# Patient Record
Sex: FEMALE | Race: WHITE | NOT HISPANIC OR LATINO | URBAN - METROPOLITAN AREA
[De-identification: names, ages, dates, MRNs, and addresses within clinical notes are randomized per-mention and may not be internally consistent; named-entity substitution may affect disease eponyms.]

---

## 2017-03-29 ENCOUNTER — INPATIENT (INPATIENT)
Facility: HOSPITAL | Age: 47
LOS: 3 days | Discharge: HOME | DRG: 101 | End: 2017-04-02
Attending: INTERNAL MEDICINE | Admitting: HOSPITALIST
Payer: COMMERCIAL

## 2017-03-29 VITALS
DIASTOLIC BLOOD PRESSURE: 110 MMHG | HEART RATE: 121 BPM | OXYGEN SATURATION: 98 % | RESPIRATION RATE: 22 BRPM | SYSTOLIC BLOOD PRESSURE: 157 MMHG

## 2017-03-29 DIAGNOSIS — R56.9 UNSPECIFIED CONVULSIONS: ICD-10-CM

## 2017-03-29 DIAGNOSIS — E78.5 HYPERLIPIDEMIA, UNSPECIFIED: ICD-10-CM

## 2017-03-29 DIAGNOSIS — E11.9 TYPE 2 DIABETES MELLITUS WITHOUT COMPLICATIONS: ICD-10-CM

## 2017-03-29 DIAGNOSIS — E87.2 ACIDOSIS: ICD-10-CM

## 2017-03-29 DIAGNOSIS — E87.1 HYPO-OSMOLALITY AND HYPONATREMIA: ICD-10-CM

## 2017-03-29 DIAGNOSIS — R00.0 TACHYCARDIA, UNSPECIFIED: ICD-10-CM

## 2017-03-29 DIAGNOSIS — E03.9 HYPOTHYROIDISM, UNSPECIFIED: ICD-10-CM

## 2017-03-29 DIAGNOSIS — Z41.8 ENCOUNTER FOR OTHER PROCEDURES FOR PURPOSES OTHER THAN REMEDYING HEALTH STATE: ICD-10-CM

## 2017-03-29 DIAGNOSIS — F25.9 SCHIZOAFFECTIVE DISORDER, UNSPECIFIED: ICD-10-CM

## 2017-03-29 LAB
ALBUMIN SERPL ELPH-MCNC: 4.9 G/DL — SIGNIFICANT CHANGE UP (ref 3.3–5)
ALP SERPL-CCNC: 88 U/L — SIGNIFICANT CHANGE UP (ref 40–120)
ALT FLD-CCNC: 13 U/L RC — SIGNIFICANT CHANGE UP (ref 10–45)
ANION GAP SERPL CALC-SCNC: 15 MMOL/L — SIGNIFICANT CHANGE UP (ref 5–17)
ANION GAP SERPL CALC-SCNC: 16 MMOL/L — SIGNIFICANT CHANGE UP (ref 5–17)
ANION GAP SERPL CALC-SCNC: 26 MMOL/L — HIGH (ref 5–17)
APTT BLD: 31 SEC — SIGNIFICANT CHANGE UP (ref 27.5–37.4)
AST SERPL-CCNC: 11 U/L — SIGNIFICANT CHANGE UP (ref 10–40)
BASE EXCESS BLDV CALC-SCNC: -5.6 MMOL/L — LOW (ref -2–2)
BASOPHILS # BLD AUTO: 0 K/UL — SIGNIFICANT CHANGE UP (ref 0–0.2)
BASOPHILS NFR BLD AUTO: 0.4 % — SIGNIFICANT CHANGE UP (ref 0–2)
BILIRUB SERPL-MCNC: 0.9 MG/DL — SIGNIFICANT CHANGE UP (ref 0.2–1.2)
BUN SERPL-MCNC: 4 MG/DL — LOW (ref 7–23)
BUN SERPL-MCNC: 6 MG/DL — LOW (ref 7–23)
BUN SERPL-MCNC: 7 MG/DL — SIGNIFICANT CHANGE UP (ref 7–23)
CA-I SERPL-SCNC: 1.23 MMOL/L — SIGNIFICANT CHANGE UP (ref 1.12–1.3)
CALCIUM SERPL-MCNC: 9.2 MG/DL — SIGNIFICANT CHANGE UP (ref 8.4–10.5)
CALCIUM SERPL-MCNC: 9.2 MG/DL — SIGNIFICANT CHANGE UP (ref 8.4–10.5)
CALCIUM SERPL-MCNC: 9.8 MG/DL — SIGNIFICANT CHANGE UP (ref 8.4–10.5)
CHLORIDE BLDV-SCNC: 106 MMOL/L — SIGNIFICANT CHANGE UP (ref 96–108)
CHLORIDE SERPL-SCNC: 101 MMOL/L — SIGNIFICANT CHANGE UP (ref 96–108)
CHLORIDE SERPL-SCNC: 101 MMOL/L — SIGNIFICANT CHANGE UP (ref 96–108)
CHLORIDE SERPL-SCNC: 86 MMOL/L — LOW (ref 96–108)
CK SERPL-CCNC: 82 U/L — SIGNIFICANT CHANGE UP (ref 25–170)
CO2 BLDV-SCNC: 22 MMOL/L — SIGNIFICANT CHANGE UP (ref 22–30)
CO2 SERPL-SCNC: 15 MMOL/L — LOW (ref 22–31)
CO2 SERPL-SCNC: 19 MMOL/L — LOW (ref 22–31)
CO2 SERPL-SCNC: 20 MMOL/L — LOW (ref 22–31)
CREAT SERPL-MCNC: 0.62 MG/DL — SIGNIFICANT CHANGE UP (ref 0.5–1.3)
CREAT SERPL-MCNC: 0.81 MG/DL — SIGNIFICANT CHANGE UP (ref 0.5–1.3)
CREAT SERPL-MCNC: 0.84 MG/DL — SIGNIFICANT CHANGE UP (ref 0.5–1.3)
EOSINOPHIL # BLD AUTO: 0 K/UL — SIGNIFICANT CHANGE UP (ref 0–0.5)
EOSINOPHIL NFR BLD AUTO: 0.5 % — SIGNIFICANT CHANGE UP (ref 0–6)
ETHANOL SERPL-MCNC: SIGNIFICANT CHANGE UP MG/DL (ref 0–10)
GAS PNL BLDV: 134 MMOL/L — LOW (ref 136–145)
GAS PNL BLDV: SIGNIFICANT CHANGE UP
GAS PNL BLDV: SIGNIFICANT CHANGE UP
GLUCOSE BLDV-MCNC: 79 MG/DL — SIGNIFICANT CHANGE UP (ref 70–99)
GLUCOSE SERPL-MCNC: 141 MG/DL — HIGH (ref 70–99)
GLUCOSE SERPL-MCNC: 156 MG/DL — HIGH (ref 70–99)
GLUCOSE SERPL-MCNC: 85 MG/DL — SIGNIFICANT CHANGE UP (ref 70–99)
HCO3 BLDV-SCNC: 20 MMOL/L — LOW (ref 21–29)
HCT VFR BLD CALC: 50 % — HIGH (ref 34.5–45)
HCT VFR BLDA CALC: 48 % — SIGNIFICANT CHANGE UP (ref 39–50)
HGB BLD CALC-MCNC: 15.6 G/DL — HIGH (ref 11.5–15.5)
HGB BLD-MCNC: 16.9 G/DL — HIGH (ref 11.5–15.5)
INR BLD: 1.16 RATIO — SIGNIFICANT CHANGE UP (ref 0.88–1.16)
LACTATE BLDV-MCNC: 1.4 MMOL/L — SIGNIFICANT CHANGE UP (ref 0.7–2)
LYMPHOCYTES # BLD AUTO: 1.5 K/UL — SIGNIFICANT CHANGE UP (ref 1–3.3)
LYMPHOCYTES # BLD AUTO: 16.2 % — SIGNIFICANT CHANGE UP (ref 13–44)
MAGNESIUM SERPL-MCNC: 2.2 MG/DL — SIGNIFICANT CHANGE UP (ref 1.6–2.6)
MCHC RBC-ENTMCNC: 28.6 PG — SIGNIFICANT CHANGE UP (ref 27–34)
MCHC RBC-ENTMCNC: 33.7 GM/DL — SIGNIFICANT CHANGE UP (ref 32–36)
MCV RBC AUTO: 84.8 FL — SIGNIFICANT CHANGE UP (ref 80–100)
MONOCYTES # BLD AUTO: 0.7 K/UL — SIGNIFICANT CHANGE UP (ref 0–0.9)
MONOCYTES NFR BLD AUTO: 7.4 % — SIGNIFICANT CHANGE UP (ref 2–14)
NEUTROPHILS # BLD AUTO: 6.9 K/UL — SIGNIFICANT CHANGE UP (ref 1.8–7.4)
NEUTROPHILS NFR BLD AUTO: 75.5 % — SIGNIFICANT CHANGE UP (ref 43–77)
PCO2 BLDV: 44 MMHG — SIGNIFICANT CHANGE UP (ref 35–50)
PH BLDV: 7.29 — LOW (ref 7.35–7.45)
PHOSPHATE SERPL-MCNC: 3.2 MG/DL — SIGNIFICANT CHANGE UP (ref 2.5–4.5)
PLATELET # BLD AUTO: 278 K/UL — SIGNIFICANT CHANGE UP (ref 150–400)
PO2 BLDV: 38 MMHG — SIGNIFICANT CHANGE UP (ref 25–45)
POTASSIUM BLDV-SCNC: 3.8 MMOL/L — SIGNIFICANT CHANGE UP (ref 3.5–5)
POTASSIUM SERPL-MCNC: 3.6 MMOL/L — SIGNIFICANT CHANGE UP (ref 3.5–5.3)
POTASSIUM SERPL-MCNC: 3.7 MMOL/L — SIGNIFICANT CHANGE UP (ref 3.5–5.3)
POTASSIUM SERPL-MCNC: 4.2 MMOL/L — SIGNIFICANT CHANGE UP (ref 3.5–5.3)
POTASSIUM SERPL-SCNC: 3.6 MMOL/L — SIGNIFICANT CHANGE UP (ref 3.5–5.3)
POTASSIUM SERPL-SCNC: 3.7 MMOL/L — SIGNIFICANT CHANGE UP (ref 3.5–5.3)
POTASSIUM SERPL-SCNC: 4.2 MMOL/L — SIGNIFICANT CHANGE UP (ref 3.5–5.3)
PROT SERPL-MCNC: 7.4 G/DL — SIGNIFICANT CHANGE UP (ref 6–8.3)
PROTHROM AB SERPL-ACNC: 12.7 SEC — SIGNIFICANT CHANGE UP (ref 9.8–12.7)
RBC # BLD: 5.9 M/UL — HIGH (ref 3.8–5.2)
RBC # FLD: 13.8 % — SIGNIFICANT CHANGE UP (ref 10.3–14.5)
SAO2 % BLDV: 67 % — SIGNIFICANT CHANGE UP (ref 67–88)
SODIUM SERPL-SCNC: 127 MMOL/L — LOW (ref 135–145)
SODIUM SERPL-SCNC: 135 MMOL/L — SIGNIFICANT CHANGE UP (ref 135–145)
SODIUM SERPL-SCNC: 137 MMOL/L — SIGNIFICANT CHANGE UP (ref 135–145)
WBC # BLD: 9.1 K/UL — SIGNIFICANT CHANGE UP (ref 3.8–10.5)
WBC # FLD AUTO: 9.1 K/UL — SIGNIFICANT CHANGE UP (ref 3.8–10.5)

## 2017-03-29 PROCEDURE — 70450 CT HEAD/BRAIN W/O DYE: CPT | Mod: 26

## 2017-03-29 PROCEDURE — 99285 EMERGENCY DEPT VISIT HI MDM: CPT | Mod: 25

## 2017-03-29 PROCEDURE — 71010: CPT | Mod: 26

## 2017-03-29 PROCEDURE — 99223 1ST HOSP IP/OBS HIGH 75: CPT | Mod: AI,GC

## 2017-03-29 PROCEDURE — 93010 ELECTROCARDIOGRAM REPORT: CPT

## 2017-03-29 RX ORDER — DEXTROSE 50 % IN WATER 50 %
25 SYRINGE (ML) INTRAVENOUS ONCE
Qty: 0 | Refills: 0 | Status: DISCONTINUED | OUTPATIENT
Start: 2017-03-29 | End: 2017-03-29

## 2017-03-29 RX ORDER — CLONAZEPAM 1 MG
1 TABLET ORAL
Qty: 0 | Refills: 0 | Status: DISCONTINUED | OUTPATIENT
Start: 2017-03-29 | End: 2017-04-02

## 2017-03-29 RX ORDER — LEVOTHYROXINE SODIUM 125 MCG
50 TABLET ORAL
Qty: 0 | Refills: 0 | Status: DISCONTINUED | OUTPATIENT
Start: 2017-03-29 | End: 2017-04-02

## 2017-03-29 RX ORDER — SODIUM CHLORIDE 9 MG/ML
1000 INJECTION, SOLUTION INTRAVENOUS
Qty: 0 | Refills: 0 | Status: DISCONTINUED | OUTPATIENT
Start: 2017-03-29 | End: 2017-03-29

## 2017-03-29 RX ORDER — DEXTROSE 50 % IN WATER 50 %
1 SYRINGE (ML) INTRAVENOUS ONCE
Qty: 0 | Refills: 0 | Status: DISCONTINUED | OUTPATIENT
Start: 2017-03-29 | End: 2017-03-29

## 2017-03-29 RX ORDER — LEVOTHYROXINE SODIUM 125 MCG
25 TABLET ORAL
Qty: 0 | Refills: 0 | Status: DISCONTINUED | OUTPATIENT
Start: 2017-03-29 | End: 2017-04-02

## 2017-03-29 RX ORDER — CHOLECALCIFEROL (VITAMIN D3) 125 MCG
2000 CAPSULE ORAL DAILY
Qty: 0 | Refills: 0 | Status: DISCONTINUED | OUTPATIENT
Start: 2017-03-29 | End: 2017-04-02

## 2017-03-29 RX ORDER — OXCARBAZEPINE 300 MG/1
300 TABLET, FILM COATED ORAL
Qty: 0 | Refills: 0 | Status: DISCONTINUED | OUTPATIENT
Start: 2017-03-29 | End: 2017-03-29

## 2017-03-29 RX ORDER — INSULIN LISPRO 100/ML
VIAL (ML) SUBCUTANEOUS AT BEDTIME
Qty: 0 | Refills: 0 | Status: DISCONTINUED | OUTPATIENT
Start: 2017-03-29 | End: 2017-04-02

## 2017-03-29 RX ORDER — RISPERIDONE 4 MG/1
4 TABLET ORAL
Qty: 0 | Refills: 0 | Status: DISCONTINUED | OUTPATIENT
Start: 2017-03-29 | End: 2017-04-02

## 2017-03-29 RX ORDER — DEXTROSE 50 % IN WATER 50 %
25 SYRINGE (ML) INTRAVENOUS ONCE
Qty: 0 | Refills: 0 | Status: DISCONTINUED | OUTPATIENT
Start: 2017-03-29 | End: 2017-04-02

## 2017-03-29 RX ORDER — OXCARBAZEPINE 300 MG/1
600 TABLET, FILM COATED ORAL
Qty: 0 | Refills: 0 | Status: DISCONTINUED | OUTPATIENT
Start: 2017-03-29 | End: 2017-03-29

## 2017-03-29 RX ORDER — SODIUM CHLORIDE 9 MG/ML
1000 INJECTION INTRAMUSCULAR; INTRAVENOUS; SUBCUTANEOUS ONCE
Qty: 0 | Refills: 0 | Status: COMPLETED | OUTPATIENT
Start: 2017-03-29 | End: 2017-03-29

## 2017-03-29 RX ORDER — SODIUM CHLORIDE 9 MG/ML
1000 INJECTION, SOLUTION INTRAVENOUS
Qty: 0 | Refills: 0 | Status: DISCONTINUED | OUTPATIENT
Start: 2017-03-29 | End: 2017-04-02

## 2017-03-29 RX ORDER — BENZTROPINE MESYLATE 1 MG
0.5 TABLET ORAL
Qty: 0 | Refills: 0 | Status: DISCONTINUED | OUTPATIENT
Start: 2017-03-29 | End: 2017-04-02

## 2017-03-29 RX ORDER — ACETAMINOPHEN 500 MG
650 TABLET ORAL EVERY 6 HOURS
Qty: 0 | Refills: 0 | Status: DISCONTINUED | OUTPATIENT
Start: 2017-03-29 | End: 2017-04-02

## 2017-03-29 RX ORDER — NICOTINE POLACRILEX 2 MG
1 GUM BUCCAL DAILY
Qty: 0 | Refills: 0 | Status: DISCONTINUED | OUTPATIENT
Start: 2017-03-29 | End: 2017-04-02

## 2017-03-29 RX ORDER — GLUCAGON INJECTION, SOLUTION 0.5 MG/.1ML
1 INJECTION, SOLUTION SUBCUTANEOUS ONCE
Qty: 0 | Refills: 0 | Status: DISCONTINUED | OUTPATIENT
Start: 2017-03-29 | End: 2017-03-29

## 2017-03-29 RX ORDER — DEXTROSE 50 % IN WATER 50 %
12.5 SYRINGE (ML) INTRAVENOUS ONCE
Qty: 0 | Refills: 0 | Status: DISCONTINUED | OUTPATIENT
Start: 2017-03-29 | End: 2017-03-29

## 2017-03-29 RX ORDER — ATORVASTATIN CALCIUM 80 MG/1
10 TABLET, FILM COATED ORAL AT BEDTIME
Qty: 0 | Refills: 0 | Status: DISCONTINUED | OUTPATIENT
Start: 2017-03-29 | End: 2017-04-02

## 2017-03-29 RX ORDER — PREGABALIN 225 MG/1
500 CAPSULE ORAL DAILY
Qty: 0 | Refills: 0 | Status: DISCONTINUED | OUTPATIENT
Start: 2017-03-29 | End: 2017-04-02

## 2017-03-29 RX ORDER — OXCARBAZEPINE 300 MG/1
600 TABLET, FILM COATED ORAL
Qty: 0 | Refills: 0 | Status: DISCONTINUED | OUTPATIENT
Start: 2017-03-29 | End: 2017-04-02

## 2017-03-29 RX ORDER — INSULIN LISPRO 100/ML
VIAL (ML) SUBCUTANEOUS
Qty: 0 | Refills: 0 | Status: DISCONTINUED | OUTPATIENT
Start: 2017-03-29 | End: 2017-04-02

## 2017-03-29 RX ORDER — ENOXAPARIN SODIUM 100 MG/ML
40 INJECTION SUBCUTANEOUS EVERY 24 HOURS
Qty: 0 | Refills: 0 | Status: DISCONTINUED | OUTPATIENT
Start: 2017-03-29 | End: 2017-04-02

## 2017-03-29 RX ORDER — INFLUENZA VIRUS VACCINE 15; 15; 15; 15 UG/.5ML; UG/.5ML; UG/.5ML; UG/.5ML
0.5 SUSPENSION INTRAMUSCULAR ONCE
Qty: 0 | Refills: 0 | Status: COMPLETED | OUTPATIENT
Start: 2017-03-29 | End: 2017-03-29

## 2017-03-29 RX ADMIN — SODIUM CHLORIDE 1000 MILLILITER(S): 9 INJECTION INTRAMUSCULAR; INTRAVENOUS; SUBCUTANEOUS at 04:42

## 2017-03-29 RX ADMIN — SODIUM CHLORIDE 1000 MILLILITER(S): 9 INJECTION INTRAMUSCULAR; INTRAVENOUS; SUBCUTANEOUS at 06:09

## 2017-03-29 RX ADMIN — Medication 1 MILLIGRAM(S): at 21:25

## 2017-03-29 RX ADMIN — Medication 1 MILLIGRAM(S): at 10:46

## 2017-03-29 RX ADMIN — ATORVASTATIN CALCIUM 10 MILLIGRAM(S): 80 TABLET, FILM COATED ORAL at 21:25

## 2017-03-29 RX ADMIN — Medication 2000 UNIT(S): at 17:43

## 2017-03-29 RX ADMIN — PREGABALIN 500 MICROGRAM(S): 225 CAPSULE ORAL at 17:43

## 2017-03-29 RX ADMIN — RISPERIDONE 4 MILLIGRAM(S): 4 TABLET ORAL at 17:44

## 2017-03-29 RX ADMIN — OXCARBAZEPINE 300 MILLIGRAM(S): 300 TABLET, FILM COATED ORAL at 17:43

## 2017-03-29 RX ADMIN — ENOXAPARIN SODIUM 40 MILLIGRAM(S): 100 INJECTION SUBCUTANEOUS at 17:42

## 2017-03-29 RX ADMIN — Medication 0.5 MILLIGRAM(S): at 17:44

## 2017-03-29 NOTE — H&P ADULT. - HISTORY OF PRESENT ILLNESS
45yo F with PMH of T2DM, HLD, schizoaffective disorder presents by ems s/p fall. pt found by  on bathroom floor s/p hearing "shriek" and "thud." pt was unconscious initially, awoke after brief period.  did witness "seizure like activity" but cannot describe in more detail. states she was "not making sense" when she awoke. she denies any complaints at this time, including headache, dizziness, cp, sob, numbness, weakness, change in vision, nausea. pt denies vomiting but appears to have vomited on self 45yo F with PMH of T2DM, HLD, and schizoaffective disorder presenting s/p fall. Per , around 3AM he heard a shriek and found the patient lying on her back in front of the bathroom.  states the patient was conscious when he found her. She had her eyes open and had "seizure-like activity." This activity was described as rhythmic movement of her arms, body, and head. However, per , patient was able to grab and hold on to both of his hands on command. When patient attempted to speak initially,  states that her speech was garbled and unclear. When EMS arrived, patient required multiple attempts to stand as she felt unsteady on her feet.    Patient was sleeping during history and physical. She was arousable but uncooperative with questioning, stating "just let me stay here."  states that there have not been any significant changes to patient's medications, mood, or health recently. Patient has not complained of headache, CP, SOB, change in vision, numbness, weakness, Abd Pain, N/V.    In the ED, patient was afebrile to 98.1, hypertensive to 152/110, tachycardic to 121, tachypneic to 22, and satting 98% on RA. She had positive orthostatics with 45yo F with PMH of T2DM, HLD, and schizoaffective disorder presenting s/p fall. Per , around 3AM he heard a shriek and found the patient lying on her back in front of the bathroom.  states the patient was conscious when he found her. She had her eyes open and had "seizure-like activity." This activity was described as rhythmic movement of her arms, body, and head. However, per , patient was able to grab and hold on to both of his hands on command. When patient attempted to speak initially,  states that her speech was garbled and unclear. When EMS arrived, patient required multiple attempts to stand as she felt unsteady on her feet.    Patient was sleeping during history and physical. She was arousable but uncooperative with questioning, stating "just let me stay here."  states that there have not been any significant changes to patient's medications, mood, or health recently. Patient has not complained of headache, CP, SOB, change in vision, numbness, weakness, Abd Pain, N/V.    In the ED, patient was afebrile to 98.1, hypertensive to 152/110, tachycardic to 121, tachypneic to 22, and satting 98% on RA. She had positive orthostatics with HR increase of 23 and diastolic decrease of 15 from sitting to standing. Patient was bolused 2L NS and given home dose of clonazepam 1mg.

## 2017-03-29 NOTE — H&P ADULT. - ATTENDING COMMENTS
47Y/O/F with PMH of T2DM, HLD, and schizoaffective disorder reported by her  that around 3 AM she found her lying on her back and not responding, initially staring at him then he noted that she is having rhythmic body movements and seizure like activity. She has no history of seizure disorder. Now, she is awake alert oriented without focal neurological deficit. There is also minor tongue bite in the lateral aspect of her tongue.   She was found to have hyponatremia with Sr. Sodium of 127, AG metabolic acidosis and hyperlactetemia. CT head was non diagnostic. I looked at her old lab which revealed mild chronic hyponatremia. She received NS bolus in the ED secondary to orthostatic hypotension. Repeat BMP revealed that her AG is closed and Serum sodium is increased to 137.   Seizure like activity- Neurology consulted EEG and MRI of brain. Seizure  precautions.   Hyponatremia – it appears like patient has history of hyponatremia and now its corrected,- will renal opinion and start hypotonic saline. Check serum osmolality and TSH and UA.  H/O schizoaffective disorder- will try to speak to her psychiatrist.   Plan of care discussed with the patient and her . They agree. ( patient's  also reported that she is not eating her meal on a regular basis)

## 2017-03-29 NOTE — ED PROVIDER NOTE - PHYSICAL EXAMINATION
CN II-XII intact. Visual fields intact. EOMI, PERRLA. Facial sensation equal bilat. Smile and eye closure equal bilat. Hearing intact bilat. Palate elevation equal, tongue protrusion midline. Lateral head rotation equal bilat. 5/5 strength UE and LE bilat. No pronator drift.

## 2017-03-29 NOTE — ED PROVIDER NOTE - OBJECTIVE STATEMENT
46F with pmh of DM II, HTN, HLD, schizoaffective disorder presents by ems s/p fall. pt found by  on bathroom floor s/p hearing "shriek" and "thud." pt was unconscious initially, awoke after brief period.  did witness "seizure like activity" but cannot describe in more detail. states she was "not making sense" when she awoke. she denies any complaints at this time, including headache, dizziness, cp, sob, numbness, weakness, change in vision, nausea. 46F with pmh of DM II, HTN, HLD, schizoaffective disorder presents by ems s/p fall. pt found by  on bathroom floor s/p hearing "shriek" and "thud." pt was unconscious initially, awoke after brief period.  did witness "seizure like activity" but cannot describe in more detail. states she was "not making sense" when she awoke. she denies any complaints at this time, including headache, dizziness, cp, sob, numbness, weakness, change in vision, nausea. pt denies vomiting but appears to have vomited on self.

## 2017-03-29 NOTE — H&P ADULT. - LAB RESULTS AND INTERPRETATION
hyponatremic. hemoconcentrated. AG acidosis with elevated lactate. negative cardiac enzymes. negative EtOH, Salicylate, acetaminophen levels.

## 2017-03-29 NOTE — ED PROVIDER NOTE - MEDICAL DECISION MAKING DETAILS
46F with schizoaffective disorder, htn, hld, presents s/p fall, possible seizure. pt initially uncooperative, agitated, but eventually agreed to evaluation. will obtain cth, cbc, cmp, vbg, ua, ekg. orthostatic bp. re-assess.

## 2017-03-29 NOTE — H&P ADULT. - PROBLEM SELECTOR PLAN 2
-positive orth -resolved, likely due to dehydration  -positive orthostatics in the ED  -tachycardia resolved after 2L bolus

## 2017-03-29 NOTE — ED PROVIDER NOTE - ATTENDING CONTRIBUTION TO CARE
47 yo F with schizoaffective disorder, htn, hld on multiple meds p/w s/p fall, possible seizure as  heard a loud thump and scream followed by him witnessing seizure activity followed by LOC for a few seconds. Pt has no recollection of the events prior to or following the incident. Dry liquid is visualized on the pt's right cheek from the edge of her mouth to her hair which is likely vomitus although pt denies. Pt says she has been compliant with her psych meds and denies overdose. Denies SI, HI, visual and auditory hallucinations. Pt initially uncooperative, agitated, but eventually agreed to evaluation. will obtain cth, cbc, cmp, vbg, ua, ekg, neuro eval for new onset seizure. orthostatic bp. re-assess.  PE: no signs of trauma, neuro intact  I performed a history and physical exam of the patient and discussed their management with the resident. I reviewed the resident's note and agree with the documented findings and plan of care. My medical decision making and observations are found above. 45 yo F with schizoaffective disorder, htn, hld on multiple meds p/w s/p fall, possible seizure as  heard a loud thump and scream followed by him witnessing seizure activity followed by LOC for a few seconds. Pt has no recollection of the events prior to or following the incident. Dry liquid is visualized on the pt's right cheek from the edge of her mouth to her hair which is likely vomitus although pt denies. Pt says she has been compliant with her psych meds and denies overdose. Denies SI, HI, visual and auditory hallucinations. Pt initially uncooperative, agitated, but eventually agreed to evaluation. will obtain cth, cbc, cmp, vbg, ua, ekg, neuro eval for new onset seizure vs. syncope vs electrolyte imbalance vs. withdrawal.   PE: no signs of trauma, neuro intact  I performed a history and physical exam of the patient and discussed their management with the resident. I reviewed the resident's note and agree with the documented findings and plan of care. My medical decision making and observations are found above.

## 2017-03-29 NOTE — ED ADULT NURSE REASSESSMENT NOTE - NS ED NURSE REASSESS COMMENT FT1
Pt received AA and Ox3.  Pt in no distress.  Pt denies cp, ha, dizziness or sob at this time.  Lungs cta b/l.  No seizure activity noted at this time.  On tele=SR.  Abdomen soft, nt/nd, (+)bs.  Pt denies abdominal pain, n/v or diarrhea at this time.  Left AC with 20G HL patent and intact.  VSS.  Pt admitted.  Pending bed availability.

## 2017-03-29 NOTE — H&P ADULT. - PROBLEM SELECTOR PLAN 1
-back to baseline mental status  -seen and evaluated by Neuro  -check routine EEG  -check MRI Brain w/o Contrast  -no role for AED at this time -unclear if true seizure: could have been secondary to hyponatremia ro benzo withdrawal  -seen and evaluated by Neuro  -check routine EEG  -check MRI Brain w/o Contrast  -no role for AED at this time -unclear if seizure vs psych event vs orthostasis  -seen and evaluated by Neuro  -check routine EEG  -check MRI Brain w/o Contrast  -no role for AED at this time

## 2017-03-29 NOTE — H&P ADULT. - PROBLEM SELECTOR PLAN 3
-may be secondary to dehydration +/- med-induced  -repeat BMP now, s/p hydration  -if not improving, check Serum Osm

## 2017-03-29 NOTE — ED ADULT NURSE REASSESSMENT NOTE - NS ED NURSE REASSESS COMMENT FT1
Pt. noted to have urinated in bed, pt. cleaned, new linen provided. Pt. refusing to be changed into new gown. No vomiting noted at this time. Moves all extremities. IVF being administered as ordered. Speech clear. Pending dispo.

## 2017-03-29 NOTE — H&P ADULT. - ASSESSMENT
45yo F with PMH of T2DM, HLD, and schizoaffective disorder presenting s/p fall with reported rhythmic body movements  found to be hyponatremic with positive orthostatics with concern for new onset seizures.

## 2017-03-29 NOTE — H&P ADULT. - RS GEN PE MLT RESP DETAILS PC
respirations non-labored/diminished breath sounds, R/airway patent/breath sounds equal/diminished breath sounds, L

## 2017-03-29 NOTE — ED ADULT NURSE NOTE - OBJECTIVE STATEMENT
45 yo F presents to ED via EMS s/p fall.  at bedside reports "seizure like activity" at home.  states he heard a "thump" and a "shriek" and found patient unresponsive on the floor, states patient was alert shortly after. Pt arrives with dried emesis on shirt and in hair. Pt. noted to have red area and small skin tear under left breast. No obvious injuries noted. Pt. moves all extremities. Skin warm pink and dry. Breathing unlabored on RA.  states patient is compliant with home medications. Pt. denies any pain/discomfort. Pt. denies SI/auditory/visual hallucinations. Comfort and safety maintained,  at bedside.

## 2017-03-29 NOTE — ED ADULT NURSE REASSESSMENT NOTE - NS ED NURSE REASSESS COMMENT FT1
Pt. urinated in bed for a second time. New sheets, linen and gown provided. Pt. denies urinary incontinence.

## 2017-03-29 NOTE — ED PROVIDER NOTE - CARE PLAN
Principal Discharge DX:	New onset seizure Principal Discharge DX:	New onset seizure  Secondary Diagnosis:	Tachycardia

## 2017-03-29 NOTE — H&P ADULT. - PROBLEM SELECTOR PLAN 4
-anion gap of 26 with elevated lactate  -may be secondary to dehydration vs new seizure  -recheck BMP, monitor gap

## 2017-03-30 LAB
ANION GAP SERPL CALC-SCNC: 13 MMOL/L — SIGNIFICANT CHANGE UP (ref 5–17)
ANION GAP SERPL CALC-SCNC: 13 MMOL/L — SIGNIFICANT CHANGE UP (ref 5–17)
APPEARANCE UR: CLEAR — SIGNIFICANT CHANGE UP
BACTERIA # UR AUTO: ABNORMAL /HPF
BASOPHILS # BLD AUTO: 0 K/UL — SIGNIFICANT CHANGE UP (ref 0–0.2)
BASOPHILS NFR BLD AUTO: 0.8 % — SIGNIFICANT CHANGE UP (ref 0–2)
BILIRUB UR-MCNC: NEGATIVE — SIGNIFICANT CHANGE UP
BUN SERPL-MCNC: 6 MG/DL — LOW (ref 7–23)
BUN SERPL-MCNC: 7 MG/DL — SIGNIFICANT CHANGE UP (ref 7–23)
CALCIUM SERPL-MCNC: 9.1 MG/DL — SIGNIFICANT CHANGE UP (ref 8.4–10.5)
CALCIUM SERPL-MCNC: 9.4 MG/DL — SIGNIFICANT CHANGE UP (ref 8.4–10.5)
CHLORIDE SERPL-SCNC: 102 MMOL/L — SIGNIFICANT CHANGE UP (ref 96–108)
CHLORIDE SERPL-SCNC: 102 MMOL/L — SIGNIFICANT CHANGE UP (ref 96–108)
CO2 SERPL-SCNC: 21 MMOL/L — LOW (ref 22–31)
CO2 SERPL-SCNC: 22 MMOL/L — SIGNIFICANT CHANGE UP (ref 22–31)
COLOR SPEC: COLORLESS — SIGNIFICANT CHANGE UP
CREAT SERPL-MCNC: 0.69 MG/DL — SIGNIFICANT CHANGE UP (ref 0.5–1.3)
CREAT SERPL-MCNC: 0.74 MG/DL — SIGNIFICANT CHANGE UP (ref 0.5–1.3)
DIFF PNL FLD: NEGATIVE — SIGNIFICANT CHANGE UP
EOSINOPHIL # BLD AUTO: 0 K/UL — SIGNIFICANT CHANGE UP (ref 0–0.5)
EOSINOPHIL NFR BLD AUTO: 0.8 % — SIGNIFICANT CHANGE UP (ref 0–6)
GLUCOSE SERPL-MCNC: 104 MG/DL — HIGH (ref 70–99)
GLUCOSE SERPL-MCNC: 107 MG/DL — HIGH (ref 70–99)
GLUCOSE UR QL: NEGATIVE — SIGNIFICANT CHANGE UP
HCT VFR BLD CALC: 43 % — SIGNIFICANT CHANGE UP (ref 34.5–45)
HGB BLD-MCNC: 14.6 G/DL — SIGNIFICANT CHANGE UP (ref 11.5–15.5)
KETONES UR-MCNC: NEGATIVE — SIGNIFICANT CHANGE UP
LEUKOCYTE ESTERASE UR-ACNC: NEGATIVE — SIGNIFICANT CHANGE UP
LYMPHOCYTES # BLD AUTO: 1.7 K/UL — SIGNIFICANT CHANGE UP (ref 1–3.3)
LYMPHOCYTES # BLD AUTO: 31.1 % — SIGNIFICANT CHANGE UP (ref 13–44)
MAGNESIUM SERPL-MCNC: 1.9 MG/DL — SIGNIFICANT CHANGE UP (ref 1.6–2.6)
MCHC RBC-ENTMCNC: 29.1 PG — SIGNIFICANT CHANGE UP (ref 27–34)
MCHC RBC-ENTMCNC: 34.1 GM/DL — SIGNIFICANT CHANGE UP (ref 32–36)
MCV RBC AUTO: 85.5 FL — SIGNIFICANT CHANGE UP (ref 80–100)
MONOCYTES # BLD AUTO: 0.4 K/UL — SIGNIFICANT CHANGE UP (ref 0–0.9)
MONOCYTES NFR BLD AUTO: 8.1 % — SIGNIFICANT CHANGE UP (ref 2–14)
NEUTROPHILS # BLD AUTO: 3.3 K/UL — SIGNIFICANT CHANGE UP (ref 1.8–7.4)
NEUTROPHILS NFR BLD AUTO: 59.1 % — SIGNIFICANT CHANGE UP (ref 43–77)
NITRITE UR-MCNC: NEGATIVE — SIGNIFICANT CHANGE UP
OSMOLALITY SERPL: 284 MOS/KG — SIGNIFICANT CHANGE UP (ref 275–300)
OSMOLALITY UR: 47 MOS/KG — LOW (ref 300–900)
PCP SPEC-MCNC: SIGNIFICANT CHANGE UP
PH UR: 5.5 — SIGNIFICANT CHANGE UP (ref 4.8–8)
PHOSPHATE SERPL-MCNC: 3.7 MG/DL — SIGNIFICANT CHANGE UP (ref 2.5–4.5)
PLATELET # BLD AUTO: 238 K/UL — SIGNIFICANT CHANGE UP (ref 150–400)
POTASSIUM SERPL-MCNC: 3.4 MMOL/L — LOW (ref 3.5–5.3)
POTASSIUM SERPL-MCNC: 3.8 MMOL/L — SIGNIFICANT CHANGE UP (ref 3.5–5.3)
POTASSIUM SERPL-SCNC: 3.4 MMOL/L — LOW (ref 3.5–5.3)
POTASSIUM SERPL-SCNC: 3.8 MMOL/L — SIGNIFICANT CHANGE UP (ref 3.5–5.3)
PROT UR-MCNC: NEGATIVE — SIGNIFICANT CHANGE UP
RBC # BLD: 5.03 M/UL — SIGNIFICANT CHANGE UP (ref 3.8–5.2)
RBC # FLD: 13.5 % — SIGNIFICANT CHANGE UP (ref 10.3–14.5)
RBC CASTS # UR COMP ASSIST: SIGNIFICANT CHANGE UP /HPF (ref 0–2)
SODIUM SERPL-SCNC: 136 MMOL/L — SIGNIFICANT CHANGE UP (ref 135–145)
SODIUM SERPL-SCNC: 137 MMOL/L — SIGNIFICANT CHANGE UP (ref 135–145)
SODIUM UR-SCNC: <20 MMOL/L — SIGNIFICANT CHANGE UP
SP GR SPEC: <1.005 — LOW (ref 1.01–1.02)
TSH SERPL-MCNC: 3.14 UIU/ML — SIGNIFICANT CHANGE UP (ref 0.27–4.2)
UROBILINOGEN FLD QL: NEGATIVE — SIGNIFICANT CHANGE UP
WBC # BLD: 5.5 K/UL — SIGNIFICANT CHANGE UP (ref 3.8–10.5)
WBC # FLD AUTO: 5.5 K/UL — SIGNIFICANT CHANGE UP (ref 3.8–10.5)

## 2017-03-30 PROCEDURE — 99233 SBSQ HOSP IP/OBS HIGH 50: CPT | Mod: GC

## 2017-03-30 RX ORDER — POTASSIUM CHLORIDE 20 MEQ
20 PACKET (EA) ORAL ONCE
Qty: 0 | Refills: 0 | Status: COMPLETED | OUTPATIENT
Start: 2017-03-30 | End: 2017-03-30

## 2017-03-30 RX ADMIN — ENOXAPARIN SODIUM 40 MILLIGRAM(S): 100 INJECTION SUBCUTANEOUS at 17:01

## 2017-03-30 RX ADMIN — Medication 0.5 MILLIGRAM(S): at 17:01

## 2017-03-30 RX ADMIN — ATORVASTATIN CALCIUM 10 MILLIGRAM(S): 80 TABLET, FILM COATED ORAL at 21:50

## 2017-03-30 RX ADMIN — Medication 0.5 MILLIGRAM(S): at 05:57

## 2017-03-30 RX ADMIN — PREGABALIN 500 MICROGRAM(S): 225 CAPSULE ORAL at 09:04

## 2017-03-30 RX ADMIN — Medication 1 MILLIGRAM(S): at 17:00

## 2017-03-30 RX ADMIN — Medication 20 MILLIEQUIVALENT(S): at 17:00

## 2017-03-30 RX ADMIN — OXCARBAZEPINE 600 MILLIGRAM(S): 300 TABLET, FILM COATED ORAL at 17:00

## 2017-03-30 RX ADMIN — RISPERIDONE 4 MILLIGRAM(S): 4 TABLET ORAL at 17:01

## 2017-03-30 RX ADMIN — RISPERIDONE 4 MILLIGRAM(S): 4 TABLET ORAL at 05:57

## 2017-03-30 RX ADMIN — Medication 25 MICROGRAM(S): at 09:03

## 2017-03-30 RX ADMIN — Medication 1 MILLIGRAM(S): at 05:57

## 2017-03-30 RX ADMIN — Medication 2000 UNIT(S): at 09:04

## 2017-03-30 NOTE — PROVIDER CONTACT NOTE (OTHER) - ASSESSMENT
Pt A+Ox4 with hx of schizoaffective disorder. Pt agreeable to IVL at one point; attempted by IV nurse twice. Pt angry/agitated removing tourniquet stating that "this is abuse..I'm going to justin you all." Pt received dose of klonopin as ordered. Unable to place IV. Pt ordered for dextrose5% at 50ml/hr. Bedtime FS81. Pt requests cola and agrees to drink to increase glucose level.

## 2017-03-30 NOTE — PROVIDER CONTACT NOTE (OTHER) - ASSESSMENT
Pt A+Ox3. Pt going to bathroom about 3times. Signal lost on monitor, refusing to be checked. States that she wants to talk to "a man doctor" and not the nurse who is a woman. Screaming and demanding for diet cola and to be left alone. [id number] when OOB to bathroom. No IVL.

## 2017-03-30 NOTE — PROVIDER CONTACT NOTE (OTHER) - REASON
Pt refusing tele to be fixed; screaming agitated stating that she wants to speak with "man doctor," HR up to 130s

## 2017-03-30 NOTE — PROVIDER CONTACT NOTE (OTHER) - ASSESSMENT
A&OX3, denies any pain or discomfort. states, "I feel fine and I don't need you or to even be here so I don't need a monitor". Spouse at bedside, not participating in conversation.

## 2017-03-30 NOTE — PROVIDER CONTACT NOTE (OTHER) - ACTION/TREATMENT ORDERED:
Continue to monitor patient. Will come to assess patient.
MD aware. Okay to hold off on IV placement at this time due to agitation. Encourage PO intake for blood glucose level. Continue to monitor.
MD aware. Will assess pt. Pt on monitor 80s.
No new orders at this time. continue to monitor pt.

## 2017-03-31 LAB
ANION GAP SERPL CALC-SCNC: 15 MMOL/L — SIGNIFICANT CHANGE UP (ref 5–17)
BUN SERPL-MCNC: 7 MG/DL — SIGNIFICANT CHANGE UP (ref 7–23)
CALCIUM SERPL-MCNC: 9.3 MG/DL — SIGNIFICANT CHANGE UP (ref 8.4–10.5)
CHLORIDE SERPL-SCNC: 100 MMOL/L — SIGNIFICANT CHANGE UP (ref 96–108)
CO2 SERPL-SCNC: 21 MMOL/L — LOW (ref 22–31)
CREAT SERPL-MCNC: 0.68 MG/DL — SIGNIFICANT CHANGE UP (ref 0.5–1.3)
GLUCOSE SERPL-MCNC: 139 MG/DL — HIGH (ref 70–99)
POTASSIUM SERPL-MCNC: 3.6 MMOL/L — SIGNIFICANT CHANGE UP (ref 3.5–5.3)
POTASSIUM SERPL-SCNC: 3.6 MMOL/L — SIGNIFICANT CHANGE UP (ref 3.5–5.3)
SODIUM SERPL-SCNC: 136 MMOL/L — SIGNIFICANT CHANGE UP (ref 135–145)

## 2017-03-31 PROCEDURE — 99233 SBSQ HOSP IP/OBS HIGH 50: CPT | Mod: GC

## 2017-03-31 RX ADMIN — Medication 25 MICROGRAM(S): at 09:00

## 2017-03-31 RX ADMIN — Medication 2000 UNIT(S): at 09:00

## 2017-03-31 RX ADMIN — Medication 0.5 MILLIGRAM(S): at 17:43

## 2017-03-31 RX ADMIN — Medication 1 MILLIGRAM(S): at 05:20

## 2017-03-31 RX ADMIN — Medication 0.5 MILLIGRAM(S): at 05:20

## 2017-03-31 RX ADMIN — SODIUM CHLORIDE 50 MILLILITER(S): 9 INJECTION, SOLUTION INTRAVENOUS at 05:20

## 2017-03-31 RX ADMIN — OXCARBAZEPINE 600 MILLIGRAM(S): 300 TABLET, FILM COATED ORAL at 17:42

## 2017-03-31 RX ADMIN — RISPERIDONE 4 MILLIGRAM(S): 4 TABLET ORAL at 17:42

## 2017-03-31 RX ADMIN — Medication 1 MILLIGRAM(S): at 17:42

## 2017-03-31 RX ADMIN — ATORVASTATIN CALCIUM 10 MILLIGRAM(S): 80 TABLET, FILM COATED ORAL at 22:27

## 2017-03-31 RX ADMIN — PREGABALIN 500 MICROGRAM(S): 225 CAPSULE ORAL at 09:00

## 2017-03-31 RX ADMIN — RISPERIDONE 4 MILLIGRAM(S): 4 TABLET ORAL at 05:20

## 2017-04-01 LAB
ANION GAP SERPL CALC-SCNC: 15 MMOL/L — SIGNIFICANT CHANGE UP (ref 5–17)
BASOPHILS # BLD AUTO: 0.1 K/UL — SIGNIFICANT CHANGE UP (ref 0–0.2)
BASOPHILS NFR BLD AUTO: 0.9 % — SIGNIFICANT CHANGE UP (ref 0–2)
BUN SERPL-MCNC: 11 MG/DL — SIGNIFICANT CHANGE UP (ref 7–23)
CALCIUM SERPL-MCNC: 9 MG/DL — SIGNIFICANT CHANGE UP (ref 8.4–10.5)
CHLORIDE SERPL-SCNC: 100 MMOL/L — SIGNIFICANT CHANGE UP (ref 96–108)
CO2 SERPL-SCNC: 20 MMOL/L — LOW (ref 22–31)
CREAT SERPL-MCNC: 0.7 MG/DL — SIGNIFICANT CHANGE UP (ref 0.5–1.3)
EOSINOPHIL # BLD AUTO: 0.1 K/UL — SIGNIFICANT CHANGE UP (ref 0–0.5)
EOSINOPHIL NFR BLD AUTO: 2.4 % — SIGNIFICANT CHANGE UP (ref 0–6)
GLUCOSE SERPL-MCNC: 106 MG/DL — HIGH (ref 70–99)
HCT VFR BLD CALC: 45.2 % — HIGH (ref 34.5–45)
HGB BLD-MCNC: 15.2 G/DL — SIGNIFICANT CHANGE UP (ref 11.5–15.5)
LYMPHOCYTES # BLD AUTO: 1.7 K/UL — SIGNIFICANT CHANGE UP (ref 1–3.3)
LYMPHOCYTES # BLD AUTO: 27.5 % — SIGNIFICANT CHANGE UP (ref 13–44)
MAGNESIUM SERPL-MCNC: 1.9 MG/DL — SIGNIFICANT CHANGE UP (ref 1.6–2.6)
MCHC RBC-ENTMCNC: 29 PG — SIGNIFICANT CHANGE UP (ref 27–34)
MCHC RBC-ENTMCNC: 33.5 GM/DL — SIGNIFICANT CHANGE UP (ref 32–36)
MCV RBC AUTO: 86.4 FL — SIGNIFICANT CHANGE UP (ref 80–100)
MONOCYTES # BLD AUTO: 0.4 K/UL — SIGNIFICANT CHANGE UP (ref 0–0.9)
MONOCYTES NFR BLD AUTO: 7.1 % — SIGNIFICANT CHANGE UP (ref 2–14)
NEUTROPHILS # BLD AUTO: 3.8 K/UL — SIGNIFICANT CHANGE UP (ref 1.8–7.4)
NEUTROPHILS NFR BLD AUTO: 62.1 % — SIGNIFICANT CHANGE UP (ref 43–77)
PHOSPHATE SERPL-MCNC: 4.6 MG/DL — HIGH (ref 2.5–4.5)
PLATELET # BLD AUTO: 238 K/UL — SIGNIFICANT CHANGE UP (ref 150–400)
POTASSIUM SERPL-MCNC: 4.5 MMOL/L — SIGNIFICANT CHANGE UP (ref 3.5–5.3)
POTASSIUM SERPL-SCNC: 4.5 MMOL/L — SIGNIFICANT CHANGE UP (ref 3.5–5.3)
RBC # BLD: 5.24 M/UL — HIGH (ref 3.8–5.2)
RBC # FLD: 13.5 % — SIGNIFICANT CHANGE UP (ref 10.3–14.5)
SODIUM SERPL-SCNC: 135 MMOL/L — SIGNIFICANT CHANGE UP (ref 135–145)
WBC # BLD: 6.1 K/UL — SIGNIFICANT CHANGE UP (ref 3.8–10.5)
WBC # FLD AUTO: 6.1 K/UL — SIGNIFICANT CHANGE UP (ref 3.8–10.5)

## 2017-04-01 PROCEDURE — 99232 SBSQ HOSP IP/OBS MODERATE 35: CPT | Mod: GC

## 2017-04-01 RX ADMIN — SODIUM CHLORIDE 50 MILLILITER(S): 9 INJECTION, SOLUTION INTRAVENOUS at 05:54

## 2017-04-01 RX ADMIN — PREGABALIN 500 MICROGRAM(S): 225 CAPSULE ORAL at 12:47

## 2017-04-01 RX ADMIN — ATORVASTATIN CALCIUM 10 MILLIGRAM(S): 80 TABLET, FILM COATED ORAL at 22:59

## 2017-04-01 RX ADMIN — Medication 50 MICROGRAM(S): at 08:32

## 2017-04-01 RX ADMIN — Medication 0.5 MILLIGRAM(S): at 05:54

## 2017-04-01 RX ADMIN — RISPERIDONE 4 MILLIGRAM(S): 4 TABLET ORAL at 05:54

## 2017-04-01 RX ADMIN — OXCARBAZEPINE 600 MILLIGRAM(S): 300 TABLET, FILM COATED ORAL at 17:30

## 2017-04-01 RX ADMIN — SODIUM CHLORIDE 50 MILLILITER(S): 9 INJECTION, SOLUTION INTRAVENOUS at 04:19

## 2017-04-01 RX ADMIN — Medication 0.5 MILLIGRAM(S): at 17:31

## 2017-04-01 RX ADMIN — SODIUM CHLORIDE 50 MILLILITER(S): 9 INJECTION, SOLUTION INTRAVENOUS at 12:48

## 2017-04-01 RX ADMIN — Medication 1 MILLIGRAM(S): at 17:30

## 2017-04-01 RX ADMIN — Medication 2000 UNIT(S): at 12:47

## 2017-04-01 RX ADMIN — Medication 1 MILLIGRAM(S): at 05:54

## 2017-04-01 RX ADMIN — RISPERIDONE 4 MILLIGRAM(S): 4 TABLET ORAL at 17:30

## 2017-04-02 ENCOUNTER — TRANSCRIPTION ENCOUNTER (OUTPATIENT)
Age: 47
End: 2017-04-02

## 2017-04-02 VITALS
RESPIRATION RATE: 18 BRPM | OXYGEN SATURATION: 983 % | HEART RATE: 65 BPM | SYSTOLIC BLOOD PRESSURE: 150 MMHG | TEMPERATURE: 98 F | DIASTOLIC BLOOD PRESSURE: 91 MMHG

## 2017-04-02 PROCEDURE — 71045 X-RAY EXAM CHEST 1 VIEW: CPT

## 2017-04-02 PROCEDURE — 82947 ASSAY GLUCOSE BLOOD QUANT: CPT

## 2017-04-02 PROCEDURE — 82553 CREATINE MB FRACTION: CPT

## 2017-04-02 PROCEDURE — 82550 ASSAY OF CK (CPK): CPT

## 2017-04-02 PROCEDURE — 80307 DRUG TEST PRSMV CHEM ANLYZR: CPT

## 2017-04-02 PROCEDURE — 81001 URINALYSIS AUTO W/SCOPE: CPT

## 2017-04-02 PROCEDURE — 83935 ASSAY OF URINE OSMOLALITY: CPT

## 2017-04-02 PROCEDURE — 83930 ASSAY OF BLOOD OSMOLALITY: CPT

## 2017-04-02 PROCEDURE — 99239 HOSP IP/OBS DSCHRG MGMT >30: CPT

## 2017-04-02 PROCEDURE — 99285 EMERGENCY DEPT VISIT HI MDM: CPT | Mod: 25

## 2017-04-02 PROCEDURE — 80053 COMPREHEN METABOLIC PANEL: CPT

## 2017-04-02 PROCEDURE — 84295 ASSAY OF SERUM SODIUM: CPT

## 2017-04-02 PROCEDURE — 82803 BLOOD GASES ANY COMBINATION: CPT

## 2017-04-02 PROCEDURE — 84132 ASSAY OF SERUM POTASSIUM: CPT

## 2017-04-02 PROCEDURE — 82435 ASSAY OF BLOOD CHLORIDE: CPT

## 2017-04-02 PROCEDURE — 80048 BASIC METABOLIC PNL TOTAL CA: CPT

## 2017-04-02 PROCEDURE — 84300 ASSAY OF URINE SODIUM: CPT

## 2017-04-02 PROCEDURE — 70450 CT HEAD/BRAIN W/O DYE: CPT

## 2017-04-02 PROCEDURE — 85027 COMPLETE CBC AUTOMATED: CPT

## 2017-04-02 PROCEDURE — 82962 GLUCOSE BLOOD TEST: CPT

## 2017-04-02 PROCEDURE — 93005 ELECTROCARDIOGRAM TRACING: CPT

## 2017-04-02 PROCEDURE — 84484 ASSAY OF TROPONIN QUANT: CPT

## 2017-04-02 PROCEDURE — 83735 ASSAY OF MAGNESIUM: CPT

## 2017-04-02 PROCEDURE — 84100 ASSAY OF PHOSPHORUS: CPT

## 2017-04-02 PROCEDURE — 84443 ASSAY THYROID STIM HORMONE: CPT

## 2017-04-02 PROCEDURE — 85730 THROMBOPLASTIN TIME PARTIAL: CPT

## 2017-04-02 PROCEDURE — 85014 HEMATOCRIT: CPT

## 2017-04-02 PROCEDURE — 82330 ASSAY OF CALCIUM: CPT

## 2017-04-02 PROCEDURE — 83605 ASSAY OF LACTIC ACID: CPT

## 2017-04-02 PROCEDURE — 85610 PROTHROMBIN TIME: CPT

## 2017-04-02 RX ADMIN — Medication 1 MILLIGRAM(S): at 06:26

## 2017-04-02 RX ADMIN — SODIUM CHLORIDE 50 MILLILITER(S): 9 INJECTION, SOLUTION INTRAVENOUS at 00:52

## 2017-04-02 RX ADMIN — Medication 0.5 MILLIGRAM(S): at 06:27

## 2017-04-02 RX ADMIN — RISPERIDONE 4 MILLIGRAM(S): 4 TABLET ORAL at 06:27

## 2017-04-02 RX ADMIN — Medication 50 MICROGRAM(S): at 10:36

## 2017-04-02 NOTE — DISCHARGE NOTE ADULT - MEDICATION SUMMARY - MEDICATIONS TO TAKE
I will START or STAY ON the medications listed below when I get home from the hospital:    clonazePAM 1 mg oral tablet  -- 1 tab(s) by mouth 2 times a day  -- Indication: For Schizoaffective disorder    OXcarbazepine 600 mg oral tablet  -- 1 tab(s) by mouth once a day  -- Indication: For Schizoaffective disorder    metFORMIN 850 mg oral tablet  -- 1 tab(s) by mouth once a day (in the morning)  -- Indication: For Diabetes mellitus type 2, controlled    atorvastatin 10 mg oral tablet  -- 1 tab(s) by mouth once a day  -- @ 5pm  -- Indication: For HLD (hyperlipidemia)    benztropine 0.5 mg oral tablet  -- 1 tab(s) by mouth 2 times a day  -- @ 8am and 5pm  -- Indication: For Schizoaffective disorder    risperiDONE 4 mg oral tablet  -- 1 tab(s) by mouth 2 times a day  -- Indication: For Schizoaffective disorder    levothyroxine 25 mcg (0.025 mg) oral tablet  -- 1 tab(s) by mouth once a day Monday to Friday and 2 tab(s) on Saturday and Sunday  -- Indication: For Hypothyroidism    cholecalciferol 2000 intl units oral tablet  -- 1 tab(s) by mouth once a day  -- Indication: For Health Maintenance    Vitamin B-12 500 mcg oral tablet  -- 1 tab(s) by mouth once a day  -- Indication: For Health Maintenance

## 2017-04-02 NOTE — DISCHARGE NOTE ADULT - CARE PLAN
Principal Discharge DX:	New onset seizure  Instructions for follow-up, activity and diet:	There is concern that you had a seizure at home because you fell, had body shaking, and bit your tongue. You were not able to tolerate the inpatient workup for seizures. You did not have any more episodes. Please follow-up with the neurologists as an outpatient for further work-up and management.  Secondary Diagnosis:	Schizoaffective disorder  Instructions for follow-up, activity and diet:	You were continued on your home psychiatric medications. You were seen by the psychiatrists who recommended continuing your home meds and follow-up with Dr. Velázquez. Please continue to take your meds as prescribed.  Secondary Diagnosis:	Hyponatremia  Instructions for follow-up, activity and diet:	You were found to have low sodium levels. This is likely due to dehydration or your medications. Your level returned to normal. Please follow-up with your PCP.  Secondary Diagnosis:	HLD (hyperlipidemia)  Instructions for follow-up, activity and diet:	You were continued on your home medications. Please follow-up with your PCP.  Secondary Diagnosis:	Diabetes mellitus type 2, controlled  Instructions for follow-up, activity and diet:	You received insulin while in the hospital, your glucose was well-controlled. Please follow-up with your PCP. Principal Discharge DX:	New onset seizure  Goal:	follow-up with the Neurologists  Instructions for follow-up, activity and diet:	There is concern that you had a seizure at home because you fell, had body shaking, and bit your tongue. You were not able to tolerate the inpatient workup for seizures. You did not have any more episodes. Please follow-up with the neurologists as an outpatient for further work-up and management.  Secondary Diagnosis:	Schizoaffective disorder  Instructions for follow-up, activity and diet:	You were continued on your home psychiatric medications. You were seen by the psychiatrists who recommended continuing your home meds and follow-up with Dr. Velázquez. Please continue to take your meds as prescribed.  Secondary Diagnosis:	Hyponatremia  Instructions for follow-up, activity and diet:	You were found to have low sodium levels. This is likely due to dehydration or your medications. Your level returned to normal. Please follow-up with your PCP.  Secondary Diagnosis:	HLD (hyperlipidemia)  Instructions for follow-up, activity and diet:	You were continued on your home medications. Please follow-up with your PCP.  Secondary Diagnosis:	Diabetes mellitus type 2, controlled  Instructions for follow-up, activity and diet:	You received insulin while in the hospital, your glucose was well-controlled. Please follow-up with your PCP.

## 2017-04-02 NOTE — DISCHARGE NOTE ADULT - ADDITIONAL INSTRUCTIONS
Please follow-up with the neurologists as an outpatient for further work-up of possible seizure.  Please follow-up with Dr. Velázquez within 1 week and continue to take your meds as prescribed.  Please follow-up with your PCP within 1-2 weeks.

## 2017-04-02 NOTE — DISCHARGE NOTE ADULT - PROVIDER TOKENS
TOKEN:'[id number]' MORELIA:'[id number]' TOKEN:'[id number]',FREE:[LAST:[[facility]],PHONE:[phone]:[phone]    -],ADDRESS:[[address] [place] [address]]]

## 2017-04-02 NOTE — DISCHARGE NOTE ADULT - HOSPITAL COURSE
47yo F with PMH of T2DM, HLD, and schizoaffective disorder presenting s/p fall. Per , around 3AM he heard a shriek and found the patient lying on her back in front of the bathroom.  states the patient was conscious when he found her. She had her eyes open and had "seizure-like activity." This activity was described as rhythmic movement of her arms, body, and head. However, per , patient was able to grab and hold on to both of his hands on command. When patient attempted to speak initially,  states that her speech was garbled and unclear. When EMS arrived, patient required multiple attempts to stand as she felt unsteady on her feet.    Patient was sleeping during history and physical. She was arousable but uncooperative with questioning, stating "just let me stay here."  states that there have not been any significant changes to patient's medications, mood, or health recently. Patient has not complained of headache, CP, SOB, change in vision, numbness, weakness, Abd Pain, N/V.    In the ED, patient was afebrile to 98.1, hypertensive to 152/110, tachycardic to 121, tachypneic to 22, and satting 98% on RA. She had positive orthostatics with HR increase of 23 and diastolic decrease of 15 from sitting to standing. Patient was bolused 2L NS and given home dose of clonazepam 1mg. 45yo F with PMH of T2DM, HLD, and schizoaffective disorder presenting s/p fall. Per , around 3AM he heard a shriek and found the patient lying on her back in front of the bathroom.  states the patient was conscious when he found her. She had her eyes open and had "seizure-like activity." This activity was described as rhythmic movement of her arms, body, and head. However, per , patient was able to grab and hold on to both of his hands on command. When patient attempted to speak initially,  states that her speech was garbled and unclear. When EMS arrived, patient required multiple attempts to stand as she felt unsteady on her feet. No significant changes to patient's medications, mood, or health recently. Patient has not complained of headache, CP, SOB, change in vision, numbness, weakness, Abd Pain, N/V. In the ED, patient was afebrile to 98.1, hypertensive to 152/110, tachycardic to 121, tachypneic to 22, and satting 98% on RA. She had positive orthostatics with HR increase of 23 and diastolic decrease of 15 from sitting to standing. Patient was bolused 2L NS and given home dose of clonazepam 1mg.    Patient admitted to Medicine for further work-up to rule-out seizure. Patient was seen by Neuro who cleared the patient for outpatient work-up. Pt could not tolerate MRI due to her hair and positioning. Pt repeatedly refused vEEG on multiple occasions. While admitted, the pt did not have any further episodes and remained at baseline mental status. Per Neuro, no role for AEDs at this time. Patient will follow-up as outpatient for further management,    Course was complicated by rapid overcorrection of chronic hyponatremia. Per outpatient providers, Na chronically 127-129 likely due to Trileptal, significant efforts as outpatient trying to titrate off medication have been unsuccessful. In the ED, patient was bolused 2L NS and had rapid overcorrection of Na. Patient was started on hypotonic IVF to stop any further correction. Renal followed the patient and stated there was no role/need for DDAVP. Sodium stabilized in 135-137 and patient had no evidence of neuro damage. UTox and CT Head were negative.    Patient was continued on her home psychiatric medications after speaking with her outpatient provider (Dr. Eber). Inpatient psychiatry evaluated the patient and determined that she had capacity and did not require inpatient psych admission. Initially, patient had very elevated mood and was oppositional to medical care. On day of discharge, patient was at her baseline mental status/behavior.    Patient was continued on her home meds for hypothyroidism. Her blood glucose was controlled with ISS in place of metformin. 47yo F with PMH of T2DM, HLD, and schizoaffective disorder presenting s/p fall. Per , around 3AM he heard a shriek and found the patient lying on her back in front of the bathroom.  states the patient was conscious when he found her. She had her eyes open and had "seizure-like activity." This activity was described as rhythmic movement of her arms, body, and head. However, per , patient was able to grab and hold on to both of his hands on command. When patient attempted to speak initially,  states that her speech was garbled and unclear. When EMS arrived, patient required multiple attempts to stand as she felt unsteady on her feet. No significant changes to patient's medications, mood, or health recently. Patient has not complained of headache, CP, SOB, change in vision, numbness, weakness, Abd Pain, N/V. In the ED, patient was afebrile to 98.1, hypertensive to 152/110, tachycardic to 121, tachypneic to 22, and satting 98% on RA. She had positive orthostatics with HR increase of 23 and diastolic decrease of 15 from sitting to standing. Patient was bolused 2L NS and given home dose of clonazepam 1mg.    Patient admitted to Medicine for further work-up to rule-out seizure. Patient was seen by Neuro who cleared the patient for outpatient work-up. Pt could not tolerate MRI due to her hair and positioning. Pt repeatedly refused vEEG on multiple occasions. While admitted, the pt did not have any further episodes and remained at baseline mental status. Per Neuro, no role for AEDs at this time. Patient will follow-up as outpatient for further management,    Course was complicated by rapid overcorrection of chronic hyponatremia. Per outpatient providers, Na chronically 127-129 likely due to Trileptal, significant efforts as outpatient trying to titrate off medication have been unsuccessful. In the ED, patient was bolused 2L NS and had rapid overcorrection of Na. Patient was started on hypotonic IVF to stop any further correction. Renal followed the patient and stated there was no role/need for DDAVP. Sodium stabilized at 135-137 and patient had no evidence of neuro damage. UTox and CT Head were negative.    Patient was continued on her home psychiatric medications after speaking with her outpatient provider (Dr. Eber). Inpatient psychiatry evaluated the patient and determined that she had capacity and did not require inpatient psych admission. Initially, patient had very elevated mood and was oppositional to medical care. On day of discharge, patient was at her baseline mental status/behavior.    Patient was continued on her home meds for hypothyroidism. Her blood glucose was controlled with ISS in place of metformin.

## 2017-04-02 NOTE — DISCHARGE NOTE ADULT - PLAN OF CARE
There is concern that you had a seizure at home because you fell, had body shaking, and bit your tongue. You were not able to tolerate the inpatient workup for seizures. You did not have any more episodes. Please follow-up with the neurologists as an outpatient for further work-up and management. You were continued on your home psychiatric medications. You were seen by the psychiatrists who recommended continuing your home meds and follow-up with Dr. Velázquez. Please continue to take your meds as prescribed. You were found to have low sodium levels. This is likely due to dehydration or your medications. Your level returned to normal. Please follow-up with your PCP. You were continued on your home medications. Please follow-up with your PCP. You received insulin while in the hospital, your glucose was well-controlled. Please follow-up with your PCP. follow-up with the Neurologists

## 2017-04-02 NOTE — DISCHARGE NOTE ADULT - CARE PROVIDER_API CALL
Montrell Velázquez (MD), Psychiatry  [address] [place] [address]  Phone: [phone]  Fax: [phone] Montrell Velázquez), Psychiatry  [address] [facility] [address]  [place] [address]  Phone: [phone]  Fax: [phone]    Wesley Villatoro), EndocrinologyMetabDiabetes; Internal Medicine  [address] [place] [address] [address]  [place], [place] [id number]  Phone: [phone]  Fax: [phone] Montrell Velázquez), Psychiatry  [address] [facility] [address]  [place] [address]  Phone: [phone]  Fax: [phone]    Wesley Villatoro (MD), EndocrinologyMetabDiabetes; Internal Medicine  [address] [place] [address] [address]  [place], [place] [id number]  Phone: [phone]  Fax: [phone]    [facility],   [address] [place] [address]  Phone: [phone]  Fax: (   )    -

## 2017-04-02 NOTE — DISCHARGE NOTE ADULT - PATIENT PORTAL LINK FT
“You can access the FollowHealth Patient Portal, offered by [facility], by registering with the following website: [email or web address]”

## 2017-04-08 ENCOUNTER — INPATIENT (INPATIENT)
Facility: HOSPITAL | Age: 47
LOS: 3 days | Discharge: HOME | DRG: 101 | End: 2017-04-12
Attending: HOSPITALIST | Admitting: HOSPITALIST
Payer: COMMERCIAL

## 2017-04-08 VITALS
HEART RATE: 97 BPM | RESPIRATION RATE: 20 BRPM | OXYGEN SATURATION: 97 % | SYSTOLIC BLOOD PRESSURE: 134 MMHG | DIASTOLIC BLOOD PRESSURE: 78 MMHG | TEMPERATURE: 98 F

## 2017-04-08 DIAGNOSIS — E03.9 HYPOTHYROIDISM, UNSPECIFIED: ICD-10-CM

## 2017-04-08 DIAGNOSIS — E87.1 HYPO-OSMOLALITY AND HYPONATREMIA: ICD-10-CM

## 2017-04-08 DIAGNOSIS — R56.9 UNSPECIFIED CONVULSIONS: ICD-10-CM

## 2017-04-08 DIAGNOSIS — Z41.8 ENCOUNTER FOR OTHER PROCEDURES FOR PURPOSES OTHER THAN REMEDYING HEALTH STATE: ICD-10-CM

## 2017-04-08 DIAGNOSIS — E11.9 TYPE 2 DIABETES MELLITUS WITHOUT COMPLICATIONS: ICD-10-CM

## 2017-04-08 DIAGNOSIS — R63.8 OTHER SYMPTOMS AND SIGNS CONCERNING FOOD AND FLUID INTAKE: ICD-10-CM

## 2017-04-08 LAB
ALBUMIN SERPL ELPH-MCNC: 4.2 G/DL — SIGNIFICANT CHANGE UP (ref 3.3–5)
ALP SERPL-CCNC: 87 U/L — SIGNIFICANT CHANGE UP (ref 40–120)
ALT FLD-CCNC: 11 U/L RC — SIGNIFICANT CHANGE UP (ref 10–45)
ANION GAP SERPL CALC-SCNC: 20 MMOL/L — HIGH (ref 5–17)
APPEARANCE UR: ABNORMAL
AST SERPL-CCNC: 12 U/L — SIGNIFICANT CHANGE UP (ref 10–40)
BACTERIA # UR AUTO: ABNORMAL /HPF
BASOPHILS # BLD AUTO: 0 K/UL — SIGNIFICANT CHANGE UP (ref 0–0.2)
BASOPHILS NFR BLD AUTO: 0.2 % — SIGNIFICANT CHANGE UP (ref 0–2)
BILIRUB SERPL-MCNC: 0.8 MG/DL — SIGNIFICANT CHANGE UP (ref 0.2–1.2)
BILIRUB UR-MCNC: NEGATIVE — SIGNIFICANT CHANGE UP
BUN SERPL-MCNC: 8 MG/DL — SIGNIFICANT CHANGE UP (ref 7–23)
CALCIUM SERPL-MCNC: 9.8 MG/DL — SIGNIFICANT CHANGE UP (ref 8.4–10.5)
CHLORIDE SERPL-SCNC: 92 MMOL/L — LOW (ref 96–108)
CO2 SERPL-SCNC: 18 MMOL/L — LOW (ref 22–31)
COLOR SPEC: SIGNIFICANT CHANGE UP
CREAT SERPL-MCNC: 0.73 MG/DL — SIGNIFICANT CHANGE UP (ref 0.5–1.3)
DIFF PNL FLD: NEGATIVE — SIGNIFICANT CHANGE UP
EOSINOPHIL # BLD AUTO: 0.1 K/UL — SIGNIFICANT CHANGE UP (ref 0–0.5)
EOSINOPHIL NFR BLD AUTO: 0.6 % — SIGNIFICANT CHANGE UP (ref 0–6)
EPI CELLS # UR: SIGNIFICANT CHANGE UP /HPF
GAS PNL BLDV: SIGNIFICANT CHANGE UP
GLUCOSE SERPL-MCNC: 110 MG/DL — HIGH (ref 70–99)
GLUCOSE UR QL: NEGATIVE — SIGNIFICANT CHANGE UP
HCT VFR BLD CALC: 46.4 % — HIGH (ref 34.5–45)
HGB BLD-MCNC: 16.3 G/DL — HIGH (ref 11.5–15.5)
KETONES UR-MCNC: ABNORMAL
LEUKOCYTE ESTERASE UR-ACNC: ABNORMAL
LYMPHOCYTES # BLD AUTO: 1.4 K/UL — SIGNIFICANT CHANGE UP (ref 1–3.3)
LYMPHOCYTES # BLD AUTO: 13.8 % — SIGNIFICANT CHANGE UP (ref 13–44)
MAGNESIUM SERPL-MCNC: 2 MG/DL — SIGNIFICANT CHANGE UP (ref 1.6–2.6)
MCHC RBC-ENTMCNC: 29.4 PG — SIGNIFICANT CHANGE UP (ref 27–34)
MCHC RBC-ENTMCNC: 35 GM/DL — SIGNIFICANT CHANGE UP (ref 32–36)
MCV RBC AUTO: 84 FL — SIGNIFICANT CHANGE UP (ref 80–100)
MONOCYTES # BLD AUTO: 0.8 K/UL — SIGNIFICANT CHANGE UP (ref 0–0.9)
MONOCYTES NFR BLD AUTO: 7.7 % — SIGNIFICANT CHANGE UP (ref 2–14)
NEUTROPHILS # BLD AUTO: 7.6 K/UL — HIGH (ref 1.8–7.4)
NEUTROPHILS NFR BLD AUTO: 77.8 % — HIGH (ref 43–77)
NITRITE UR-MCNC: NEGATIVE — SIGNIFICANT CHANGE UP
PH UR: 6 — SIGNIFICANT CHANGE UP (ref 4.8–8)
PHOSPHATE SERPL-MCNC: 3.7 MG/DL — SIGNIFICANT CHANGE UP (ref 2.5–4.5)
PLATELET # BLD AUTO: 269 K/UL — SIGNIFICANT CHANGE UP (ref 150–400)
POTASSIUM SERPL-MCNC: 3.8 MMOL/L — SIGNIFICANT CHANGE UP (ref 3.5–5.3)
POTASSIUM SERPL-SCNC: 3.8 MMOL/L — SIGNIFICANT CHANGE UP (ref 3.5–5.3)
PROT SERPL-MCNC: 6.6 G/DL — SIGNIFICANT CHANGE UP (ref 6–8.3)
PROT UR-MCNC: NEGATIVE — SIGNIFICANT CHANGE UP
RBC # BLD: 5.52 M/UL — HIGH (ref 3.8–5.2)
RBC # FLD: 13.8 % — SIGNIFICANT CHANGE UP (ref 10.3–14.5)
RBC CASTS # UR COMP ASSIST: SIGNIFICANT CHANGE UP /HPF (ref 0–2)
SODIUM SERPL-SCNC: 130 MMOL/L — LOW (ref 135–145)
SP GR SPEC: <1.005 — LOW (ref 1.01–1.02)
UROBILINOGEN FLD QL: NEGATIVE — SIGNIFICANT CHANGE UP
WBC # BLD: 9.8 K/UL — SIGNIFICANT CHANGE UP (ref 3.8–10.5)
WBC # FLD AUTO: 9.8 K/UL — SIGNIFICANT CHANGE UP (ref 3.8–10.5)
WBC UR QL: SIGNIFICANT CHANGE UP /HPF (ref 0–5)

## 2017-04-08 PROCEDURE — 99223 1ST HOSP IP/OBS HIGH 75: CPT

## 2017-04-08 PROCEDURE — 99285 EMERGENCY DEPT VISIT HI MDM: CPT | Mod: 25

## 2017-04-08 PROCEDURE — 99223 1ST HOSP IP/OBS HIGH 75: CPT | Mod: GC

## 2017-04-08 RX ORDER — BENZTROPINE MESYLATE 1 MG
0.5 TABLET ORAL
Qty: 0 | Refills: 0 | Status: DISCONTINUED | OUTPATIENT
Start: 2017-04-08 | End: 2017-04-12

## 2017-04-08 RX ORDER — CLONAZEPAM 1 MG
1 TABLET ORAL
Qty: 0 | Refills: 0 | Status: DISCONTINUED | OUTPATIENT
Start: 2017-04-08 | End: 2017-04-12

## 2017-04-08 RX ORDER — GLUCAGON INJECTION, SOLUTION 0.5 MG/.1ML
1 INJECTION, SOLUTION SUBCUTANEOUS ONCE
Qty: 0 | Refills: 0 | Status: DISCONTINUED | OUTPATIENT
Start: 2017-04-08 | End: 2017-04-12

## 2017-04-08 RX ORDER — ACETAMINOPHEN 500 MG
650 TABLET ORAL EVERY 6 HOURS
Qty: 0 | Refills: 0 | Status: DISCONTINUED | OUTPATIENT
Start: 2017-04-08 | End: 2017-04-12

## 2017-04-08 RX ORDER — ENOXAPARIN SODIUM 100 MG/ML
40 INJECTION SUBCUTANEOUS DAILY
Qty: 0 | Refills: 0 | Status: DISCONTINUED | OUTPATIENT
Start: 2017-04-08 | End: 2017-04-12

## 2017-04-08 RX ORDER — DEXTROSE 50 % IN WATER 50 %
12.5 SYRINGE (ML) INTRAVENOUS ONCE
Qty: 0 | Refills: 0 | Status: DISCONTINUED | OUTPATIENT
Start: 2017-04-08 | End: 2017-04-12

## 2017-04-08 RX ORDER — PREGABALIN 225 MG/1
500 CAPSULE ORAL DAILY
Qty: 0 | Refills: 0 | Status: DISCONTINUED | OUTPATIENT
Start: 2017-04-08 | End: 2017-04-12

## 2017-04-08 RX ORDER — LEVETIRACETAM 250 MG/1
500 TABLET, FILM COATED ORAL ONCE
Qty: 0 | Refills: 0 | Status: COMPLETED | OUTPATIENT
Start: 2017-04-08 | End: 2017-04-08

## 2017-04-08 RX ORDER — DEXTROSE 50 % IN WATER 50 %
25 SYRINGE (ML) INTRAVENOUS ONCE
Qty: 0 | Refills: 0 | Status: DISCONTINUED | OUTPATIENT
Start: 2017-04-08 | End: 2017-04-12

## 2017-04-08 RX ORDER — INSULIN LISPRO 100/ML
VIAL (ML) SUBCUTANEOUS AT BEDTIME
Qty: 0 | Refills: 0 | Status: DISCONTINUED | OUTPATIENT
Start: 2017-04-08 | End: 2017-04-12

## 2017-04-08 RX ORDER — SODIUM CHLORIDE 9 MG/ML
1000 INJECTION, SOLUTION INTRAVENOUS
Qty: 0 | Refills: 0 | Status: DISCONTINUED | OUTPATIENT
Start: 2017-04-08 | End: 2017-04-12

## 2017-04-08 RX ORDER — INSULIN LISPRO 100/ML
VIAL (ML) SUBCUTANEOUS
Qty: 0 | Refills: 0 | Status: DISCONTINUED | OUTPATIENT
Start: 2017-04-08 | End: 2017-04-12

## 2017-04-08 RX ORDER — CHOLECALCIFEROL (VITAMIN D3) 125 MCG
2000 CAPSULE ORAL DAILY
Qty: 0 | Refills: 0 | Status: DISCONTINUED | OUTPATIENT
Start: 2017-04-08 | End: 2017-04-12

## 2017-04-08 RX ORDER — RISPERIDONE 4 MG/1
4 TABLET ORAL
Qty: 0 | Refills: 0 | Status: DISCONTINUED | OUTPATIENT
Start: 2017-04-08 | End: 2017-04-12

## 2017-04-08 RX ORDER — INFLUENZA VIRUS VACCINE 15; 15; 15; 15 UG/.5ML; UG/.5ML; UG/.5ML; UG/.5ML
0.5 SUSPENSION INTRAMUSCULAR ONCE
Qty: 0 | Refills: 0 | Status: DISCONTINUED | OUTPATIENT
Start: 2017-04-08 | End: 2017-04-12

## 2017-04-08 RX ORDER — LEVOTHYROXINE SODIUM 125 MCG
25 TABLET ORAL
Qty: 0 | Refills: 0 | Status: DISCONTINUED | OUTPATIENT
Start: 2017-04-08 | End: 2017-04-12

## 2017-04-08 RX ORDER — OXCARBAZEPINE 300 MG/1
600 TABLET, FILM COATED ORAL DAILY
Qty: 0 | Refills: 0 | Status: DISCONTINUED | OUTPATIENT
Start: 2017-04-08 | End: 2017-04-10

## 2017-04-08 RX ORDER — ATORVASTATIN CALCIUM 80 MG/1
10 TABLET, FILM COATED ORAL AT BEDTIME
Qty: 0 | Refills: 0 | Status: DISCONTINUED | OUTPATIENT
Start: 2017-04-08 | End: 2017-04-12

## 2017-04-08 RX ORDER — LEVOTHYROXINE SODIUM 125 MCG
50 TABLET ORAL
Qty: 0 | Refills: 0 | Status: DISCONTINUED | OUTPATIENT
Start: 2017-04-08 | End: 2017-04-12

## 2017-04-08 RX ORDER — DEXTROSE 50 % IN WATER 50 %
1 SYRINGE (ML) INTRAVENOUS ONCE
Qty: 0 | Refills: 0 | Status: DISCONTINUED | OUTPATIENT
Start: 2017-04-08 | End: 2017-04-12

## 2017-04-08 RX ADMIN — OXCARBAZEPINE 600 MILLIGRAM(S): 300 TABLET, FILM COATED ORAL at 17:49

## 2017-04-08 RX ADMIN — Medication 0.5 MILLIGRAM(S): at 17:50

## 2017-04-08 RX ADMIN — Medication 1 MILLIGRAM(S): at 17:48

## 2017-04-08 RX ADMIN — ATORVASTATIN CALCIUM 10 MILLIGRAM(S): 80 TABLET, FILM COATED ORAL at 22:28

## 2017-04-08 RX ADMIN — LEVETIRACETAM 400 MILLIGRAM(S): 250 TABLET, FILM COATED ORAL at 10:15

## 2017-04-08 RX ADMIN — PREGABALIN 500 MICROGRAM(S): 225 CAPSULE ORAL at 17:49

## 2017-04-08 RX ADMIN — Medication 2000 UNIT(S): at 17:48

## 2017-04-08 RX ADMIN — RISPERIDONE 4 MILLIGRAM(S): 4 TABLET ORAL at 17:49

## 2017-04-08 NOTE — ED BEHAVIORAL HEALTH ASSESSMENT NOTE - DESCRIPTION
Stereotypical answers, otherwise cooperative Rash Patient on psychiatric disability, lives with  in [place].  No children.

## 2017-04-08 NOTE — H&P ADULT. - LAB RESULTS AND INTERPRETATION
No leukocytosis, Mildly elevated hemoglobin Personally reviewed - No leukocytosis, Mildly elevated hemoglobin, Na 130

## 2017-04-08 NOTE — ED BEHAVIORAL HEALTH ASSESSMENT NOTE - RISK ASSESSMENT
Risk factors include SAD, anxiety.  No history of self- injurious behavior, prior sucidality, previous suicide attempts, positive family hx, substance abuse.  She has consistent outpatient follow up for over 10 years and has a good therapeutic rapport with Dr. Velázquez.  Although she demonstrates poor insight into illness and poor reactivity to stressors, it appears this is her baseline.   At this time pt does not represent an acute threat to self/others necessitating inpatient hospitalization.

## 2017-04-08 NOTE — ED BEHAVIORAL HEALTH ASSESSMENT NOTE - SUMMARY
Patient is a 45 year old  female, domiciled with  in [place], non-caregiver, unemployed on SSI, PPH of Schizoaffective DO, multiple prior psychiatric hospitalizations, most recent in 2013 [facility] (unknown reason), current outpatient tx with Dr. Montrell Velázquez, no known suicide attempts, no known history of violence or arrests, no active substance abuse or known history of complicated withdrawal, PMH significant of hypothyroidism, HLD, DM2, brought in by EMS for seizures. ED team called consult for capacity on refusing treatment and diagnostic procedures. Pt at the moment appears off baseline in terms of providing only stereotypical answers and being disoriented in time, which suggests post-ictal state. Therefore, pt is not able to express a persistent will as to how to proceed for these medical procedures, and is unable to explain her rational as of why she would refuse them, nor the consequences that she would expect this decision to have on her health. Her  agrees with treatment recommendations from primary team however, and in the room he convinced the patient to accept those. Otherwise, the patient does not present with any deviation from baseline in terms of her psychiatric sx and she does not meet criteria for 9.27 hospitalization, so she can c/w her outpt ttmt w Dr Velázquez. Patient is a 45 year old  female, domiciled with  in [place], non-caregiver, unemployed on SSI, PPH of Schizoaffective DO, multiple prior psychiatric hospitalizations, most recent in 2013 [facility] (unknown reason), current outpatient tx with Dr. Montrell Velázquez, no known suicide attempts, no known history of violence or arrests, no active substance abuse or known history of complicated withdrawal, PMH significant of hypothyroidism, HLD, DM2, brought in by EMS for seizures. ED team called consult for capacity on refusing treatment and diagnostic procedures. Pt at the moment appears off baseline in terms of providing only stereotypical answers and being disoriented in time, which suggests post-ictal state. Therefore, pt is not able to express a persistent will as to how to proceed for these medical procedures, and is unable to explain her rational as of why she would refuse them, nor the consequences that she would expect this decision to have on her health. Her  agrees with treatment recommendations from primary team however, and in the room he convinced the patient to accept those. Otherwise, the patient does not present with any deviation from baseline in terms of her psychiatric sx and she does not meet criteria for 9.27 hospitalization. 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 Case will be signed out to CL for follow up during medical hospitalization.

## 2017-04-08 NOTE — H&P ADULT. - PROBLEM SELECTOR PLAN 3
Pt w/ hyponatremia @ 130. per documentation, pt chronically hyponatremia (likely secondary to Trileptal)  - would monitor BMP for now  - Encourage po intake

## 2017-04-08 NOTE — ED BEHAVIORAL HEALTH ASSESSMENT NOTE - OTHER PAST PSYCHIATRIC HISTORY (INCLUDE DETAILS REGARDING ONSET, COURSE OF ILLNESS, INPATIENT/OUTPATIENT TREATMENT)
SAD  on Risperdal, Cogentin, Klonopin, Trileptal  + 5 hospitalizations, no SI/SA, last hospitalized in 2013  Outpt treatment w. Dr. Montrell Velázquez (10 years)

## 2017-04-08 NOTE — ED PROVIDER NOTE - NOTES
Does not meet criteria for inpatient admission, feels patient's disorientation is due to postictal state, states patient does not have capacity and medical decisions should be made by  for now, and also would like to be contacted with neuro recs

## 2017-04-08 NOTE — ED PROVIDER NOTE - ATTENDING CONTRIBUTION TO CARE
****ATTENDING**** 45yo f hx listed BIB  for witnessed seizure in bed with generalized shaking. Patient was recently admitted to NS for seizure and left declining work up. At this time patient is awake and oriented to 1 saying that she does not want a work at this time. As per  patient did not have any trauma. No recent illness or fever, chills. On exam, Patient is awake and alert. Tongue with abrasion. Patient's chest is clear to ausculation, +s1s2. Abdomen is soft nd/nt +BS. Extremity with no swelling. Moving all 4 extremities.   Check labs, consult psych and neurology. closely monitor patient.

## 2017-04-08 NOTE — ED ADULT NURSE NOTE - OBJECTIVE STATEMENT
pt BIBA s/p seizure. Pt awake A/O and denies any complaints and stating to DR VILMA Falk that she wants to go home, I don't want anything done.  states "I woke up and saw she was having a seizure, no sure how long it was"

## 2017-04-08 NOTE — ED BEHAVIORAL HEALTH ASSESSMENT NOTE - HPI (INCLUDE ILLNESS QUALITY, SEVERITY, DURATION, TIMING, CONTEXT, MODIFYING FACTORS, ASSOCIATED SIGNS AND SYMPTOMS)
Patient is a 46 year old  female, domiciled with  in [place], non-caregiver, unemployed on SSI, PPH of Schizoaffective DO, multiple prior psychiatric hospitalizations, most recent in 2013 [facility] (unknown reason), [facility] outpatient tx with Dr. Montrell Velázquez, no known suicide attempts, no known history of violence or arrests, no active substance abuse or known history of complicated withdrawal, PMH significant of hypothyroidism, HLD, DM2, brought in by EMS, called by  after the patient had a second episode of seizure-like activity while she was in bed.     Interview limited due to stereotypical answers of patient, limited to "yes", "correct", "no, thank you", "I don't know" and "I am tired of talking". Pt denies having any recall of the event that brought her to the hospital, and denies having any clear memory of the time before the event. She is unable to explain the reason why she is at the hospital at the moment, although she recognizes that she is at [facility], is able to tell where she lives and is oriented to year and month, but not day of the month or the week. Pt gives ambivalent answers to whether she would agree to have bloodwork as a w/u for her seizures, or whether she would take Keppra, which is being recommended by the primary team. Otherwise, patient denies change from baseline in terms of feeling more suspicious than at baseline, denies changes in her mood at the time, denies SI/HI, and reports being compliant w her medications, which she keeps close track of by checking in a paper each time that she takes her meds.    Per , both the patient's disorientation and her stereotypical answers are not her baseline, however he denies any other changes from baseline on her presentation, and denies that any other changes happened prior to the this last seizure episode. He denies that the patient has been presenting unusual behaviors for her, or that has endorsed SI/HI, denies changes in mood.  agrees that she should have a diagnostic workup and treatment for seizures, since despite she does not drive, it is still not safe for her to not be on treatment for the seizures if these are the doctor's recommendation.    Note on her case by CL from last week consulted. In summary, on an assessment on 3/31/17 pt was psychiatrically at her baseline, and no additional recommendations to her usual management were provided. Patient is a 46 year old  female, domiciled with  in [place], non-caregiver, unemployed on SSI, PPH of Schizoaffective DO, multiple prior psychiatric hospitalizations, most recent in 2013 [facility] (unknown reason), [facility] outpatient tx with Dr. Montrell Velázquez, no known suicide attempts, no known history of violence or arrests, no active substance abuse or known history of complicated withdrawal, PMH significant of hypothyroidism, HLD, DM2, brought in by EMS, called by  after the patient had a second episode of seizure-like activity while she was in bed.     Interview limited due to stereotypical answers of patient, limited to "yes", "correct", "no, thank you", "I don't know" and "I am tired of talking". Pt denies having any recall of the event that brought her to the hospital, and denies having any clear memory of the time before the event. She is unable to explain the reason why she is at the hospital at the moment, although she recognizes that she is at [facility], is able to tell where she lives and is oriented to year and month, but not day of the month or the week. Pt gives ambivalent answers to whether she would agree to have bloodwork as a w/u for her seizures, or whether she would take Keppra, which is being recommended by the primary team. Otherwise, patient denies change from baseline in terms of feeling more suspicious than at baseline, denies changes in her mood at the time, denies SI/HI, and reports being compliant w her medications, which she keeps close track of by checking in a paper each time that she takes her meds.    Per , both the patient's disorientation and her stereotypical answers are not her baseline, however he denies any other changes from baseline on her presentation, and denies that any other changes happened prior to the this last seizure episode. He denies that the patient has been presenting unusual behaviors for her, or that has endorsed SI/HI, denies changes in mood.  agrees that she should have a diagnostic workup and treatment for seizures, since despite she does not drive, it is still not safe for her to not be on treatment for the seizures if these are the doctor's recommendation.    Note on her case by CL from last week consulted. In summary, on an assessment on 3/31/17 pt was psychiatrically at her baseline, and no additional recommendations to her usual management were provided.    On reassessment in the am, ED team recommended admission, finding out Na of 130.

## 2017-04-08 NOTE — ED PROVIDER NOTE - MEDICAL DECISION MAKING DETAILS
46 year old female with psychiatric disorders p/w 2nd seizure in 10 days, recently discharged, will consult neuro to determine medication recommendations and psych for capacity evaluation, reassess

## 2017-04-08 NOTE — H&P ADULT. - PROBLEM SELECTOR PLAN 2
New onset seizure from prior hospitilization  - c/w trileptal Pt w/ hyponatremia @ 130. per documentation, pt chronically hyponatremia (likely secondary to Trileptal)  - would monitor BMP for now  - Encourage po intake - pt w/ hx of schizoaffective d/o with weird affect  - Would resume home Trileptal and benztropine  - C/w klonopin  - Would contact Dr. Velázquez (pt's psychiatrist) about increasing dose of Trileptal

## 2017-04-08 NOTE — ED BEHAVIORAL HEALTH ASSESSMENT NOTE - SUICIDE PROTECTIVE FACTORS
Identifies reasons for living/Future oriented/Supportive social network or family/Positive therapeutic relationships/Fear of death or dying due to pain/suffering/Responsibility to family and others

## 2017-04-08 NOTE — ED PROVIDER NOTE - PROGRESS NOTE DETAILS
pt well appearing, no seizure like activity while in ED, pt ambulating with steady gait.  to care for pt and administer meds. discussed case jimi mendez neuro resident - review prior neuro resident recomnadiaons of increasign trileptil for sezire. made neuro residnet aware that tp is being weined off medication 2/2 SIAHD. Ed labs show Na of 130 in setting of seairure. Per neuro recs olinda start keppa, admit to medicine for hyponatremia. - HS

## 2017-04-08 NOTE — ED PROVIDER NOTE - OBJECTIVE STATEMENT
46 year old female with PMHx of hypothyroidism, HLD, DM2, schizoaffective disorder, multiple prior hospitalizations for medication noncompliance, recent admission (AZ'ed 6 days ago) for first time seizure in the setting of orthostatic hypotension. During hospital stay, pt refused EEG and MRI. Psych deemed her to have capacity. Neuro did not recommend AEDs and she did not have any further episodes during her stay thus was discharged with outpatient follow up. This morning she is BIBEMS for further seizure episode, it happened in bed, witnessed by , generalized, denies tongue biting or incontinence. Per EMS, she was "altered" afterwards.  states she is currently at her baseline mental status. Patient states "I want to go home as soon as possible" and gives other stereotyped answers.   Psychiatrist Eber

## 2017-04-08 NOTE — ED BEHAVIORAL HEALTH ASSESSMENT NOTE - PATIENT SEEN BY
Trainee with Telephonic supervision from Attending Psychiatrist Attending Psychiatrist supervising NP/Trainee and meeting pt

## 2017-04-08 NOTE — H&P ADULT. - ASSESSMENT
46 year old female with PMHx of hypothyroidism, HLD, DM2, schizoaffective disorder, multiple prior hospitalizations for medication noncompliance presents with seizure-like activity. Pt was brought into the ED by EMS for further seizure episode, it happened in bed, witnessed by , generalized, denies tongue biting or incontinence.

## 2017-04-08 NOTE — ED BEHAVIORAL HEALTH ASSESSMENT NOTE - PSYCHIATRIC ISSUES AND PLAN (INCLUDE STANDING AND PRN MEDICATION)
C/w standing risperidone 4 mg po qhs, klonopin 0.5 mg po qhs. Discontinue oxcarbamazepine C/w standing ·benztropine 0.5 mg oral tablet: 1 tab(s) orally 2 times a day; ·clonazePAM 1 mg oral tablet: 1 tab(s) orally 2 times a day;. Discontinue oxcarbamazepine

## 2017-04-08 NOTE — ED BEHAVIORAL HEALTH ASSESSMENT NOTE - OTHER
Pt does not have capacity to refuse diagnostic and treatment recs by primary team, because she is not able to produce a decision that is stable in time, is unable to justify her rationale, nor the consequences of this decision on her health.  however agrees with treatment recs by primary team, including bloodwork and starting Keppra, after discussion of alternatives, risks and benefits.

## 2017-04-08 NOTE — ED BEHAVIORAL HEALTH ASSESSMENT NOTE - DETAILS
Depakote Patient,  aware of plan. Patient, , and psychiatrist aware of plan. Dr Anderson in the am Patient, , aware of plan.

## 2017-04-08 NOTE — ED BEHAVIORAL HEALTH ASSESSMENT NOTE - CURRENT MEDICATION
on Risperdal 4mg bid,  Klonopin 1mg bid, [id number] bid, atorvastatin, vit b12, vit #, metformin atorvastatin 10 mg oral tablet: 1 tab(s) orally once a day; ·benztropine 0.5 mg oral tablet: 1 tab(s) orally 2 times a day; ·clonazePAM 1 mg oral tablet: 1 tab(s) orally 2 times a day; ·levothyroxine 25 mcg (0.025 mg) oral tablet: 1 tab(s) orally once a day Monday to Friday and 2 tab(s) on Saturday and Sunday; ·metFORMIN 850 mg oral tablet: 1 tab(s) orally once a day (in the morning);·OXcarbazepine 600 mg oral tablet: 1 tab(s) orally once a day; ·risperiDONE 4 mg oral tablet: 1 tab(s) orally 2 times a day  ·cholecalciferol 2000 intl units oral tablet: 1 tab(s) orally once a day; ·Vitamin B-12 500 mcg oral tablet: 1 tab(s) orally once a day

## 2017-04-08 NOTE — H&P ADULT. - HISTORY OF PRESENT ILLNESS
46 year old female with PMHx of hypothyroidism, HLD, DM2, schizoaffective disorder, multiple prior hospitalizations for medication noncompliance. Pt was brought into the ED by EMS for further seizure episode, it happened in bed, witnessed by , generalized, denies tongue biting or incontinence. Per EMS, she was "altered" afterwards.  states she is currently at her baseline mental status. Pt 46 year old female with PMHx of hypothyroidism, HLD, DM2, schizoaffective disorder, multiple prior hospitalizations for medication noncompliance presents with seizure-like activity. Pt was brought into the ED by EMS for further seizure episode, it happened in bed, witnessed by , generalized, denies tongue biting or incontinence. Per EMS, she was "altered" afterwards.  states she is currently at her baseline mental status. Pt is poor historian, and kept asking for water and ice. Per pt, she doesn't remember anything prior to coming to the hospital.    Of note, she was recently admitted for a fall. At the time of admission, neurology cleared patient for outpatient MRI and patient refused EEG. She also was hyponatremic, which was overcorrected quickly requiring hypotonic IVF. During admission, patient was seizure-free.

## 2017-04-08 NOTE — ED ADULT NURSE NOTE - CHPI ED SYMPTOMS NEG
no nausea/no blurred vision/no confusion/no numbness/no dizziness/no change in level of consciousness/no weakness/no vomiting/no loss of consciousness/no fever

## 2017-04-08 NOTE — H&P ADULT. - ATTENDING COMMENTS
Pt seen and examined,  is at bedside.  reports convulsions over night around 4:30Am, which woke him up. The patient was unresponsive at the time, +tongue bitting, lasted 10-15 min, slurred speech after, and then resolved. She did not get MRI on last admission, because "she needs to have her hair washed, conditioned, and put down". And they want to do that out-patient.  The patient is sleeping and not cooperative with my exam, withdraws arms and legs when I tried to examine her, resists pupillary exam, but opened her eyes to her husbands voice. She said to leave her alone.   Plan:  - check EEG  - MRI out-patient  - seen by neurology, can increase trileptal if OK per psych  - schizoaffective d/o - cont home meds  - hypothyroidism - cont home meds  - DM - ISS  - dispo: if MS back at baseline can d/c home after EEG

## 2017-04-08 NOTE — ED BEHAVIORAL HEALTH ASSESSMENT NOTE - MODIFICATIONS
Patient known to writer from previous admissions.  Patient with a long history of schizoaffective disorder but has been at her baseline recently, with no overt delusions, hallucinations, si/hi.  presented today for witnessed seizure. On ecam she is oriented to place, month, year, but not why she is here or what the plan of care, despite being told multiple times by the clinicians.  She is ambivalent about treatment.

## 2017-04-08 NOTE — H&P ADULT. - PROBLEM SELECTOR PLAN 1
Pt w/ hyponatremia @ 130. per documentation, pt chronically hyponatremia (likely secondary to Trileptal)  - would monitor BMP for now  - Encourage po intake Seen by neurology, unclear whether seizure vs PNES.  Pt not cooperative with PE.   - c/w trileptal, neuro recs uptitrating if Ok with psychiatry  - check EEG  - out-pt MRI per neuro

## 2017-04-08 NOTE — ED BEHAVIORAL HEALTH ASSESSMENT NOTE - CASE SUMMARY
46 year old female with history of sad presents for witnessed seizure.  Of note she was admitted 1 weeka go for similar reasons, she refused workup and treatment and was discharged.  on last admission was hyponatremic.  Today in ed na is 130.  She icurerently on risperdal 4mg bid, tripletal 600mg bis klonopin 1mg bid, and cogentin.  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  Given seizure activity would recommend neurology consult to switch trileptal to another aed.   from a psychiatric standpoint, depakote, lamictal would be preferable as it would help for mood stabilization as well as seizure disorder.  Will defer to neuro about cross titrating between the two because of risk of future seizures.  recommend to cont risperdal 4mg bid, cogentin, and klonopin.  Can give ativan 2mg iv/im q2hrs for seizures or for agitation/anxiety.  cont on 1:1 as patient is post-ictal still confused at this point as she is elopement risk.  patient does not have capacity to leave ama or refuse medical workup or treatment at this time as she does not know why she is here or what treatments have been offered despite being told many times.  this is likely secondary to post ictal state and is not her baseline mental status.  As this state resolves, likely she will regain her decisional capacity soon.

## 2017-04-09 ENCOUNTER — TRANSCRIPTION ENCOUNTER (OUTPATIENT)
Age: 47
End: 2017-04-09

## 2017-04-09 DIAGNOSIS — F25.9 SCHIZOAFFECTIVE DISORDER, UNSPECIFIED: ICD-10-CM

## 2017-04-09 LAB
ANION GAP SERPL CALC-SCNC: 15 MMOL/L — SIGNIFICANT CHANGE UP (ref 5–17)
BUN SERPL-MCNC: 9 MG/DL — SIGNIFICANT CHANGE UP (ref 7–23)
CALCIUM SERPL-MCNC: 9.6 MG/DL — SIGNIFICANT CHANGE UP (ref 8.4–10.5)
CHLORIDE SERPL-SCNC: 99 MMOL/L — SIGNIFICANT CHANGE UP (ref 96–108)
CO2 SERPL-SCNC: 19 MMOL/L — LOW (ref 22–31)
CREAT SERPL-MCNC: 0.81 MG/DL — SIGNIFICANT CHANGE UP (ref 0.5–1.3)
CULTURE RESULTS: SIGNIFICANT CHANGE UP
GLUCOSE SERPL-MCNC: 123 MG/DL — HIGH (ref 70–99)
HCT VFR BLD CALC: 49.4 % — HIGH (ref 34.5–45)
HGB BLD-MCNC: 15.9 G/DL — HIGH (ref 11.5–15.5)
MAGNESIUM SERPL-MCNC: 2 MG/DL — SIGNIFICANT CHANGE UP (ref 1.6–2.6)
MCHC RBC-ENTMCNC: 27.4 PG — SIGNIFICANT CHANGE UP (ref 27–34)
MCHC RBC-ENTMCNC: 32.2 GM/DL — SIGNIFICANT CHANGE UP (ref 32–36)
MCV RBC AUTO: 85.4 FL — SIGNIFICANT CHANGE UP (ref 80–100)
PHOSPHATE SERPL-MCNC: 2.9 MG/DL — SIGNIFICANT CHANGE UP (ref 2.5–4.5)
PLATELET # BLD AUTO: 254 K/UL — SIGNIFICANT CHANGE UP (ref 150–400)
POTASSIUM SERPL-MCNC: 3.6 MMOL/L — SIGNIFICANT CHANGE UP (ref 3.5–5.3)
POTASSIUM SERPL-SCNC: 3.6 MMOL/L — SIGNIFICANT CHANGE UP (ref 3.5–5.3)
RBC # BLD: 5.79 M/UL — HIGH (ref 3.8–5.2)
RBC # FLD: 13.9 % — SIGNIFICANT CHANGE UP (ref 10.3–14.5)
SODIUM SERPL-SCNC: 133 MMOL/L — LOW (ref 135–145)
SPECIMEN SOURCE: SIGNIFICANT CHANGE UP
WBC # BLD: 8.2 K/UL — SIGNIFICANT CHANGE UP (ref 3.8–10.5)
WBC # FLD AUTO: 8.2 K/UL — SIGNIFICANT CHANGE UP (ref 3.8–10.5)

## 2017-04-09 RX ADMIN — Medication 0.5 MILLIGRAM(S): at 17:44

## 2017-04-09 RX ADMIN — OXCARBAZEPINE 600 MILLIGRAM(S): 300 TABLET, FILM COATED ORAL at 17:44

## 2017-04-09 RX ADMIN — Medication 2000 UNIT(S): at 13:40

## 2017-04-09 RX ADMIN — Medication 1 MILLIGRAM(S): at 08:18

## 2017-04-09 RX ADMIN — RISPERIDONE 4 MILLIGRAM(S): 4 TABLET ORAL at 17:44

## 2017-04-09 RX ADMIN — Medication 50 MICROGRAM(S): at 08:18

## 2017-04-09 RX ADMIN — RISPERIDONE 4 MILLIGRAM(S): 4 TABLET ORAL at 09:00

## 2017-04-09 RX ADMIN — Medication 1 MILLIGRAM(S): at 17:44

## 2017-04-09 RX ADMIN — PREGABALIN 500 MICROGRAM(S): 225 CAPSULE ORAL at 13:39

## 2017-04-09 RX ADMIN — ATORVASTATIN CALCIUM 10 MILLIGRAM(S): 80 TABLET, FILM COATED ORAL at 22:28

## 2017-04-09 RX ADMIN — Medication 0.5 MILLIGRAM(S): at 08:19

## 2017-04-09 NOTE — DISCHARGE NOTE ADULT - PATIENT PORTAL LINK FT
“You can access the [facility] Patient Portal, offered by [facility], by registering with the following website: [email or web address]”

## 2017-04-09 NOTE — DISCHARGE NOTE ADULT - MEDICATION SUMMARY - MEDICATIONS TO CHANGE
I will SWITCH the dose or number of times a day I take the medications listed below when I get home from the hospital:  None I will SWITCH the dose or number of times a day I take the medications listed below when I get home from the hospital:    OXcarbazepine 600 mg oral tablet  -- 1 tab(s) by mouth once a day

## 2017-04-09 NOTE — DISCHARGE NOTE ADULT - HOSPITAL COURSE
Admission:  46 year old female with PMHx of hypothyroidism, HLD, T2DM, schizoaffective disorder, multiple prior hospitalizations for medication noncompliance presents with seizure-like activity. Pt was brought into the ED by EMS for further seizure episode, it happened in bed, witnessed by , generalized, denies tongue biting or incontinence. Per EMS, she was "altered" afterwards.  states she is currently at her baseline mental status. Pt is poor historian, and kept asking for water and ice. Per pt, she doesn't remember anything prior to coming to the hospital.  Of note, she was recently admitted for a fall c/b ?seizure like activity. At the time of admission, neurology cleared patient for outpatient MRI and patient refused EEG. She also was hyponatremic, which was overcorrected quickly requiring hypotonic IVF. During admission, patient was seizure-free.       Hospitalization:  Pt was seen by neuro who agreed patient can follow up with out pt EEG and MRI, as episode does not sound like typical-seizure. Pt was stable overnight, no further activity/issues. Pt was not cooperative with daily physical neuro exams by our team, inappropriate affect/giggling and responses.    No changes to medication.    Pt okay to be discharged home on 4/9/17 with outpt follow up as previously recommended. Admission:  46 year old female with PMHx of hypothyroidism, HLD, T2DM, schizoaffective disorder, multiple prior hospitalizations for medication noncompliance presents with seizure-like activity. Pt was brought into the ED by EMS for further seizure episode, it happened in bed, witnessed by , generalized, denies tongue biting or incontinence. Per EMS, she was "altered" afterwards.  states she is currently at her baseline mental status. Pt is poor historian, and kept asking for water and ice. Per pt, she doesn't remember anything prior to coming to the hospital.  Of note, she was recently admitted for a fall c/b ?seizure like activity. At the time of admission, neurology cleared patient for outpatient MRI and patient refused EEG. She also was hyponatremic, which was overcorrected quickly requiring hypotonic IVF. During admission, patient was seizure-free.       Hospitalization:  Pt was seen by neuro who agreed patient can follow up with out pt EEG and MRI, as episode does not sound like typical-seizure. Pt was stable overnight, no further activity/issues. Pt was not cooperative with daily physical neuro exams by our team, inappropriate affect/giggling and responses.    Neuro recommended to think abuout increasing trileptal to 900 qd however since Dr. Alcantar is prescribing the medication the covering psych team was uncomfortable with making that change. The patient will need to follow up as an outpt for any medication titration.    Pt okay to be discharged home on 4/9/17 with outpt follow up as previously recommended. Admission:  46 year old female with PMHx of hypothyroidism, HLD, T2DM, schizoaffective disorder, multiple prior hospitalizations for medication noncompliance presents with seizure-like activity. Pt was brought into the ED by EMS for further seizure episode, it happened in bed, witnessed by , generalized, denies tongue biting or incontinence. Per EMS, she was "altered" afterwards.  states she is currently at her baseline mental status. Pt is poor historian, and kept asking for water and ice. Per pt, she doesn't remember anything prior to coming to the hospital.  Of note, she was recently admitted for a fall c/b ?seizure like activity. At the time of admission, neurology cleared patient for outpatient MRI and patient refused EEG. She also was hyponatremic, which was overcorrected quickly requiring hypotonic IVF. During admission, patient was seizure-free.       Hospitalization:  Pt was seen by neuro who agreed patient can follow up with out pt EEG and MRI, as episode does not sound like typical-seizure. Pt was stable overnight, no further activity/issues. Pt was not cooperative with daily physical neuro exams by our team, inappropriate affect/giggling and responses.    Pt once again refuse MRI/EEG on this admission, not giving a reason. She was aggressive towards staff when a physical exam was attempted. [facility] pt follow up. Pt's trileptal was increased to 900 mg qd on recommendation of neuro with agreement from psych.    Pt okay to be discharged home on 4/10/17 with outpt follow up as previously recommended. Admission:  46 year old female with PMHx of hypothyroidism, HLD, T2DM, schizoaffective disorder, multiple prior hospitalizations for medication noncompliance presents with seizure-like activity. Pt was brought into the ED by EMS for further seizure episode, it happened in bed, witnessed by , generalized, denies tongue biting or incontinence. Per EMS, she was "altered" afterwards.  states she is currently at her baseline mental status. Pt is poor historian, and kept asking for water and ice. Per pt, she doesn't remember anything prior to coming to the hospital.  Of note, she was recently admitted for a fall c/b ?seizure like activity. At the time of admission, neurology cleared patient for outpatient MRI and patient refused EEG. She also was hyponatremic, which was overcorrected quickly requiring hypotonic IVF. During admission, patient was seizure-free.       Hospitalization:  Differential is seizure vs psychogenic nonepileptic seizure.  Pt was seen by neuro who agreed patient can follow up with out-pt EEG and MRI. Pt was stable during hospitalization, no further activity/issues. Pt was not cooperative with daily physical neuro exams by our team, inappropriate affect/giggling and responses.    Pt once again refuse MRI/EEG on this admission, not giving a reason. She was aggressive towards staff when a physical exam was attempted. Neuro and [facility] cleared pt for discharge with out pt follow up. Pt's trileptal was increased to 900 mg qd on recommendation of neuro with agreement from psych.    Pt okay to be discharged home on 4/12/17 with outpt follow up as previously recommended. Admission:  46 year old female with PMHx of hypothyroidism, HLD, T2DM, schizoaffective disorder, multiple prior hospitalizations for medication noncompliance presents with seizure-like activity. Pt was brought into the ED by EMS for further seizure episode, it happened in bed, witnessed by , generalized, denies tongue biting or incontinence. Per EMS, she was "altered" afterwards.  states she is currently at her baseline mental status. Pt is poor historian, and kept asking for water and ice. Per pt, she doesn't remember anything prior to coming to the hospital.  Of note, she was recently admitted for a fall c/b ?seizure like activity. At the time of admission, neurology cleared patient for outpatient MRI and patient refused EEG. She also was hyponatremic, which was overcorrected quickly requiring hypotonic IVF. During admission, patient was seizure-free.       Hospitalization:  Differential is seizure vs psychogenic nonepileptic seizure   Pt was seen by neuro who agreed patient can follow up with out-pt EEG and MRI. Pt was stable during hospitalization, no further activity/issues. Pt was not cooperative with daily physical neuro exams by our team, inappropriate affect/giggling and responses. Pt was attemtpted to be discahrge the day after admission but was refused to be taken home by  who demanded an inpt work up.    However, pt refused MRI/EEG ( x3 attempts, psych said pt did not have capacity to refuse w/u,  was requesting intubation and sedation for work up which was denied) including the last time with sedation w/ ativan 2 mg given prior to exam and she could not give a coherent reason as to why she refused the work up. She was aggressive towards staff when a physical exam was attempted. Neuro and psych cleared pt for discharge with out pt follow up,  given the unlikelihood of this being a true seizure.. Pt's trileptal was increased to 900 mg qd on recommendation of neuro with agreement from psych.    Pt okay to be discharged home on 4/12/17 with outpt follow up as previously recommended.

## 2017-04-09 NOTE — DISCHARGE NOTE ADULT - PROVIDER TOKENS
TOKEN:'[id number]',FREE:[LAST:[[facility]],PHONE:[(   )    -],FAX:[(   )    -],ADDRESS:[[address] [place] [address]  Phone: [phone]]]

## 2017-04-09 NOTE — DISCHARGE NOTE ADULT - PLAN OF CARE
You were admitted because of "seizure like" occurance at home. However, the description did not fit a true seizure. Please follow up with your neurologist and primary care doctor/psychiatrist in order to complete the out patient MRI and EEG as recommended by neurology. This will allow us to better understand the etiology and type of seizures you are having, should you have a seizure disorder. out patient neurology follow up please follow up with your Psychiatrist as an out patient. Also, continue to take all meds from home as previously prescribed, nothing has changed on this admission. out patient psych follow up You were admitted because of "seizure like" occurrence at home. However, the description did not fit a true seizure. Please follow up with your neurologist and primary care doctor/psychiatrist in order to complete the out patient MRI and EEG as recommended by neurology. This will allow us to better understand the etiology and type of seizures you are having, should you have a seizure disorder.    You will also need to follow up with your psychiatrist to assess whether you trileptal would need to be increased in dose amount. Control blood sugar Take your medications as prescribed by your doctor. Follow up with PMD on discharge. Euthyroid Please take your medications as prescribed by your PMD. Follow up him for continued management of your thyroid problems. please follow up with your Psychiatrist as an out patient. Also, continue to take all meds as prescribed. Your trileptal dose was increased to 900mg. You were admitted because of "seizure like" occurrence at home. It was either a seizure or pseudoseizure. Please follow up with your neurologist and primary care doctor/psychiatrist in order to complete the out patient MRI and EEG as recommended by neurology. This will allow us to better understand the etiology and type of seizures you are having, should you have a seizure disorder.    You will also need to follow up with your psychiatrist to assess whether you trileptal would need to be increased in dose amount.  Take the higher dose of trileptal as recommended by neurologist here. You were admitted because of "seizure like" occurrence at home. It was either a seizure or pseudoseizure. You had 3 attempts to have an MRI completed here but each time they were refused and/or unable to be performed because of compliance. Please follow up with your neurologist and primary care doctor/psychiatrist in order to complete the out patient MRI and EEG as recommended by neurology, since an inpatient work up is not needed at this time. This will allow us to better understand the etiology and type of seizures you are having, should you have a seizure disorder.  Take the higher dose of trileptal as recommended by neurologist here and follow up with your neurologist./psychiatrist as an outpt.

## 2017-04-09 NOTE — DISCHARGE NOTE ADULT - MEDICATION SUMMARY - MEDICATIONS TO TAKE
I will START or STAY ON the medications listed below when I get home from the hospital:    clonazePAM 1 mg oral tablet  -- 1 tab(s) by mouth 2 times a day  -- Indication: For Schizoaffective disorder, unspecified type    OXcarbazepine 600 mg oral tablet  -- 1 tab(s) by mouth once a day  -- Indication: For Seizures    metFORMIN 850 mg oral tablet  -- 1 tab(s) by mouth once a day (in the morning)  -- Indication: For T2DM    atorvastatin 10 mg oral tablet  -- 1 tab(s) by mouth once a day  -- @ 5pm  -- Indication: For HLD    benztropine 0.5 mg oral tablet  -- 1 tab(s) by mouth 2 times a day  -- @ 8am and 5pm  -- Indication: For Schizoaffective disorder, unspecified type    risperiDONE 4 mg oral tablet  -- 1 tab(s) by mouth 2 times a day  -- Indication: For Schizoaffective disorder, unspecified type    levothyroxine 25 mcg (0.025 mg) oral tablet  -- 1 tab(s) by mouth once a day Monday to Friday and 2 tab(s) on Saturday and Sunday  -- Indication: For Hypothyroid    cholecalciferol 2000 intl units oral tablet  -- 1 tab(s) by mouth once a day  -- Indication: For prophylactic    Vitamin B-12 500 mcg oral tablet  -- 1 tab(s) by mouth once a day  -- Indication: For prophylactic I will START or STAY ON the medications listed below when I get home from the hospital:    clonazePAM 1 mg oral tablet  -- 1 tab(s) by mouth 2 times a day  -- Indication: For Schizoaffective disorder, unspecified type    OXcarbazepine 300 mg oral tablet  -- 3 tab(s) by mouth once a day MDD:3 tabs  -- Indication: For Seizures    metFORMIN 850 mg oral tablet  -- 1 tab(s) by mouth once a day (in the morning)  -- Indication: For T2DM    atorvastatin 10 mg oral tablet  -- 1 tab(s) by mouth once a day  -- @ 5pm  -- Indication: For HLD    benztropine 0.5 mg oral tablet  -- 1 tab(s) by mouth 2 times a day  -- @ 8am and 5pm  -- Indication: For Schizoaffective disorder, unspecified type    risperiDONE 4 mg oral tablet  -- 1 tab(s) by mouth 2 times a day  -- Indication: For Schizoaffective disorder, unspecified type    levothyroxine 25 mcg (0.025 mg) oral tablet  -- 1 tab(s) by mouth once a day Monday to Friday and 2 tab(s) on Saturday and Sunday  -- Indication: For Hypothyroid    cholecalciferol 2000 intl units oral tablet  -- 1 tab(s) by mouth once a day  -- Indication: For prophylactic    Vitamin B-12 500 mcg oral tablet  -- 1 tab(s) by mouth once a day  -- Indication: For prophylactic

## 2017-04-09 NOTE — DISCHARGE NOTE ADULT - CARE PROVIDER_API CALL
Montrell Velázquez), Psychiatry  [address] [facility] [address]  [place] [address]  Phone: [phone]  Fax: [phone]    [facility],   [address] [place] [id number]  Phone: [phone]  Phone: (   )    -  Fax: (   )    -

## 2017-04-09 NOTE — DISCHARGE NOTE ADULT - CARE PLAN
Principal Discharge DX:	Seizures  Goal:	out patient neurology follow up  Instructions for follow-up, activity and diet:	You were admitted because of "seizure like" occurance at home. However, the description did not fit a true seizure. Please follow up with your neurologist and primary care doctor/psychiatrist in order to complete the out patient MRI and EEG as recommended by neurology. This will allow us to better understand the etiology and type of seizures you are having, should you have a seizure disorder.  Secondary Diagnosis:	Schizoaffective disorder, unspecified type  Goal:	out patient psych follow up  Instructions for follow-up, activity and diet:	please follow up with your Psychiatrist as an out patient. Also, continue to take all meds from home as previously prescribed, nothing has changed on this admission.  Secondary Diagnosis:	Diabetes  Secondary Diagnosis:	Hypothyroid Principal Discharge DX:	Seizures  Goal:	out patient neurology follow up  Instructions for follow-up, activity and diet:	You were admitted because of "seizure like" occurrence at home. However, the description did not fit a true seizure. Please follow up with your neurologist and primary care doctor/psychiatrist in order to complete the out patient MRI and EEG as recommended by neurology. This will allow us to better understand the etiology and type of seizures you are having, should you have a seizure disorder.    You will also need to follow up with your psychiatrist to assess whether you trileptal would need to be increased in dose amount.  Secondary Diagnosis:	Schizoaffective disorder, unspecified type  Goal:	out patient psych follow up  Instructions for follow-up, activity and diet:	please follow up with your Psychiatrist as an out patient. Also, continue to take all meds from home as previously prescribed, nothing has changed on this admission.  Secondary Diagnosis:	Diabetes  Secondary Diagnosis:	Hypothyroid Principal Discharge DX:	Seizures  Goal:	out patient neurology follow up  Instructions for follow-up, activity and diet:	You were admitted because of "seizure like" occurrence at home. However, the description did not fit a true seizure. Please follow up with your neurologist and primary care doctor/psychiatrist in order to complete the out patient MRI and EEG as recommended by neurology. This will allow us to better understand the etiology and type of seizures you are having, should you have a seizure disorder.    You will also need to follow up with your psychiatrist to assess whether you trileptal would need to be increased in dose amount.  Secondary Diagnosis:	Schizoaffective disorder, unspecified type  Goal:	out patient psych follow up  Instructions for follow-up, activity and diet:	please follow up with your Psychiatrist as an out patient. Also, continue to take all meds from home as previously prescribed, nothing has changed on this admission.  Secondary Diagnosis:	Diabetes  Goal:	Control blood sugar  Instructions for follow-up, activity and diet:	Take your medications as prescribed by your doctor. Follow up with PMD on discharge.  Secondary Diagnosis:	Hypothyroid  Goal:	Euthyroid  Instructions for follow-up, activity and diet:	Please take your medications as prescribed by your PMD. Follow up him for continued management of your thyroid problems. Principal Discharge DX:	Seizures  Goal:	out patient neurology follow up  Instructions for follow-up, activity and diet:	You were admitted because of "seizure like" occurrence at home. It was either a seizure or pseudoseizure. Please follow up with your neurologist and primary care doctor/psychiatrist in order to complete the out patient MRI and EEG as recommended by neurology. This will allow us to better understand the etiology and type of seizures you are having, should you have a seizure disorder.    You will also need to follow up with your psychiatrist to assess whether you trileptal would need to be increased in dose amount.  Take the higher dose of trileptal as recommended by neurologist here.  Secondary Diagnosis:	Schizoaffective disorder, unspecified type  Goal:	out patient psych follow up  Instructions for follow-up, activity and diet:	please follow up with your Psychiatrist as an out patient. Also, continue to take all meds as prescribed. Your trileptal dose was increased to 900mg.  Secondary Diagnosis:	Diabetes  Goal:	Control blood sugar  Instructions for follow-up, activity and diet:	Take your medications as prescribed by your doctor. Follow up with PMD on discharge.  Secondary Diagnosis:	Hypothyroid  Goal:	Euthyroid  Instructions for follow-up, activity and diet:	Please take your medications as prescribed by your PMD. Follow up him for continued management of your thyroid problems. Principal Discharge DX:	Seizures  Goal:	out patient neurology follow up  Instructions for follow-up, activity and diet:	You were admitted because of "seizure like" occurrence at home. It was either a seizure or pseudoseizure. You had 3 attempts to have an MRI completed here but each time they were refused and/or unable to be performed because of compliance. Please follow up with your neurologist and primary care doctor/psychiatrist in order to complete the out patient MRI and EEG as recommended by neurology, since an inpatient work up is not needed at this time. This will allow us to better understand the etiology and type of seizures you are having, should you have a seizure disorder.  Take the higher dose of trileptal as recommended by neurologist here and follow up with your neurologist./psychiatrist as an outpt.  Secondary Diagnosis:	Schizoaffective disorder, unspecified type  Goal:	out patient psych follow up  Instructions for follow-up, activity and diet:	please follow up with your Psychiatrist as an out patient. Also, continue to take all meds as prescribed. Your trileptal dose was increased to 900mg.  Secondary Diagnosis:	Diabetes  Goal:	Control blood sugar  Instructions for follow-up, activity and diet:	Take your medications as prescribed by your doctor. Follow up with PMD on discharge.  Secondary Diagnosis:	Hypothyroid  Goal:	Euthyroid  Instructions for follow-up, activity and diet:	Please take your medications as prescribed by your PMD. Follow up him for continued management of your thyroid problems.

## 2017-04-10 PROCEDURE — 99232 SBSQ HOSP IP/OBS MODERATE 35: CPT

## 2017-04-10 PROCEDURE — 99233 SBSQ HOSP IP/OBS HIGH 50: CPT | Mod: GC

## 2017-04-10 RX ORDER — OXCARBAZEPINE 300 MG/1
1 TABLET, FILM COATED ORAL
Qty: 0 | Refills: 0 | COMMUNITY

## 2017-04-10 RX ORDER — OXCARBAZEPINE 300 MG/1
3 TABLET, FILM COATED ORAL
Qty: 42 | Refills: 0 | OUTPATIENT
Start: 2017-04-10 | End: 2017-04-24

## 2017-04-10 RX ORDER — OXCARBAZEPINE 300 MG/1
900 TABLET, FILM COATED ORAL
Qty: 0 | Refills: 0 | Status: DISCONTINUED | OUTPATIENT
Start: 2017-04-10 | End: 2017-04-10

## 2017-04-10 RX ORDER — OXCARBAZEPINE 300 MG/1
900 TABLET, FILM COATED ORAL DAILY
Qty: 0 | Refills: 0 | Status: DISCONTINUED | OUTPATIENT
Start: 2017-04-10 | End: 2017-04-12

## 2017-04-10 RX ADMIN — ATORVASTATIN CALCIUM 10 MILLIGRAM(S): 80 TABLET, FILM COATED ORAL at 22:53

## 2017-04-10 RX ADMIN — PREGABALIN 500 MICROGRAM(S): 225 CAPSULE ORAL at 08:14

## 2017-04-10 RX ADMIN — OXCARBAZEPINE 900 MILLIGRAM(S): 300 TABLET, FILM COATED ORAL at 17:56

## 2017-04-10 RX ADMIN — Medication 1 MILLIGRAM(S): at 08:16

## 2017-04-10 RX ADMIN — RISPERIDONE 4 MILLIGRAM(S): 4 TABLET ORAL at 08:14

## 2017-04-10 RX ADMIN — Medication 1 MILLIGRAM(S): at 17:56

## 2017-04-10 RX ADMIN — Medication 0.5 MILLIGRAM(S): at 17:56

## 2017-04-10 RX ADMIN — Medication 0.5 MILLIGRAM(S): at 08:14

## 2017-04-10 RX ADMIN — RISPERIDONE 4 MILLIGRAM(S): 4 TABLET ORAL at 17:56

## 2017-04-10 RX ADMIN — Medication 2000 UNIT(S): at 08:14

## 2017-04-10 RX ADMIN — Medication 25 MICROGRAM(S): at 08:14

## 2017-04-11 PROCEDURE — 99232 SBSQ HOSP IP/OBS MODERATE 35: CPT

## 2017-04-11 PROCEDURE — 99233 SBSQ HOSP IP/OBS HIGH 50: CPT | Mod: GC

## 2017-04-11 RX ADMIN — Medication 1 MILLIGRAM(S): at 17:05

## 2017-04-11 RX ADMIN — ATORVASTATIN CALCIUM 10 MILLIGRAM(S): 80 TABLET, FILM COATED ORAL at 21:21

## 2017-04-11 RX ADMIN — Medication 0.5 MILLIGRAM(S): at 08:39

## 2017-04-11 RX ADMIN — OXCARBAZEPINE 900 MILLIGRAM(S): 300 TABLET, FILM COATED ORAL at 11:23

## 2017-04-11 RX ADMIN — Medication 2000 UNIT(S): at 11:25

## 2017-04-11 RX ADMIN — PREGABALIN 500 MICROGRAM(S): 225 CAPSULE ORAL at 11:24

## 2017-04-11 RX ADMIN — RISPERIDONE 4 MILLIGRAM(S): 4 TABLET ORAL at 17:05

## 2017-04-11 RX ADMIN — Medication 25 MICROGRAM(S): at 08:39

## 2017-04-11 RX ADMIN — RISPERIDONE 4 MILLIGRAM(S): 4 TABLET ORAL at 11:23

## 2017-04-11 RX ADMIN — Medication 1 MILLIGRAM(S): at 08:38

## 2017-04-11 RX ADMIN — Medication 0.5 MILLIGRAM(S): at 17:05

## 2017-04-11 RX ADMIN — Medication 2 MILLIGRAM(S): at 17:05

## 2017-04-12 VITALS
TEMPERATURE: 98 F | OXYGEN SATURATION: 98 % | SYSTOLIC BLOOD PRESSURE: 112 MMHG | HEART RATE: 64 BPM | RESPIRATION RATE: 18 BRPM | DIASTOLIC BLOOD PRESSURE: 76 MMHG

## 2017-04-12 PROCEDURE — 84132 ASSAY OF SERUM POTASSIUM: CPT

## 2017-04-12 PROCEDURE — 85027 COMPLETE CBC AUTOMATED: CPT

## 2017-04-12 PROCEDURE — 84295 ASSAY OF SERUM SODIUM: CPT

## 2017-04-12 PROCEDURE — 82803 BLOOD GASES ANY COMBINATION: CPT

## 2017-04-12 PROCEDURE — 84100 ASSAY OF PHOSPHORUS: CPT

## 2017-04-12 PROCEDURE — 81001 URINALYSIS AUTO W/SCOPE: CPT

## 2017-04-12 PROCEDURE — 83735 ASSAY OF MAGNESIUM: CPT

## 2017-04-12 PROCEDURE — 85014 HEMATOCRIT: CPT

## 2017-04-12 PROCEDURE — 80053 COMPREHEN METABOLIC PANEL: CPT

## 2017-04-12 PROCEDURE — 99239 HOSP IP/OBS DSCHRG MGMT >30: CPT

## 2017-04-12 PROCEDURE — 87086 URINE CULTURE/COLONY COUNT: CPT

## 2017-04-12 PROCEDURE — 82330 ASSAY OF CALCIUM: CPT

## 2017-04-12 PROCEDURE — 82435 ASSAY OF BLOOD CHLORIDE: CPT

## 2017-04-12 PROCEDURE — 99285 EMERGENCY DEPT VISIT HI MDM: CPT

## 2017-04-12 PROCEDURE — 82947 ASSAY GLUCOSE BLOOD QUANT: CPT

## 2017-04-12 PROCEDURE — 83605 ASSAY OF LACTIC ACID: CPT

## 2017-04-12 PROCEDURE — 80048 BASIC METABOLIC PNL TOTAL CA: CPT

## 2017-04-12 RX ORDER — OXCARBAZEPINE 300 MG/1
3 TABLET, FILM COATED ORAL
Qty: 42 | Refills: 0 | OUTPATIENT
Start: 2017-04-12 | End: 2017-04-26

## 2017-04-12 RX ADMIN — OXCARBAZEPINE 900 MILLIGRAM(S): 300 TABLET, FILM COATED ORAL at 13:54

## 2017-04-12 RX ADMIN — RISPERIDONE 4 MILLIGRAM(S): 4 TABLET ORAL at 08:39

## 2017-04-12 RX ADMIN — Medication 1 MILLIGRAM(S): at 08:39

## 2017-04-12 RX ADMIN — Medication 25 MICROGRAM(S): at 08:39

## 2017-04-12 RX ADMIN — Medication 0.5 MILLIGRAM(S): at 17:03

## 2017-04-12 RX ADMIN — PREGABALIN 500 MICROGRAM(S): 225 CAPSULE ORAL at 13:54

## 2017-04-12 RX ADMIN — Medication 2000 UNIT(S): at 13:54

## 2017-04-12 RX ADMIN — ENOXAPARIN SODIUM 40 MILLIGRAM(S): 100 INJECTION SUBCUTANEOUS at 13:54

## 2017-04-12 RX ADMIN — Medication 1 MILLIGRAM(S): at 17:03

## 2017-04-12 RX ADMIN — Medication 0.5 MILLIGRAM(S): at 08:39

## 2017-04-12 RX ADMIN — RISPERIDONE 4 MILLIGRAM(S): 4 TABLET ORAL at 17:03

## 2017-04-27 ENCOUNTER — EMERGENCY (EMERGENCY)
Facility: HOSPITAL | Age: 47
LOS: 1 days | Discharge: PSYCHIATRIC OR SUBSTANCE USE FACILITY | End: 2017-04-27
Attending: EMERGENCY MEDICINE | Admitting: EMERGENCY MEDICINE
Payer: COMMERCIAL

## 2017-04-27 VITALS
HEART RATE: 84 BPM | SYSTOLIC BLOOD PRESSURE: 155 MMHG | DIASTOLIC BLOOD PRESSURE: 98 MMHG | TEMPERATURE: 98 F | RESPIRATION RATE: 20 BRPM | OXYGEN SATURATION: 97 %

## 2017-04-27 DIAGNOSIS — E78.5 HYPERLIPIDEMIA, UNSPECIFIED: ICD-10-CM

## 2017-04-27 DIAGNOSIS — R41.82 ALTERED MENTAL STATUS, UNSPECIFIED: ICD-10-CM

## 2017-04-27 DIAGNOSIS — F25.9 SCHIZOAFFECTIVE DISORDER, UNSPECIFIED: ICD-10-CM

## 2017-04-27 DIAGNOSIS — E03.9 HYPOTHYROIDISM, UNSPECIFIED: ICD-10-CM

## 2017-04-27 DIAGNOSIS — Z79.899 OTHER LONG TERM (CURRENT) DRUG THERAPY: ICD-10-CM

## 2017-04-27 DIAGNOSIS — E11.9 TYPE 2 DIABETES MELLITUS WITHOUT COMPLICATIONS: ICD-10-CM

## 2017-04-27 DIAGNOSIS — Z79.84 LONG TERM (CURRENT) USE OF ORAL HYPOGLYCEMIC DRUGS: ICD-10-CM

## 2017-04-27 DIAGNOSIS — Z88.8 ALLERGY STATUS TO OTHER DRUGS, MEDICAMENTS AND BIOLOGICAL SUBSTANCES STATUS: ICD-10-CM

## 2017-04-27 DIAGNOSIS — Z88.0 ALLERGY STATUS TO PENICILLIN: ICD-10-CM

## 2017-04-27 LAB
ALBUMIN SERPL ELPH-MCNC: 4.5 G/DL — SIGNIFICANT CHANGE UP (ref 3.3–5)
ALP SERPL-CCNC: 83 U/L — SIGNIFICANT CHANGE UP (ref 40–120)
ALT FLD-CCNC: 12 U/L RC — SIGNIFICANT CHANGE UP (ref 10–45)
ANION GAP SERPL CALC-SCNC: 20 MMOL/L — HIGH (ref 5–17)
APAP SERPL-MCNC: <15 UG/ML — SIGNIFICANT CHANGE UP (ref 10–30)
APPEARANCE UR: CLEAR — SIGNIFICANT CHANGE UP
AST SERPL-CCNC: 16 U/L — SIGNIFICANT CHANGE UP (ref 10–40)
BASOPHILS # BLD AUTO: 0.1 K/UL — SIGNIFICANT CHANGE UP (ref 0–0.2)
BASOPHILS NFR BLD AUTO: 0.7 % — SIGNIFICANT CHANGE UP (ref 0–2)
BILIRUB SERPL-MCNC: 0.9 MG/DL — SIGNIFICANT CHANGE UP (ref 0.2–1.2)
BILIRUB UR-MCNC: NEGATIVE — SIGNIFICANT CHANGE UP
BUN SERPL-MCNC: 8 MG/DL — SIGNIFICANT CHANGE UP (ref 7–23)
CALCIUM SERPL-MCNC: 9.9 MG/DL — SIGNIFICANT CHANGE UP (ref 8.4–10.5)
CHLORIDE SERPL-SCNC: 94 MMOL/L — LOW (ref 96–108)
CO2 SERPL-SCNC: 19 MMOL/L — LOW (ref 22–31)
COLOR SPEC: SIGNIFICANT CHANGE UP
CREAT SERPL-MCNC: 1 MG/DL — SIGNIFICANT CHANGE UP (ref 0.5–1.3)
DIFF PNL FLD: NEGATIVE — SIGNIFICANT CHANGE UP
EOSINOPHIL # BLD AUTO: 0 K/UL — SIGNIFICANT CHANGE UP (ref 0–0.5)
EOSINOPHIL NFR BLD AUTO: 0.4 % — SIGNIFICANT CHANGE UP (ref 0–6)
EPI CELLS # UR: SIGNIFICANT CHANGE UP /HPF
GAS PNL BLDV: SIGNIFICANT CHANGE UP
GLUCOSE SERPL-MCNC: 151 MG/DL — HIGH (ref 70–99)
GLUCOSE UR QL: NEGATIVE — SIGNIFICANT CHANGE UP
HCT VFR BLD CALC: 45.9 % — HIGH (ref 34.5–45)
HGB BLD-MCNC: 16.1 G/DL — HIGH (ref 11.5–15.5)
KETONES UR-MCNC: NEGATIVE — SIGNIFICANT CHANGE UP
LEUKOCYTE ESTERASE UR-ACNC: NEGATIVE — SIGNIFICANT CHANGE UP
LYMPHOCYTES # BLD AUTO: 1.8 K/UL — SIGNIFICANT CHANGE UP (ref 1–3.3)
LYMPHOCYTES # BLD AUTO: 19.1 % — SIGNIFICANT CHANGE UP (ref 13–44)
MCHC RBC-ENTMCNC: 29.5 PG — SIGNIFICANT CHANGE UP (ref 27–34)
MCHC RBC-ENTMCNC: 35 GM/DL — SIGNIFICANT CHANGE UP (ref 32–36)
MCV RBC AUTO: 84.2 FL — SIGNIFICANT CHANGE UP (ref 80–100)
MONOCYTES # BLD AUTO: 0.7 K/UL — SIGNIFICANT CHANGE UP (ref 0–0.9)
MONOCYTES NFR BLD AUTO: 7.3 % — SIGNIFICANT CHANGE UP (ref 2–14)
NEUTROPHILS # BLD AUTO: 6.7 K/UL — SIGNIFICANT CHANGE UP (ref 1.8–7.4)
NEUTROPHILS NFR BLD AUTO: 72.5 % — SIGNIFICANT CHANGE UP (ref 43–77)
NITRITE UR-MCNC: NEGATIVE — SIGNIFICANT CHANGE UP
PCP SPEC-MCNC: SIGNIFICANT CHANGE UP
PH UR: 6 — SIGNIFICANT CHANGE UP (ref 5–8)
PLATELET # BLD AUTO: 246 K/UL — SIGNIFICANT CHANGE UP (ref 150–400)
POTASSIUM SERPL-MCNC: 3.9 MMOL/L — SIGNIFICANT CHANGE UP (ref 3.5–5.3)
POTASSIUM SERPL-SCNC: 3.9 MMOL/L — SIGNIFICANT CHANGE UP (ref 3.5–5.3)
PROT SERPL-MCNC: 7.3 G/DL — SIGNIFICANT CHANGE UP (ref 6–8.3)
PROT UR-MCNC: NEGATIVE — SIGNIFICANT CHANGE UP
RBC # BLD: 5.45 M/UL — HIGH (ref 3.8–5.2)
RBC # FLD: 13.9 % — SIGNIFICANT CHANGE UP (ref 10.3–14.5)
RBC CASTS # UR COMP ASSIST: >50 /HPF (ref 0–2)
SALICYLATES SERPL-MCNC: 2.6 MG/DL — LOW (ref 15–30)
SODIUM SERPL-SCNC: 133 MMOL/L — LOW (ref 135–145)
SP GR SPEC: <1.005 — LOW (ref 1.01–1.02)
UROBILINOGEN FLD QL: NEGATIVE — SIGNIFICANT CHANGE UP
WBC # BLD: 9.2 K/UL — SIGNIFICANT CHANGE UP (ref 3.8–10.5)
WBC # FLD AUTO: 9.2 K/UL — SIGNIFICANT CHANGE UP (ref 3.8–10.5)
WBC UR QL: SIGNIFICANT CHANGE UP /HPF (ref 0–5)

## 2017-04-27 PROCEDURE — 70450 CT HEAD/BRAIN W/O DYE: CPT | Mod: 26

## 2017-04-27 PROCEDURE — 99285 EMERGENCY DEPT VISIT HI MDM: CPT | Mod: 25

## 2017-04-27 PROCEDURE — 71010: CPT | Mod: 26

## 2017-04-27 PROCEDURE — 93010 ELECTROCARDIOGRAM REPORT: CPT

## 2017-04-27 NOTE — ED PROVIDER NOTE - ATTENDING CONTRIBUTION TO CARE
****ATTENDING**** 45yo f hx listed BIB EMS for AMS. As per EMS and  patient became confused, concerned for CVA. Patient is a limited historian. On exam patient appears confused, repeating word. PERRL, Patient's chest is clear to ausculation, +s1s2. Abdomen is soft nd/nt +BS. Moving all 4 ext. Check Labs, Neuro consult/Psych consult.

## 2017-04-27 NOTE — ED BEHAVIORAL HEALTH ASSESSMENT NOTE - CURRENT MEDICATION
atorvastatin 10 mg oral tablet: 1 tab(s) orally once a day; ·benztropine 0.5 mg oral tablet: 1 tab(s) orally 2 times a day; ·clonazePAM 1 mg oral tablet: 1 tab(s) orally 2 times a day; ·levothyroxine 25 mcg (0.025 mg) oral tablet: 1 tab(s) orally once a day Monday to Friday and 2 tab(s) on Saturday and Sunday; ·metFORMIN 850 mg oral tablet: 1 tab(s) orally once a day (in the morning);·OXcarbazepine 600 mg oral tablet: 1 tab(s) orally once a day; ·risperiDONE 4 mg oral tablet: 1 tab(s) orally 2 times a day  ·cholecalciferol 2000 intl units oral tablet: 1 tab(s) orally once a day; ·Vitamin B-12 500 mcg oral tablet: 1 tab(s) orally once a day Oxcarbazepine 900mg daily, Metformin 850mg daily, Atorvastatin 10mg daily, Cholecalciferol 2000 IU daily, Vit-B12 500 mcg daily. Patient has been prescribed Oxcarbazepine 900mg daily, Metformin 850mg daily, Atorvastatin 10mg daily, Cholecalciferol 2000 IU daily, Vit-B12 500 mcg daily. Patient has been prescribed Oxcarbazepine 900mg daily, Metformin 850mg daily, Atorvastatin 10mg daily, Cholecalciferol 2000 IU daily, Vit-B12 500 mcg daily, Levothyroxine 25mcg daily

## 2017-04-27 NOTE — ED BEHAVIORAL HEALTH ASSESSMENT NOTE - HPI (INCLUDE ILLNESS QUALITY, SEVERITY, DURATION, TIMING, CONTEXT, MODIFYING FACTORS, ASSOCIATED SIGNS AND SYMPTOMS)
Patient is a 46 year old  female, domiciled with  in [place], non-caregiver, unemployed on SSI, PPH of Schizoaffective DO, multiple prior psychiatric hospitalizations, most recent in 2013 [facility] (disorganized behavior marked by repeatedly calling police), [facility] outpatient tx with Dr. Montrell Velázquez, no known suicide attempts, no known history of violence or arrests, no active substance abuse or known history of complicated withdrawal, PMH significant of hypothyroidism, HLD, DM2, brought in by EMS, called by . Patient's  states that the patient repeatedly stated to him that he is not her . He states that she also "didn't recognize the cat." He was concerned that the patient was having a stroke so called EMS.    On interview, patient is difficult to engage. She lays down and does not make eye contact with writer. She then abruptly gets up and states, "evelio says no." When asked about psychotic symptoms, she denies delusions, perceptual disturbances or IOR/TB. However, she repeteadly answers with "evelio says no" She then goes on to state "im 66 years old and I have no ." When asked questions about ADLs such as eating , she states, "I don't eat, I don't know what you're asking" She continues to answer other questions non sensical answers.     Spoke to the patient's , who states that patient speech has been progressively becoming incoherent over the past week and worsened today. He states that the patient is compliant with her outpatient medications and saw her outpatient psychiatrist last week. He denies depressive or manic symptoms in the patient. He also denies delusions, paranoia or notable perceptual disturbances in the patient.    Spoke to the ED team. They state that the patient was incoherent and tangential in her speech on arrival. They state that she refused to be taken to her room "unless the FBI was present" When patient was confronted about this statement she states, "my  calls people to hurt me so I need the FBI" Patient's  states that she is referring to EMS as "people hurting her."    Left message for Dr. Velázquez on his emergency line [phone]. Patient is a 46 year old  female, domiciled with  in [place], non-caregiver, unemployed on SSI, PPH of Schizoaffective DO, multiple prior psychiatric hospitalizations, most recent in 2013 [facility] (disorganized behavior marked by repeatedly calling police), [facility] outpatient tx with Dr. Montrell Velázquez, no known suicide attempts, no known history of violence or arrests, no active substance abuse or known history of complicated withdrawal, PMH significant of hypothyroidism, HLD, DM2, brought in by EMS, called by . Patient's  states that the patient repeatedly stated to him that he is not her . He states that she also "didn't recognize the cat." He was concerned that the patient was having a stroke so called EMS.    On interview, patient is difficult to engage. She lays down and does not make eye contact with writer. She then abruptly gets up and states, "evelio says no." When asked about psychotic symptoms, she denies delusions, perceptual disturbances or IOR/TB. However, she repeteadly answers with "evelio says no" She then goes on to state "im 66 years old and I have no ." When asked questions about ADLs such as eating , she states, "I don't eat, I don't know what you're asking" She continues to answer other questions non sensical answers.     Spoke to the patient's , who states that patient speech has been progressively becoming incoherent over the past week and worsened today. He states that he is unsure if patient is compliant with outpatient medications. He denies depressive or manic symptoms in the patient. He also denies delusions, paranoia or notable perceptual disturbances in the patient.    Spoke to the ED team. They state that the patient was incoherent and tangential in her speech on arrival. They state that she refused to be taken to her room "unless the FBI was present" When patient was confronted about this statement she states, "my  calls people to hurt me so I need the FBI" Patient's  states that she is referring to EMS as "people hurting her."    Left message for Dr. Velázquez on his emergency line [phone]. Patient is a 46 year old  female, domiciled with  in [place], non-caregiver, unemployed on SSI, PPH of Schizoaffective DO, multiple prior psychiatric hospitalizations, most recent in 2013 [facility] (disorganized behavior marked by repeatedly calling police), [facility] outpatient tx with Dr. Montrell Velázquez, no known suicide attempts, no known history of violence or arrests, no active substance abuse or known history of complicated withdrawal, PMH significant of hypothyroidism, HLD, DM2, brought in by EMS, called by . Patient's  states that the patient repeatedly stated to him that he is not her . He states that she also "didn't recognize the cat." He was concerned that the patient was having a stroke so called EMS.    On interview, patient is difficult to engage. She lays down and does not make eye contact with writer. She then abruptly gets up and states, "evelio says no." When asked about psychotic symptoms, she denies delusions, perceptual disturbances or IOR/TB. However, she repeteadly answers with "evelio says no" She then goes on to state "im 66 years old and I have no ." When asked questions about ADLs such as eating , she states, "I don't eat, I don't know what you're asking" She continues to answer other questions non sensical answers.     Spoke to the patient's , who states that patient speech has been progressively becoming incoherent over the past week and worsened further today. He states that he is unsure if patient is compliant with outpatient medications. He denies depressive or manic symptoms in the patient. He also denies delusions, paranoia or notable perceptual disturbances in the patient.    Spoke to the ED team. They state that the patient was incoherent and tangential in her speech on arrival. They state that she refused to be taken to her room "unless the FBI was present" When patient was confronted about this statement she states, "my  calls people to hurt me so I need the FBI" Patient's  states that she is referring to EMS as "people hurting her."    Left message for Dr. Velázquez on his emergency line [phone]. Patient is a 46 year old  female, domiciled with  in [place], non-caregiver, unemployed on SSI, PPH of Schizoaffective DO, multiple prior psychiatric hospitalizations, most recent in 2013 [facility] (disorganized behavior marked by repeatedly calling police), [facility] outpatient tx with Dr. Mnotrell Velázquez, no known suicide attempts, no known history of violence or arrests, no active substance abuse or known history of complicated withdrawal, medica brought in by EMS, called by . Patient's  states that the patient repeatedly stated to him that he is not her . He states that she also "didn't recognize the cat." He was concerned that the patient was having a stroke so called EMS.    On interview, patient is difficult to engage. She lays down and does not make eye contact with writer. She then abruptly gets up and states, "evelio says no." When asked about psychotic symptoms, she denies delusions, perceptual disturbances or IOR/TB. However, she repeteadly answers with "evelio says no" She then goes on to state "im 66 years old and I have no ." When asked questions about ADLs such as eating , she states, "I don't eat, I don't know what you're asking" She continues to answer other questions non sensical answers.     Spoke to the patient's , who states that patient speech has been progressively becoming incoherent over the past week and worsened further today. He states that he is unsure if patient is compliant with outpatient medications. He denies depressive or manic symptoms in the patient. He also denies delusions, paranoia or notable perceptual disturbances in the patient.    Spoke to the ED team. They state that the patient was incoherent and tangential in her speech on arrival. They state that she refused to be taken to her room "unless the FBI was present" When patient was confronted about this statement she states, "my  calls people to hurt me so I need the FBI" Patient's  states that she is referring to EMS as "people hurting her."    Left message for Dr. Velázquez on his emergency line [phone]. Patient is a 46 year old  female, domiciled with  in [place], non-caregiver, unemployed on SSI, PPH of Schizoaffective DO, multiple prior psychiatric hospitalizations, most recent in 2013 [facility] (disorganized behavior marked by repeatedly calling police), [facility] outpatient tx with Dr. Montrell Velázquez, no known suicide attempts, no known history of violence or arrests, no active substance abuse or known history of complicated withdrawal, medica brought in by EMS, called by . Patient's  states that the patient repeatedly stated to him that he is not her . He states that she also "didn't recognize the cat." He was concerned that the patient was having a stroke so called EMS.    On interview, patient is difficult to engage. She lays down and does not make eye contact with writer. She then abruptly gets up and states, "evelio says no." When asked about psychotic symptoms, she denies delusions, perceptual disturbances or IOR/TB. However, she repeteadly answers with "evelio says no" She then goes on to state "im 66 years old and I have no ." When asked questions about ADLs such as eating , she states, "I don't eat, I don't know what you're asking" She continues to answer other questions non sensical answers. When asked if she is compliant with medications, she states, "what medications?"    Spoke to the patient's , who states that patient speech has been progressively becoming incoherent over the past week and worsened further today. He states that he is unsure if patient is compliant with outpatient medications.  He denies depressive or manic symptoms in the patient. He also denies delusions, paranoia or notable perceptual disturbances in the patient. In regards to her functioning he states that the patient hasn't bathed for the past week and has had difficulty leaving her house and visiting her physicians.    Spoke to the ED team. They state that the patient was incoherent and tangential in her speech on arrival. They state that she refused to be taken to her room "unless the FBI was present" When patient was confronted about this statement she states, "my  calls people to hurt me so I need the FBI" Patient's  states that she is referring to EMS as "people hurting her."    Left message for Dr. Velázquez on his emergency line [phone]. 46 year old  female, domiciled with  in [place], non-caregiver, unemployed on SSI, PPH of Schizoaffective DO, multiple prior psychiatric hospitalizations, most recent in 2013 [facility] (disorganized behavior marked by repeatedly calling police), [facility] outpatient tx with Dr. Monterll Velázquez, no known suicide attempts, no known history of violence or arrests, no active substance abuse or known history of complicated withdrawal,  brought in by EMS, called by . Patient's  states that the patient repeatedly stated to him that he is not her . He states that she also "didn't recognize the cat." He was concerned that the patient was having a stroke so called EMS.    On interview, patient is difficult to engage. She lays down and does not make eye contact with writer. She then abruptly gets up and states, "evelio says no." When asked about psychotic symptoms, she denies delusions, perceptual disturbances or IOR/TB. However, she repeatedly answers with "evelio says no" She then goes on to state "im 66 years old and I have no ." When asked questions about ADLs such as eating , she states, "I don't eat, I don't know what you're asking" She continues to answer other questions with non-sensical answers. When asked if she is compliant with medications, she states, "what medications?"    Spoke to the patient's , who states that patient speech has been progressively becoming incoherent over the past week and worsened further today. He states that he is unsure if patient is compliant with outpatient medications.  He denies depressive or manic symptoms in the patient. He also denies delusions, paranoia or notable perceptual disturbances in the patient. In regards to her functioning he states that the patient hasn't bathed for the past week and has had difficulty leaving her house and visiting her physicians.    Spoke to the ED team. They state that the patient was incoherent and tangential in her speech on arrival. They state that she refused to be taken to her room "unless the FBI was present" When patient was confronted about this statement she states, "my  calls people to hurt me so I need the FBI" Patient's  states that she is referring to EMS as "people hurting her."    Left message for Dr. Eber on his emergency line [phone].

## 2017-04-27 NOTE — ED PROVIDER NOTE - PROGRESS NOTE DETAILS
pt is medically cleared  seth sherman Skip Burkett MD: Patient with improving labs, will continue one more liter of fluid, will transfer to [facility].

## 2017-04-27 NOTE — ED BEHAVIORAL HEALTH ASSESSMENT NOTE - RISK ASSESSMENT
Risk factors include SAD, anxiety.  No history of self- injurious behavior, prior sucidality, previous suicide attempts, positive family hx, substance abuse.  She has consistent outpatient follow up for over 10 years and has a good therapeutic rapport with Dr. Velázquez.  Although she demonstrates poor insight into illness and poor reactivity to stressors, it appears this is her baseline.   At this time pt does not represent an acute threat to self/others necessitating inpatient hospitalization. Patient is at moderate risk for self injurious behavior due to risk factors of acute psychosis with a history of schizoaffective disorder. Although she is not currently suicidal or homicidal, her psychosis is impairing her ability to function as is apparent by her inability to communicate and poor hygiene.

## 2017-04-27 NOTE — ED PROVIDER NOTE - OBJECTIVE STATEMENT
46F hx hypothyroid hld dm schizoaffective disorder presents with AMS.  called EMS because patient seemed altered more than usual. On arrival EMS wasn't sure if she was at baseline or having a "stroke" as patient was not making sense. On arrival patient alert oriented x 0 pressured speech going off on tangents unable to hold a normal conversation.

## 2017-04-27 NOTE — ED BEHAVIORAL HEALTH ASSESSMENT NOTE - DETAILS
Depakote Dr Anderson in the am Patient, , aware of plan. Depakote-rash Given to JACOB spoke to Dr. Lindsey

## 2017-04-27 NOTE — ED BEHAVIORAL HEALTH ASSESSMENT NOTE - MEDICAL ISSUES AND PLAN (INCLUDE STANDING AND PRN MEDICATION)
Continue Oxcarbazepine 900mg daily, Metformin 850mg daily, Atorvastatin 10mg daily, Cholecalciferol 2000 IU daily, Vit-B12 500 mcg daily, Levothyroxine 25mcg

## 2017-04-27 NOTE — ED BEHAVIORAL HEALTH ASSESSMENT NOTE - DESCRIPTION
Stereotypical answers, otherwise cooperative Rash Patient on psychiatric disability, lives with  in [place].  No children. cooperative. PMH significant of hypothyroidism, HLD, DM2, non epilitiform seizures vs seizures

## 2017-04-27 NOTE — ED BEHAVIORAL HEALTH ASSESSMENT NOTE - PSYCHIATRIC ISSUES AND PLAN (INCLUDE STANDING AND PRN MEDICATION)
C/w standing ·benztropine 0.5 mg oral tablet: 1 tab(s) orally 2 times a day; ·clonazePAM 1 mg oral tablet: 1 tab(s) orally 2 times a day;. Discontinue oxcarbamazepine As compliance cannot be confirmed at this time will restart Risperidone at 1mg BID and Klonopin at 0.5 mg BID. Will do CIWA monitoring for benzo withdrawal symptioms in case patient has been compliant with klonopin. Will continue benztropine 0.5mg BID. As compliance cannot be confirmed at this time will restart Risperidone at 1mg BID and Klonopin at 0.5 mg BID. Will recommend CIWA monitoring for benzo withdrawal symptioms in case patient has been compliant with klonopin. Will continue benztropine 0.5mg BID.

## 2017-04-27 NOTE — ED PROVIDER NOTE - MEDICAL DECISION MAKING DETAILS
46F hx hypothyroid hld dm schizoaffective d/o presents with ams. Pt currently not speaking coherently, disheveled unkempt.  will do medical work up and psych eval.

## 2017-04-27 NOTE — ED PROVIDER NOTE - PHYSICAL EXAMINATION
Constitutional: disheveled unkempt pressured speech   Eyes: PERRLA EOMI  Head: Normocephalic atraumatic  Cardiac: regular rate   Resp: Lungs CTAB  GI: Abd s/nt/nd  Neuro: CN2-12 intact  Skin: No rashes  Psych: No si/hi - pressured speech goes off on tangents cannot hold normal conversation/answer basic questions.

## 2017-04-27 NOTE — ED BEHAVIORAL HEALTH ASSESSMENT NOTE - SUMMARY
Patient is a 45 year old  female, domiciled with  in [place], non-caregiver, unemployed on SSI, PPH of Schizoaffective DO, multiple prior psychiatric hospitalizations, most recent in 2013 [facility] (unknown reason), current outpatient tx with Dr. Montrell Velázquez, no known suicide attempts, no known history of violence or arrests, no active substance abuse or known history of complicated withdrawal, PMH significant of hypothyroidism, HLD, DM2, brought in by EMS for seizures. ED team called consult for capacity on refusing treatment and diagnostic procedures. Pt at the moment appears off baseline in terms of providing only stereotypical answers and being disoriented in time, which suggests post-ictal state. Therefore, pt is not able to express a persistent will as to how to proceed for these medical procedures, and is unable to explain her rational as of why she would refuse them, nor the consequences that she would expect this decision to have on her health. Her  agrees with treatment recommendations from primary team however, and in the room he convinced the patient to accept those. Otherwise, the patient does not present with any deviation from baseline in terms of her psychiatric sx and she does not meet criteria for 9.27 hospitalization. 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 Case will be signed out to [facility] for follow up during medical hospitalization. Patient is a 46 year old  female, domiciled with  in [place], non-caregiver, unemployed on SSI, PPH of Schizoaffective DO, multiple prior psychiatric hospitalizations, most recent in 2013 [facility] (disorganized behavior marked by repeatedly calling police), [facility] outpatient tx with Dr. Montrell Velázquez, no known suicide attempts, no known history of violence or arrests, no active substance abuse or known history of complicated withdrawal, medica brought in by EMS, called by . Patient's  states that the patient repeatedly stated to him that he is not her . He states that she also "didn't recognize the cat." He was concerned that the patient was having a stroke so called EMS. Patient is a 46 year old  female, domiciled with  in [place], non-caregiver, unemployed on SSI, PPH of Schizoaffective DO, multiple prior psychiatric hospitalizations, most recent in 2013 [facility] (disorganized behavior marked by repeatedly calling police), current outpatient tx with Dr. Montrell Velázquez, no known suicide attempts, no known history of violence or arrests, no active substance abuse or known history of complicated withdrawal, medica brought in by EMS, called by . Patient's  states that the patient repeatedly stated to him that he is not her . He states that she also "didn't recognize the cat." He was concerned that the patient was having a stroke so called EMS.    Patient presents with acute psychosis marked by disorganized speech and disorganized behavior ongoing for at least 1 week in the context of possible non-compliance with medication. She will require involuntary hospitalization for stabilization as her psychosis is impairing her ability to function/care for self as is apparent by her inability to communicate and extremely poor hygiene. She also does not have the capacity at this time to accept a voluntary hospitalization.

## 2017-04-27 NOTE — ED BEHAVIORAL HEALTH ASSESSMENT NOTE - OTHER PAST PSYCHIATRIC HISTORY (INCLUDE DETAILS REGARDING ONSET, COURSE OF ILLNESS, INPATIENT/OUTPATIENT TREATMENT)
SAD  on Risperdal 4mg BID, Klonopin 1mg BID, Klonopin, Trileptal  + 5 hospitalizations, no SI/SA, last hospitalized in 2013  Outpt treatment w. Dr. Montrell Velázquez (10 years) SAD  on Risperdal 4mg BID, Klonopin 1mg BID, Cogen , Trileptal  + 5 hospitalizations, no SI/SA, last hospitalized in 2013  Outpt treatment w. Dr. Montrell Velázquez (10 years) Schizoaffective disorder    Risperdal 4mg BID, Klonopin 1mg BID, Cogentin 0.5mg BID , Trileptal  + 5 hospitalizations, no SI/SA, last hospitalized in 2013  Outpt treatment w. Dr. Montrell Velázquez (10 years) Schizoaffective disorder + 5 hospitalizations, no SI/SA, last hospitalized in 2013    Risperdal 4mg BID , Klonopin 1mg BID, Cogentin 0.5mg BID (cannot confirm compliance)    Outpt treatment wLyric Velázquez (10 years)

## 2017-04-27 NOTE — ED ADULT NURSE NOTE - OBJECTIVE STATEMENT
45 y/o female biba for altered mental status. Pt has significant hx of schizophrenia. Pt presents altered, babbling, confused, diaphoretic, lungs cta, abd soft nondistended, maex4, abd soft nondistended, akin warm dry and intact. Denies suicidal or homicidal ideation. Security called to wand and collect belongings.  Awaits further evaluation from MD.

## 2017-04-28 ENCOUNTER — INPATIENT (INPATIENT)
Facility: HOSPITAL | Age: 47
LOS: 47 days | Discharge: HOME | End: 2017-06-15
Attending: PSYCHIATRY & NEUROLOGY | Admitting: STUDENT IN AN ORGANIZED HEALTH CARE EDUCATION/TRAINING PROGRAM
Payer: COMMERCIAL

## 2017-04-28 VITALS
DIASTOLIC BLOOD PRESSURE: 88 MMHG | TEMPERATURE: 98 F | HEART RATE: 82 BPM | OXYGEN SATURATION: 98 % | SYSTOLIC BLOOD PRESSURE: 129 MMHG | RESPIRATION RATE: 20 BRPM

## 2017-04-28 VITALS
WEIGHT: 175.05 LBS | DIASTOLIC BLOOD PRESSURE: 90 MMHG | SYSTOLIC BLOOD PRESSURE: 160 MMHG | TEMPERATURE: 98 F | HEART RATE: 101 BPM | HEIGHT: 62 IN

## 2017-04-28 DIAGNOSIS — F25.9 SCHIZOAFFECTIVE DISORDER, UNSPECIFIED: ICD-10-CM

## 2017-04-28 LAB
ANION GAP SERPL CALC-SCNC: 19 MMOL/L — HIGH (ref 5–17)
BUN SERPL-MCNC: 9 MG/DL — SIGNIFICANT CHANGE UP (ref 7–23)
CALCIUM SERPL-MCNC: 9.7 MG/DL — SIGNIFICANT CHANGE UP (ref 8.4–10.5)
CHLORIDE SERPL-SCNC: 98 MMOL/L — SIGNIFICANT CHANGE UP (ref 96–108)
CO2 SERPL-SCNC: 18 MMOL/L — LOW (ref 22–31)
CREAT SERPL-MCNC: 0.81 MG/DL — SIGNIFICANT CHANGE UP (ref 0.5–1.3)
GLUCOSE SERPL-MCNC: 77 MG/DL — SIGNIFICANT CHANGE UP (ref 70–99)
POTASSIUM SERPL-MCNC: 3.9 MMOL/L — SIGNIFICANT CHANGE UP (ref 3.5–5.3)
POTASSIUM SERPL-SCNC: 3.9 MMOL/L — SIGNIFICANT CHANGE UP (ref 3.5–5.3)
SODIUM SERPL-SCNC: 135 MMOL/L — SIGNIFICANT CHANGE UP (ref 135–145)
TSH SERPL-MCNC: 5.11 UIU/ML — HIGH (ref 0.27–4.2)

## 2017-04-28 PROCEDURE — 99232 SBSQ HOSP IP/OBS MODERATE 35: CPT

## 2017-04-28 PROCEDURE — 81001 URINALYSIS AUTO W/SCOPE: CPT

## 2017-04-28 PROCEDURE — 80053 COMPREHEN METABOLIC PANEL: CPT

## 2017-04-28 PROCEDURE — 85027 COMPLETE CBC AUTOMATED: CPT

## 2017-04-28 PROCEDURE — 82435 ASSAY OF BLOOD CHLORIDE: CPT

## 2017-04-28 PROCEDURE — 82330 ASSAY OF CALCIUM: CPT

## 2017-04-28 PROCEDURE — 71045 X-RAY EXAM CHEST 1 VIEW: CPT

## 2017-04-28 PROCEDURE — 84295 ASSAY OF SERUM SODIUM: CPT

## 2017-04-28 PROCEDURE — 84132 ASSAY OF SERUM POTASSIUM: CPT

## 2017-04-28 PROCEDURE — 85014 HEMATOCRIT: CPT

## 2017-04-28 PROCEDURE — 80048 BASIC METABOLIC PNL TOTAL CA: CPT

## 2017-04-28 PROCEDURE — 82947 ASSAY GLUCOSE BLOOD QUANT: CPT

## 2017-04-28 PROCEDURE — 84443 ASSAY THYROID STIM HORMONE: CPT

## 2017-04-28 PROCEDURE — 82803 BLOOD GASES ANY COMBINATION: CPT

## 2017-04-28 PROCEDURE — 80307 DRUG TEST PRSMV CHEM ANLYZR: CPT

## 2017-04-28 PROCEDURE — 99285 EMERGENCY DEPT VISIT HI MDM: CPT | Mod: 25

## 2017-04-28 PROCEDURE — 93005 ELECTROCARDIOGRAM TRACING: CPT

## 2017-04-28 PROCEDURE — 82550 ASSAY OF CK (CPK): CPT

## 2017-04-28 PROCEDURE — 70450 CT HEAD/BRAIN W/O DYE: CPT

## 2017-04-28 PROCEDURE — 83605 ASSAY OF LACTIC ACID: CPT

## 2017-04-28 RX ORDER — PREGABALIN 225 MG/1
500 CAPSULE ORAL DAILY
Qty: 0 | Refills: 0 | Status: DISCONTINUED | OUTPATIENT
Start: 2017-04-28 | End: 2017-06-15

## 2017-04-28 RX ORDER — HALOPERIDOL DECANOATE 100 MG/ML
5 INJECTION INTRAMUSCULAR EVERY 6 HOURS
Qty: 0 | Refills: 0 | Status: DISCONTINUED | OUTPATIENT
Start: 2017-04-28 | End: 2017-06-15

## 2017-04-28 RX ORDER — CHOLECALCIFEROL (VITAMIN D3) 125 MCG
2000 CAPSULE ORAL DAILY
Qty: 0 | Refills: 0 | Status: DISCONTINUED | OUTPATIENT
Start: 2017-04-28 | End: 2017-06-15

## 2017-04-28 RX ORDER — CLONAZEPAM 1 MG
1 TABLET ORAL
Qty: 0 | Refills: 0 | Status: DISCONTINUED | OUTPATIENT
Start: 2017-04-28 | End: 2017-05-05

## 2017-04-28 RX ORDER — OXCARBAZEPINE 300 MG/1
300 TABLET, FILM COATED ORAL THREE TIMES A DAY
Qty: 0 | Refills: 0 | Status: DISCONTINUED | OUTPATIENT
Start: 2017-04-28 | End: 2017-06-15

## 2017-04-28 RX ORDER — HALOPERIDOL DECANOATE 100 MG/ML
5 INJECTION INTRAMUSCULAR ONCE
Qty: 0 | Refills: 0 | Status: DISCONTINUED | OUTPATIENT
Start: 2017-04-28 | End: 2017-06-15

## 2017-04-28 RX ORDER — DIPHENHYDRAMINE HCL 50 MG
50 CAPSULE ORAL EVERY 6 HOURS
Qty: 0 | Refills: 0 | Status: DISCONTINUED | OUTPATIENT
Start: 2017-04-28 | End: 2017-06-15

## 2017-04-28 RX ORDER — NICOTINE POLACRILEX 2 MG
1 GUM BUCCAL DAILY
Qty: 0 | Refills: 0 | Status: DISCONTINUED | OUTPATIENT
Start: 2017-04-28 | End: 2017-06-15

## 2017-04-28 RX ORDER — SODIUM CHLORIDE 9 MG/ML
1000 INJECTION INTRAMUSCULAR; INTRAVENOUS; SUBCUTANEOUS ONCE
Qty: 0 | Refills: 0 | Status: COMPLETED | OUTPATIENT
Start: 2017-04-28 | End: 2017-04-28

## 2017-04-28 RX ORDER — LEVOTHYROXINE SODIUM 125 MCG
25 TABLET ORAL DAILY
Qty: 0 | Refills: 0 | Status: DISCONTINUED | OUTPATIENT
Start: 2017-04-28 | End: 2017-06-15

## 2017-04-28 RX ORDER — NICOTINE POLACRILEX 2 MG
2 GUM BUCCAL
Qty: 0 | Refills: 0 | Status: DISCONTINUED | OUTPATIENT
Start: 2017-04-28 | End: 2017-06-15

## 2017-04-28 RX ORDER — RISPERIDONE 4 MG/1
1 TABLET ORAL
Qty: 0 | Refills: 0 | Status: DISCONTINUED | OUTPATIENT
Start: 2017-04-28 | End: 2017-04-29

## 2017-04-28 RX ORDER — SODIUM CHLORIDE 9 MG/ML
250 INJECTION INTRAMUSCULAR; INTRAVENOUS; SUBCUTANEOUS ONCE
Qty: 0 | Refills: 0 | Status: COMPLETED | OUTPATIENT
Start: 2017-04-28 | End: 2017-04-28

## 2017-04-28 RX ORDER — CLONAZEPAM 1 MG
0.5 TABLET ORAL
Qty: 0 | Refills: 0 | Status: DISCONTINUED | OUTPATIENT
Start: 2017-04-28 | End: 2017-04-28

## 2017-04-28 RX ORDER — METFORMIN HYDROCHLORIDE 850 MG/1
850 TABLET ORAL DAILY
Qty: 0 | Refills: 0 | Status: DISCONTINUED | OUTPATIENT
Start: 2017-04-28 | End: 2017-06-15

## 2017-04-28 RX ORDER — BENZTROPINE MESYLATE 1 MG
0.5 TABLET ORAL
Qty: 0 | Refills: 0 | Status: DISCONTINUED | OUTPATIENT
Start: 2017-04-28 | End: 2017-05-12

## 2017-04-28 RX ORDER — ATORVASTATIN CALCIUM 80 MG/1
10 TABLET, FILM COATED ORAL AT BEDTIME
Qty: 0 | Refills: 0 | Status: DISCONTINUED | OUTPATIENT
Start: 2017-04-28 | End: 2017-06-15

## 2017-04-28 RX ADMIN — SODIUM CHLORIDE 2000 MILLILITER(S): 9 INJECTION INTRAMUSCULAR; INTRAVENOUS; SUBCUTANEOUS at 01:39

## 2017-04-28 RX ADMIN — Medication 1 MILLIGRAM(S): at 21:06

## 2017-04-28 RX ADMIN — Medication 0.5 MILLIGRAM(S): at 10:01

## 2017-04-28 RX ADMIN — OXCARBAZEPINE 300 MILLIGRAM(S): 300 TABLET, FILM COATED ORAL at 13:53

## 2017-04-28 RX ADMIN — OXCARBAZEPINE 300 MILLIGRAM(S): 300 TABLET, FILM COATED ORAL at 21:07

## 2017-04-28 RX ADMIN — Medication 25 MICROGRAM(S): at 10:01

## 2017-04-28 RX ADMIN — Medication 0.5 MILLIGRAM(S): at 21:06

## 2017-04-28 RX ADMIN — RISPERIDONE 1 MILLIGRAM(S): 4 TABLET ORAL at 21:07

## 2017-04-28 RX ADMIN — Medication 2000 UNIT(S): at 10:01

## 2017-04-28 RX ADMIN — METFORMIN HYDROCHLORIDE 850 MILLIGRAM(S): 850 TABLET ORAL at 10:01

## 2017-04-28 RX ADMIN — OXCARBAZEPINE 300 MILLIGRAM(S): 300 TABLET, FILM COATED ORAL at 10:01

## 2017-04-28 RX ADMIN — ATORVASTATIN CALCIUM 10 MILLIGRAM(S): 80 TABLET, FILM COATED ORAL at 21:06

## 2017-04-28 RX ADMIN — RISPERIDONE 1 MILLIGRAM(S): 4 TABLET ORAL at 10:01

## 2017-04-28 RX ADMIN — SODIUM CHLORIDE 500 MILLILITER(S): 9 INJECTION INTRAMUSCULAR; INTRAVENOUS; SUBCUTANEOUS at 00:46

## 2017-04-28 RX ADMIN — PREGABALIN 500 MICROGRAM(S): 225 CAPSULE ORAL at 10:01

## 2017-04-28 NOTE — ED ADULT NURSE REASSESSMENT NOTE - NS ED NURSE REASSESS COMMENT FT1
Pt. was transferred to [address] this time via [facility]. pt.remains disorganized and psychotic, but calm and cooperative w/ transfer.

## 2017-04-28 NOTE — ED ADULT NURSE REASSESSMENT NOTE - NS ED NURSE REASSESS COMMENT FT1
Received report from Berenice Good, ED, RN regarding pt. profile and plan of care. pt. in [facility],calm, but is disorganized and psychotic.pt.maintained   on 1:1 CO for safety.

## 2017-04-29 LAB
CHOLEST SERPL-MCNC: 190 MG/DL — SIGNIFICANT CHANGE UP (ref 120–199)
HBA1C BLD-MCNC: 5.6 % — SIGNIFICANT CHANGE UP (ref 4–5.6)
HDLC SERPL-MCNC: 63 MG/DL — SIGNIFICANT CHANGE UP (ref 45–65)
LIPID PNL WITH DIRECT LDL SERPL: 111 MG/DL — SIGNIFICANT CHANGE UP
T4 AB SER-ACNC: 7.41 UG/DL — SIGNIFICANT CHANGE UP (ref 5.1–13)
TRIGL SERPL-MCNC: 68 MG/DL — SIGNIFICANT CHANGE UP (ref 10–149)

## 2017-04-29 PROCEDURE — 99232 SBSQ HOSP IP/OBS MODERATE 35: CPT

## 2017-04-29 RX ORDER — RISPERIDONE 4 MG/1
2 TABLET ORAL AT BEDTIME
Qty: 0 | Refills: 0 | Status: DISCONTINUED | OUTPATIENT
Start: 2017-04-29 | End: 2017-04-30

## 2017-04-29 RX ORDER — RISPERIDONE 4 MG/1
1 TABLET ORAL DAILY
Qty: 0 | Refills: 0 | Status: DISCONTINUED | OUTPATIENT
Start: 2017-04-29 | End: 2017-05-02

## 2017-04-29 RX ADMIN — OXCARBAZEPINE 300 MILLIGRAM(S): 300 TABLET, FILM COATED ORAL at 20:54

## 2017-04-29 RX ADMIN — Medication 0.5 MILLIGRAM(S): at 09:20

## 2017-04-29 RX ADMIN — Medication 25 MICROGRAM(S): at 09:21

## 2017-04-29 RX ADMIN — RISPERIDONE 1 MILLIGRAM(S): 4 TABLET ORAL at 09:21

## 2017-04-29 RX ADMIN — Medication 1 MILLIGRAM(S): at 09:21

## 2017-04-29 RX ADMIN — METFORMIN HYDROCHLORIDE 850 MILLIGRAM(S): 850 TABLET ORAL at 09:21

## 2017-04-29 RX ADMIN — PREGABALIN 500 MICROGRAM(S): 225 CAPSULE ORAL at 09:21

## 2017-04-29 RX ADMIN — ATORVASTATIN CALCIUM 10 MILLIGRAM(S): 80 TABLET, FILM COATED ORAL at 20:54

## 2017-04-29 RX ADMIN — RISPERIDONE 2 MILLIGRAM(S): 4 TABLET ORAL at 20:54

## 2017-04-29 RX ADMIN — OXCARBAZEPINE 300 MILLIGRAM(S): 300 TABLET, FILM COATED ORAL at 09:21

## 2017-04-29 RX ADMIN — Medication 2000 UNIT(S): at 09:21

## 2017-04-29 RX ADMIN — Medication 0.5 MILLIGRAM(S): at 20:54

## 2017-04-29 RX ADMIN — OXCARBAZEPINE 300 MILLIGRAM(S): 300 TABLET, FILM COATED ORAL at 13:25

## 2017-04-29 RX ADMIN — Medication 1 MILLIGRAM(S): at 20:54

## 2017-04-30 LAB — T3FREE SERPL-MCNC: 2.43 PG/ML — SIGNIFICANT CHANGE UP (ref 1.8–4.6)

## 2017-04-30 PROCEDURE — 99232 SBSQ HOSP IP/OBS MODERATE 35: CPT

## 2017-04-30 RX ORDER — RISPERIDONE 4 MG/1
3 TABLET ORAL AT BEDTIME
Qty: 0 | Refills: 0 | Status: DISCONTINUED | OUTPATIENT
Start: 2017-04-30 | End: 2017-05-01

## 2017-04-30 RX ADMIN — RISPERIDONE 3 MILLIGRAM(S): 4 TABLET ORAL at 21:02

## 2017-04-30 RX ADMIN — OXCARBAZEPINE 300 MILLIGRAM(S): 300 TABLET, FILM COATED ORAL at 20:57

## 2017-04-30 RX ADMIN — METFORMIN HYDROCHLORIDE 850 MILLIGRAM(S): 850 TABLET ORAL at 09:03

## 2017-04-30 RX ADMIN — Medication 1 MILLIGRAM(S): at 20:57

## 2017-04-30 RX ADMIN — Medication 1 MILLIGRAM(S): at 09:03

## 2017-04-30 RX ADMIN — Medication 0.5 MILLIGRAM(S): at 20:57

## 2017-04-30 RX ADMIN — OXCARBAZEPINE 300 MILLIGRAM(S): 300 TABLET, FILM COATED ORAL at 09:03

## 2017-04-30 RX ADMIN — Medication 0.5 MILLIGRAM(S): at 09:03

## 2017-04-30 RX ADMIN — Medication 2000 UNIT(S): at 09:03

## 2017-04-30 RX ADMIN — RISPERIDONE 1 MILLIGRAM(S): 4 TABLET ORAL at 09:03

## 2017-04-30 RX ADMIN — Medication 25 MICROGRAM(S): at 09:03

## 2017-04-30 RX ADMIN — ATORVASTATIN CALCIUM 10 MILLIGRAM(S): 80 TABLET, FILM COATED ORAL at 20:57

## 2017-04-30 RX ADMIN — OXCARBAZEPINE 300 MILLIGRAM(S): 300 TABLET, FILM COATED ORAL at 12:54

## 2017-04-30 RX ADMIN — PREGABALIN 500 MICROGRAM(S): 225 CAPSULE ORAL at 09:03

## 2017-05-01 PROCEDURE — 99232 SBSQ HOSP IP/OBS MODERATE 35: CPT

## 2017-05-01 RX ORDER — RISPERIDONE 4 MG/1
4 TABLET ORAL AT BEDTIME
Qty: 0 | Refills: 0 | Status: DISCONTINUED | OUTPATIENT
Start: 2017-05-01 | End: 2017-05-04

## 2017-05-01 RX ADMIN — OXCARBAZEPINE 300 MILLIGRAM(S): 300 TABLET, FILM COATED ORAL at 09:16

## 2017-05-01 RX ADMIN — OXCARBAZEPINE 300 MILLIGRAM(S): 300 TABLET, FILM COATED ORAL at 22:28

## 2017-05-01 RX ADMIN — Medication 0.5 MILLIGRAM(S): at 09:16

## 2017-05-01 RX ADMIN — PREGABALIN 500 MICROGRAM(S): 225 CAPSULE ORAL at 09:16

## 2017-05-01 RX ADMIN — Medication 1 MILLIGRAM(S): at 22:28

## 2017-05-01 RX ADMIN — Medication 2000 UNIT(S): at 09:16

## 2017-05-01 RX ADMIN — Medication 1 MILLIGRAM(S): at 09:16

## 2017-05-01 RX ADMIN — Medication 25 MICROGRAM(S): at 09:16

## 2017-05-01 RX ADMIN — Medication 0.5 MILLIGRAM(S): at 22:28

## 2017-05-01 RX ADMIN — RISPERIDONE 1 MILLIGRAM(S): 4 TABLET ORAL at 09:16

## 2017-05-01 RX ADMIN — ATORVASTATIN CALCIUM 10 MILLIGRAM(S): 80 TABLET, FILM COATED ORAL at 22:28

## 2017-05-01 RX ADMIN — OXCARBAZEPINE 300 MILLIGRAM(S): 300 TABLET, FILM COATED ORAL at 13:15

## 2017-05-01 RX ADMIN — METFORMIN HYDROCHLORIDE 850 MILLIGRAM(S): 850 TABLET ORAL at 09:16

## 2017-05-01 RX ADMIN — RISPERIDONE 4 MILLIGRAM(S): 4 TABLET ORAL at 22:28

## 2017-05-02 ENCOUNTER — APPOINTMENT (OUTPATIENT)
Dept: INTERNAL MEDICINE | Facility: CLINIC | Age: 47
End: 2017-05-02

## 2017-05-02 PROCEDURE — 99232 SBSQ HOSP IP/OBS MODERATE 35: CPT

## 2017-05-02 RX ORDER — RISPERIDONE 4 MG/1
2 TABLET ORAL DAILY
Qty: 0 | Refills: 0 | Status: DISCONTINUED | OUTPATIENT
Start: 2017-05-03 | End: 2017-05-03

## 2017-05-02 RX ADMIN — OXCARBAZEPINE 300 MILLIGRAM(S): 300 TABLET, FILM COATED ORAL at 09:32

## 2017-05-02 RX ADMIN — Medication 1 MILLIGRAM(S): at 09:32

## 2017-05-02 RX ADMIN — Medication 0.5 MILLIGRAM(S): at 20:43

## 2017-05-02 RX ADMIN — Medication 2000 UNIT(S): at 09:32

## 2017-05-02 RX ADMIN — PREGABALIN 500 MICROGRAM(S): 225 CAPSULE ORAL at 09:32

## 2017-05-02 RX ADMIN — ATORVASTATIN CALCIUM 10 MILLIGRAM(S): 80 TABLET, FILM COATED ORAL at 20:43

## 2017-05-02 RX ADMIN — METFORMIN HYDROCHLORIDE 850 MILLIGRAM(S): 850 TABLET ORAL at 09:32

## 2017-05-02 RX ADMIN — RISPERIDONE 1 MILLIGRAM(S): 4 TABLET ORAL at 09:33

## 2017-05-02 RX ADMIN — OXCARBAZEPINE 300 MILLIGRAM(S): 300 TABLET, FILM COATED ORAL at 21:00

## 2017-05-02 RX ADMIN — RISPERIDONE 4 MILLIGRAM(S): 4 TABLET ORAL at 21:00

## 2017-05-02 RX ADMIN — Medication 1 MILLIGRAM(S): at 20:43

## 2017-05-02 RX ADMIN — OXCARBAZEPINE 300 MILLIGRAM(S): 300 TABLET, FILM COATED ORAL at 13:09

## 2017-05-02 RX ADMIN — Medication 25 MICROGRAM(S): at 09:32

## 2017-05-02 RX ADMIN — Medication 0.5 MILLIGRAM(S): at 09:32

## 2017-05-03 PROCEDURE — 99232 SBSQ HOSP IP/OBS MODERATE 35: CPT

## 2017-05-03 RX ORDER — CLONAZEPAM 1 MG
1 TABLET ORAL
Qty: 0 | Refills: 0 | Status: DISCONTINUED | OUTPATIENT
Start: 2017-05-06 | End: 2017-05-13

## 2017-05-03 RX ORDER — RISPERIDONE 4 MG/1
3 TABLET ORAL DAILY
Qty: 0 | Refills: 0 | Status: DISCONTINUED | OUTPATIENT
Start: 2017-05-04 | End: 2017-05-04

## 2017-05-03 RX ADMIN — Medication 25 MICROGRAM(S): at 09:03

## 2017-05-03 RX ADMIN — METFORMIN HYDROCHLORIDE 850 MILLIGRAM(S): 850 TABLET ORAL at 09:03

## 2017-05-03 RX ADMIN — Medication 0.5 MILLIGRAM(S): at 21:22

## 2017-05-03 RX ADMIN — Medication 0.5 MILLIGRAM(S): at 09:03

## 2017-05-03 RX ADMIN — Medication 1 MILLIGRAM(S): at 21:22

## 2017-05-03 RX ADMIN — Medication 2000 UNIT(S): at 09:03

## 2017-05-03 RX ADMIN — RISPERIDONE 2 MILLIGRAM(S): 4 TABLET ORAL at 09:03

## 2017-05-03 RX ADMIN — OXCARBAZEPINE 300 MILLIGRAM(S): 300 TABLET, FILM COATED ORAL at 09:03

## 2017-05-03 RX ADMIN — ATORVASTATIN CALCIUM 10 MILLIGRAM(S): 80 TABLET, FILM COATED ORAL at 21:22

## 2017-05-03 RX ADMIN — Medication 1 MILLIGRAM(S): at 09:03

## 2017-05-03 RX ADMIN — OXCARBAZEPINE 300 MILLIGRAM(S): 300 TABLET, FILM COATED ORAL at 13:44

## 2017-05-03 RX ADMIN — PREGABALIN 500 MICROGRAM(S): 225 CAPSULE ORAL at 09:03

## 2017-05-03 RX ADMIN — OXCARBAZEPINE 300 MILLIGRAM(S): 300 TABLET, FILM COATED ORAL at 21:22

## 2017-05-03 RX ADMIN — RISPERIDONE 4 MILLIGRAM(S): 4 TABLET ORAL at 21:22

## 2017-05-04 PROCEDURE — 99232 SBSQ HOSP IP/OBS MODERATE 35: CPT

## 2017-05-04 RX ORDER — RISPERIDONE 4 MG/1
4 TABLET ORAL
Qty: 0 | Refills: 0 | Status: DISCONTINUED | OUTPATIENT
Start: 2017-05-04 | End: 2017-05-08

## 2017-05-04 RX ADMIN — METFORMIN HYDROCHLORIDE 850 MILLIGRAM(S): 850 TABLET ORAL at 08:45

## 2017-05-04 RX ADMIN — Medication 0.5 MILLIGRAM(S): at 08:45

## 2017-05-04 RX ADMIN — Medication 1 MILLIGRAM(S): at 08:45

## 2017-05-04 RX ADMIN — Medication 25 MICROGRAM(S): at 08:45

## 2017-05-04 RX ADMIN — OXCARBAZEPINE 300 MILLIGRAM(S): 300 TABLET, FILM COATED ORAL at 12:53

## 2017-05-04 RX ADMIN — OXCARBAZEPINE 300 MILLIGRAM(S): 300 TABLET, FILM COATED ORAL at 08:45

## 2017-05-04 RX ADMIN — Medication 2000 UNIT(S): at 08:45

## 2017-05-04 RX ADMIN — RISPERIDONE 3 MILLIGRAM(S): 4 TABLET ORAL at 08:45

## 2017-05-04 RX ADMIN — PREGABALIN 500 MICROGRAM(S): 225 CAPSULE ORAL at 08:45

## 2017-05-04 RX ADMIN — RISPERIDONE 4 MILLIGRAM(S): 4 TABLET ORAL at 20:34

## 2017-05-05 PROCEDURE — 99232 SBSQ HOSP IP/OBS MODERATE 35: CPT

## 2017-05-05 RX ADMIN — Medication 1 MILLIGRAM(S): at 10:47

## 2017-05-05 RX ADMIN — RISPERIDONE 4 MILLIGRAM(S): 4 TABLET ORAL at 10:47

## 2017-05-05 RX ADMIN — OXCARBAZEPINE 300 MILLIGRAM(S): 300 TABLET, FILM COATED ORAL at 13:26

## 2017-05-05 RX ADMIN — Medication 25 MICROGRAM(S): at 10:47

## 2017-05-05 RX ADMIN — RISPERIDONE 4 MILLIGRAM(S): 4 TABLET ORAL at 20:45

## 2017-05-05 RX ADMIN — Medication 2000 UNIT(S): at 10:47

## 2017-05-05 RX ADMIN — Medication 0.5 MILLIGRAM(S): at 20:45

## 2017-05-05 RX ADMIN — PREGABALIN 500 MICROGRAM(S): 225 CAPSULE ORAL at 10:47

## 2017-05-05 RX ADMIN — OXCARBAZEPINE 300 MILLIGRAM(S): 300 TABLET, FILM COATED ORAL at 10:47

## 2017-05-05 RX ADMIN — ATORVASTATIN CALCIUM 10 MILLIGRAM(S): 80 TABLET, FILM COATED ORAL at 20:45

## 2017-05-05 RX ADMIN — METFORMIN HYDROCHLORIDE 850 MILLIGRAM(S): 850 TABLET ORAL at 10:47

## 2017-05-05 RX ADMIN — Medication 1 MILLIGRAM(S): at 20:45

## 2017-05-05 RX ADMIN — OXCARBAZEPINE 300 MILLIGRAM(S): 300 TABLET, FILM COATED ORAL at 20:45

## 2017-05-05 RX ADMIN — Medication 0.5 MILLIGRAM(S): at 10:47

## 2017-05-06 RX ADMIN — OXCARBAZEPINE 300 MILLIGRAM(S): 300 TABLET, FILM COATED ORAL at 10:05

## 2017-05-06 RX ADMIN — ATORVASTATIN CALCIUM 10 MILLIGRAM(S): 80 TABLET, FILM COATED ORAL at 20:34

## 2017-05-06 RX ADMIN — OXCARBAZEPINE 300 MILLIGRAM(S): 300 TABLET, FILM COATED ORAL at 13:49

## 2017-05-06 RX ADMIN — Medication 1 MILLIGRAM(S): at 10:04

## 2017-05-06 RX ADMIN — Medication 0.5 MILLIGRAM(S): at 20:33

## 2017-05-06 RX ADMIN — Medication 1 MILLIGRAM(S): at 20:34

## 2017-05-06 RX ADMIN — Medication 1 MILLIGRAM(S): at 20:33

## 2017-05-06 RX ADMIN — OXCARBAZEPINE 300 MILLIGRAM(S): 300 TABLET, FILM COATED ORAL at 20:34

## 2017-05-06 RX ADMIN — ATORVASTATIN CALCIUM 10 MILLIGRAM(S): 80 TABLET, FILM COATED ORAL at 20:33

## 2017-05-06 RX ADMIN — PREGABALIN 500 MICROGRAM(S): 225 CAPSULE ORAL at 10:04

## 2017-05-06 RX ADMIN — Medication 0.5 MILLIGRAM(S): at 20:34

## 2017-05-06 RX ADMIN — RISPERIDONE 4 MILLIGRAM(S): 4 TABLET ORAL at 20:34

## 2017-05-06 RX ADMIN — Medication 2000 UNIT(S): at 10:05

## 2017-05-06 RX ADMIN — METFORMIN HYDROCHLORIDE 850 MILLIGRAM(S): 850 TABLET ORAL at 10:04

## 2017-05-06 RX ADMIN — Medication 25 MICROGRAM(S): at 10:04

## 2017-05-06 RX ADMIN — OXCARBAZEPINE 300 MILLIGRAM(S): 300 TABLET, FILM COATED ORAL at 20:33

## 2017-05-06 RX ADMIN — Medication 0.5 MILLIGRAM(S): at 10:05

## 2017-05-06 RX ADMIN — RISPERIDONE 4 MILLIGRAM(S): 4 TABLET ORAL at 10:05

## 2017-05-07 RX ADMIN — Medication 1 MILLIGRAM(S): at 08:42

## 2017-05-07 RX ADMIN — ATORVASTATIN CALCIUM 10 MILLIGRAM(S): 80 TABLET, FILM COATED ORAL at 20:55

## 2017-05-07 RX ADMIN — OXCARBAZEPINE 300 MILLIGRAM(S): 300 TABLET, FILM COATED ORAL at 08:42

## 2017-05-07 RX ADMIN — Medication 1 MILLIGRAM(S): at 20:55

## 2017-05-07 RX ADMIN — RISPERIDONE 4 MILLIGRAM(S): 4 TABLET ORAL at 20:56

## 2017-05-07 RX ADMIN — Medication 0.5 MILLIGRAM(S): at 08:42

## 2017-05-07 RX ADMIN — Medication 2000 UNIT(S): at 08:42

## 2017-05-07 RX ADMIN — RISPERIDONE 4 MILLIGRAM(S): 4 TABLET ORAL at 08:42

## 2017-05-07 RX ADMIN — Medication 0.5 MILLIGRAM(S): at 20:55

## 2017-05-07 RX ADMIN — OXCARBAZEPINE 300 MILLIGRAM(S): 300 TABLET, FILM COATED ORAL at 20:56

## 2017-05-07 RX ADMIN — PREGABALIN 500 MICROGRAM(S): 225 CAPSULE ORAL at 08:42

## 2017-05-07 RX ADMIN — Medication 25 MICROGRAM(S): at 08:42

## 2017-05-07 RX ADMIN — OXCARBAZEPINE 300 MILLIGRAM(S): 300 TABLET, FILM COATED ORAL at 12:46

## 2017-05-07 RX ADMIN — METFORMIN HYDROCHLORIDE 850 MILLIGRAM(S): 850 TABLET ORAL at 08:42

## 2017-05-08 PROCEDURE — 99232 SBSQ HOSP IP/OBS MODERATE 35: CPT

## 2017-05-08 RX ORDER — RISPERIDONE 4 MG/1
3 TABLET ORAL
Qty: 0 | Refills: 0 | Status: DISCONTINUED | OUTPATIENT
Start: 2017-05-08 | End: 2017-05-09

## 2017-05-08 RX ORDER — OLANZAPINE 15 MG/1
10 TABLET, FILM COATED ORAL AT BEDTIME
Qty: 0 | Refills: 0 | Status: DISCONTINUED | OUTPATIENT
Start: 2017-05-08 | End: 2017-05-09

## 2017-05-08 RX ADMIN — METFORMIN HYDROCHLORIDE 850 MILLIGRAM(S): 850 TABLET ORAL at 09:56

## 2017-05-08 RX ADMIN — OXCARBAZEPINE 300 MILLIGRAM(S): 300 TABLET, FILM COATED ORAL at 21:02

## 2017-05-08 RX ADMIN — Medication 2000 UNIT(S): at 09:56

## 2017-05-08 RX ADMIN — Medication 0.5 MILLIGRAM(S): at 09:56

## 2017-05-08 RX ADMIN — Medication 1 MILLIGRAM(S): at 21:02

## 2017-05-08 RX ADMIN — OLANZAPINE 10 MILLIGRAM(S): 15 TABLET, FILM COATED ORAL at 21:02

## 2017-05-08 RX ADMIN — RISPERIDONE 4 MILLIGRAM(S): 4 TABLET ORAL at 09:57

## 2017-05-08 RX ADMIN — OXCARBAZEPINE 300 MILLIGRAM(S): 300 TABLET, FILM COATED ORAL at 09:57

## 2017-05-08 RX ADMIN — OXCARBAZEPINE 300 MILLIGRAM(S): 300 TABLET, FILM COATED ORAL at 12:40

## 2017-05-08 RX ADMIN — Medication 1 MILLIGRAM(S): at 09:56

## 2017-05-08 RX ADMIN — PREGABALIN 500 MICROGRAM(S): 225 CAPSULE ORAL at 09:56

## 2017-05-08 RX ADMIN — Medication 25 MICROGRAM(S): at 09:56

## 2017-05-08 RX ADMIN — Medication 0.5 MILLIGRAM(S): at 21:02

## 2017-05-08 RX ADMIN — RISPERIDONE 3 MILLIGRAM(S): 4 TABLET ORAL at 21:02

## 2017-05-08 RX ADMIN — ATORVASTATIN CALCIUM 10 MILLIGRAM(S): 80 TABLET, FILM COATED ORAL at 21:02

## 2017-05-09 PROCEDURE — 99232 SBSQ HOSP IP/OBS MODERATE 35: CPT

## 2017-05-09 RX ORDER — RISPERIDONE 4 MG/1
2 TABLET ORAL
Qty: 0 | Refills: 0 | Status: DISCONTINUED | OUTPATIENT
Start: 2017-05-09 | End: 2017-05-11

## 2017-05-09 RX ORDER — OLANZAPINE 15 MG/1
20 TABLET, FILM COATED ORAL AT BEDTIME
Qty: 0 | Refills: 0 | Status: DISCONTINUED | OUTPATIENT
Start: 2017-05-09 | End: 2017-05-11

## 2017-05-09 RX ADMIN — Medication 25 MICROGRAM(S): at 09:19

## 2017-05-09 RX ADMIN — Medication 0.5 MILLIGRAM(S): at 21:11

## 2017-05-09 RX ADMIN — PREGABALIN 500 MICROGRAM(S): 225 CAPSULE ORAL at 09:19

## 2017-05-09 RX ADMIN — OXCARBAZEPINE 300 MILLIGRAM(S): 300 TABLET, FILM COATED ORAL at 21:12

## 2017-05-09 RX ADMIN — OXCARBAZEPINE 300 MILLIGRAM(S): 300 TABLET, FILM COATED ORAL at 12:26

## 2017-05-09 RX ADMIN — RISPERIDONE 3 MILLIGRAM(S): 4 TABLET ORAL at 09:19

## 2017-05-09 RX ADMIN — Medication 0.5 MILLIGRAM(S): at 09:19

## 2017-05-09 RX ADMIN — Medication 1 MILLIGRAM(S): at 09:19

## 2017-05-09 RX ADMIN — OXCARBAZEPINE 300 MILLIGRAM(S): 300 TABLET, FILM COATED ORAL at 09:19

## 2017-05-09 RX ADMIN — METFORMIN HYDROCHLORIDE 850 MILLIGRAM(S): 850 TABLET ORAL at 09:19

## 2017-05-09 RX ADMIN — Medication 1 MILLIGRAM(S): at 21:12

## 2017-05-09 RX ADMIN — RISPERIDONE 2 MILLIGRAM(S): 4 TABLET ORAL at 21:12

## 2017-05-09 RX ADMIN — OLANZAPINE 20 MILLIGRAM(S): 15 TABLET, FILM COATED ORAL at 21:12

## 2017-05-09 RX ADMIN — Medication 2000 UNIT(S): at 09:19

## 2017-05-09 RX ADMIN — ATORVASTATIN CALCIUM 10 MILLIGRAM(S): 80 TABLET, FILM COATED ORAL at 21:11

## 2017-05-10 PROCEDURE — 99232 SBSQ HOSP IP/OBS MODERATE 35: CPT

## 2017-05-10 RX ADMIN — Medication 0.5 MILLIGRAM(S): at 21:21

## 2017-05-10 RX ADMIN — Medication 1 MILLIGRAM(S): at 09:56

## 2017-05-10 RX ADMIN — Medication 0.5 MILLIGRAM(S): at 09:56

## 2017-05-10 RX ADMIN — Medication 1 MILLIGRAM(S): at 21:21

## 2017-05-10 RX ADMIN — RISPERIDONE 2 MILLIGRAM(S): 4 TABLET ORAL at 21:21

## 2017-05-10 RX ADMIN — Medication 2000 UNIT(S): at 09:56

## 2017-05-10 RX ADMIN — PREGABALIN 500 MICROGRAM(S): 225 CAPSULE ORAL at 09:56

## 2017-05-10 RX ADMIN — ATORVASTATIN CALCIUM 10 MILLIGRAM(S): 80 TABLET, FILM COATED ORAL at 21:21

## 2017-05-10 RX ADMIN — OXCARBAZEPINE 300 MILLIGRAM(S): 300 TABLET, FILM COATED ORAL at 13:28

## 2017-05-10 RX ADMIN — OXCARBAZEPINE 300 MILLIGRAM(S): 300 TABLET, FILM COATED ORAL at 09:25

## 2017-05-10 RX ADMIN — Medication 25 MICROGRAM(S): at 09:56

## 2017-05-10 RX ADMIN — METFORMIN HYDROCHLORIDE 850 MILLIGRAM(S): 850 TABLET ORAL at 09:25

## 2017-05-10 RX ADMIN — OXCARBAZEPINE 300 MILLIGRAM(S): 300 TABLET, FILM COATED ORAL at 21:21

## 2017-05-10 RX ADMIN — RISPERIDONE 2 MILLIGRAM(S): 4 TABLET ORAL at 09:25

## 2017-05-10 RX ADMIN — OLANZAPINE 20 MILLIGRAM(S): 15 TABLET, FILM COATED ORAL at 21:21

## 2017-05-11 PROCEDURE — 99232 SBSQ HOSP IP/OBS MODERATE 35: CPT

## 2017-05-11 PROCEDURE — 93010 ELECTROCARDIOGRAM REPORT: CPT

## 2017-05-11 RX ORDER — OLANZAPINE 15 MG/1
25 TABLET, FILM COATED ORAL AT BEDTIME
Qty: 0 | Refills: 0 | Status: DISCONTINUED | OUTPATIENT
Start: 2017-05-11 | End: 2017-05-12

## 2017-05-11 RX ORDER — RISPERIDONE 4 MG/1
1 TABLET ORAL
Qty: 0 | Refills: 0 | Status: DISCONTINUED | OUTPATIENT
Start: 2017-05-11 | End: 2017-05-12

## 2017-05-11 RX ORDER — CLONAZEPAM 1 MG
1 TABLET ORAL
Qty: 0 | Refills: 0 | Status: DISCONTINUED | OUTPATIENT
Start: 2017-05-14 | End: 2017-05-14

## 2017-05-11 RX ADMIN — Medication 2000 UNIT(S): at 08:59

## 2017-05-11 RX ADMIN — OXCARBAZEPINE 300 MILLIGRAM(S): 300 TABLET, FILM COATED ORAL at 22:05

## 2017-05-11 RX ADMIN — Medication 1 MILLIGRAM(S): at 08:59

## 2017-05-11 RX ADMIN — RISPERIDONE 2 MILLIGRAM(S): 4 TABLET ORAL at 08:59

## 2017-05-11 RX ADMIN — OXCARBAZEPINE 300 MILLIGRAM(S): 300 TABLET, FILM COATED ORAL at 14:33

## 2017-05-11 RX ADMIN — ATORVASTATIN CALCIUM 10 MILLIGRAM(S): 80 TABLET, FILM COATED ORAL at 22:04

## 2017-05-11 RX ADMIN — Medication 25 MICROGRAM(S): at 08:59

## 2017-05-11 RX ADMIN — PREGABALIN 500 MICROGRAM(S): 225 CAPSULE ORAL at 08:59

## 2017-05-11 RX ADMIN — Medication 1 MILLIGRAM(S): at 22:05

## 2017-05-11 RX ADMIN — Medication 0.5 MILLIGRAM(S): at 08:59

## 2017-05-11 RX ADMIN — METFORMIN HYDROCHLORIDE 850 MILLIGRAM(S): 850 TABLET ORAL at 08:59

## 2017-05-11 RX ADMIN — RISPERIDONE 1 MILLIGRAM(S): 4 TABLET ORAL at 22:05

## 2017-05-11 RX ADMIN — OXCARBAZEPINE 300 MILLIGRAM(S): 300 TABLET, FILM COATED ORAL at 08:59

## 2017-05-11 RX ADMIN — Medication 0.5 MILLIGRAM(S): at 22:04

## 2017-05-11 RX ADMIN — OLANZAPINE 25 MILLIGRAM(S): 15 TABLET, FILM COATED ORAL at 22:05

## 2017-05-12 PROCEDURE — 99232 SBSQ HOSP IP/OBS MODERATE 35: CPT

## 2017-05-12 RX ORDER — OLANZAPINE 15 MG/1
30 TABLET, FILM COATED ORAL AT BEDTIME
Qty: 0 | Refills: 0 | Status: DISCONTINUED | OUTPATIENT
Start: 2017-05-12 | End: 2017-05-15

## 2017-05-12 RX ADMIN — PREGABALIN 500 MICROGRAM(S): 225 CAPSULE ORAL at 09:54

## 2017-05-12 RX ADMIN — ATORVASTATIN CALCIUM 10 MILLIGRAM(S): 80 TABLET, FILM COATED ORAL at 21:55

## 2017-05-12 RX ADMIN — OXCARBAZEPINE 300 MILLIGRAM(S): 300 TABLET, FILM COATED ORAL at 12:51

## 2017-05-12 RX ADMIN — Medication 1 MILLIGRAM(S): at 09:54

## 2017-05-12 RX ADMIN — OXCARBAZEPINE 300 MILLIGRAM(S): 300 TABLET, FILM COATED ORAL at 09:55

## 2017-05-12 RX ADMIN — Medication 25 MICROGRAM(S): at 09:54

## 2017-05-12 RX ADMIN — OXCARBAZEPINE 300 MILLIGRAM(S): 300 TABLET, FILM COATED ORAL at 21:55

## 2017-05-12 RX ADMIN — Medication 2000 UNIT(S): at 09:54

## 2017-05-12 RX ADMIN — Medication 1 MILLIGRAM(S): at 21:55

## 2017-05-12 RX ADMIN — METFORMIN HYDROCHLORIDE 850 MILLIGRAM(S): 850 TABLET ORAL at 09:54

## 2017-05-12 RX ADMIN — OLANZAPINE 30 MILLIGRAM(S): 15 TABLET, FILM COATED ORAL at 21:55

## 2017-05-12 RX ADMIN — RISPERIDONE 1 MILLIGRAM(S): 4 TABLET ORAL at 09:55

## 2017-05-12 RX ADMIN — Medication 0.5 MILLIGRAM(S): at 09:53

## 2017-05-13 RX ADMIN — PREGABALIN 500 MICROGRAM(S): 225 CAPSULE ORAL at 09:10

## 2017-05-13 RX ADMIN — ATORVASTATIN CALCIUM 10 MILLIGRAM(S): 80 TABLET, FILM COATED ORAL at 21:52

## 2017-05-13 RX ADMIN — OXCARBAZEPINE 300 MILLIGRAM(S): 300 TABLET, FILM COATED ORAL at 09:11

## 2017-05-13 RX ADMIN — OXCARBAZEPINE 300 MILLIGRAM(S): 300 TABLET, FILM COATED ORAL at 21:52

## 2017-05-13 RX ADMIN — OLANZAPINE 30 MILLIGRAM(S): 15 TABLET, FILM COATED ORAL at 21:52

## 2017-05-13 RX ADMIN — Medication 25 MICROGRAM(S): at 09:10

## 2017-05-13 RX ADMIN — Medication 1 MILLIGRAM(S): at 21:52

## 2017-05-13 RX ADMIN — Medication 2000 UNIT(S): at 09:10

## 2017-05-13 RX ADMIN — METFORMIN HYDROCHLORIDE 850 MILLIGRAM(S): 850 TABLET ORAL at 09:11

## 2017-05-13 RX ADMIN — OXCARBAZEPINE 300 MILLIGRAM(S): 300 TABLET, FILM COATED ORAL at 13:16

## 2017-05-13 RX ADMIN — Medication 1 MILLIGRAM(S): at 09:10

## 2017-05-14 RX ORDER — CLONAZEPAM 1 MG
0.5 TABLET ORAL
Qty: 0 | Refills: 0 | Status: DISCONTINUED | OUTPATIENT
Start: 2017-05-14 | End: 2017-05-19

## 2017-05-14 RX ADMIN — Medication 25 MICROGRAM(S): at 08:38

## 2017-05-14 RX ADMIN — METFORMIN HYDROCHLORIDE 850 MILLIGRAM(S): 850 TABLET ORAL at 08:38

## 2017-05-14 RX ADMIN — Medication 1 MILLIGRAM(S): at 08:38

## 2017-05-14 RX ADMIN — PREGABALIN 500 MICROGRAM(S): 225 CAPSULE ORAL at 08:38

## 2017-05-14 RX ADMIN — OLANZAPINE 30 MILLIGRAM(S): 15 TABLET, FILM COATED ORAL at 21:27

## 2017-05-14 RX ADMIN — OXCARBAZEPINE 300 MILLIGRAM(S): 300 TABLET, FILM COATED ORAL at 21:27

## 2017-05-14 RX ADMIN — OXCARBAZEPINE 300 MILLIGRAM(S): 300 TABLET, FILM COATED ORAL at 12:51

## 2017-05-14 RX ADMIN — Medication 0.5 MILLIGRAM(S): at 21:26

## 2017-05-14 RX ADMIN — ATORVASTATIN CALCIUM 10 MILLIGRAM(S): 80 TABLET, FILM COATED ORAL at 21:26

## 2017-05-14 RX ADMIN — Medication 2000 UNIT(S): at 08:38

## 2017-05-14 RX ADMIN — OXCARBAZEPINE 300 MILLIGRAM(S): 300 TABLET, FILM COATED ORAL at 08:38

## 2017-05-15 PROCEDURE — 99232 SBSQ HOSP IP/OBS MODERATE 35: CPT

## 2017-05-15 RX ORDER — OLANZAPINE 15 MG/1
35 TABLET, FILM COATED ORAL AT BEDTIME
Qty: 0 | Refills: 0 | Status: DISCONTINUED | OUTPATIENT
Start: 2017-05-15 | End: 2017-05-16

## 2017-05-15 RX ADMIN — Medication 0.5 MILLIGRAM(S): at 20:52

## 2017-05-15 RX ADMIN — Medication 25 MICROGRAM(S): at 09:04

## 2017-05-15 RX ADMIN — OXCARBAZEPINE 300 MILLIGRAM(S): 300 TABLET, FILM COATED ORAL at 09:05

## 2017-05-15 RX ADMIN — OXCARBAZEPINE 300 MILLIGRAM(S): 300 TABLET, FILM COATED ORAL at 12:45

## 2017-05-15 RX ADMIN — OXCARBAZEPINE 300 MILLIGRAM(S): 300 TABLET, FILM COATED ORAL at 20:53

## 2017-05-15 RX ADMIN — METFORMIN HYDROCHLORIDE 850 MILLIGRAM(S): 850 TABLET ORAL at 09:04

## 2017-05-15 RX ADMIN — ATORVASTATIN CALCIUM 10 MILLIGRAM(S): 80 TABLET, FILM COATED ORAL at 20:52

## 2017-05-15 RX ADMIN — PREGABALIN 500 MICROGRAM(S): 225 CAPSULE ORAL at 09:04

## 2017-05-15 RX ADMIN — Medication 0.5 MILLIGRAM(S): at 09:01

## 2017-05-15 RX ADMIN — Medication 2000 UNIT(S): at 09:00

## 2017-05-15 RX ADMIN — OLANZAPINE 35 MILLIGRAM(S): 15 TABLET, FILM COATED ORAL at 20:53

## 2017-05-16 PROCEDURE — 99232 SBSQ HOSP IP/OBS MODERATE 35: CPT

## 2017-05-16 RX ORDER — OLANZAPINE 15 MG/1
40 TABLET, FILM COATED ORAL AT BEDTIME
Qty: 0 | Refills: 0 | Status: DISCONTINUED | OUTPATIENT
Start: 2017-05-16 | End: 2017-05-25

## 2017-05-16 RX ADMIN — ATORVASTATIN CALCIUM 10 MILLIGRAM(S): 80 TABLET, FILM COATED ORAL at 21:31

## 2017-05-16 RX ADMIN — OXCARBAZEPINE 300 MILLIGRAM(S): 300 TABLET, FILM COATED ORAL at 08:54

## 2017-05-16 RX ADMIN — Medication 25 MICROGRAM(S): at 08:53

## 2017-05-16 RX ADMIN — OXCARBAZEPINE 300 MILLIGRAM(S): 300 TABLET, FILM COATED ORAL at 16:30

## 2017-05-16 RX ADMIN — Medication 0.5 MILLIGRAM(S): at 08:53

## 2017-05-16 RX ADMIN — Medication 0.5 MILLIGRAM(S): at 21:31

## 2017-05-16 RX ADMIN — Medication 2000 UNIT(S): at 08:53

## 2017-05-16 RX ADMIN — OXCARBAZEPINE 300 MILLIGRAM(S): 300 TABLET, FILM COATED ORAL at 21:31

## 2017-05-16 RX ADMIN — PREGABALIN 500 MICROGRAM(S): 225 CAPSULE ORAL at 08:53

## 2017-05-16 RX ADMIN — METFORMIN HYDROCHLORIDE 850 MILLIGRAM(S): 850 TABLET ORAL at 08:53

## 2017-05-16 RX ADMIN — OLANZAPINE 40 MILLIGRAM(S): 15 TABLET, FILM COATED ORAL at 21:31

## 2017-05-17 PROCEDURE — 99232 SBSQ HOSP IP/OBS MODERATE 35: CPT

## 2017-05-17 RX ADMIN — PREGABALIN 500 MICROGRAM(S): 225 CAPSULE ORAL at 09:40

## 2017-05-17 RX ADMIN — OLANZAPINE 40 MILLIGRAM(S): 15 TABLET, FILM COATED ORAL at 21:30

## 2017-05-17 RX ADMIN — ATORVASTATIN CALCIUM 10 MILLIGRAM(S): 80 TABLET, FILM COATED ORAL at 21:30

## 2017-05-17 RX ADMIN — OXCARBAZEPINE 300 MILLIGRAM(S): 300 TABLET, FILM COATED ORAL at 09:40

## 2017-05-17 RX ADMIN — Medication 25 MICROGRAM(S): at 09:40

## 2017-05-17 RX ADMIN — OXCARBAZEPINE 300 MILLIGRAM(S): 300 TABLET, FILM COATED ORAL at 12:56

## 2017-05-17 RX ADMIN — Medication 0.5 MILLIGRAM(S): at 09:40

## 2017-05-17 RX ADMIN — Medication 0.5 MILLIGRAM(S): at 21:30

## 2017-05-17 RX ADMIN — Medication 2000 UNIT(S): at 09:40

## 2017-05-17 RX ADMIN — OXCARBAZEPINE 300 MILLIGRAM(S): 300 TABLET, FILM COATED ORAL at 21:30

## 2017-05-17 RX ADMIN — METFORMIN HYDROCHLORIDE 850 MILLIGRAM(S): 850 TABLET ORAL at 09:40

## 2017-05-18 PROCEDURE — 99232 SBSQ HOSP IP/OBS MODERATE 35: CPT

## 2017-05-18 RX ADMIN — METFORMIN HYDROCHLORIDE 850 MILLIGRAM(S): 850 TABLET ORAL at 09:35

## 2017-05-18 RX ADMIN — Medication 0.5 MILLIGRAM(S): at 20:59

## 2017-05-18 RX ADMIN — OLANZAPINE 40 MILLIGRAM(S): 15 TABLET, FILM COATED ORAL at 20:59

## 2017-05-18 RX ADMIN — Medication 25 MICROGRAM(S): at 09:35

## 2017-05-18 RX ADMIN — ATORVASTATIN CALCIUM 10 MILLIGRAM(S): 80 TABLET, FILM COATED ORAL at 20:59

## 2017-05-18 RX ADMIN — OXCARBAZEPINE 300 MILLIGRAM(S): 300 TABLET, FILM COATED ORAL at 09:35

## 2017-05-18 RX ADMIN — Medication 0.5 MILLIGRAM(S): at 09:34

## 2017-05-18 RX ADMIN — OXCARBAZEPINE 300 MILLIGRAM(S): 300 TABLET, FILM COATED ORAL at 21:00

## 2017-05-18 RX ADMIN — PREGABALIN 500 MICROGRAM(S): 225 CAPSULE ORAL at 09:34

## 2017-05-18 RX ADMIN — Medication 2000 UNIT(S): at 09:34

## 2017-05-18 RX ADMIN — OXCARBAZEPINE 300 MILLIGRAM(S): 300 TABLET, FILM COATED ORAL at 12:35

## 2017-05-19 PROCEDURE — 99232 SBSQ HOSP IP/OBS MODERATE 35: CPT

## 2017-05-19 RX ORDER — FLUPHENAZINE HYDROCHLORIDE 1 MG/1
2.5 TABLET, FILM COATED ORAL
Qty: 0 | Refills: 0 | Status: DISCONTINUED | OUTPATIENT
Start: 2017-05-19 | End: 2017-05-23

## 2017-05-19 RX ADMIN — Medication 0.5 MILLIGRAM(S): at 09:53

## 2017-05-19 RX ADMIN — PREGABALIN 500 MICROGRAM(S): 225 CAPSULE ORAL at 09:53

## 2017-05-19 RX ADMIN — OLANZAPINE 40 MILLIGRAM(S): 15 TABLET, FILM COATED ORAL at 22:04

## 2017-05-19 RX ADMIN — Medication 25 MICROGRAM(S): at 09:54

## 2017-05-19 RX ADMIN — FLUPHENAZINE HYDROCHLORIDE 2.5 MILLIGRAM(S): 1 TABLET, FILM COATED ORAL at 22:04

## 2017-05-19 RX ADMIN — Medication 2000 UNIT(S): at 09:53

## 2017-05-19 RX ADMIN — METFORMIN HYDROCHLORIDE 850 MILLIGRAM(S): 850 TABLET ORAL at 09:54

## 2017-05-19 RX ADMIN — OXCARBAZEPINE 300 MILLIGRAM(S): 300 TABLET, FILM COATED ORAL at 09:54

## 2017-05-19 RX ADMIN — OXCARBAZEPINE 300 MILLIGRAM(S): 300 TABLET, FILM COATED ORAL at 22:04

## 2017-05-19 RX ADMIN — OXCARBAZEPINE 300 MILLIGRAM(S): 300 TABLET, FILM COATED ORAL at 12:32

## 2017-05-19 RX ADMIN — ATORVASTATIN CALCIUM 10 MILLIGRAM(S): 80 TABLET, FILM COATED ORAL at 22:04

## 2017-05-20 RX ADMIN — ATORVASTATIN CALCIUM 10 MILLIGRAM(S): 80 TABLET, FILM COATED ORAL at 21:29

## 2017-05-20 RX ADMIN — FLUPHENAZINE HYDROCHLORIDE 2.5 MILLIGRAM(S): 1 TABLET, FILM COATED ORAL at 21:29

## 2017-05-20 RX ADMIN — FLUPHENAZINE HYDROCHLORIDE 2.5 MILLIGRAM(S): 1 TABLET, FILM COATED ORAL at 09:42

## 2017-05-20 RX ADMIN — OLANZAPINE 40 MILLIGRAM(S): 15 TABLET, FILM COATED ORAL at 21:29

## 2017-05-20 RX ADMIN — Medication 25 MICROGRAM(S): at 09:42

## 2017-05-20 RX ADMIN — OXCARBAZEPINE 300 MILLIGRAM(S): 300 TABLET, FILM COATED ORAL at 13:43

## 2017-05-20 RX ADMIN — Medication 2000 UNIT(S): at 09:12

## 2017-05-20 RX ADMIN — OXCARBAZEPINE 300 MILLIGRAM(S): 300 TABLET, FILM COATED ORAL at 21:29

## 2017-05-20 RX ADMIN — OXCARBAZEPINE 300 MILLIGRAM(S): 300 TABLET, FILM COATED ORAL at 09:42

## 2017-05-20 RX ADMIN — METFORMIN HYDROCHLORIDE 850 MILLIGRAM(S): 850 TABLET ORAL at 09:42

## 2017-05-20 RX ADMIN — PREGABALIN 500 MICROGRAM(S): 225 CAPSULE ORAL at 09:42

## 2017-05-21 RX ADMIN — Medication 25 MICROGRAM(S): at 09:31

## 2017-05-21 RX ADMIN — Medication 2000 UNIT(S): at 09:31

## 2017-05-21 RX ADMIN — OXCARBAZEPINE 300 MILLIGRAM(S): 300 TABLET, FILM COATED ORAL at 20:40

## 2017-05-21 RX ADMIN — FLUPHENAZINE HYDROCHLORIDE 2.5 MILLIGRAM(S): 1 TABLET, FILM COATED ORAL at 09:31

## 2017-05-21 RX ADMIN — OXCARBAZEPINE 300 MILLIGRAM(S): 300 TABLET, FILM COATED ORAL at 09:31

## 2017-05-21 RX ADMIN — OLANZAPINE 40 MILLIGRAM(S): 15 TABLET, FILM COATED ORAL at 20:40

## 2017-05-21 RX ADMIN — PREGABALIN 500 MICROGRAM(S): 225 CAPSULE ORAL at 09:31

## 2017-05-21 RX ADMIN — METFORMIN HYDROCHLORIDE 850 MILLIGRAM(S): 850 TABLET ORAL at 09:31

## 2017-05-21 RX ADMIN — OXCARBAZEPINE 300 MILLIGRAM(S): 300 TABLET, FILM COATED ORAL at 13:41

## 2017-05-21 RX ADMIN — FLUPHENAZINE HYDROCHLORIDE 2.5 MILLIGRAM(S): 1 TABLET, FILM COATED ORAL at 20:40

## 2017-05-21 RX ADMIN — ATORVASTATIN CALCIUM 10 MILLIGRAM(S): 80 TABLET, FILM COATED ORAL at 20:40

## 2017-05-22 PROCEDURE — 99232 SBSQ HOSP IP/OBS MODERATE 35: CPT

## 2017-05-22 RX ADMIN — Medication 2000 UNIT(S): at 09:29

## 2017-05-22 RX ADMIN — OXCARBAZEPINE 300 MILLIGRAM(S): 300 TABLET, FILM COATED ORAL at 17:08

## 2017-05-22 RX ADMIN — Medication 50 MILLIGRAM(S): at 21:24

## 2017-05-22 RX ADMIN — OXCARBAZEPINE 300 MILLIGRAM(S): 300 TABLET, FILM COATED ORAL at 09:30

## 2017-05-22 RX ADMIN — Medication 25 MICROGRAM(S): at 09:30

## 2017-05-22 RX ADMIN — FLUPHENAZINE HYDROCHLORIDE 2.5 MILLIGRAM(S): 1 TABLET, FILM COATED ORAL at 09:30

## 2017-05-22 RX ADMIN — Medication 2 MILLIGRAM(S): at 09:33

## 2017-05-22 RX ADMIN — FLUPHENAZINE HYDROCHLORIDE 2.5 MILLIGRAM(S): 1 TABLET, FILM COATED ORAL at 21:24

## 2017-05-22 RX ADMIN — METFORMIN HYDROCHLORIDE 850 MILLIGRAM(S): 850 TABLET ORAL at 09:30

## 2017-05-22 RX ADMIN — HALOPERIDOL DECANOATE 5 MILLIGRAM(S): 100 INJECTION INTRAMUSCULAR at 09:32

## 2017-05-22 RX ADMIN — OLANZAPINE 40 MILLIGRAM(S): 15 TABLET, FILM COATED ORAL at 21:24

## 2017-05-22 RX ADMIN — Medication 50 MILLIGRAM(S): at 09:32

## 2017-05-22 RX ADMIN — ATORVASTATIN CALCIUM 10 MILLIGRAM(S): 80 TABLET, FILM COATED ORAL at 21:24

## 2017-05-22 RX ADMIN — PREGABALIN 500 MICROGRAM(S): 225 CAPSULE ORAL at 09:29

## 2017-05-22 RX ADMIN — OXCARBAZEPINE 300 MILLIGRAM(S): 300 TABLET, FILM COATED ORAL at 21:24

## 2017-05-23 PROCEDURE — 99232 SBSQ HOSP IP/OBS MODERATE 35: CPT

## 2017-05-23 RX ORDER — FLUPHENAZINE HYDROCHLORIDE 1 MG/1
5 TABLET, FILM COATED ORAL
Qty: 0 | Refills: 0 | Status: DISCONTINUED | OUTPATIENT
Start: 2017-05-23 | End: 2017-06-02

## 2017-05-23 RX ADMIN — OLANZAPINE 40 MILLIGRAM(S): 15 TABLET, FILM COATED ORAL at 21:30

## 2017-05-23 RX ADMIN — FLUPHENAZINE HYDROCHLORIDE 5 MILLIGRAM(S): 1 TABLET, FILM COATED ORAL at 21:30

## 2017-05-23 RX ADMIN — OXCARBAZEPINE 300 MILLIGRAM(S): 300 TABLET, FILM COATED ORAL at 21:30

## 2017-05-23 RX ADMIN — OXCARBAZEPINE 300 MILLIGRAM(S): 300 TABLET, FILM COATED ORAL at 13:10

## 2017-05-23 RX ADMIN — Medication 2000 UNIT(S): at 09:43

## 2017-05-23 RX ADMIN — FLUPHENAZINE HYDROCHLORIDE 2.5 MILLIGRAM(S): 1 TABLET, FILM COATED ORAL at 09:43

## 2017-05-23 RX ADMIN — Medication 25 MICROGRAM(S): at 09:43

## 2017-05-23 RX ADMIN — OXCARBAZEPINE 300 MILLIGRAM(S): 300 TABLET, FILM COATED ORAL at 09:43

## 2017-05-23 RX ADMIN — PREGABALIN 500 MICROGRAM(S): 225 CAPSULE ORAL at 09:43

## 2017-05-23 RX ADMIN — ATORVASTATIN CALCIUM 10 MILLIGRAM(S): 80 TABLET, FILM COATED ORAL at 21:30

## 2017-05-23 RX ADMIN — METFORMIN HYDROCHLORIDE 850 MILLIGRAM(S): 850 TABLET ORAL at 09:43

## 2017-05-24 LAB
BASOPHILS # BLD AUTO: 0.01 K/UL — SIGNIFICANT CHANGE UP (ref 0–0.2)
BASOPHILS NFR BLD AUTO: 0.2 % — SIGNIFICANT CHANGE UP (ref 0–2)
EOSINOPHIL # BLD AUTO: 0.07 K/UL — SIGNIFICANT CHANGE UP (ref 0–0.5)
EOSINOPHIL NFR BLD AUTO: 1.5 % — SIGNIFICANT CHANGE UP (ref 0–6)
HCT VFR BLD CALC: 37.6 % — SIGNIFICANT CHANGE UP (ref 34.5–45)
HGB BLD-MCNC: 13.7 G/DL — SIGNIFICANT CHANGE UP (ref 11.5–15.5)
IMM GRANULOCYTES NFR BLD AUTO: 0 % — SIGNIFICANT CHANGE UP (ref 0–1.5)
LYMPHOCYTES # BLD AUTO: 1.02 K/UL — SIGNIFICANT CHANGE UP (ref 1–3.3)
LYMPHOCYTES # BLD AUTO: 22.2 % — SIGNIFICANT CHANGE UP (ref 13–44)
MCHC RBC-ENTMCNC: 29 PG — SIGNIFICANT CHANGE UP (ref 27–34)
MCHC RBC-ENTMCNC: 36.4 % — HIGH (ref 32–36)
MCV RBC AUTO: 79.5 FL — LOW (ref 80–100)
MONOCYTES # BLD AUTO: 0.35 K/UL — SIGNIFICANT CHANGE UP (ref 0–0.9)
MONOCYTES NFR BLD AUTO: 7.6 % — SIGNIFICANT CHANGE UP (ref 2–14)
NEUTROPHILS # BLD AUTO: 3.14 K/UL — SIGNIFICANT CHANGE UP (ref 1.8–7.4)
NEUTROPHILS NFR BLD AUTO: 68.5 % — SIGNIFICANT CHANGE UP (ref 43–77)
PLATELET # BLD AUTO: 201 K/UL — SIGNIFICANT CHANGE UP (ref 150–400)
PMV BLD: 8.8 FL — SIGNIFICANT CHANGE UP (ref 7–13)
RBC # BLD: 4.73 M/UL — SIGNIFICANT CHANGE UP (ref 3.8–5.2)
RBC # FLD: 13.8 % — SIGNIFICANT CHANGE UP (ref 10.3–14.5)
WBC # BLD: 4.59 K/UL — SIGNIFICANT CHANGE UP (ref 3.8–10.5)
WBC # FLD AUTO: 4.59 K/UL — SIGNIFICANT CHANGE UP (ref 3.8–10.5)

## 2017-05-24 PROCEDURE — 99232 SBSQ HOSP IP/OBS MODERATE 35: CPT

## 2017-05-24 RX ORDER — CLOZAPINE 150 MG/1
25 TABLET, ORALLY DISINTEGRATING ORAL AT BEDTIME
Qty: 0 | Refills: 0 | Status: DISCONTINUED | OUTPATIENT
Start: 2017-05-24 | End: 2017-05-25

## 2017-05-24 RX ADMIN — PREGABALIN 500 MICROGRAM(S): 225 CAPSULE ORAL at 09:59

## 2017-05-24 RX ADMIN — OLANZAPINE 40 MILLIGRAM(S): 15 TABLET, FILM COATED ORAL at 21:40

## 2017-05-24 RX ADMIN — OXCARBAZEPINE 300 MILLIGRAM(S): 300 TABLET, FILM COATED ORAL at 12:57

## 2017-05-24 RX ADMIN — OXCARBAZEPINE 300 MILLIGRAM(S): 300 TABLET, FILM COATED ORAL at 09:59

## 2017-05-24 RX ADMIN — FLUPHENAZINE HYDROCHLORIDE 5 MILLIGRAM(S): 1 TABLET, FILM COATED ORAL at 09:59

## 2017-05-24 RX ADMIN — Medication 2000 UNIT(S): at 09:59

## 2017-05-24 RX ADMIN — Medication 25 MICROGRAM(S): at 09:59

## 2017-05-24 RX ADMIN — OXCARBAZEPINE 300 MILLIGRAM(S): 300 TABLET, FILM COATED ORAL at 21:40

## 2017-05-24 RX ADMIN — CLOZAPINE 25 MILLIGRAM(S): 150 TABLET, ORALLY DISINTEGRATING ORAL at 21:40

## 2017-05-24 RX ADMIN — METFORMIN HYDROCHLORIDE 850 MILLIGRAM(S): 850 TABLET ORAL at 09:59

## 2017-05-24 RX ADMIN — FLUPHENAZINE HYDROCHLORIDE 5 MILLIGRAM(S): 1 TABLET, FILM COATED ORAL at 21:40

## 2017-05-24 RX ADMIN — ATORVASTATIN CALCIUM 10 MILLIGRAM(S): 80 TABLET, FILM COATED ORAL at 21:40

## 2017-05-25 PROCEDURE — 99232 SBSQ HOSP IP/OBS MODERATE 35: CPT

## 2017-05-25 RX ORDER — CLOZAPINE 150 MG/1
50 TABLET, ORALLY DISINTEGRATING ORAL AT BEDTIME
Qty: 0 | Refills: 0 | Status: DISCONTINUED | OUTPATIENT
Start: 2017-05-25 | End: 2017-05-25

## 2017-05-25 RX ORDER — OLANZAPINE 15 MG/1
30 TABLET, FILM COATED ORAL AT BEDTIME
Qty: 0 | Refills: 0 | Status: DISCONTINUED | OUTPATIENT
Start: 2017-05-25 | End: 2017-05-26

## 2017-05-25 RX ORDER — CLOZAPINE 150 MG/1
25 TABLET, ORALLY DISINTEGRATING ORAL AT BEDTIME
Qty: 0 | Refills: 0 | Status: DISCONTINUED | OUTPATIENT
Start: 2017-05-25 | End: 2017-05-26

## 2017-05-25 RX ADMIN — Medication 2000 UNIT(S): at 08:59

## 2017-05-25 RX ADMIN — METFORMIN HYDROCHLORIDE 850 MILLIGRAM(S): 850 TABLET ORAL at 08:59

## 2017-05-25 RX ADMIN — Medication 25 MICROGRAM(S): at 08:59

## 2017-05-25 RX ADMIN — ATORVASTATIN CALCIUM 10 MILLIGRAM(S): 80 TABLET, FILM COATED ORAL at 21:49

## 2017-05-25 RX ADMIN — FLUPHENAZINE HYDROCHLORIDE 5 MILLIGRAM(S): 1 TABLET, FILM COATED ORAL at 08:59

## 2017-05-25 RX ADMIN — CLOZAPINE 25 MILLIGRAM(S): 150 TABLET, ORALLY DISINTEGRATING ORAL at 21:49

## 2017-05-25 RX ADMIN — OXCARBAZEPINE 300 MILLIGRAM(S): 300 TABLET, FILM COATED ORAL at 12:38

## 2017-05-25 RX ADMIN — OLANZAPINE 30 MILLIGRAM(S): 15 TABLET, FILM COATED ORAL at 21:49

## 2017-05-25 RX ADMIN — OXCARBAZEPINE 300 MILLIGRAM(S): 300 TABLET, FILM COATED ORAL at 21:49

## 2017-05-25 RX ADMIN — FLUPHENAZINE HYDROCHLORIDE 5 MILLIGRAM(S): 1 TABLET, FILM COATED ORAL at 21:49

## 2017-05-25 RX ADMIN — PREGABALIN 500 MICROGRAM(S): 225 CAPSULE ORAL at 08:59

## 2017-05-25 RX ADMIN — OXCARBAZEPINE 300 MILLIGRAM(S): 300 TABLET, FILM COATED ORAL at 08:59

## 2017-05-26 PROCEDURE — 99232 SBSQ HOSP IP/OBS MODERATE 35: CPT

## 2017-05-26 RX ORDER — CLOZAPINE 150 MG/1
50 TABLET, ORALLY DISINTEGRATING ORAL AT BEDTIME
Qty: 0 | Refills: 0 | Status: COMPLETED | OUTPATIENT
Start: 2017-05-26 | End: 2017-05-26

## 2017-05-26 RX ORDER — CLOZAPINE 150 MG/1
100 TABLET, ORALLY DISINTEGRATING ORAL AT BEDTIME
Qty: 0 | Refills: 0 | Status: COMPLETED | OUTPATIENT
Start: 2017-05-28 | End: 2017-05-28

## 2017-05-26 RX ORDER — OLANZAPINE 15 MG/1
20 TABLET, FILM COATED ORAL AT BEDTIME
Qty: 0 | Refills: 0 | Status: DISCONTINUED | OUTPATIENT
Start: 2017-05-26 | End: 2017-05-29

## 2017-05-26 RX ORDER — CLOZAPINE 150 MG/1
75 TABLET, ORALLY DISINTEGRATING ORAL AT BEDTIME
Qty: 0 | Refills: 0 | Status: COMPLETED | OUTPATIENT
Start: 2017-05-27 | End: 2017-05-27

## 2017-05-26 RX ADMIN — OXCARBAZEPINE 300 MILLIGRAM(S): 300 TABLET, FILM COATED ORAL at 14:21

## 2017-05-26 RX ADMIN — PREGABALIN 500 MICROGRAM(S): 225 CAPSULE ORAL at 08:52

## 2017-05-26 RX ADMIN — OLANZAPINE 20 MILLIGRAM(S): 15 TABLET, FILM COATED ORAL at 21:41

## 2017-05-26 RX ADMIN — OXCARBAZEPINE 300 MILLIGRAM(S): 300 TABLET, FILM COATED ORAL at 09:44

## 2017-05-26 RX ADMIN — FLUPHENAZINE HYDROCHLORIDE 5 MILLIGRAM(S): 1 TABLET, FILM COATED ORAL at 08:52

## 2017-05-26 RX ADMIN — OXCARBAZEPINE 300 MILLIGRAM(S): 300 TABLET, FILM COATED ORAL at 21:41

## 2017-05-26 RX ADMIN — METFORMIN HYDROCHLORIDE 850 MILLIGRAM(S): 850 TABLET ORAL at 08:52

## 2017-05-26 RX ADMIN — FLUPHENAZINE HYDROCHLORIDE 5 MILLIGRAM(S): 1 TABLET, FILM COATED ORAL at 21:40

## 2017-05-26 RX ADMIN — CLOZAPINE 50 MILLIGRAM(S): 150 TABLET, ORALLY DISINTEGRATING ORAL at 21:40

## 2017-05-26 RX ADMIN — ATORVASTATIN CALCIUM 10 MILLIGRAM(S): 80 TABLET, FILM COATED ORAL at 21:40

## 2017-05-26 RX ADMIN — Medication 25 MICROGRAM(S): at 08:52

## 2017-05-26 RX ADMIN — Medication 2000 UNIT(S): at 09:44

## 2017-05-27 RX ADMIN — OLANZAPINE 20 MILLIGRAM(S): 15 TABLET, FILM COATED ORAL at 20:58

## 2017-05-27 RX ADMIN — OXCARBAZEPINE 300 MILLIGRAM(S): 300 TABLET, FILM COATED ORAL at 08:48

## 2017-05-27 RX ADMIN — METFORMIN HYDROCHLORIDE 850 MILLIGRAM(S): 850 TABLET ORAL at 08:48

## 2017-05-27 RX ADMIN — Medication 2000 UNIT(S): at 08:48

## 2017-05-27 RX ADMIN — FLUPHENAZINE HYDROCHLORIDE 5 MILLIGRAM(S): 1 TABLET, FILM COATED ORAL at 20:58

## 2017-05-27 RX ADMIN — PREGABALIN 500 MICROGRAM(S): 225 CAPSULE ORAL at 08:48

## 2017-05-27 RX ADMIN — OXCARBAZEPINE 300 MILLIGRAM(S): 300 TABLET, FILM COATED ORAL at 20:58

## 2017-05-27 RX ADMIN — Medication 25 MICROGRAM(S): at 08:48

## 2017-05-27 RX ADMIN — FLUPHENAZINE HYDROCHLORIDE 5 MILLIGRAM(S): 1 TABLET, FILM COATED ORAL at 08:48

## 2017-05-27 RX ADMIN — OXCARBAZEPINE 300 MILLIGRAM(S): 300 TABLET, FILM COATED ORAL at 13:01

## 2017-05-27 RX ADMIN — ATORVASTATIN CALCIUM 10 MILLIGRAM(S): 80 TABLET, FILM COATED ORAL at 20:58

## 2017-05-27 RX ADMIN — CLOZAPINE 75 MILLIGRAM(S): 150 TABLET, ORALLY DISINTEGRATING ORAL at 20:58

## 2017-05-28 RX ADMIN — OLANZAPINE 20 MILLIGRAM(S): 15 TABLET, FILM COATED ORAL at 21:06

## 2017-05-28 RX ADMIN — OXCARBAZEPINE 300 MILLIGRAM(S): 300 TABLET, FILM COATED ORAL at 12:43

## 2017-05-28 RX ADMIN — CLOZAPINE 100 MILLIGRAM(S): 150 TABLET, ORALLY DISINTEGRATING ORAL at 21:06

## 2017-05-28 RX ADMIN — Medication 2000 UNIT(S): at 08:47

## 2017-05-28 RX ADMIN — OXCARBAZEPINE 300 MILLIGRAM(S): 300 TABLET, FILM COATED ORAL at 21:07

## 2017-05-28 RX ADMIN — FLUPHENAZINE HYDROCHLORIDE 5 MILLIGRAM(S): 1 TABLET, FILM COATED ORAL at 08:47

## 2017-05-28 RX ADMIN — Medication 25 MICROGRAM(S): at 08:47

## 2017-05-28 RX ADMIN — ATORVASTATIN CALCIUM 10 MILLIGRAM(S): 80 TABLET, FILM COATED ORAL at 21:06

## 2017-05-28 RX ADMIN — METFORMIN HYDROCHLORIDE 850 MILLIGRAM(S): 850 TABLET ORAL at 08:47

## 2017-05-28 RX ADMIN — FLUPHENAZINE HYDROCHLORIDE 5 MILLIGRAM(S): 1 TABLET, FILM COATED ORAL at 21:06

## 2017-05-28 RX ADMIN — OXCARBAZEPINE 300 MILLIGRAM(S): 300 TABLET, FILM COATED ORAL at 08:47

## 2017-05-28 RX ADMIN — PREGABALIN 500 MICROGRAM(S): 225 CAPSULE ORAL at 08:47

## 2017-05-29 RX ORDER — OLANZAPINE 15 MG/1
10 TABLET, FILM COATED ORAL AT BEDTIME
Qty: 0 | Refills: 0 | Status: DISCONTINUED | OUTPATIENT
Start: 2017-05-29 | End: 2017-05-30

## 2017-05-29 RX ORDER — CLOZAPINE 150 MG/1
150 TABLET, ORALLY DISINTEGRATING ORAL AT BEDTIME
Qty: 0 | Refills: 0 | Status: DISCONTINUED | OUTPATIENT
Start: 2017-05-29 | End: 2017-05-30

## 2017-05-29 RX ADMIN — Medication 25 MICROGRAM(S): at 09:07

## 2017-05-29 RX ADMIN — OXCARBAZEPINE 300 MILLIGRAM(S): 300 TABLET, FILM COATED ORAL at 09:08

## 2017-05-29 RX ADMIN — Medication 2000 UNIT(S): at 09:07

## 2017-05-29 RX ADMIN — OLANZAPINE 10 MILLIGRAM(S): 15 TABLET, FILM COATED ORAL at 21:39

## 2017-05-29 RX ADMIN — CLOZAPINE 150 MILLIGRAM(S): 150 TABLET, ORALLY DISINTEGRATING ORAL at 21:38

## 2017-05-29 RX ADMIN — OXCARBAZEPINE 300 MILLIGRAM(S): 300 TABLET, FILM COATED ORAL at 21:04

## 2017-05-29 RX ADMIN — FLUPHENAZINE HYDROCHLORIDE 5 MILLIGRAM(S): 1 TABLET, FILM COATED ORAL at 21:04

## 2017-05-29 RX ADMIN — FLUPHENAZINE HYDROCHLORIDE 5 MILLIGRAM(S): 1 TABLET, FILM COATED ORAL at 09:07

## 2017-05-29 RX ADMIN — OXCARBAZEPINE 300 MILLIGRAM(S): 300 TABLET, FILM COATED ORAL at 13:15

## 2017-05-29 RX ADMIN — PREGABALIN 500 MICROGRAM(S): 225 CAPSULE ORAL at 09:07

## 2017-05-29 RX ADMIN — METFORMIN HYDROCHLORIDE 850 MILLIGRAM(S): 850 TABLET ORAL at 09:07

## 2017-05-29 RX ADMIN — ATORVASTATIN CALCIUM 10 MILLIGRAM(S): 80 TABLET, FILM COATED ORAL at 21:04

## 2017-05-30 PROCEDURE — 99232 SBSQ HOSP IP/OBS MODERATE 35: CPT

## 2017-05-30 RX ORDER — CLOZAPINE 150 MG/1
200 TABLET, ORALLY DISINTEGRATING ORAL AT BEDTIME
Qty: 0 | Refills: 0 | Status: DISCONTINUED | OUTPATIENT
Start: 2017-05-30 | End: 2017-05-31

## 2017-05-30 RX ADMIN — FLUPHENAZINE HYDROCHLORIDE 5 MILLIGRAM(S): 1 TABLET, FILM COATED ORAL at 21:14

## 2017-05-30 RX ADMIN — Medication 25 MICROGRAM(S): at 09:34

## 2017-05-30 RX ADMIN — OXCARBAZEPINE 300 MILLIGRAM(S): 300 TABLET, FILM COATED ORAL at 09:34

## 2017-05-30 RX ADMIN — PREGABALIN 500 MICROGRAM(S): 225 CAPSULE ORAL at 09:34

## 2017-05-30 RX ADMIN — ATORVASTATIN CALCIUM 10 MILLIGRAM(S): 80 TABLET, FILM COATED ORAL at 21:13

## 2017-05-30 RX ADMIN — Medication 2000 UNIT(S): at 09:34

## 2017-05-30 RX ADMIN — OXCARBAZEPINE 300 MILLIGRAM(S): 300 TABLET, FILM COATED ORAL at 16:01

## 2017-05-30 RX ADMIN — METFORMIN HYDROCHLORIDE 850 MILLIGRAM(S): 850 TABLET ORAL at 09:34

## 2017-05-30 RX ADMIN — OXCARBAZEPINE 300 MILLIGRAM(S): 300 TABLET, FILM COATED ORAL at 21:14

## 2017-05-30 RX ADMIN — FLUPHENAZINE HYDROCHLORIDE 5 MILLIGRAM(S): 1 TABLET, FILM COATED ORAL at 09:34

## 2017-05-30 RX ADMIN — CLOZAPINE 200 MILLIGRAM(S): 150 TABLET, ORALLY DISINTEGRATING ORAL at 21:13

## 2017-05-31 LAB
BASOPHILS # BLD AUTO: 0.02 K/UL — SIGNIFICANT CHANGE UP (ref 0–0.2)
BASOPHILS NFR BLD AUTO: 0.3 % — SIGNIFICANT CHANGE UP (ref 0–2)
EOSINOPHIL # BLD AUTO: 0.09 K/UL — SIGNIFICANT CHANGE UP (ref 0–0.5)
EOSINOPHIL NFR BLD AUTO: 1.4 % — SIGNIFICANT CHANGE UP (ref 0–6)
HCT VFR BLD CALC: 45 % — SIGNIFICANT CHANGE UP (ref 34.5–45)
HGB BLD-MCNC: 15.4 G/DL — SIGNIFICANT CHANGE UP (ref 11.5–15.5)
IMM GRANULOCYTES NFR BLD AUTO: 0.2 % — SIGNIFICANT CHANGE UP (ref 0–1.5)
LYMPHOCYTES # BLD AUTO: 1.19 K/UL — SIGNIFICANT CHANGE UP (ref 1–3.3)
LYMPHOCYTES # BLD AUTO: 18.3 % — SIGNIFICANT CHANGE UP (ref 13–44)
MCHC RBC-ENTMCNC: 28.9 PG — SIGNIFICANT CHANGE UP (ref 27–34)
MCHC RBC-ENTMCNC: 34.2 % — SIGNIFICANT CHANGE UP (ref 32–36)
MCV RBC AUTO: 84.6 FL — SIGNIFICANT CHANGE UP (ref 80–100)
MONOCYTES # BLD AUTO: 0.49 K/UL — SIGNIFICANT CHANGE UP (ref 0–0.9)
MONOCYTES NFR BLD AUTO: 7.5 % — SIGNIFICANT CHANGE UP (ref 2–14)
NEUTROPHILS # BLD AUTO: 4.7 K/UL — SIGNIFICANT CHANGE UP (ref 1.8–7.4)
NEUTROPHILS NFR BLD AUTO: 72.3 % — SIGNIFICANT CHANGE UP (ref 43–77)
PLATELET # BLD AUTO: 253 K/UL — SIGNIFICANT CHANGE UP (ref 150–400)
PMV BLD: 9 FL — SIGNIFICANT CHANGE UP (ref 7–13)
RBC # BLD: 5.32 M/UL — HIGH (ref 3.8–5.2)
RBC # FLD: 14.7 % — HIGH (ref 10.3–14.5)
WBC # BLD: 6.5 K/UL — SIGNIFICANT CHANGE UP (ref 3.8–10.5)
WBC # FLD AUTO: 6.5 K/UL — SIGNIFICANT CHANGE UP (ref 3.8–10.5)

## 2017-05-31 PROCEDURE — 99232 SBSQ HOSP IP/OBS MODERATE 35: CPT

## 2017-05-31 RX ORDER — CLOZAPINE 150 MG/1
225 TABLET, ORALLY DISINTEGRATING ORAL AT BEDTIME
Qty: 0 | Refills: 0 | Status: DISCONTINUED | OUTPATIENT
Start: 2017-05-31 | End: 2017-06-01

## 2017-05-31 RX ADMIN — OXCARBAZEPINE 300 MILLIGRAM(S): 300 TABLET, FILM COATED ORAL at 21:02

## 2017-05-31 RX ADMIN — METFORMIN HYDROCHLORIDE 850 MILLIGRAM(S): 850 TABLET ORAL at 09:07

## 2017-05-31 RX ADMIN — Medication 2000 UNIT(S): at 09:07

## 2017-05-31 RX ADMIN — OXCARBAZEPINE 300 MILLIGRAM(S): 300 TABLET, FILM COATED ORAL at 12:51

## 2017-05-31 RX ADMIN — Medication 25 MICROGRAM(S): at 09:07

## 2017-05-31 RX ADMIN — FLUPHENAZINE HYDROCHLORIDE 5 MILLIGRAM(S): 1 TABLET, FILM COATED ORAL at 09:07

## 2017-05-31 RX ADMIN — PREGABALIN 500 MICROGRAM(S): 225 CAPSULE ORAL at 09:07

## 2017-05-31 RX ADMIN — ATORVASTATIN CALCIUM 10 MILLIGRAM(S): 80 TABLET, FILM COATED ORAL at 21:02

## 2017-05-31 RX ADMIN — CLOZAPINE 225 MILLIGRAM(S): 150 TABLET, ORALLY DISINTEGRATING ORAL at 21:02

## 2017-05-31 RX ADMIN — OXCARBAZEPINE 300 MILLIGRAM(S): 300 TABLET, FILM COATED ORAL at 09:07

## 2017-05-31 RX ADMIN — FLUPHENAZINE HYDROCHLORIDE 5 MILLIGRAM(S): 1 TABLET, FILM COATED ORAL at 21:02

## 2017-06-01 PROCEDURE — 99232 SBSQ HOSP IP/OBS MODERATE 35: CPT

## 2017-06-01 RX ORDER — CLOZAPINE 150 MG/1
250 TABLET, ORALLY DISINTEGRATING ORAL AT BEDTIME
Qty: 0 | Refills: 0 | Status: DISCONTINUED | OUTPATIENT
Start: 2017-06-01 | End: 2017-06-02

## 2017-06-01 RX ADMIN — ATORVASTATIN CALCIUM 10 MILLIGRAM(S): 80 TABLET, FILM COATED ORAL at 21:34

## 2017-06-01 RX ADMIN — Medication 2000 UNIT(S): at 09:04

## 2017-06-01 RX ADMIN — Medication 25 MICROGRAM(S): at 09:04

## 2017-06-01 RX ADMIN — METFORMIN HYDROCHLORIDE 850 MILLIGRAM(S): 850 TABLET ORAL at 09:03

## 2017-06-01 RX ADMIN — CLOZAPINE 250 MILLIGRAM(S): 150 TABLET, ORALLY DISINTEGRATING ORAL at 21:34

## 2017-06-01 RX ADMIN — FLUPHENAZINE HYDROCHLORIDE 5 MILLIGRAM(S): 1 TABLET, FILM COATED ORAL at 09:04

## 2017-06-01 RX ADMIN — OXCARBAZEPINE 300 MILLIGRAM(S): 300 TABLET, FILM COATED ORAL at 13:45

## 2017-06-01 RX ADMIN — OXCARBAZEPINE 300 MILLIGRAM(S): 300 TABLET, FILM COATED ORAL at 09:03

## 2017-06-01 RX ADMIN — OXCARBAZEPINE 300 MILLIGRAM(S): 300 TABLET, FILM COATED ORAL at 21:34

## 2017-06-01 RX ADMIN — PREGABALIN 500 MICROGRAM(S): 225 CAPSULE ORAL at 09:04

## 2017-06-01 RX ADMIN — FLUPHENAZINE HYDROCHLORIDE 5 MILLIGRAM(S): 1 TABLET, FILM COATED ORAL at 21:34

## 2017-06-02 PROCEDURE — 99232 SBSQ HOSP IP/OBS MODERATE 35: CPT

## 2017-06-02 RX ORDER — FLUPHENAZINE HYDROCHLORIDE 1 MG/1
2.5 TABLET, FILM COATED ORAL
Qty: 0 | Refills: 0 | Status: DISCONTINUED | OUTPATIENT
Start: 2017-06-02 | End: 2017-06-05

## 2017-06-02 RX ORDER — CLOZAPINE 150 MG/1
300 TABLET, ORALLY DISINTEGRATING ORAL AT BEDTIME
Qty: 0 | Refills: 0 | Status: DISCONTINUED | OUTPATIENT
Start: 2017-06-03 | End: 2017-06-15

## 2017-06-02 RX ORDER — CLOZAPINE 150 MG/1
275 TABLET, ORALLY DISINTEGRATING ORAL AT BEDTIME
Qty: 0 | Refills: 0 | Status: COMPLETED | OUTPATIENT
Start: 2017-06-02 | End: 2017-06-02

## 2017-06-02 RX ADMIN — FLUPHENAZINE HYDROCHLORIDE 5 MILLIGRAM(S): 1 TABLET, FILM COATED ORAL at 09:11

## 2017-06-02 RX ADMIN — CLOZAPINE 275 MILLIGRAM(S): 150 TABLET, ORALLY DISINTEGRATING ORAL at 21:14

## 2017-06-02 RX ADMIN — FLUPHENAZINE HYDROCHLORIDE 2.5 MILLIGRAM(S): 1 TABLET, FILM COATED ORAL at 21:14

## 2017-06-02 RX ADMIN — OXCARBAZEPINE 300 MILLIGRAM(S): 300 TABLET, FILM COATED ORAL at 09:11

## 2017-06-02 RX ADMIN — Medication 25 MICROGRAM(S): at 09:11

## 2017-06-02 RX ADMIN — OXCARBAZEPINE 300 MILLIGRAM(S): 300 TABLET, FILM COATED ORAL at 13:11

## 2017-06-02 RX ADMIN — OXCARBAZEPINE 300 MILLIGRAM(S): 300 TABLET, FILM COATED ORAL at 21:14

## 2017-06-02 RX ADMIN — METFORMIN HYDROCHLORIDE 850 MILLIGRAM(S): 850 TABLET ORAL at 09:11

## 2017-06-02 RX ADMIN — ATORVASTATIN CALCIUM 10 MILLIGRAM(S): 80 TABLET, FILM COATED ORAL at 21:14

## 2017-06-02 RX ADMIN — Medication 2000 UNIT(S): at 09:11

## 2017-06-02 RX ADMIN — PREGABALIN 500 MICROGRAM(S): 225 CAPSULE ORAL at 09:11

## 2017-06-03 RX ADMIN — FLUPHENAZINE HYDROCHLORIDE 2.5 MILLIGRAM(S): 1 TABLET, FILM COATED ORAL at 21:21

## 2017-06-03 RX ADMIN — Medication 2000 UNIT(S): at 08:11

## 2017-06-03 RX ADMIN — METFORMIN HYDROCHLORIDE 850 MILLIGRAM(S): 850 TABLET ORAL at 08:11

## 2017-06-03 RX ADMIN — OXCARBAZEPINE 300 MILLIGRAM(S): 300 TABLET, FILM COATED ORAL at 21:21

## 2017-06-03 RX ADMIN — FLUPHENAZINE HYDROCHLORIDE 2.5 MILLIGRAM(S): 1 TABLET, FILM COATED ORAL at 08:11

## 2017-06-03 RX ADMIN — PREGABALIN 500 MICROGRAM(S): 225 CAPSULE ORAL at 08:11

## 2017-06-03 RX ADMIN — ATORVASTATIN CALCIUM 10 MILLIGRAM(S): 80 TABLET, FILM COATED ORAL at 21:21

## 2017-06-03 RX ADMIN — Medication 25 MICROGRAM(S): at 08:11

## 2017-06-03 RX ADMIN — CLOZAPINE 300 MILLIGRAM(S): 150 TABLET, ORALLY DISINTEGRATING ORAL at 21:21

## 2017-06-03 RX ADMIN — OXCARBAZEPINE 300 MILLIGRAM(S): 300 TABLET, FILM COATED ORAL at 08:11

## 2017-06-03 RX ADMIN — OXCARBAZEPINE 300 MILLIGRAM(S): 300 TABLET, FILM COATED ORAL at 12:14

## 2017-06-04 RX ADMIN — CLOZAPINE 300 MILLIGRAM(S): 150 TABLET, ORALLY DISINTEGRATING ORAL at 20:52

## 2017-06-04 RX ADMIN — ATORVASTATIN CALCIUM 10 MILLIGRAM(S): 80 TABLET, FILM COATED ORAL at 20:52

## 2017-06-04 RX ADMIN — Medication 50 MILLIGRAM(S): at 20:53

## 2017-06-04 RX ADMIN — FLUPHENAZINE HYDROCHLORIDE 2.5 MILLIGRAM(S): 1 TABLET, FILM COATED ORAL at 09:37

## 2017-06-04 RX ADMIN — Medication 2000 UNIT(S): at 09:37

## 2017-06-04 RX ADMIN — PREGABALIN 500 MICROGRAM(S): 225 CAPSULE ORAL at 09:37

## 2017-06-04 RX ADMIN — Medication 25 MICROGRAM(S): at 09:37

## 2017-06-04 RX ADMIN — OXCARBAZEPINE 300 MILLIGRAM(S): 300 TABLET, FILM COATED ORAL at 12:46

## 2017-06-04 RX ADMIN — OXCARBAZEPINE 300 MILLIGRAM(S): 300 TABLET, FILM COATED ORAL at 09:37

## 2017-06-04 RX ADMIN — METFORMIN HYDROCHLORIDE 850 MILLIGRAM(S): 850 TABLET ORAL at 09:37

## 2017-06-04 RX ADMIN — OXCARBAZEPINE 300 MILLIGRAM(S): 300 TABLET, FILM COATED ORAL at 20:53

## 2017-06-04 RX ADMIN — FLUPHENAZINE HYDROCHLORIDE 2.5 MILLIGRAM(S): 1 TABLET, FILM COATED ORAL at 20:52

## 2017-06-05 PROCEDURE — 99232 SBSQ HOSP IP/OBS MODERATE 35: CPT

## 2017-06-05 RX ORDER — DESMOPRESSIN ACETATE 0.1 MG/1
0.1 TABLET ORAL AT BEDTIME
Qty: 0 | Refills: 0 | Status: DISCONTINUED | OUTPATIENT
Start: 2017-06-05 | End: 2017-06-15

## 2017-06-05 RX ADMIN — Medication 25 MICROGRAM(S): at 09:08

## 2017-06-05 RX ADMIN — DESMOPRESSIN ACETATE 0.1 MILLIGRAM(S): 0.1 TABLET ORAL at 20:50

## 2017-06-05 RX ADMIN — CLOZAPINE 300 MILLIGRAM(S): 150 TABLET, ORALLY DISINTEGRATING ORAL at 20:50

## 2017-06-05 RX ADMIN — FLUPHENAZINE HYDROCHLORIDE 2.5 MILLIGRAM(S): 1 TABLET, FILM COATED ORAL at 09:08

## 2017-06-05 RX ADMIN — OXCARBAZEPINE 300 MILLIGRAM(S): 300 TABLET, FILM COATED ORAL at 12:53

## 2017-06-05 RX ADMIN — PREGABALIN 500 MICROGRAM(S): 225 CAPSULE ORAL at 09:08

## 2017-06-05 RX ADMIN — METFORMIN HYDROCHLORIDE 850 MILLIGRAM(S): 850 TABLET ORAL at 09:08

## 2017-06-05 RX ADMIN — Medication 2000 UNIT(S): at 09:08

## 2017-06-05 RX ADMIN — ATORVASTATIN CALCIUM 10 MILLIGRAM(S): 80 TABLET, FILM COATED ORAL at 20:50

## 2017-06-05 RX ADMIN — OXCARBAZEPINE 300 MILLIGRAM(S): 300 TABLET, FILM COATED ORAL at 20:50

## 2017-06-05 RX ADMIN — OXCARBAZEPINE 300 MILLIGRAM(S): 300 TABLET, FILM COATED ORAL at 09:08

## 2017-06-06 PROCEDURE — 99232 SBSQ HOSP IP/OBS MODERATE 35: CPT

## 2017-06-06 RX ADMIN — PREGABALIN 500 MICROGRAM(S): 225 CAPSULE ORAL at 09:04

## 2017-06-06 RX ADMIN — METFORMIN HYDROCHLORIDE 850 MILLIGRAM(S): 850 TABLET ORAL at 09:04

## 2017-06-06 RX ADMIN — Medication 2000 UNIT(S): at 09:04

## 2017-06-06 RX ADMIN — OXCARBAZEPINE 300 MILLIGRAM(S): 300 TABLET, FILM COATED ORAL at 09:04

## 2017-06-06 RX ADMIN — DESMOPRESSIN ACETATE 0.1 MILLIGRAM(S): 0.1 TABLET ORAL at 21:16

## 2017-06-06 RX ADMIN — OXCARBAZEPINE 300 MILLIGRAM(S): 300 TABLET, FILM COATED ORAL at 13:10

## 2017-06-06 RX ADMIN — ATORVASTATIN CALCIUM 10 MILLIGRAM(S): 80 TABLET, FILM COATED ORAL at 21:16

## 2017-06-06 RX ADMIN — OXCARBAZEPINE 300 MILLIGRAM(S): 300 TABLET, FILM COATED ORAL at 21:16

## 2017-06-06 RX ADMIN — Medication 25 MICROGRAM(S): at 09:04

## 2017-06-06 RX ADMIN — CLOZAPINE 300 MILLIGRAM(S): 150 TABLET, ORALLY DISINTEGRATING ORAL at 21:16

## 2017-06-07 LAB
BASOPHILS # BLD AUTO: 0 K/UL — SIGNIFICANT CHANGE UP (ref 0–0.2)
BASOPHILS NFR BLD AUTO: 0 % — SIGNIFICANT CHANGE UP (ref 0–2)
EOSINOPHIL # BLD AUTO: 0.06 K/UL — SIGNIFICANT CHANGE UP (ref 0–0.5)
EOSINOPHIL NFR BLD AUTO: 1 % — SIGNIFICANT CHANGE UP (ref 0–6)
HCT VFR BLD CALC: 36.8 % — SIGNIFICANT CHANGE UP (ref 34.5–45)
HGB BLD-MCNC: 13.5 G/DL — SIGNIFICANT CHANGE UP (ref 11.5–15.5)
IMM GRANULOCYTES NFR BLD AUTO: 0.2 % — SIGNIFICANT CHANGE UP (ref 0–1.5)
LYMPHOCYTES # BLD AUTO: 0.56 K/UL — LOW (ref 1–3.3)
LYMPHOCYTES # BLD AUTO: 9 % — LOW (ref 13–44)
MCHC RBC-ENTMCNC: 28.8 PG — SIGNIFICANT CHANGE UP (ref 27–34)
MCHC RBC-ENTMCNC: 36.7 % — HIGH (ref 32–36)
MCV RBC AUTO: 78.6 FL — LOW (ref 80–100)
MONOCYTES # BLD AUTO: 0.47 K/UL — SIGNIFICANT CHANGE UP (ref 0–0.9)
MONOCYTES NFR BLD AUTO: 7.5 % — SIGNIFICANT CHANGE UP (ref 2–14)
NEUTROPHILS # BLD AUTO: 5.13 K/UL — SIGNIFICANT CHANGE UP (ref 1.8–7.4)
NEUTROPHILS NFR BLD AUTO: 82.3 % — HIGH (ref 43–77)
PLATELET # BLD AUTO: 205 K/UL — SIGNIFICANT CHANGE UP (ref 150–400)
PMV BLD: 9 FL — SIGNIFICANT CHANGE UP (ref 7–13)
RBC # BLD: 4.68 M/UL — SIGNIFICANT CHANGE UP (ref 3.8–5.2)
RBC # FLD: 13.1 % — SIGNIFICANT CHANGE UP (ref 10.3–14.5)
WBC # BLD: 6.23 K/UL — SIGNIFICANT CHANGE UP (ref 3.8–10.5)
WBC # FLD AUTO: 6.23 K/UL — SIGNIFICANT CHANGE UP (ref 3.8–10.5)

## 2017-06-07 PROCEDURE — 99232 SBSQ HOSP IP/OBS MODERATE 35: CPT

## 2017-06-07 RX ADMIN — Medication 2000 UNIT(S): at 08:31

## 2017-06-07 RX ADMIN — Medication 25 MICROGRAM(S): at 08:31

## 2017-06-07 RX ADMIN — CLOZAPINE 300 MILLIGRAM(S): 150 TABLET, ORALLY DISINTEGRATING ORAL at 21:27

## 2017-06-07 RX ADMIN — OXCARBAZEPINE 300 MILLIGRAM(S): 300 TABLET, FILM COATED ORAL at 13:44

## 2017-06-07 RX ADMIN — OXCARBAZEPINE 300 MILLIGRAM(S): 300 TABLET, FILM COATED ORAL at 08:31

## 2017-06-07 RX ADMIN — METFORMIN HYDROCHLORIDE 850 MILLIGRAM(S): 850 TABLET ORAL at 08:31

## 2017-06-07 RX ADMIN — PREGABALIN 500 MICROGRAM(S): 225 CAPSULE ORAL at 08:31

## 2017-06-07 RX ADMIN — OXCARBAZEPINE 300 MILLIGRAM(S): 300 TABLET, FILM COATED ORAL at 21:28

## 2017-06-07 RX ADMIN — ATORVASTATIN CALCIUM 10 MILLIGRAM(S): 80 TABLET, FILM COATED ORAL at 21:27

## 2017-06-07 RX ADMIN — DESMOPRESSIN ACETATE 0.1 MILLIGRAM(S): 0.1 TABLET ORAL at 21:28

## 2017-06-08 PROCEDURE — 99232 SBSQ HOSP IP/OBS MODERATE 35: CPT

## 2017-06-08 RX ADMIN — OXCARBAZEPINE 300 MILLIGRAM(S): 300 TABLET, FILM COATED ORAL at 21:06

## 2017-06-08 RX ADMIN — OXCARBAZEPINE 300 MILLIGRAM(S): 300 TABLET, FILM COATED ORAL at 08:52

## 2017-06-08 RX ADMIN — DESMOPRESSIN ACETATE 0.1 MILLIGRAM(S): 0.1 TABLET ORAL at 21:05

## 2017-06-08 RX ADMIN — Medication 2000 UNIT(S): at 08:52

## 2017-06-08 RX ADMIN — OXCARBAZEPINE 300 MILLIGRAM(S): 300 TABLET, FILM COATED ORAL at 12:46

## 2017-06-08 RX ADMIN — Medication 25 MICROGRAM(S): at 08:52

## 2017-06-08 RX ADMIN — ATORVASTATIN CALCIUM 10 MILLIGRAM(S): 80 TABLET, FILM COATED ORAL at 21:05

## 2017-06-08 RX ADMIN — METFORMIN HYDROCHLORIDE 850 MILLIGRAM(S): 850 TABLET ORAL at 08:52

## 2017-06-08 RX ADMIN — CLOZAPINE 300 MILLIGRAM(S): 150 TABLET, ORALLY DISINTEGRATING ORAL at 21:05

## 2017-06-08 RX ADMIN — PREGABALIN 500 MICROGRAM(S): 225 CAPSULE ORAL at 08:52

## 2017-06-09 PROCEDURE — 99232 SBSQ HOSP IP/OBS MODERATE 35: CPT

## 2017-06-09 RX ORDER — ONDANSETRON 8 MG/1
4 TABLET, FILM COATED ORAL EVERY 6 HOURS
Qty: 0 | Refills: 0 | Status: DISCONTINUED | OUTPATIENT
Start: 2017-06-09 | End: 2017-06-15

## 2017-06-09 RX ADMIN — OXCARBAZEPINE 300 MILLIGRAM(S): 300 TABLET, FILM COATED ORAL at 09:32

## 2017-06-09 RX ADMIN — PREGABALIN 500 MICROGRAM(S): 225 CAPSULE ORAL at 09:32

## 2017-06-09 RX ADMIN — METFORMIN HYDROCHLORIDE 850 MILLIGRAM(S): 850 TABLET ORAL at 09:32

## 2017-06-09 RX ADMIN — Medication 25 MICROGRAM(S): at 09:32

## 2017-06-09 RX ADMIN — Medication 2000 UNIT(S): at 09:32

## 2017-06-09 RX ADMIN — CLOZAPINE 300 MILLIGRAM(S): 150 TABLET, ORALLY DISINTEGRATING ORAL at 21:12

## 2017-06-09 RX ADMIN — OXCARBAZEPINE 300 MILLIGRAM(S): 300 TABLET, FILM COATED ORAL at 13:40

## 2017-06-09 RX ADMIN — ATORVASTATIN CALCIUM 10 MILLIGRAM(S): 80 TABLET, FILM COATED ORAL at 21:12

## 2017-06-09 RX ADMIN — DESMOPRESSIN ACETATE 0.1 MILLIGRAM(S): 0.1 TABLET ORAL at 21:11

## 2017-06-09 RX ADMIN — OXCARBAZEPINE 300 MILLIGRAM(S): 300 TABLET, FILM COATED ORAL at 21:11

## 2017-06-10 RX ADMIN — OXCARBAZEPINE 300 MILLIGRAM(S): 300 TABLET, FILM COATED ORAL at 14:27

## 2017-06-10 RX ADMIN — ATORVASTATIN CALCIUM 10 MILLIGRAM(S): 80 TABLET, FILM COATED ORAL at 21:05

## 2017-06-10 RX ADMIN — PREGABALIN 500 MICROGRAM(S): 225 CAPSULE ORAL at 09:31

## 2017-06-10 RX ADMIN — Medication 25 MICROGRAM(S): at 09:31

## 2017-06-10 RX ADMIN — OXCARBAZEPINE 300 MILLIGRAM(S): 300 TABLET, FILM COATED ORAL at 21:05

## 2017-06-10 RX ADMIN — CLOZAPINE 300 MILLIGRAM(S): 150 TABLET, ORALLY DISINTEGRATING ORAL at 21:05

## 2017-06-10 RX ADMIN — METFORMIN HYDROCHLORIDE 850 MILLIGRAM(S): 850 TABLET ORAL at 09:31

## 2017-06-10 RX ADMIN — OXCARBAZEPINE 300 MILLIGRAM(S): 300 TABLET, FILM COATED ORAL at 09:31

## 2017-06-10 RX ADMIN — DESMOPRESSIN ACETATE 0.1 MILLIGRAM(S): 0.1 TABLET ORAL at 21:05

## 2017-06-10 RX ADMIN — Medication 2000 UNIT(S): at 09:31

## 2017-06-11 RX ADMIN — Medication 25 MICROGRAM(S): at 10:34

## 2017-06-11 RX ADMIN — OXCARBAZEPINE 300 MILLIGRAM(S): 300 TABLET, FILM COATED ORAL at 12:51

## 2017-06-11 RX ADMIN — DESMOPRESSIN ACETATE 0.1 MILLIGRAM(S): 0.1 TABLET ORAL at 20:44

## 2017-06-11 RX ADMIN — METFORMIN HYDROCHLORIDE 850 MILLIGRAM(S): 850 TABLET ORAL at 10:34

## 2017-06-11 RX ADMIN — ATORVASTATIN CALCIUM 10 MILLIGRAM(S): 80 TABLET, FILM COATED ORAL at 20:44

## 2017-06-11 RX ADMIN — CLOZAPINE 300 MILLIGRAM(S): 150 TABLET, ORALLY DISINTEGRATING ORAL at 20:44

## 2017-06-11 RX ADMIN — OXCARBAZEPINE 300 MILLIGRAM(S): 300 TABLET, FILM COATED ORAL at 10:34

## 2017-06-11 RX ADMIN — OXCARBAZEPINE 300 MILLIGRAM(S): 300 TABLET, FILM COATED ORAL at 20:44

## 2017-06-11 RX ADMIN — PREGABALIN 500 MICROGRAM(S): 225 CAPSULE ORAL at 10:34

## 2017-06-11 RX ADMIN — Medication 2000 UNIT(S): at 10:34

## 2017-06-12 LAB — CLOZAPINE SERPL-MCNC: SIGNIFICANT CHANGE UP

## 2017-06-12 PROCEDURE — 99232 SBSQ HOSP IP/OBS MODERATE 35: CPT

## 2017-06-12 RX ADMIN — OXCARBAZEPINE 300 MILLIGRAM(S): 300 TABLET, FILM COATED ORAL at 21:08

## 2017-06-12 RX ADMIN — CLOZAPINE 300 MILLIGRAM(S): 150 TABLET, ORALLY DISINTEGRATING ORAL at 21:08

## 2017-06-12 RX ADMIN — Medication 25 MICROGRAM(S): at 09:06

## 2017-06-12 RX ADMIN — METFORMIN HYDROCHLORIDE 850 MILLIGRAM(S): 850 TABLET ORAL at 09:06

## 2017-06-12 RX ADMIN — OXCARBAZEPINE 300 MILLIGRAM(S): 300 TABLET, FILM COATED ORAL at 09:06

## 2017-06-12 RX ADMIN — DESMOPRESSIN ACETATE 0.1 MILLIGRAM(S): 0.1 TABLET ORAL at 21:08

## 2017-06-12 RX ADMIN — PREGABALIN 500 MICROGRAM(S): 225 CAPSULE ORAL at 09:06

## 2017-06-12 RX ADMIN — ATORVASTATIN CALCIUM 10 MILLIGRAM(S): 80 TABLET, FILM COATED ORAL at 21:07

## 2017-06-12 RX ADMIN — Medication 2000 UNIT(S): at 09:06

## 2017-06-12 RX ADMIN — OXCARBAZEPINE 300 MILLIGRAM(S): 300 TABLET, FILM COATED ORAL at 16:04

## 2017-06-13 PROCEDURE — 99232 SBSQ HOSP IP/OBS MODERATE 35: CPT

## 2017-06-13 RX ADMIN — CLOZAPINE 300 MILLIGRAM(S): 150 TABLET, ORALLY DISINTEGRATING ORAL at 21:23

## 2017-06-13 RX ADMIN — OXCARBAZEPINE 300 MILLIGRAM(S): 300 TABLET, FILM COATED ORAL at 12:13

## 2017-06-13 RX ADMIN — PREGABALIN 500 MICROGRAM(S): 225 CAPSULE ORAL at 09:09

## 2017-06-13 RX ADMIN — ATORVASTATIN CALCIUM 10 MILLIGRAM(S): 80 TABLET, FILM COATED ORAL at 21:23

## 2017-06-13 RX ADMIN — OXCARBAZEPINE 300 MILLIGRAM(S): 300 TABLET, FILM COATED ORAL at 09:09

## 2017-06-13 RX ADMIN — Medication 2000 UNIT(S): at 09:09

## 2017-06-13 RX ADMIN — Medication 25 MICROGRAM(S): at 09:09

## 2017-06-13 RX ADMIN — METFORMIN HYDROCHLORIDE 850 MILLIGRAM(S): 850 TABLET ORAL at 09:09

## 2017-06-13 RX ADMIN — DESMOPRESSIN ACETATE 0.1 MILLIGRAM(S): 0.1 TABLET ORAL at 21:23

## 2017-06-13 RX ADMIN — OXCARBAZEPINE 300 MILLIGRAM(S): 300 TABLET, FILM COATED ORAL at 21:24

## 2017-06-14 LAB
BASOPHILS # BLD AUTO: 0.03 K/UL — SIGNIFICANT CHANGE UP (ref 0–0.2)
BASOPHILS NFR BLD AUTO: 0.5 % — SIGNIFICANT CHANGE UP (ref 0–2)
EOSINOPHIL # BLD AUTO: 0.37 K/UL — SIGNIFICANT CHANGE UP (ref 0–0.5)
EOSINOPHIL NFR BLD AUTO: 5.9 % — SIGNIFICANT CHANGE UP (ref 0–6)
HCT VFR BLD CALC: 36.2 % — SIGNIFICANT CHANGE UP (ref 34.5–45)
HGB BLD-MCNC: 13.1 G/DL — SIGNIFICANT CHANGE UP (ref 11.5–15.5)
IMM GRANULOCYTES NFR BLD AUTO: 0.2 % — SIGNIFICANT CHANGE UP (ref 0–1.5)
LYMPHOCYTES # BLD AUTO: 1.19 K/UL — SIGNIFICANT CHANGE UP (ref 1–3.3)
LYMPHOCYTES # BLD AUTO: 19.1 % — SIGNIFICANT CHANGE UP (ref 13–44)
MCHC RBC-ENTMCNC: 28.8 PG — SIGNIFICANT CHANGE UP (ref 27–34)
MCHC RBC-ENTMCNC: 36.2 % — HIGH (ref 32–36)
MCV RBC AUTO: 79.6 FL — LOW (ref 80–100)
MONOCYTES # BLD AUTO: 0.34 K/UL — SIGNIFICANT CHANGE UP (ref 0–0.9)
MONOCYTES NFR BLD AUTO: 5.4 % — SIGNIFICANT CHANGE UP (ref 2–14)
NEUTROPHILS # BLD AUTO: 4.3 K/UL — SIGNIFICANT CHANGE UP (ref 1.8–7.4)
NEUTROPHILS NFR BLD AUTO: 68.9 % — SIGNIFICANT CHANGE UP (ref 43–77)
PLATELET # BLD AUTO: 284 K/UL — SIGNIFICANT CHANGE UP (ref 150–400)
PMV BLD: 8.5 FL — SIGNIFICANT CHANGE UP (ref 7–13)
RBC # BLD: 4.55 M/UL — SIGNIFICANT CHANGE UP (ref 3.8–5.2)
RBC # FLD: 13.6 % — SIGNIFICANT CHANGE UP (ref 10.3–14.5)
WBC # BLD: 6.24 K/UL — SIGNIFICANT CHANGE UP (ref 3.8–10.5)
WBC # FLD AUTO: 6.24 K/UL — SIGNIFICANT CHANGE UP (ref 3.8–10.5)

## 2017-06-14 PROCEDURE — 99232 SBSQ HOSP IP/OBS MODERATE 35: CPT

## 2017-06-14 RX ORDER — BENZTROPINE MESYLATE 1 MG
1 TABLET ORAL
Qty: 0 | Refills: 0 | COMMUNITY

## 2017-06-14 RX ORDER — DESMOPRESSIN ACETATE 0.1 MG/1
1 TABLET ORAL
Qty: 15 | Refills: 0 | OUTPATIENT
Start: 2017-06-14 | End: 2017-06-29

## 2017-06-14 RX ORDER — CLONAZEPAM 1 MG
1 TABLET ORAL
Qty: 0 | Refills: 0 | COMMUNITY

## 2017-06-14 RX ORDER — PREGABALIN 225 MG/1
1 CAPSULE ORAL
Qty: 15 | Refills: 0 | OUTPATIENT
Start: 2017-06-14 | End: 2017-06-29

## 2017-06-14 RX ORDER — LEVOTHYROXINE SODIUM 125 MCG
1 TABLET ORAL
Qty: 0 | Refills: 0 | COMMUNITY

## 2017-06-14 RX ORDER — PREGABALIN 225 MG/1
1 CAPSULE ORAL
Qty: 0 | Refills: 0 | COMMUNITY

## 2017-06-14 RX ORDER — METFORMIN HYDROCHLORIDE 850 MG/1
1 TABLET ORAL
Qty: 0 | Refills: 0 | COMMUNITY

## 2017-06-14 RX ORDER — LEVOTHYROXINE SODIUM 125 MCG
1 TABLET ORAL
Qty: 15 | Refills: 0 | OUTPATIENT
Start: 2017-06-14 | End: 2017-06-29

## 2017-06-14 RX ORDER — METFORMIN HYDROCHLORIDE 850 MG/1
1 TABLET ORAL
Qty: 15 | Refills: 0 | OUTPATIENT
Start: 2017-06-14 | End: 2017-06-29

## 2017-06-14 RX ORDER — OXCARBAZEPINE 300 MG/1
1 TABLET, FILM COATED ORAL
Qty: 45 | Refills: 0 | OUTPATIENT
Start: 2017-06-14 | End: 2017-06-29

## 2017-06-14 RX ORDER — RISPERIDONE 4 MG/1
1 TABLET ORAL
Qty: 0 | Refills: 0 | COMMUNITY

## 2017-06-14 RX ORDER — CLOZAPINE 150 MG/1
3 TABLET, ORALLY DISINTEGRATING ORAL
Qty: 21 | Refills: 0 | OUTPATIENT
Start: 2017-06-14 | End: 2017-06-21

## 2017-06-14 RX ORDER — ATORVASTATIN CALCIUM 80 MG/1
1 TABLET, FILM COATED ORAL
Qty: 15 | Refills: 0 | OUTPATIENT
Start: 2017-06-14 | End: 2017-06-29

## 2017-06-14 RX ORDER — CHOLECALCIFEROL (VITAMIN D3) 125 MCG
2000 CAPSULE ORAL
Qty: 30000 | Refills: 0 | OUTPATIENT
Start: 2017-06-14 | End: 2017-06-29

## 2017-06-14 RX ORDER — CHOLECALCIFEROL (VITAMIN D3) 125 MCG
1 CAPSULE ORAL
Qty: 0 | Refills: 0 | COMMUNITY

## 2017-06-14 RX ORDER — ATORVASTATIN CALCIUM 80 MG/1
1 TABLET, FILM COATED ORAL
Qty: 0 | Refills: 0 | COMMUNITY

## 2017-06-14 RX ADMIN — Medication 25 MICROGRAM(S): at 09:52

## 2017-06-14 RX ADMIN — OXCARBAZEPINE 300 MILLIGRAM(S): 300 TABLET, FILM COATED ORAL at 09:52

## 2017-06-14 RX ADMIN — PREGABALIN 500 MICROGRAM(S): 225 CAPSULE ORAL at 09:52

## 2017-06-14 RX ADMIN — METFORMIN HYDROCHLORIDE 850 MILLIGRAM(S): 850 TABLET ORAL at 09:52

## 2017-06-14 RX ADMIN — ATORVASTATIN CALCIUM 10 MILLIGRAM(S): 80 TABLET, FILM COATED ORAL at 21:06

## 2017-06-14 RX ADMIN — CLOZAPINE 300 MILLIGRAM(S): 150 TABLET, ORALLY DISINTEGRATING ORAL at 21:06

## 2017-06-14 RX ADMIN — OXCARBAZEPINE 300 MILLIGRAM(S): 300 TABLET, FILM COATED ORAL at 12:55

## 2017-06-14 RX ADMIN — DESMOPRESSIN ACETATE 0.1 MILLIGRAM(S): 0.1 TABLET ORAL at 21:06

## 2017-06-14 RX ADMIN — Medication 2000 UNIT(S): at 09:52

## 2017-06-14 RX ADMIN — OXCARBAZEPINE 300 MILLIGRAM(S): 300 TABLET, FILM COATED ORAL at 21:06

## 2017-06-15 VITALS — TEMPERATURE: 97 F

## 2017-06-15 PROCEDURE — 99238 HOSP IP/OBS DSCHRG MGMT 30/<: CPT

## 2017-06-15 RX ADMIN — PREGABALIN 500 MICROGRAM(S): 225 CAPSULE ORAL at 08:56

## 2017-06-15 RX ADMIN — OXCARBAZEPINE 300 MILLIGRAM(S): 300 TABLET, FILM COATED ORAL at 12:52

## 2017-06-15 RX ADMIN — Medication 25 MICROGRAM(S): at 08:56

## 2017-06-15 RX ADMIN — Medication 2000 UNIT(S): at 08:56

## 2017-06-15 RX ADMIN — METFORMIN HYDROCHLORIDE 850 MILLIGRAM(S): 850 TABLET ORAL at 08:57

## 2017-06-15 RX ADMIN — OXCARBAZEPINE 300 MILLIGRAM(S): 300 TABLET, FILM COATED ORAL at 08:57

## 2017-06-21 ENCOUNTER — OUTPATIENT (OUTPATIENT)
Dept: OUTPATIENT SERVICES | Facility: HOSPITAL | Age: 47
LOS: 1 days | Discharge: HOME | End: 2017-06-21
Payer: COMMERCIAL

## 2017-06-21 LAB
BASOPHILS # BLD AUTO: 0.03 K/UL — SIGNIFICANT CHANGE UP (ref 0–0.2)
BASOPHILS NFR BLD AUTO: 0.3 % — SIGNIFICANT CHANGE UP (ref 0–2)
EOSINOPHIL # BLD AUTO: 0.85 K/UL — HIGH (ref 0–0.5)
EOSINOPHIL NFR BLD AUTO: 9.2 % — HIGH (ref 0–6)
HCT VFR BLD CALC: 35.6 % — SIGNIFICANT CHANGE UP (ref 34.5–45)
HGB BLD-MCNC: 12.7 G/DL — SIGNIFICANT CHANGE UP (ref 11.5–15.5)
IMM GRANULOCYTES NFR BLD AUTO: 0.1 % — SIGNIFICANT CHANGE UP (ref 0–1.5)
LYMPHOCYTES # BLD AUTO: 1.16 K/UL — SIGNIFICANT CHANGE UP (ref 1–3.3)
LYMPHOCYTES # BLD AUTO: 12.6 % — LOW (ref 13–44)
MCHC RBC-ENTMCNC: 28.8 PG — SIGNIFICANT CHANGE UP (ref 27–34)
MCHC RBC-ENTMCNC: 35.7 % — SIGNIFICANT CHANGE UP (ref 32–36)
MCV RBC AUTO: 80.7 FL — SIGNIFICANT CHANGE UP (ref 80–100)
MONOCYTES # BLD AUTO: 0.43 K/UL — SIGNIFICANT CHANGE UP (ref 0–0.9)
MONOCYTES NFR BLD AUTO: 4.7 % — SIGNIFICANT CHANGE UP (ref 2–14)
NEUTROPHILS # BLD AUTO: 6.75 K/UL — SIGNIFICANT CHANGE UP (ref 1.8–7.4)
NEUTROPHILS NFR BLD AUTO: 73.1 % — SIGNIFICANT CHANGE UP (ref 43–77)
PLATELET # BLD AUTO: 263 K/UL — SIGNIFICANT CHANGE UP (ref 150–400)
PMV BLD: 7.9 FL — SIGNIFICANT CHANGE UP (ref 7–13)
RBC # BLD: 4.41 M/UL — SIGNIFICANT CHANGE UP (ref 3.8–5.2)
RBC # FLD: 13.9 % — SIGNIFICANT CHANGE UP (ref 10.3–14.5)
WBC # BLD: 9.23 K/UL — SIGNIFICANT CHANGE UP (ref 3.8–10.5)
WBC # FLD AUTO: 9.23 K/UL — SIGNIFICANT CHANGE UP (ref 3.8–10.5)

## 2017-06-25 ENCOUNTER — INPATIENT (INPATIENT)
Facility: HOSPITAL | Age: 47
LOS: 2 days | Discharge: HOME | DRG: 641 | End: 2017-06-28
Attending: HOSPITALIST | Admitting: HOSPITALIST
Payer: COMMERCIAL

## 2017-06-25 VITALS
HEART RATE: 88 BPM | DIASTOLIC BLOOD PRESSURE: 92 MMHG | TEMPERATURE: 98 F | SYSTOLIC BLOOD PRESSURE: 140 MMHG | OXYGEN SATURATION: 100 % | RESPIRATION RATE: 16 BRPM

## 2017-06-25 DIAGNOSIS — E78.00 PURE HYPERCHOLESTEROLEMIA, UNSPECIFIED: ICD-10-CM

## 2017-06-25 DIAGNOSIS — E87.1 HYPO-OSMOLALITY AND HYPONATREMIA: ICD-10-CM

## 2017-06-25 DIAGNOSIS — R56.9 UNSPECIFIED CONVULSIONS: ICD-10-CM

## 2017-06-25 DIAGNOSIS — Z29.9 ENCOUNTER FOR PROPHYLACTIC MEASURES, UNSPECIFIED: ICD-10-CM
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
## 2017-06-25 DIAGNOSIS — F25.9 SCHIZOAFFECTIVE DISORDER, UNSPECIFIED: ICD-10-CM

## 2017-06-25 DIAGNOSIS — E03.9 HYPOTHYROIDISM, UNSPECIFIED: ICD-10-CM

## 2017-06-25 DIAGNOSIS — E11.9 TYPE 2 DIABETES MELLITUS WITHOUT COMPLICATIONS: ICD-10-CM

## 2017-06-25 LAB
ANION GAP SERPL CALC-SCNC: 12 MMOL/L — SIGNIFICANT CHANGE UP (ref 5–17)
ANION GAP SERPL CALC-SCNC: 13 MMOL/L — SIGNIFICANT CHANGE UP (ref 5–17)
APAP SERPL-MCNC: <15 UG/ML — SIGNIFICANT CHANGE UP (ref 10–30)
APPEARANCE UR: CLEAR — SIGNIFICANT CHANGE UP
BASOPHILS # BLD AUTO: 0 K/UL — SIGNIFICANT CHANGE UP (ref 0–0.2)
BASOPHILS NFR BLD AUTO: 0.6 % — SIGNIFICANT CHANGE UP (ref 0–2)
BILIRUB UR-MCNC: NEGATIVE — SIGNIFICANT CHANGE UP
BUN SERPL-MCNC: 7 MG/DL — SIGNIFICANT CHANGE UP (ref 7–23)
BUN SERPL-MCNC: 7 MG/DL — SIGNIFICANT CHANGE UP (ref 7–23)
CALCIUM SERPL-MCNC: 9.2 MG/DL — SIGNIFICANT CHANGE UP (ref 8.4–10.5)
CALCIUM SERPL-MCNC: 9.6 MG/DL — SIGNIFICANT CHANGE UP (ref 8.4–10.5)
CARBAMAZEPINE SERPL-MCNC: 0.7 UG/ML — LOW (ref 4–12)
CHLORIDE SERPL-SCNC: 81 MMOL/L — LOW (ref 96–108)
CHLORIDE SERPL-SCNC: 84 MMOL/L — LOW (ref 96–108)
CK SERPL-CCNC: 32 U/L — SIGNIFICANT CHANGE UP (ref 25–170)
CO2 SERPL-SCNC: 23 MMOL/L — SIGNIFICANT CHANGE UP (ref 22–31)
CO2 SERPL-SCNC: 24 MMOL/L — SIGNIFICANT CHANGE UP (ref 22–31)
COLOR SPEC: COLORLESS — SIGNIFICANT CHANGE UP
CREAT ?TM UR-MCNC: 5 MG/DL — SIGNIFICANT CHANGE UP
CREAT SERPL-MCNC: 0.49 MG/DL — LOW (ref 0.5–1.3)
CREAT SERPL-MCNC: 0.5 MG/DL — SIGNIFICANT CHANGE UP (ref 0.5–1.3)
DIFF PNL FLD: NEGATIVE — SIGNIFICANT CHANGE UP
EOSINOPHIL # BLD AUTO: 0.2 K/UL — SIGNIFICANT CHANGE UP (ref 0–0.5)
EOSINOPHIL NFR BLD AUTO: 2.8 % — SIGNIFICANT CHANGE UP (ref 0–6)
ETHANOL SERPL-MCNC: SIGNIFICANT CHANGE UP MG/DL (ref 0–10)
GAS PNL BLDV: SIGNIFICANT CHANGE UP
GLUCOSE SERPL-MCNC: 101 MG/DL — HIGH (ref 70–99)
GLUCOSE SERPL-MCNC: 118 MG/DL — HIGH (ref 70–99)
GLUCOSE UR QL: NEGATIVE — SIGNIFICANT CHANGE UP
HCT VFR BLD CALC: 33.8 % — LOW (ref 34.5–45)
HGB BLD-MCNC: 12.3 G/DL — SIGNIFICANT CHANGE UP (ref 11.5–15.5)
KETONES UR-MCNC: NEGATIVE — SIGNIFICANT CHANGE UP
LEUKOCYTE ESTERASE UR-ACNC: NEGATIVE — SIGNIFICANT CHANGE UP
LYMPHOCYTES # BLD AUTO: 0.8 K/UL — LOW (ref 1–3.3)
LYMPHOCYTES # BLD AUTO: 9.4 % — LOW (ref 13–44)
MCHC RBC-ENTMCNC: 30.8 PG — SIGNIFICANT CHANGE UP (ref 27–34)
MCHC RBC-ENTMCNC: 36.3 GM/DL — HIGH (ref 32–36)
MCV RBC AUTO: 84.7 FL — SIGNIFICANT CHANGE UP (ref 80–100)
MONOCYTES # BLD AUTO: 0.3 K/UL — SIGNIFICANT CHANGE UP (ref 0–0.9)
MONOCYTES NFR BLD AUTO: 4.3 % — SIGNIFICANT CHANGE UP (ref 2–14)
NEUTROPHILS # BLD AUTO: 6.6 K/UL — SIGNIFICANT CHANGE UP (ref 1.8–7.4)
NEUTROPHILS NFR BLD AUTO: 83 % — HIGH (ref 43–77)
NITRITE UR-MCNC: NEGATIVE — SIGNIFICANT CHANGE UP
OSMOLALITY SERPL: 251 MOS/KG — LOW (ref 275–300)
OSMOLALITY UR: 53 MOS/KG — LOW (ref 300–900)
PCP SPEC-MCNC: SIGNIFICANT CHANGE UP
PH UR: 6.5 — SIGNIFICANT CHANGE UP (ref 5–8)
PLATELET # BLD AUTO: 228 K/UL — SIGNIFICANT CHANGE UP (ref 150–400)
POTASSIUM SERPL-MCNC: 3.4 MMOL/L — LOW (ref 3.5–5.3)
POTASSIUM SERPL-MCNC: 3.6 MMOL/L — SIGNIFICANT CHANGE UP (ref 3.5–5.3)
POTASSIUM SERPL-SCNC: 3.4 MMOL/L — LOW (ref 3.5–5.3)
POTASSIUM SERPL-SCNC: 3.6 MMOL/L — SIGNIFICANT CHANGE UP (ref 3.5–5.3)
PROT UR-MCNC: NEGATIVE — SIGNIFICANT CHANGE UP
RBC # BLD: 4 M/UL — SIGNIFICANT CHANGE UP (ref 3.8–5.2)
RBC # FLD: 12.9 % — SIGNIFICANT CHANGE UP (ref 10.3–14.5)
SALICYLATES SERPL-MCNC: <2 MG/DL — LOW (ref 15–30)
SODIUM SERPL-SCNC: 117 MMOL/L — CRITICAL LOW (ref 135–145)
SODIUM SERPL-SCNC: 120 MMOL/L — CRITICAL LOW (ref 135–145)
SODIUM UR-SCNC: <20 MMOL/L — SIGNIFICANT CHANGE UP
SP GR SPEC: <1.005 — LOW (ref 1.01–1.02)
UROBILINOGEN FLD QL: NEGATIVE — SIGNIFICANT CHANGE UP
WBC # BLD: 7.9 K/UL — SIGNIFICANT CHANGE UP (ref 3.8–10.5)
WBC # FLD AUTO: 7.9 K/UL — SIGNIFICANT CHANGE UP (ref 3.8–10.5)

## 2017-06-25 PROCEDURE — 99223 1ST HOSP IP/OBS HIGH 75: CPT | Mod: AI,GC

## 2017-06-25 PROCEDURE — 99285 EMERGENCY DEPT VISIT HI MDM: CPT

## 2017-06-25 PROCEDURE — 70450 CT HEAD/BRAIN W/O DYE: CPT | Mod: 26

## 2017-06-25 PROCEDURE — 99223 1ST HOSP IP/OBS HIGH 75: CPT

## 2017-06-25 RX ORDER — HEPARIN SODIUM 5000 [USP'U]/ML
5000 INJECTION INTRAVENOUS; SUBCUTANEOUS EVERY 8 HOURS
Qty: 0 | Refills: 0 | Status: DISCONTINUED | OUTPATIENT
Start: 2017-06-25 | End: 2017-06-28

## 2017-06-25 RX ORDER — DEXTROSE 50 % IN WATER 50 %
1 SYRINGE (ML) INTRAVENOUS ONCE
Qty: 0 | Refills: 0 | Status: DISCONTINUED | OUTPATIENT
Start: 2017-06-25 | End: 2017-06-28

## 2017-06-25 RX ORDER — DEXTROSE 50 % IN WATER 50 %
12.5 SYRINGE (ML) INTRAVENOUS ONCE
Qty: 0 | Refills: 0 | Status: DISCONTINUED | OUTPATIENT
Start: 2017-06-25 | End: 2017-06-28

## 2017-06-25 RX ORDER — GLUCAGON INJECTION, SOLUTION 0.5 MG/.1ML
1 INJECTION, SOLUTION SUBCUTANEOUS ONCE
Qty: 0 | Refills: 0 | Status: DISCONTINUED | OUTPATIENT
Start: 2017-06-25 | End: 2017-06-28

## 2017-06-25 RX ORDER — INSULIN LISPRO 100/ML
VIAL (ML) SUBCUTANEOUS
Qty: 0 | Refills: 0 | Status: DISCONTINUED | OUTPATIENT
Start: 2017-06-25 | End: 2017-06-28

## 2017-06-25 RX ORDER — PREGABALIN 225 MG/1
500 CAPSULE ORAL DAILY
Qty: 0 | Refills: 0 | Status: DISCONTINUED | OUTPATIENT
Start: 2017-06-25 | End: 2017-06-28

## 2017-06-25 RX ORDER — LEVETIRACETAM 250 MG/1
500 TABLET, FILM COATED ORAL
Qty: 0 | Refills: 0 | Status: DISCONTINUED | OUTPATIENT
Start: 2017-06-25 | End: 2017-06-28

## 2017-06-25 RX ORDER — CHOLECALCIFEROL (VITAMIN D3) 125 MCG
1000 CAPSULE ORAL DAILY
Qty: 0 | Refills: 0 | Status: DISCONTINUED | OUTPATIENT
Start: 2017-06-25 | End: 2017-06-28

## 2017-06-25 RX ORDER — DEXTROSE 50 % IN WATER 50 %
25 SYRINGE (ML) INTRAVENOUS ONCE
Qty: 0 | Refills: 0 | Status: DISCONTINUED | OUTPATIENT
Start: 2017-06-25 | End: 2017-06-28

## 2017-06-25 RX ORDER — INSULIN LISPRO 100/ML
VIAL (ML) SUBCUTANEOUS AT BEDTIME
Qty: 0 | Refills: 0 | Status: DISCONTINUED | OUTPATIENT
Start: 2017-06-25 | End: 2017-06-28

## 2017-06-25 RX ORDER — LEVOTHYROXINE SODIUM 125 MCG
25 TABLET ORAL DAILY
Qty: 0 | Refills: 0 | Status: DISCONTINUED | OUTPATIENT
Start: 2017-06-25 | End: 2017-06-28

## 2017-06-25 RX ORDER — HALOPERIDOL DECANOATE 100 MG/ML
3 INJECTION INTRAMUSCULAR EVERY 6 HOURS
Qty: 0 | Refills: 0 | Status: DISCONTINUED | OUTPATIENT
Start: 2017-06-25 | End: 2017-06-28

## 2017-06-25 RX ORDER — SODIUM CHLORIDE 9 MG/ML
1000 INJECTION, SOLUTION INTRAVENOUS
Qty: 0 | Refills: 0 | Status: DISCONTINUED | OUTPATIENT
Start: 2017-06-25 | End: 2017-06-28

## 2017-06-25 RX ORDER — ATORVASTATIN CALCIUM 80 MG/1
10 TABLET, FILM COATED ORAL AT BEDTIME
Qty: 0 | Refills: 0 | Status: DISCONTINUED | OUTPATIENT
Start: 2017-06-25 | End: 2017-06-28

## 2017-06-25 RX ORDER — CLOZAPINE 150 MG/1
300 TABLET, ORALLY DISINTEGRATING ORAL AT BEDTIME
Qty: 0 | Refills: 0 | Status: DISCONTINUED | OUTPATIENT
Start: 2017-06-25 | End: 2017-06-28

## 2017-06-25 RX ADMIN — CLOZAPINE 300 MILLIGRAM(S): 150 TABLET, ORALLY DISINTEGRATING ORAL at 21:08

## 2017-06-25 RX ADMIN — ATORVASTATIN CALCIUM 10 MILLIGRAM(S): 80 TABLET, FILM COATED ORAL at 21:08

## 2017-06-25 RX ADMIN — HEPARIN SODIUM 5000 UNIT(S): 5000 INJECTION INTRAVENOUS; SUBCUTANEOUS at 21:08

## 2017-06-25 RX ADMIN — LEVETIRACETAM 500 MILLIGRAM(S): 250 TABLET, FILM COATED ORAL at 21:08

## 2017-06-25 NOTE — ED ADULT NURSE NOTE - OBJECTIVE STATEMENT
45 y/o female patient presents to ED brought in by EMS with poor personal hygiene. Patient states she called 911 after  left for work today because she "does not like to be left alone". Patient arrived to ED covered from head to toe in feces which was stuck on to her body appearing to have been there for a few days. Patient's  states the patient has been having "uncontrolled diarrhea" for a few days. Patient's  at bedside states the patient was recently discharged from [facility] after a ~49 day stay. Patient states she does not feel safe at home, she loves her  but "just cannot live with him and belongs in a hospital". Patient has PMHX schizoaffective disorder, hypothyroid, DM. As per patient and , unknown last date of shower. Patient states she is having difficulty performing ADLs independently.

## 2017-06-25 NOTE — ED ADULT NURSE REASSESSMENT NOTE - NS ED NURSE REASSESS COMMENT FT1
Patient appears to be resting comfortably; no acute distress noted; patient denies n/v/d, dizziness, pain; a&ox3;  at bedside; safety and comfort measures provided

## 2017-06-25 NOTE — ED BEHAVIORAL HEALTH ASSESSMENT NOTE - RISK ASSESSMENT
Patient is at moderate risk for self injurious behavior due to risk factors of a history of schizoaffective disorder and being unemployed. The patient's protective factors include supportive , being domiciled, no substance abuse, she is compliant with medications.

## 2017-06-25 NOTE — H&P ADULT - PROBLEM SELECTOR PLAN 1
2/2 primary polydipsia and medications given history, low urine osmo and na.   will hold desmopressin, trileptal. Volume restrict to 1000 ml a day and monitor BMP Q6 with goal correction of 8-10 meq per 24 hours.  Will check tsh and am cortisol.

## 2017-06-25 NOTE — H&P ADULT - HISTORY OF PRESENT ILLNESS
47 yo F hx Schizoaffective disorder, DM 2, HLD, hypothyroid, recently hospitalized at [facility] (4/28/17 - 6/15/17) called 911 after falling and defecating herself. Pt states she was at home when she suddenly defecated on herself, her knees buckled and she fell down. She denies dizziness/fainting, LOC, head trauma, SOB, palpitations or chest pain. Pt was recently started on clozapine for her schizoaffective d/o. She also takes desmopressin nightly for urinary incontinence. She takes trileptal for hx seizure (never proven, possibly pseudoseizures). She states that she eats and drinks all the time. She like to drink water and diet coke. She drinks because it makes her "feel good", but not because she is thirsty. She has frequent urinations and occasional incontinence.     In ED, VSS, and pt found to be hyponatremic to 117. Psych evaluated pt and suggest keeping clozapine, holding trileptal and adding haldol 3mg Q6PRN.

## 2017-06-25 NOTE — ED PROVIDER NOTE - PROGRESS NOTE DETAILS
Attending Terry: I received sign out. pt with sig hyponatremia, no reports of seizure. will send osmolarity, urine lytes. ua shows sig low specific gravity. unclear whether sign fluids. will fluid restrict. nephro consult. no h/o SIADH. plan to admit

## 2017-06-25 NOTE — H&P ADULT - PROBLEM SELECTOR PLAN 2
will continue with clozapine, daily cbc to monitor for agranulocytosis.   psych on board. Haldol 3mg Q6 PRN agitation.

## 2017-06-25 NOTE — ED PROVIDER NOTE - MEDICAL DECISION MAKING DETAILS
47yo female found down covered in her own feces, evaluated by psych, found to be hyponatremic. Will admit for hyponatremia, send off urine electrolytes. Erlinda Koenig DO 45yo female found down covered in her own feces, evaluated by psych, found to be hyponatremic. Will admit for hyponatremia, send off urine electrolytes. Erlinda Keonig DO  Blade Sims DO; see attending attestation

## 2017-06-25 NOTE — ED PROVIDER NOTE - OBJECTIVE STATEMENT
45yo female PMH schizoaffective disorder, DM, hyperlipidemia, hypothyroid presenting with altered mental status after being found covered in feces. As per patient, she 45yo female PMH schizoaffective disorder, DM, hyperlipidemia, hypothyroid presenting with altered mental status after being found covered in feces. As per patient, she defecated in her pants and then called EMS. Patient refuses to care for herself.

## 2017-06-25 NOTE — ED BEHAVIORAL HEALTH ASSESSMENT NOTE - HPI (INCLUDE ILLNESS QUALITY, SEVERITY, DURATION, TIMING, CONTEXT, MODIFYING FACTORS, ASSOCIATED SIGNS AND SYMPTOMS)
46 year old  female, domiciled with  in [place], non-caregiver, unemployed on SSI, PPH of Schizoaffective DO, multiple prior psychiatric hospitalizations, most recent in 2013 [facility] (disorganized behavior marked by repeatedly calling police), [facility] outpatient tx with Dr. Montrell Velázquez, no known suicide attempts, no known history of violence or arrests, no active substance abuse or known history of complicated withdrawal,  brought in by EMS, called by . Patient's  states that the patient repeatedly stated to him that he is not her . He states that she also "didn't recognize the cat." He was concerned that the patient was having a stroke so called EMS.    On interview, patient is difficult to engage. She lays down and does not make eye contact with writer. She then abruptly gets up and states, "evelio says no." When asked about psychotic symptoms, she denies delusions, perceptual disturbances or IOR/TB. However, she repeatedly answers with "evelio says no" She then goes on to state "im 66 years old and I have no ." When asked questions about ADLs such as eating , she states, "I don't eat, I don't know what you're asking" She continues to answer other questions with non-sensical answers. When asked if she is compliant with medications, she states, "what medications?"    Spoke to the patient's , who states that patient speech has been progressively becoming incoherent over the past week and worsened further today. He states that he is unsure if patient is compliant with outpatient medications.  He denies depressive or manic symptoms in the patient. He also denies delusions, paranoia or notable perceptual disturbances in the patient. In regards to her functioning he states that the patient hasn't bathed for the past week and has had difficulty leaving her house and visiting her physicians.    Spoke to the ED team. They state that the patient was incoherent and tangential in her speech on arrival. They state that she refused to be taken to her room "unless the FBI was present" When patient was confronted about this statement she states, "my  calls people to hurt me so I need the FBI" Patient's  states that she is referring to EMS as "people hurting her."    Left message for Dr. Eber on his emergency line [phone]. 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 Sunita Vegas, no known suicide attempts, no known history of violence or arrests, no active substance abuse or known history of complicated withdrawal,  brought in by EMS activated by the patient after an episode of incontinence earlier this morning. On interview, the patient says she called 911 because she would like to return to [facility]. She says she likes [facility] because she has friends there and she enjoys being around people and attending groups. Being there, she says, is better than being at home where she reports she is lonely. The patient denies AH/VH or thought insertion, broadcasting, or withdrawal. No delusions could be elicited. She denies that her  is an imposter and says that she has no concerns for her safety. No referential delusions could be elicited. She says her mood is "good" and that she has been sleeping well. She denies expansive, elevated, or irritable mood or increased goal-directed activities for 4 days or more. She denies any recent drug or alcohol use. She reports compliance with her medications including Clozapine. Her next Clozapine appointment is on 6/27/17. She says she is taking care of herself at home in terms of eating and keeping the house in order and tending to basic hygiene. She reports recently stopping acne treatments that were drying out her skin.       According to the patient's , the patient is at her baseline. He says she called 911 after she defecated on herself earlier this morning. He says there was another episode last week when the patient did not make it to the bathroom in time. Aside from that, he says there's no evidence that she's hearing voices or that her thoughts are being manipulated. She has been compliant with her medications he says. He adds she is taking care of herself at home, but was over-using an acne treatment to wash her face. He said she is no longer using the acne treatment. He also reports that the patient puts shampoo in her hair and then does not wash it out, which the patient denies. He denies psychotic, depressive, or manic symptoms in the patient. The  does not feel that the patient needs psychiatric admission. 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 Sunita Vegas, no known suicide attempts, no known history of violence or arrests, no active substance abuse or known history of complicated withdrawal,  brought in by EMS activated by the patient after an episode of incontinence earlier this morning. As per staff, pt was covered in feces.  On interview, the patient says she called 911 because she would like to return to [facility]. She says she likes [facility] because she has friends there and she enjoys being around people and attending groups. Being there, she says, is better than being at home where she reports she is lonely. The patient denies AH/VH or thought insertion, broadcasting, or withdrawal. No delusions could be elicited. She denies that her  is an imposter and says that she has no concerns for her safety. No referential delusions could be elicited. She says her mood is "good" and that she has been sleeping well. She denies expansive, elevated, or irritable mood or increased goal-directed activities for 4 days or more. She denies any recent drug or alcohol use. She reports compliance with her medications including Clozapine. Her next Clozapine clinic appointment is on 6/27/17 and next appt with Dr. Vegas is the week after. She says she is taking care of herself at home in terms of eating and keeping the house in order and tending to basic hygiene.  As per , pt's hygiene was never great and its her baseline. She reports recently stopping acne treatments that were drying out her skin.       According to the patient's , the patient is at her baseline. He says she called 911 after she defecated on herself earlier this morning. He says there was another episode last week when the patient did not make it to the bathroom in time. Aside from that, he says there's no evidence that she's hearing voices or that her thoughts are being manipulated. She has been compliant with her medications he says. He adds she is taking care of herself at home, but was over-using an acne treatment to wash her face. He said she is no longer using the acne treatment. He also reports that the patient puts shampoo in her hair and then does not wash it out, which the patient denies. He denies psychotic, depressive, or manic symptoms in the patient. The  does not feel that the patient needs psychiatric admission.

## 2017-06-25 NOTE — H&P ADULT - ATTENDING COMMENTS
pt. 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 pysch following. rec d/c trileptal. neuro eval. will start keppra 500mg bid. monitor bmp [id number]. do not increase more than 8meq in 24hr. fluid restriction.

## 2017-06-25 NOTE — ED BEHAVIORAL HEALTH ASSESSMENT NOTE - CURRENT MEDICATION
Clozapine 300 mg qhs, Oxcarbazepine 300mg daily, Metformin 850mg daily, Atorvastatin 10mg daily, Cholecalciferol 2000 IU daily, Vit-B12 500 mcg daily, Levothyroxine 25mcg daily

## 2017-06-25 NOTE — H&P ADULT - NSHPPHYSICALEXAM_GEN_ALL_CORE
PHYSICAL EXAM:    Constitutional: Disheveled, dry flakes across scalp and face.   Eyes: PERRL; EOMI  ENMT: Normal oropharnxy, no oral lesions, no erythema, no exudates  Neck:  Supple; no JVD; normal thyroid gland  MSK/Back: normal shape; ROM intact; strength intact, no CVA tenderness. Normal strength in all ext, No joint swelling, no joint tenderenss, no joint erythema, no effusions  Respiratory: airway patent; breath sounds equal; good air movement, no wheezing, no crackes, no rhonchi. no increase in WOB  Cardiovascular: regular rate and rhythm  no rub , no murmur, no gallops.   Gastrointestinal: Obese. soft; no distention, normal BS, no TTP, no rebound, no guarding, no mass, no organomegaly, no ascites.  Genitourinary: Not examined  Extremities: no clubbing; no cyanosis; no pedal edema, non-tender to palpation, DP and Radial pulses intact.  Neurological: alert and oriented x 3; responds to verbal commands; sensation intact: CN nerves grossly intact.   Skin: warm and dry; erythema on chest.   Psychiatric: Tangential. Anxious Preseverating. No active SI or HI. PHYSICAL EXAM:    Constitutional: Disheveled, dry flakes across scalp and face.   Eyes: PERRL; EOMI  ENMT: Normal oropharnxy, no oral lesions, no erythema, no exudates  Neck:  Supple; no JVD; normal thyroid gland  MSK/Back: normal shape; ROM intact; strength intact, no CVA tenderness. Normal strengthles, no rhonchi. no increase in WOB  Cardiovascular: regular rate and rhythm  no rub , no murmur, no gallops.   Gastrointestinal: Obese. soft; no distention, normal BS, no TTP, no rebound, no guarding, no mass, no organomegaly, no ascites.  Genitourinary: Not examined  Extremities: no clubbing; no cyanosis; no pedal edema, non-tender to palpation, DP and Radial pulses intact.  Neurological: alert and oriented x 3; responds to verbal commands; sensation intact: CN nerves grossly intact.  Skin: warm and dry; erythema on chest.   Psychiatric: Tangential. Anxious Preseverating. No active SI or HI. PHYSICAL EXAM:    Constitutional: Disheveled, dry flakes across scalp and face.   Eyes: PERRL; EOMI  ENMT: Normal oropharnxy, no oral lesions, no erythema, no exudates  Neck:  Supple; no JVD; normal thyroid gland  MSK/Back: normal shape; ROM intact; strength intact, no CVA tenderness. Normal strengthles, no rhonchi. no increase in WOB  Cardiovascular: regular rate and rhythm  no rub , no murmur, no gallops.   Gastrointestinal: Obese. soft; no distention, normal BS, no TTP, no rebound, no guarding, no mass, no organomegaly, no ascites.  Genitourinary: Not examined  Extremities: no clubbing; no cyanosis; no pedal edema, non-tender to palpation, DP and Radial pulses intact.  Neurological: alert and oriented x 3; responds to verbal commands; sensation intact: CN nerves grossly intact.  Skin: warm and dry; erythema on chest.   Psychiatric: Tangential. Anxious Perseverating. No active SI or HI.

## 2017-06-25 NOTE — H&P ADULT - NSHPLABSRESULTS_GEN_ALL_CORE
12.3   7.9   )-----------( 228      ( 25 Jun 2017 15:34 )             33.8       06-25    120<LL>  |  84<L>  |  7   ----------------------------<  101<H>  3.4<L>   |  23  |  0.49<L>    Ca    9.6      25 Jun 2017 17:18            CARDIAC MARKERS ( 25 Jun 2017 17:18 )  x     / x     / 32 U/L / x     / x

## 2017-06-25 NOTE — ED BEHAVIORAL HEALTH ASSESSMENT NOTE - PSYCHIATRIC ISSUES AND PLAN (INCLUDE STANDING AND PRN MEDICATION)
would c/w Clozapine, check CBC to monitor for agranulocytosis, would D/C Trileptal as it can cause hyponatremia.

## 2017-06-25 NOTE — H&P ADULT - NSHPREVIEWOFSYSTEMS_GEN_ALL_CORE
REVIEW OF SYSTEMS  General: no sweating; fever; chills; anorexia; weight loss: malaise  Skin/Breast: Dry flakes all over head and face ( states pt puts on shampoo, but doesn't wash it off). 	  Ophthalmologic: no diplopia; no photophobia; no lacrimation L; no lacrimation R; No eye pain, no visual changes	  ENMT: no hearing difficulty; no ear pain; no tinnitus; no vertigo; no sinus symptoms: no throat pain  oral pain with lesions.  Respiratory and Thorax: no wheezing; no dyspnea; no cough; no hemoptysis, no sputum production, no stridor	  Cardiovascular: no chest pain; no palpitations; no dyspnea on exertion; no orthopnea, no pedal edema  Gastrointestinal: no nausea; no vomiting; no melena; no hematochezia; no jaundice; no diarrhea, no abominal pain.   Genitourinary: no hematuria; no renal colic; no flank pain L; no flank pain R; no urine discoloration; no dysuria +INCONTINENCE  Musculoskeletal: no arthralgia; no arthritis; no joint swelling; no myalgia  Neurological: no weakness; no headache; no loss of consciousness; no confusion  Psychiatric: NO SI/HI  Hematology/Lymphatics: no gum bleeding; no nose bleeding; no skin lumps  Endocrine. No heat/cold intolerence, +POLYDIPSIA +POLYURIA.

## 2017-06-25 NOTE — ED BEHAVIORAL HEALTH ASSESSMENT NOTE - DETAILS
Depakote-rash ISABELLA, started on Clozapine, now in outpatient clinic with Dr. Vegas After business hours plan discussed writer is [facility] psychiatrist spoke to ED staff

## 2017-06-25 NOTE — ED BEHAVIORAL HEALTH ASSESSMENT NOTE - SUMMARY
Patient is a 46 year old  female, domiciled with  in [place], non-caregiver, unemployed on SSI, PPH of Schizoaffective DO, multiple prior psychiatric hospitalizations, most recent in 2013 [facility] (disorganized behavior marked by repeatedly calling police), current outpatient tx with Dr. Montrell Velázquez, no known suicide attempts, no known history of violence or arrests, no active substance abuse or known history of complicated withdrawal, medica brought in by EMS, called by . Patient's  states that the patient repeatedly stated to him that he is not her . He states that she also "didn't recognize the cat." He was concerned that the patient was having a stroke so called EMS.    Patient presents with acute psychosis marked by disorganized speech and disorganized behavior ongoing for at least 1 week in the context of possible non-compliance with medication. She will require involuntary hospitalization for stabilization as her psychosis is impairing her ability to function/care for self as is apparent by her inability to communicate and extremely poor hygiene. She also does not have the capacity at this time to accept a voluntary hospitalization. Patient is a 46 year old  female, domiciled with  in [place], non-caregiver, unemployed on SSI, PPH of Schizoaffective DO, multiple prior psychiatric hospitalizations, most recent in [facility] earlier this morning presenting after calling 911 because she defecated on herself. On interview, the patient denies psychotic symptoms. The patient's  reports she's at her baseline. The patient does not meet criteria for psychiatric inpatient admission. She should be discharged from the ED today and follow-up at her outpatient clozapine clinic on 6/27/17 as previously scheduled. Patient is a 46 year old  female, domiciled with  in [place], non-caregiver, unemployed on SSI, PPH of Schizoaffective DO, multiple prior psychiatric hospitalizations, most recent in [facility] earlier this morning presenting after calling 911 because she defecated on herself. On interview, the patient denies psychotic symptoms. The patient's  reports she's at her baseline. The patient does not meet criteria for psychiatric inpatient admission. Pt will be admitted to medicine due to hyponatremia. once discharged pt can follow-up at her outpatient clozapine clinic at [facility].

## 2017-06-25 NOTE — ED BEHAVIORAL HEALTH ASSESSMENT NOTE - OTHER PAST PSYCHIATRIC HISTORY (INCLUDE DETAILS REGARDING ONSET, COURSE OF ILLNESS, INPATIENT/OUTPATIENT TREATMENT)
Schizoaffective disorder + 6 hospitalizations, no SI/SA, last hospitalized earlier this month    Risperdal 4mg BID , Klonopin 1mg BID, Cogentin 0.5mg BID (cannot confirm compliance)    Outpt treatment w. Dr. Montrell Velázquez (10 years) Schizoaffective disorder + 6 hospitalizations, no SI/SA, last hospitalized earlier this month    Previously was on Risperdal 4mg BID , Klonopin 1mg BID, Cogentin 0.5mg BID    Outpt treatment w. Dr. Montrell Velázquez (10 years)

## 2017-06-25 NOTE — ED BEHAVIORAL HEALTH ASSESSMENT NOTE - CASE SUMMARY
Patient is a 46 year old  female, domiciled with  in [place], non-caregiver, unemployed on SSI, PPH of Schizoaffective DO, multiple prior psychiatric hospitalizations, most recent in [facility] earlier this morning presenting after calling 911 because she defecated on herself. On interview, the patient denies psychotic symptoms. The patient's  reports she's at her baseline. The patient does not meet criteria for psychiatric inpatient admission. Pt will be admitted to medicine due to hyponatremia. once discharged pt can follow-up at her outpatient clozapine clinic at [facility].  would c/w Clozapine 300mg, check CBC to monitor for agranulocytosis, would D/C Trileptal as it can cause hyponatremia. for agitation, can give Haldol 3mg po/iv q6hrs PRN

## 2017-06-25 NOTE — H&P ADULT - PROBLEM SELECTOR PLAN 3
holding trileptal as can cause hyponatremia. seizures have not been proven, possibly pseudoseizures.   Will start keppra 500 BID, monitor for worsening mood.   consider neuro consult.

## 2017-06-25 NOTE — ED PROVIDER NOTE - ATTENDING CONTRIBUTION TO CARE
Pt BIBEMS after found to be acting erratically, covered in feces.   On exam, pt foul smelling, covered in feces.   A: schizoaffective disorder  P: labs, psych consult.

## 2017-06-25 NOTE — ED BEHAVIORAL HEALTH ASSESSMENT NOTE - DESCRIPTION
cooperative. 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  No children. Spends her days watching TV. cooperative cooperative, but appears regressed, child like .

## 2017-06-25 NOTE — H&P ADULT - ASSESSMENT
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

## 2017-06-26 LAB
ANION GAP SERPL CALC-SCNC: 10 MMOL/L — SIGNIFICANT CHANGE UP (ref 5–17)
ANION GAP SERPL CALC-SCNC: 12 MMOL/L — SIGNIFICANT CHANGE UP (ref 5–17)
ANION GAP SERPL CALC-SCNC: 14 MMOL/L — SIGNIFICANT CHANGE UP (ref 5–17)
ANION GAP SERPL CALC-SCNC: 15 MMOL/L — SIGNIFICANT CHANGE UP (ref 5–17)
BUN SERPL-MCNC: 10 MG/DL — SIGNIFICANT CHANGE UP (ref 7–23)
BUN SERPL-MCNC: 10 MG/DL — SIGNIFICANT CHANGE UP (ref 7–23)
BUN SERPL-MCNC: 11 MG/DL — SIGNIFICANT CHANGE UP (ref 7–23)
BUN SERPL-MCNC: 14 MG/DL — SIGNIFICANT CHANGE UP (ref 7–23)
CALCIUM SERPL-MCNC: 9.3 MG/DL — SIGNIFICANT CHANGE UP (ref 8.4–10.5)
CALCIUM SERPL-MCNC: 9.4 MG/DL — SIGNIFICANT CHANGE UP (ref 8.4–10.5)
CALCIUM SERPL-MCNC: 9.5 MG/DL — SIGNIFICANT CHANGE UP (ref 8.4–10.5)
CALCIUM SERPL-MCNC: 9.9 MG/DL — SIGNIFICANT CHANGE UP (ref 8.4–10.5)
CHLORIDE SERPL-SCNC: 94 MMOL/L — LOW (ref 96–108)
CHLORIDE SERPL-SCNC: 96 MMOL/L — SIGNIFICANT CHANGE UP (ref 96–108)
CHLORIDE SERPL-SCNC: 98 MMOL/L — SIGNIFICANT CHANGE UP (ref 96–108)
CHLORIDE SERPL-SCNC: 98 MMOL/L — SIGNIFICANT CHANGE UP (ref 96–108)
CO2 SERPL-SCNC: 24 MMOL/L — SIGNIFICANT CHANGE UP (ref 22–31)
CO2 SERPL-SCNC: 24 MMOL/L — SIGNIFICANT CHANGE UP (ref 22–31)
CO2 SERPL-SCNC: 25 MMOL/L — SIGNIFICANT CHANGE UP (ref 22–31)
CO2 SERPL-SCNC: 28 MMOL/L — SIGNIFICANT CHANGE UP (ref 22–31)
CORTIS AM PEAK SERPL-MCNC: 10.1 UG/DL — SIGNIFICANT CHANGE UP (ref 6–18.4)
CORTIS AM PEAK SERPL-MCNC: 18.2 UG/DL — SIGNIFICANT CHANGE UP (ref 6–18.4)
CREAT SERPL-MCNC: 0.69 MG/DL — SIGNIFICANT CHANGE UP (ref 0.5–1.3)
CREAT SERPL-MCNC: 0.73 MG/DL — SIGNIFICANT CHANGE UP (ref 0.5–1.3)
CREAT SERPL-MCNC: 0.74 MG/DL — SIGNIFICANT CHANGE UP (ref 0.5–1.3)
CREAT SERPL-MCNC: 0.81 MG/DL — SIGNIFICANT CHANGE UP (ref 0.5–1.3)
GLUCOSE SERPL-MCNC: 111 MG/DL — HIGH (ref 70–99)
GLUCOSE SERPL-MCNC: 127 MG/DL — HIGH (ref 70–99)
GLUCOSE SERPL-MCNC: 143 MG/DL — HIGH (ref 70–99)
GLUCOSE SERPL-MCNC: 151 MG/DL — HIGH (ref 70–99)
HCT VFR BLD CALC: 34.9 % — SIGNIFICANT CHANGE UP (ref 34.5–45)
HGB BLD-MCNC: 12.2 G/DL — SIGNIFICANT CHANGE UP (ref 11.5–15.5)
MAGNESIUM SERPL-MCNC: 2 MG/DL — SIGNIFICANT CHANGE UP (ref 1.6–2.6)
MCHC RBC-ENTMCNC: 30.1 PG — SIGNIFICANT CHANGE UP (ref 27–34)
MCHC RBC-ENTMCNC: 35 GM/DL — SIGNIFICANT CHANGE UP (ref 32–36)
MCV RBC AUTO: 85.9 FL — SIGNIFICANT CHANGE UP (ref 80–100)
PHOSPHATE SERPL-MCNC: 4.5 MG/DL — SIGNIFICANT CHANGE UP (ref 2.5–4.5)
PLATELET # BLD AUTO: 235 K/UL — SIGNIFICANT CHANGE UP (ref 150–400)
POTASSIUM SERPL-MCNC: 3.4 MMOL/L — LOW (ref 3.5–5.3)
POTASSIUM SERPL-MCNC: 3.5 MMOL/L — SIGNIFICANT CHANGE UP (ref 3.5–5.3)
POTASSIUM SERPL-MCNC: 3.7 MMOL/L — SIGNIFICANT CHANGE UP (ref 3.5–5.3)
POTASSIUM SERPL-MCNC: 4.1 MMOL/L — SIGNIFICANT CHANGE UP (ref 3.5–5.3)
POTASSIUM SERPL-SCNC: 3.4 MMOL/L — LOW (ref 3.5–5.3)
POTASSIUM SERPL-SCNC: 3.5 MMOL/L — SIGNIFICANT CHANGE UP (ref 3.5–5.3)
POTASSIUM SERPL-SCNC: 3.7 MMOL/L — SIGNIFICANT CHANGE UP (ref 3.5–5.3)
POTASSIUM SERPL-SCNC: 4.1 MMOL/L — SIGNIFICANT CHANGE UP (ref 3.5–5.3)
RBC # BLD: 4.06 M/UL — SIGNIFICANT CHANGE UP (ref 3.8–5.2)
RBC # FLD: 13.2 % — SIGNIFICANT CHANGE UP (ref 10.3–14.5)
SODIUM SERPL-SCNC: 132 MMOL/L — LOW (ref 135–145)
SODIUM SERPL-SCNC: 132 MMOL/L — LOW (ref 135–145)
SODIUM SERPL-SCNC: 136 MMOL/L — SIGNIFICANT CHANGE UP (ref 135–145)
SODIUM SERPL-SCNC: 138 MMOL/L — SIGNIFICANT CHANGE UP (ref 135–145)
WBC # BLD: 6.2 K/UL — SIGNIFICANT CHANGE UP (ref 3.8–10.5)
WBC # FLD AUTO: 6.2 K/UL — SIGNIFICANT CHANGE UP (ref 3.8–10.5)

## 2017-06-26 PROCEDURE — 99233 SBSQ HOSP IP/OBS HIGH 50: CPT | Mod: GC

## 2017-06-26 PROCEDURE — 99232 SBSQ HOSP IP/OBS MODERATE 35: CPT

## 2017-06-26 RX ORDER — SODIUM CHLORIDE 9 MG/ML
1000 INJECTION, SOLUTION INTRAVENOUS
Qty: 0 | Refills: 0 | Status: DISCONTINUED | OUTPATIENT
Start: 2017-06-26 | End: 2017-06-27

## 2017-06-26 RX ORDER — POTASSIUM CHLORIDE 20 MEQ
20 PACKET (EA) ORAL ONCE
Qty: 0 | Refills: 0 | Status: COMPLETED | OUTPATIENT
Start: 2017-06-26 | End: 2017-06-26

## 2017-06-26 RX ADMIN — LEVETIRACETAM 500 MILLIGRAM(S): 250 TABLET, FILM COATED ORAL at 17:42

## 2017-06-26 RX ADMIN — CLOZAPINE 300 MILLIGRAM(S): 150 TABLET, ORALLY DISINTEGRATING ORAL at 21:41

## 2017-06-26 RX ADMIN — ATORVASTATIN CALCIUM 10 MILLIGRAM(S): 80 TABLET, FILM COATED ORAL at 21:28

## 2017-06-26 RX ADMIN — Medication 1000 UNIT(S): at 11:57

## 2017-06-26 RX ADMIN — Medication 20 MILLIEQUIVALENT(S): at 10:16

## 2017-06-26 RX ADMIN — HALOPERIDOL DECANOATE 3 MILLIGRAM(S): 100 INJECTION INTRAMUSCULAR at 12:35

## 2017-06-26 RX ADMIN — SODIUM CHLORIDE 50 MILLILITER(S): 9 INJECTION, SOLUTION INTRAVENOUS at 01:03

## 2017-06-26 RX ADMIN — SODIUM CHLORIDE 50 MILLILITER(S): 9 INJECTION, SOLUTION INTRAVENOUS at 10:17

## 2017-06-26 RX ADMIN — Medication 25 MICROGRAM(S): at 05:48

## 2017-06-26 RX ADMIN — HEPARIN SODIUM 5000 UNIT(S): 5000 INJECTION INTRAVENOUS; SUBCUTANEOUS at 05:48

## 2017-06-26 RX ADMIN — PREGABALIN 500 MICROGRAM(S): 225 CAPSULE ORAL at 11:57

## 2017-06-26 RX ADMIN — HEPARIN SODIUM 5000 UNIT(S): 5000 INJECTION INTRAVENOUS; SUBCUTANEOUS at 21:29

## 2017-06-26 RX ADMIN — SODIUM CHLORIDE 100 MILLILITER(S): 9 INJECTION, SOLUTION INTRAVENOUS at 15:19

## 2017-06-26 RX ADMIN — LEVETIRACETAM 500 MILLIGRAM(S): 250 TABLET, FILM COATED ORAL at 05:48

## 2017-06-26 RX ADMIN — HEPARIN SODIUM 5000 UNIT(S): 5000 INJECTION INTRAVENOUS; SUBCUTANEOUS at 15:19

## 2017-06-26 NOTE — PROGRESS NOTE BEHAVIORAL HEALTH - NSBHCONSULTRECOMMENDOTHER_PSY_A_CORE FT
1) pt does not need inpt psychiatric admission, pt psychiatrically at baseline, continue clozapine 300 mg QHS  2) hold trileptal  3) dr salcedo called, her outpt psychiatrist, made aware of plan; to f/u with her on thursday, july 6th  4)  made aware of plan, not concerned for pt's safety at this time

## 2017-06-26 NOTE — PROGRESS NOTE BEHAVIORAL HEALTH - NSBHCHARTREVIEWLAB_PSY_A_CORE FT
06-26    132<L>  |  96  |  10  ----------------------------<  127<H>  3.4<L>   |  24  |  0.74    Ca    9.3      26 Jun 2017 06:50  Phos  4.5     06-26  Mg     2.0     06-26                          12.2   6.2   )-----------( 235      ( 26 Jun 2017 06:50 )             34.9

## 2017-06-26 NOTE — PROGRESS NOTE ADULT - ASSESSMENT
47 yo F hx Schizoaffective disorder, DM 2, HLD, hypothyroid, recently hospitalized at [facility] (4/28/17 - 6/15/17) called 911 after falling and defecating herself, found to have euvolemic hyponatremia 2/2 primary polydipsia and medications.

## 2017-06-26 NOTE — PROGRESS NOTE BEHAVIORAL HEALTH - NSBHCHARTREVIEWVS_PSY_A_CORE FT
T(C): 37.1, Max: 37.1 (06-26 @ 05:13)  HR: 83 (76 - 88)  BP: 119/72 (119/72 - 147/92)  RR: 18 (16 - 18)  SpO2: 98% (98% - 100%)  Wt(kg): --

## 2017-06-26 NOTE — PROGRESS NOTE BEHAVIORAL HEALTH - SUMMARY
Patient is a 46 year old  female, domiciled with  in [place], non-caregiver, unemployed on SSI, PPH of Schizoaffective DO, multiple prior psychiatric hospitalizations, most recent in [facility] earlier this morning presenting after calling 911 because she defecated on herself. On interview, the patient denies psychotic symptoms. The patient's  reports she's at her baseline. The patient does not meet criteria for psychiatric inpatient admission. Pt will be admitted to medicine due to hyponatremia. once discharged pt can follow-up at her outpatient clozapine clinic at [facility].

## 2017-06-26 NOTE — PROGRESS NOTE ADULT - PROBLEM SELECTOR PLAN 1
- Multiple etiologies presented including polydypsia, medication use such as oxcarbezapine and desmopressin  - will fluid restrict to 1L/day  - BMP q 6 hrs with a goal of 8-10 meq in 24 hrs - Multiple etiologies presented including polydypsia, medication use such as oxcarbezapine and desmopressin  - will fluid restrict to 1L/day  - BMP q 6 hrs with a goal of increasing Na no more than 8-10 meq in 24 hrs to avoid central pontine myelinolysis  - if Na comes up too quickly increase D5

## 2017-06-26 NOTE — PROGRESS NOTE BEHAVIORAL HEALTH - NSBHFUPINTERVALHXFT_PSY_A_CORE
Pt seen, AOA x 3, here for hyponatremia, compliant with medication, clozapine, no side effects reported. Trileptal stopped, secondary to hyponatremia. Pt denies depressed or anxious mood, no si/hi, and reports fair sleep and appetite.

## 2017-06-26 NOTE — PROGRESS NOTE ADULT - PROBLEM SELECTOR PLAN 3
holding trileptal as can cause hyponatremia. seizures have not been proven, possibly pseudoseizures.   Will start keppra 500 BID, monitor for worsening mood.   consider neuro consult. holding trileptal as can cause hyponatremia. seizures have not been proven, possibly pseudoseizures.   Continue keppra 500 BID, monitor for worsening mood.   consider neuro consult. holding trileptal as can cause hyponatremia. seizures have not been proven, possibly pseudoseizures.   Continue keppra 500 BID, monitor for worsening mood.   will d/w patients neurologist.

## 2017-06-26 NOTE — PROGRESS NOTE ADULT - SUBJECTIVE AND OBJECTIVE BOX
Patient is a 46y old  Female with schizoaffective disorder, polydipsia, hypothyroidism and possible hx of seizures vs pseudoseizures who presented for fall and found hyponatremia.     SUBJECTIVE / OVERNIGHT EVENTS:  Patient seen at bedside. Does not appear to be in any acute distress. AAO x 3 but difficult in conversation as she goes off on tangentials (unclear as to what her baseline level of function is.     MEDICATIONS  (STANDING):  atorvastatin 10milliGRAM(s) Oral at bedtime  cloZAPine 300milliGRAM(s) Oral at bedtime  levothyroxine 25MICROGram(s) Oral daily  cholecalciferol 1000Unit(s) Oral daily  cyanocobalamin 500MICROGram(s) Oral daily  heparin  Injectable 5000Unit(s) SubCutaneous every 8 hours  insulin lispro (HumaLOG) corrective regimen sliding scale  SubCutaneous three times a day before meals  insulin lispro (HumaLOG) corrective regimen sliding scale  SubCutaneous at bedtime  dextrose 5%. 1000milliLiter(s) IV Continuous <Continuous>  dextrose 50% Injectable 12.5Gram(s) IV Push once  dextrose 50% Injectable 25Gram(s) IV Push once  dextrose 50% Injectable 25Gram(s) IV Push once  levETIRAcetam 500milliGRAM(s) Oral two times a day  dextrose 5%. 1000milliLiter(s) IV Continuous <Continuous>    MEDICATIONS  (PRN):  dextrose Gel 1Dose(s) Oral once PRN Blood Glucose LESS THAN 70 milliGRAM(s)/deciliter  glucagon  Injectable 1milliGRAM(s) IntraMuscular once PRN Glucose LESS THAN 70 milligrams/deciliter  haloperidol    Injectable 3milliGRAM(s) IV Push every 6 hours PRN agitation    Vital Signs Last 24 Hrs  T(C): 36.6, Max: 37.1 (06-26 @ 05:13)  HR: 79 (76 - 88)  BP: 107/72 (107/72 - 147/92)  RR: 18 (16 - 18)  SpO2: 98% (98% - 100%)  Wt(kg): --  CAPILLARY BLOOD GLUCOSE  131 (26 Jun 2017 12:59)  111 (26 Jun 2017 08:58)  120 (25 Jun 2017 21:34)    I&O's Summary  I & Os for 24h ending 26 Jun 2017 07:00  =============================================  IN: 240 ml / OUT: 2 ml / NET: 238 ml    I & Os for current day (as of 26 Jun 2017 14:19)  =============================================  IN: 720 ml / OUT: 0 ml / NET: 720 ml      PHYSICAL EXAM  GENERAL: NAD, disheveled and hyperactive middle aged female.   HEAD:  Atraumatic, Normocephalic, flaky skin throughout the entire scalp  CHEST/LUNG: Clear to auscultation bilaterally; No wheezes, rales or rhonchi  HEART: Regular rate and rhythm; No murmurs, rubs, or gallops  ABDOMEN: Soft, Nontender, Nondistended; Bowel sounds present, no organomegaly  EXTREMITIES:  2+ Peripheral Pulses, No clubbing, cyanosis, or edema  PSYCH: AAOx3. Euphoric affect and tangential    LABS:                        12.2   6.2   )-----------( 235      ( 26 Jun 2017 06:50 )             34.9     06-26    136  |  98  |  11  ----------------------------<  151<H>  4.1   |  24  |  0.69    Ca    9.9      26 Jun 2017 13:03  Phos  4.5     06-26  Mg     2.0     06-26    Urinalysis Basic - ( 25 Jun 2017 16:18 )    Color: Colorless / Appearance: Clear / SG: <1.005 / pH: x  Gluc: x / Ketone: Negative  / Bili: Negative / Urobili: Negative   Blood: x / Protein: Negative / Nitrite: Negative   Leuk Esterase: Negative / RBC: x / WBC x   Sq Epi: x / Non Sq Epi: x / Bacteria: x    RADIOLOGY & ADDITIONAL TESTS: No new imaging performed

## 2017-06-26 NOTE — PROGRESS NOTE BEHAVIORAL HEALTH - RISK ASSESSMENT
Patient is at moderate risk for self injurious behavior due to risk factors of a history of schizoaffective disorder and being unemployed. The patient's protective factors include supportive , being domiciled, no substance abuse, she is compliant with medications. Pt denies si/hi, and  is not concerned for pt's safety, feels comfortable with her d/c home following medical clearance.

## 2017-06-27 DIAGNOSIS — F25.9 SCHIZOAFFECTIVE DISORDER, UNSPECIFIED: ICD-10-CM

## 2017-06-27 LAB
ANION GAP SERPL CALC-SCNC: 10 MMOL/L — SIGNIFICANT CHANGE UP (ref 5–17)
ANION GAP SERPL CALC-SCNC: 11 MMOL/L — SIGNIFICANT CHANGE UP (ref 5–17)
ANION GAP SERPL CALC-SCNC: 11 MMOL/L — SIGNIFICANT CHANGE UP (ref 5–17)
BUN SERPL-MCNC: 12 MG/DL — SIGNIFICANT CHANGE UP (ref 7–23)
BUN SERPL-MCNC: 15 MG/DL — SIGNIFICANT CHANGE UP (ref 7–23)
BUN SERPL-MCNC: 15 MG/DL — SIGNIFICANT CHANGE UP (ref 7–23)
CALCIUM SERPL-MCNC: 9 MG/DL — SIGNIFICANT CHANGE UP (ref 8.4–10.5)
CALCIUM SERPL-MCNC: 9.3 MG/DL — SIGNIFICANT CHANGE UP (ref 8.4–10.5)
CALCIUM SERPL-MCNC: 9.6 MG/DL — SIGNIFICANT CHANGE UP (ref 8.4–10.5)
CHLORIDE SERPL-SCNC: 100 MMOL/L — SIGNIFICANT CHANGE UP (ref 96–108)
CHLORIDE SERPL-SCNC: 101 MMOL/L — SIGNIFICANT CHANGE UP (ref 96–108)
CHLORIDE SERPL-SCNC: 102 MMOL/L — SIGNIFICANT CHANGE UP (ref 96–108)
CO2 SERPL-SCNC: 23 MMOL/L — SIGNIFICANT CHANGE UP (ref 22–31)
CO2 SERPL-SCNC: 25 MMOL/L — SIGNIFICANT CHANGE UP (ref 22–31)
CO2 SERPL-SCNC: 26 MMOL/L — SIGNIFICANT CHANGE UP (ref 22–31)
CREAT SERPL-MCNC: 0.74 MG/DL — SIGNIFICANT CHANGE UP (ref 0.5–1.3)
CREAT SERPL-MCNC: 0.76 MG/DL — SIGNIFICANT CHANGE UP (ref 0.5–1.3)
CREAT SERPL-MCNC: 0.88 MG/DL — SIGNIFICANT CHANGE UP (ref 0.5–1.3)
GLUCOSE SERPL-MCNC: 104 MG/DL — HIGH (ref 70–99)
GLUCOSE SERPL-MCNC: 110 MG/DL — HIGH (ref 70–99)
GLUCOSE SERPL-MCNC: 88 MG/DL — SIGNIFICANT CHANGE UP (ref 70–99)
POTASSIUM SERPL-MCNC: 3.9 MMOL/L — SIGNIFICANT CHANGE UP (ref 3.5–5.3)
POTASSIUM SERPL-MCNC: 3.9 MMOL/L — SIGNIFICANT CHANGE UP (ref 3.5–5.3)
POTASSIUM SERPL-MCNC: 4.4 MMOL/L — SIGNIFICANT CHANGE UP (ref 3.5–5.3)
POTASSIUM SERPL-SCNC: 3.9 MMOL/L — SIGNIFICANT CHANGE UP (ref 3.5–5.3)
POTASSIUM SERPL-SCNC: 3.9 MMOL/L — SIGNIFICANT CHANGE UP (ref 3.5–5.3)
POTASSIUM SERPL-SCNC: 4.4 MMOL/L — SIGNIFICANT CHANGE UP (ref 3.5–5.3)
SODIUM SERPL-SCNC: 135 MMOL/L — SIGNIFICANT CHANGE UP (ref 135–145)
SODIUM SERPL-SCNC: 135 MMOL/L — SIGNIFICANT CHANGE UP (ref 135–145)
SODIUM SERPL-SCNC: 139 MMOL/L — SIGNIFICANT CHANGE UP (ref 135–145)

## 2017-06-27 PROCEDURE — 99239 HOSP IP/OBS DSCHRG MGMT >30: CPT

## 2017-06-27 RX ORDER — LEVETIRACETAM 250 MG/1
1 TABLET, FILM COATED ORAL
Qty: 60 | Refills: 0 | OUTPATIENT
Start: 2017-06-27 | End: 2017-07-27

## 2017-06-27 RX ADMIN — Medication 1000 UNIT(S): at 12:58

## 2017-06-27 RX ADMIN — HEPARIN SODIUM 5000 UNIT(S): 5000 INJECTION INTRAVENOUS; SUBCUTANEOUS at 15:44

## 2017-06-27 RX ADMIN — SODIUM CHLORIDE 125 MILLILITER(S): 9 INJECTION, SOLUTION INTRAVENOUS at 08:39

## 2017-06-27 RX ADMIN — LEVETIRACETAM 500 MILLIGRAM(S): 250 TABLET, FILM COATED ORAL at 17:31

## 2017-06-27 RX ADMIN — Medication 25 MICROGRAM(S): at 05:28

## 2017-06-27 RX ADMIN — LEVETIRACETAM 500 MILLIGRAM(S): 250 TABLET, FILM COATED ORAL at 05:28

## 2017-06-27 RX ADMIN — HEPARIN SODIUM 5000 UNIT(S): 5000 INJECTION INTRAVENOUS; SUBCUTANEOUS at 21:21

## 2017-06-27 RX ADMIN — PREGABALIN 500 MICROGRAM(S): 225 CAPSULE ORAL at 12:58

## 2017-06-27 RX ADMIN — CLOZAPINE 300 MILLIGRAM(S): 150 TABLET, ORALLY DISINTEGRATING ORAL at 21:21

## 2017-06-27 RX ADMIN — SODIUM CHLORIDE 125 MILLILITER(S): 9 INJECTION, SOLUTION INTRAVENOUS at 05:27

## 2017-06-27 RX ADMIN — ATORVASTATIN CALCIUM 10 MILLIGRAM(S): 80 TABLET, FILM COATED ORAL at 21:21

## 2017-06-27 RX ADMIN — HEPARIN SODIUM 5000 UNIT(S): 5000 INJECTION INTRAVENOUS; SUBCUTANEOUS at 05:28

## 2017-06-27 NOTE — DISCHARGE NOTE ADULT - MEDICATION SUMMARY - MEDICATIONS TO STOP TAKING
I will STOP taking the medications listed below when I get home from the hospital:    OXcarbazepine 300 mg oral tablet  -- 1 tab(s) by mouth 3 times a day    desmopressin 0.1 mg oral tablet  -- 1 tab(s) by mouth once a day (at bedtime)

## 2017-06-27 NOTE — DISCHARGE NOTE ADULT - CARE PLAN
Principal Discharge DX:	Hyponatremia  Goal:	Treatment  Instructions for follow-up, activity and diet:	You came into the hospital with an electrolyte imbalance, particularly sodium which can cause fluctuations in mental function. This was likely caused by uncontrollable drinking, and some of the medications that you were taking (trileptal for the seizures, and desmopressin for the uncontrollable drinking). Both of these medications were stopped during your stay and will not be continued. You should follow up with your psychiatrist, Dr. Vegas on July 6th. In the meantime, you should monitor the amount of water that you drink daily and restrict yourself to no more than the recommended 8 to 10 glasses of water per day. Anything more will result in worsening of your condition.  Secondary Diagnosis:	Fall Principal Discharge DX:	Hyponatremia  Goal:	Treatment  Instructions for follow-up, activity and diet:	You came into the hospital with an electrolyte imbalance, particularly sodium which can cause fluctuations in mental function. This was likely caused by uncontrollable drinking of water, and some of the medications that you were taking (trileptal for the seizures, and desmopressin for the uncontrollable drinking of water). Both of these medications were stopped during your stay and will not be continued. You should follow up with your psychiatrist, Dr. Vegas on July 6th. In the meantime, you should monitor the amount of water that you drink daily and restrict yourself to no more than the recommended 8 to 10 glasses of water per day. Anything more will result in worsening of your condition.  Secondary Diagnosis:	Fall  Instructions for follow-up, activity and diet:	Your fall was likely due to an electrolyte abnormality. During your hospital stay, you were able to ambulate with both physical therapy and nurses without any complications.  Secondary Diagnosis:	Diabetes  Instructions for follow-up, activity and diet:	Please continue your home regimen of metformin and follow up with your outpatient provider to check your kidney function.  Secondary Diagnosis:	High cholesterol  Instructions for follow-up, activity and diet:	Please continue taking your home dose of Lipitor  Secondary Diagnosis:	Hypothyroid  Secondary Diagnosis:	Schizoaffective disorder  Instructions for follow-up, activity and diet:	Please follow up with Dr. Sunita Vegas on July 6th at [place] for further management.  Secondary Diagnosis:	Seizure  Instructions for follow-up, activity and diet:	Your Oxcarbezapine was discontinued during this admission because it had likely contributed to electrolyte abnormalities. You have been started on a medication called Keppra or Leviteracetam which you should take instead for seizure prevention. Please follow up with your outpatient provider as this medication can cause mood changes. Principal Discharge DX:	Hyponatremia  Goal:	Treatment  Instructions for follow-up, activity and diet:	You came into the hospital with an electrolyte imbalance, particularly sodium which can cause fluctuations in mental function. This was likely caused by uncontrollable drinking of water, and some of the medications that you were taking (trileptal for the seizures, and desmopressin for the uncontrollable drinking of water). Both of these medications were stopped during your stay and will not be continued. You should follow up with your psychiatrist, Dr. Vegas on July 6th. In the meantime, you should monitor the amount of water that you drink daily and restrict yourself to no more than the recommended 8 to 10 glasses of water per day. Anything more will result in worsening of your condition.  Secondary Diagnosis:	Fall  Instructions for follow-up, activity and diet:	Your fall was likely due to an electrolyte abnormality. During your hospital stay, you were able to ambulate with both physical therapy and nurses without any complications.  Secondary Diagnosis:	Diabetes  Instructions for follow-up, activity and diet:	Please continue your home regimen of metformin and follow up with your outpatient provider to check your kidney function.  Secondary Diagnosis:	High cholesterol  Instructions for follow-up, activity and diet:	Please continue taking your home dose of Lipitor  Secondary Diagnosis:	Hypothyroid  Instructions for follow-up, activity and diet:	Continue with synthroid  Secondary Diagnosis:	Schizoaffective disorder  Instructions for follow-up, activity and diet:	Please follow up with Dr. Sunita Vegas on July 6th at [place] for further management.  Secondary Diagnosis:	Seizure  Instructions for follow-up, activity and diet:	Your Oxcarbezapine was discontinued during this admission because it had likely contributed to electrolyte abnormalities. You have been started on a medication called Keppra or Leviteracetam which you should take instead for seizure prevention. Please follow up with your outpatient provider as this medication can cause mood changes. Principal Discharge DX:	Hyponatremia  Goal:	Treatment  Instructions for follow-up, activity and diet:	You came into the hospital with an electrolyte imbalance, particularly sodium which can cause fluctuations in mental function. This was likely caused by uncontrollable drinking of water, and some of the medications that you were taking (trileptal for the seizures, and desmopressin for the uncontrollable drinking of water). Both of these medications were stopped during your stay and will not be continued. You should follow up with your psychiatrist, Dr. Vegas on July 6th. In the meantime, you should monitor the amount of water that you drink daily and restrict yourself to no more than the recommended 8 to 10 glasses of water per day. Anything more will result in worsening of your condition.  Secondary Diagnosis:	Fall  Instructions for follow-up, activity and diet:	Your fall was likely due to an electrolyte abnormality. During your hospital stay, you were able to ambulate with both physical therapy and nurses without any complications.  Secondary Diagnosis:	Diabetes  Instructions for follow-up, activity and diet:	Please continue your home regimen of metformin and follow up with your outpatient provider to check your kidney function.  Secondary Diagnosis:	High cholesterol  Instructions for follow-up, activity and diet:	Please continue taking your home dose of Lipitor  Secondary Diagnosis:	Hypothyroid  Instructions for follow-up, activity and diet:	Continue with synthroid  Secondary Diagnosis:	Schizoaffective disorder  Instructions for follow-up, activity and diet:	Please follow up with Dr. Sunita Vegas on July 6th at [place] for further management.  Secondary Diagnosis:	Seizure  Instructions for follow-up, activity and diet:	Your Oxcarbezapine was discontinued during this admission because it had likely contributed to electrolyte abnormalities. You have been started on a medication called Keppra or Leviteracetam which you should take instead for seizure prevention. Please follow up with your outpatient provider as this medication can cause mood changes. Principal Discharge DX:	Hyponatremia  Goal:	Treatment  Instructions for follow-up, activity and diet:	You came into the hospital with an electrolyte imbalance, particularly sodium which can cause fluctuations in mental function. This was likely caused by uncontrollable drinking of water, and some of the medications that you were taking (trileptal for the seizures, and desmopressin for the uncontrollable drinking of water). Both of these medications were stopped during your stay and will not be continued. You should follow up with your psychiatrist, Dr. Vegas on July 6th. In the meantime, you should monitor the amount of water that you drink daily and restrict yourself to no more than the recommended 8 to 10 glasses of water per day. Anything more will result in worsening of your condition.  Secondary Diagnosis:	Fall  Instructions for follow-up, activity and diet:	Your fall was likely due to an electrolyte abnormality. During your hospital stay, you were able to ambulate with both physical therapy and nurses without any complications.  Secondary Diagnosis:	Diabetes  Instructions for follow-up, activity and diet:	Please continue your home regimen of metformin and follow up with your outpatient provider to check your kidney function.  Secondary Diagnosis:	High cholesterol  Instructions for follow-up, activity and diet:	Please continue taking your home dose of Lipitor  Secondary Diagnosis:	Hypothyroid  Instructions for follow-up, activity and diet:	Continue with synthroid  Secondary Diagnosis:	Schizoaffective disorder  Instructions for follow-up, activity and diet:	Please follow up with Dr. Sunita Vegas on July 6th at [place] for further management.  Secondary Diagnosis:	Seizure  Instructions for follow-up, activity and diet:	Your Oxcarbezapine was discontinued during this admission because it had likely contributed to electrolyte abnormalities. You have been started on a medication called Keppra or Leviteracetam which you should take instead for seizure prevention. Please follow up with your outpatient provider as this medication can cause mood changes. Principal Discharge DX:	Hyponatremia  Goal:	Treatment  Instructions for follow-up, activity and diet:	You came into the hospital with an electrolyte imbalance, particularly sodium which can cause fluctuations in mental function. This was likely caused by uncontrollable drinking of water, and some of the medications that you were taking (trileptal for the seizures, and desmopressin for the uncontrollable drinking of water). Both of these medications were stopped during your stay and will not be continued. You should follow up with your psychiatrist, Dr. Vegas on July 6th. In the meantime, you should monitor the amount of water that you drink daily and restrict yourself to no more than the recommended 8 to 10 glasses of water per day. Anything more will result in worsening of your condition.  Secondary Diagnosis:	Fall  Instructions for follow-up, activity and diet:	Your fall was likely due to an electrolyte abnormality. During your hospital stay, you were able to ambulate with both physical therapy and nurses without any complications.  Secondary Diagnosis:	Diabetes  Instructions for follow-up, activity and diet:	Please continue your home regimen of metformin and follow up with your outpatient provider to check your kidney function.  Secondary Diagnosis:	High cholesterol  Instructions for follow-up, activity and diet:	Please continue taking your home dose of Lipitor  Secondary Diagnosis:	Hypothyroid  Instructions for follow-up, activity and diet:	Continue with synthroid  Secondary Diagnosis:	Schizoaffective disorder  Instructions for follow-up, activity and diet:	Please follow up with Dr. Sunita Vegas on July 6th at [place] for further management.  Secondary Diagnosis:	Seizure  Instructions for follow-up, activity and diet:	Your Oxcarbezapine was discontinued during this admission because it had likely contributed to electrolyte abnormalities. You have been started on a medication called Keppra or Leviteracetam which you should take instead for seizure prevention. Please follow up with your outpatient provider as this medication can cause mood changes.

## 2017-06-27 NOTE — DISCHARGE NOTE ADULT - MEDICATION SUMMARY - MEDICATIONS TO TAKE
I will START or STAY ON the medications listed below when I get home from the hospital:    levETIRAcetam 500 mg oral tablet  -- 1 tab(s) by mouth 2 times a day  -- Indication: For Seizure prophylaxis    metFORMIN 850 mg oral tablet  -- 1 tab(s) by mouth once a day  -- Indication: For Diabetes    atorvastatin 10 mg oral tablet  -- 1 tab(s) by mouth once a day (at bedtime)  -- Indication: For Cholesterol medication    cloZAPine 100 mg oral tablet  -- 3 tab(s) by mouth once a day (at bedtime)  -- Indication: For Schizoaffective disorder    levothyroxine 25 mcg (0.025 mg) oral tablet  -- 1 tab(s) by mouth once a day  -- Indication: For Hypothyroidism    cholecalciferol oral tablet  -- 2000 unit(s) by mouth once a day  -- Indication: For Supplemental    cyanocobalamin 500 mcg oral tablet  -- 1 tab(s) by mouth once a day  -- Indication: For Supplemental

## 2017-06-27 NOTE — PROGRESS NOTE ADULT - PROBLEM SELECTOR PLAN 3
holding trileptal as can cause hyponatremia. seizures have not been proven, possibly pseudoseizures.   Continue keppra 500 BID, monitor for worsening mood.   will d/w patients neurologist.

## 2017-06-27 NOTE — PROGRESS NOTE ADULT - SUBJECTIVE AND OBJECTIVE BOX
46 year old female with schizoaffective disorder who presented here because of a fall and was found to be hyponatremia.     SUBJECTIVE / OVERNIGHT EVENTS: 1 AM Sodium went up to 138 and her D5 rate was increased to 125cc/hr. 7AM labs then shown a Sodium of 135 and D5 rate is continued. Will continue to monitory her Sodium every 6 hrs. Patient seen at bedside. Reports no overnight events, no issues, questions or concerns.     MEDICATIONS  (STANDING):  atorvastatin 10 milliGRAM(s) Oral at bedtime  cloZAPine 300 milliGRAM(s) Oral at bedtime  levothyroxine 25 MICROGram(s) Oral daily  cholecalciferol 1000 Unit(s) Oral daily  cyanocobalamin 500 MICROGram(s) Oral daily  heparin  Injectable 5000 Unit(s) SubCutaneous every 8 hours  insulin lispro (HumaLOG) corrective regimen sliding scale   SubCutaneous three times a day before meals  insulin lispro (HumaLOG) corrective regimen sliding scale   SubCutaneous at bedtime  dextrose 5%. 1000 milliLiter(s) (50 mL/Hr) IV Continuous <Continuous>  dextrose 50% Injectable 12.5 Gram(s) IV Push once  dextrose 50% Injectable 25 Gram(s) IV Push once  dextrose 50% Injectable 25 Gram(s) IV Push once  levETIRAcetam 500 milliGRAM(s) Oral two times a day  dextrose 5%. 1000 milliLiter(s) (125 mL/Hr) IV Continuous <Continuous>    MEDICATIONS  (PRN):  dextrose Gel 1 Dose(s) Oral once PRN Blood Glucose LESS THAN 70 milliGRAM(s)/deciliter  glucagon  Injectable 1 milliGRAM(s) IntraMuscular once PRN Glucose LESS THAN 70 milligrams/deciliter  haloperidol    Injectable 3 milliGRAM(s) IV Push every 6 hours PRN agitation      Vital Signs Last 24 Hrs  T(C): 36.8 (06-27-17 @ 05:57), Max: 36.8 (06-27-17 @ 05:57)  HR: 85 (06-27-17 @ 05:57) (79 - 95)  BP: 107/66 (06-27-17 @ 05:57) (107/66 - 133/86)  RR: 18 (06-27-17 @ 05:57) (18 - 18)  SpO2: 100% (06-27-17 @ 05:57) (96% - 100%)  CAPILLARY BLOOD GLUCOSE  120 (27 Jun 2017 00:00)  128 (26 Jun 2017 17:52)  131 (26 Jun 2017 12:59)  111 (26 Jun 2017 08:58)        I&O's Summary    26 Jun 2017 07:01  -  27 Jun 2017 07:00  --------------------------------------------------------  IN: 2220 mL / OUT: 400 mL / NET: 1820 mL        PHYSICAL EXAM  GENERAL: NAD, disheveled middle aged female, euphoric affect  CHEST/LUNG: Clear to auscultation bilaterally; No wheeze, rales or rhonchi  HEART: Regular rate and rhythm; No murmurs, rubs, or gallops  ABDOMEN: Bowel sounds present. Soft, distended abdomen, nontender to palpation. No organomegaly.   EXTREMITIES:  2+ Peripheral Pulses, No clubbing, cyanosis, or edema  PSYCH: AAOx3, euphoric and elated affect, very tangential  SKIN: flaky dry skin throughout entire body    LABS:                        12.2   6.2   )-----------( 235      ( 26 Jun 2017 06:50 )             34.9     06-27    135  |  101  |  15  ----------------------------<  110<H>  4.4   |  23  |  0.76    Ca    9.0      27 Jun 2017 06:21  Phos  4.5     06-26  Mg     2.0     06-26

## 2017-06-27 NOTE — PROGRESS NOTE ADULT - ASSESSMENT
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

## 2017-06-27 NOTE — PROGRESS NOTE ADULT - PROBLEM SELECTOR PLAN 1
- Multiple etiologies presented including polydypsia, medication use such as oxcarbezapine and desmopressin  -D5 solution increased to 125cc/hr. Sodium goal for the next 24 hrs should be (136 - 140)  - will fluid restrict to 1L/day  - BMP q 6 hrs with a goal of increasing Na no more than 8-10 meq in 24 hrs to avoid central pontine myelinolysis - Multiple etiologies presented including polydypsia, medication use such as oxcarbazepine and desmopressin  -D5 solution increased to 125cc/hr. Sodium goal for the next 24 hrs should be (136 - 140)  - will fluid restrict to 1L/day  - BMP q 6 hrs with a goal of increasing Na no more than 8-10 meq in 24 hrs to avoid central pontine myelinolysis  - sodium has normalized will check PM BMP and if is within normal range than will plan to discharge later tonight.

## 2017-06-27 NOTE — DISCHARGE NOTE ADULT - CARE PROVIDER_API CALL
Sunita Vegas (MD), Psychiatry  [address] [place] [id number]  Phone: [phone]  Fax: [phone]

## 2017-06-27 NOTE — DISCHARGE NOTE ADULT - HOSPITAL COURSE
This is a 46 year old female with history of schizoaffective disorder, T2DM, HLD and hypothyroidism who presented after a fall. Patient had no obvious injuries and had no loss of conciousness, trauma to the head any bruises or lacerations. At the emergency department she was discovered to be hyponatremia with a sodium of 120 which was understood to have multiple etiologies including polydipsia and certain medications: trileptal and desmopressin that she was taking. Patient underwent fluid repletion and was corrected appropriately to ensure that her electrolytes stabilized, particularly her sodium which is now in normal range.     Psychiatry has seen the patient at this admission and made the recommendations to transition from trileptal to Keppra for seizure prophylaxis. Desmopressin was also held as a causal agent. Patient was kept on 1L/day fluid restriction as well. Patient follows with Dr. Sunita Vegas at [place] and will follow up with the patient on July 6th to follow up and reconcile the medications. This is a 46 year old female with history of schizoaffective disorder, T2DM, HLD and hypothyroidism who presented after a fall. Patient had no obvious injuries and had no loss of conciousness, trauma to the head any bruises or lacerations. At the emergency department she was discovered to be hyponatremia with a sodium of 120 which was understood to have multiple etiologies including polydipsia and certain medications: trileptal and desmopressin that she was taking. Patient underwent fluid repletion and her electrolytes (BMP) were monitored every 6 hrs to ensure adequate correction of her Sodium.     Psychiatry has seen the patient at this admission and made the recommendations to transition from trileptal to Keppra for seizure prophylaxis. Desmopressin was also held as a causal agent. Patient was kept on 1L/day fluid restriction as well. Patient follows with Dr. Sunita Vegas at [place] and will follow up with the patient on July 6th to follow up and reconcile the medications. This is a 46 year old female with history of schizoaffective disorder, T2DM, HLD and hypothyroidism who presented after a fall. Patient had no obvious injuries and had no loss of conciousness, trauma to the head any bruises or lacerations. At the emergency department she was discovered to be hyponatremic with a sodium of 120 which was understood to have multiple etiologies including polydipsia and certain medications: trileptal and desmopressin that she was taking. Patient underwent fluid repletion and her electrolytes (BMP) were monitored every 6 hrs to ensure adequate correction of her Sodium.     Psychiatry has seen the patient at this admission and made the recommendations to transition from trileptal to Keppra for seizure prophylaxis. Desmopressin was also held as a causal agent. Patient was kept on 1L/day fluid restriction as well. Patient follows with Dr. Sunita Vegas at [place] and will follow up with the patient on July 6th to follow up and reconcile the medications. This is a 46 year old female with history of schizoaffective disorder, T2DM, HLD and hypothyroidism who presented after a fall. Patient had no obvious injuries and had no loss of conciousness, trauma to the head any bruises or lacerations. At the emergency department she was discovered to be hyponatremic with a sodium of 120 which was understood to have multiple etiologies including polydipsia and certain medications: trileptal and desmopressin that she was taking. Patient underwent fluid repletion and her electrolytes (BMP) were monitored every 6 hrs to ensure adequate correction of her Sodium.     Psychiatry has seen the patient at this admission and made the recommendations to transition from trileptal to Keppra for seizure prophylaxis. Desmopressin was also held as a causal agent. Patient was kept on 1L/day fluid restriction as well. Patient follows with Dr. Sunita Vegas at [place] and will follow up with the patient on July 6th to follow up and reconcile the medications.     Discharge time spent: 40 min

## 2017-06-27 NOTE — DISCHARGE NOTE ADULT - PLAN OF CARE
Treatment You came into the hospital with an electrolyte imbalance, particularly sodium which can cause fluctuations in mental function. This was likely caused by uncontrollable drinking, and some of the medications that you were taking (trileptal for the seizures, and desmopressin for the uncontrollable drinking). Both of these medications were stopped during your stay and will not be continued. You should follow up with your psychiatrist, Dr. Vegas on July 6th. In the meantime, you should monitor the amount of water that you drink daily and restrict yourself to no more than the recommended 8 to 10 glasses of water per day. Anything more will result in worsening of your condition. Your fall was likely due to an electrolyte abnormality. During your hospital stay, you were able to ambulate with both physical therapy and nurses without any complications. Please continue your home regimen of metformin and follow up with your outpatient provider to check your kidney function. Please continue taking your home dose of Lipitor Please follow up with Dr. Sunita Vegas on July 6th at [place] for further management. Your Oxcarbezapine was discontinued during this admission because it had likely contributed to electrolyte abnormalities. You have been started on a medication called Keppra or Leviteracetam which you should take instead for seizure prevention. Please follow up with your outpatient provider as this medication can cause mood changes. You came into the hospital with an electrolyte imbalance, particularly sodium which can cause fluctuations in mental function. This was likely caused by uncontrollable drinking of water, and some of the medications that you were taking (trileptal for the seizures, and desmopressin for the uncontrollable drinking of water). Both of these medications were stopped during your stay and will not be continued. You should follow up with your psychiatrist, Dr. Vgeas on July 6th. In the meantime, you should monitor the amount of water that you drink daily and restrict yourself to no more than the recommended 8 to 10 glasses of water per day. Anything more will result in worsening of your condition. Continue with synthroid

## 2017-06-28 VITALS
TEMPERATURE: 98 F | SYSTOLIC BLOOD PRESSURE: 119 MMHG | HEART RATE: 89 BPM | DIASTOLIC BLOOD PRESSURE: 71 MMHG | RESPIRATION RATE: 18 BRPM | OXYGEN SATURATION: 99 %

## 2017-06-28 PROCEDURE — 84295 ASSAY OF SERUM SODIUM: CPT

## 2017-06-28 PROCEDURE — 99285 EMERGENCY DEPT VISIT HI MDM: CPT | Mod: 25

## 2017-06-28 PROCEDURE — 85027 COMPLETE CBC AUTOMATED: CPT

## 2017-06-28 PROCEDURE — 82533 TOTAL CORTISOL: CPT

## 2017-06-28 PROCEDURE — 80307 DRUG TEST PRSMV CHEM ANLYZR: CPT

## 2017-06-28 PROCEDURE — 84300 ASSAY OF URINE SODIUM: CPT

## 2017-06-28 PROCEDURE — 82330 ASSAY OF CALCIUM: CPT

## 2017-06-28 PROCEDURE — 80156 ASSAY CARBAMAZEPINE TOTAL: CPT

## 2017-06-28 PROCEDURE — 85014 HEMATOCRIT: CPT

## 2017-06-28 PROCEDURE — 82570 ASSAY OF URINE CREATININE: CPT

## 2017-06-28 PROCEDURE — 84132 ASSAY OF SERUM POTASSIUM: CPT

## 2017-06-28 PROCEDURE — 84550 ASSAY OF BLOOD/URIC ACID: CPT

## 2017-06-28 PROCEDURE — 82435 ASSAY OF BLOOD CHLORIDE: CPT

## 2017-06-28 PROCEDURE — 99232 SBSQ HOSP IP/OBS MODERATE 35: CPT | Mod: GC

## 2017-06-28 PROCEDURE — 83930 ASSAY OF BLOOD OSMOLALITY: CPT

## 2017-06-28 PROCEDURE — 82947 ASSAY GLUCOSE BLOOD QUANT: CPT

## 2017-06-28 PROCEDURE — 82803 BLOOD GASES ANY COMBINATION: CPT

## 2017-06-28 PROCEDURE — 93005 ELECTROCARDIOGRAM TRACING: CPT

## 2017-06-28 PROCEDURE — 84443 ASSAY THYROID STIM HORMONE: CPT

## 2017-06-28 PROCEDURE — 84100 ASSAY OF PHOSPHORUS: CPT

## 2017-06-28 PROCEDURE — 83935 ASSAY OF URINE OSMOLALITY: CPT

## 2017-06-28 PROCEDURE — 83735 ASSAY OF MAGNESIUM: CPT

## 2017-06-28 PROCEDURE — 82550 ASSAY OF CK (CPK): CPT

## 2017-06-28 PROCEDURE — 83605 ASSAY OF LACTIC ACID: CPT

## 2017-06-28 PROCEDURE — 81003 URINALYSIS AUTO W/O SCOPE: CPT

## 2017-06-28 PROCEDURE — 80048 BASIC METABOLIC PNL TOTAL CA: CPT

## 2017-06-28 PROCEDURE — 70450 CT HEAD/BRAIN W/O DYE: CPT

## 2017-06-28 RX ADMIN — PREGABALIN 500 MICROGRAM(S): 225 CAPSULE ORAL at 11:08

## 2017-06-28 RX ADMIN — HEPARIN SODIUM 5000 UNIT(S): 5000 INJECTION INTRAVENOUS; SUBCUTANEOUS at 06:00

## 2017-06-28 RX ADMIN — LEVETIRACETAM 500 MILLIGRAM(S): 250 TABLET, FILM COATED ORAL at 06:00

## 2017-06-28 RX ADMIN — Medication 25 MICROGRAM(S): at 06:00

## 2017-06-28 RX ADMIN — Medication 1000 UNIT(S): at 11:08

## 2017-06-28 NOTE — PROGRESS NOTE ADULT - ASSESSMENT
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

## 2017-06-28 NOTE — PROGRESS NOTE ADULT - SUBJECTIVE AND OBJECTIVE BOX
46 year old female with schizoaffective disorder who presented here because of a fall and was found to be hyponatremia.     SUBJECTIVE / OVERNIGHT EVENTS: No new complaints. Feels well. Denies any Headache, Nausea, Dizzyness.  Reports no overnight events, no issues, questions or concerns.     MEDICATIONS  (STANDING):  atorvastatin 10 milliGRAM(s) Oral at bedtime  cloZAPine 300 milliGRAM(s) Oral at bedtime  levothyroxine 25 MICROGram(s) Oral daily  cholecalciferol 1000 Unit(s) Oral daily  cyanocobalamin 500 MICROGram(s) Oral daily  heparin  Injectable 5000 Unit(s) SubCutaneous every 8 hours  insulin lispro (HumaLOG) corrective regimen sliding scale   SubCutaneous three times a day before meals  insulin lispro (HumaLOG) corrective regimen sliding scale   SubCutaneous at bedtime  dextrose 5%. 1000 milliLiter(s) (50 mL/Hr) IV Continuous <Continuous>  dextrose 50% Injectable 12.5 Gram(s) IV Push once  dextrose 50% Injectable 25 Gram(s) IV Push once  dextrose 50% Injectable 25 Gram(s) IV Push once  levETIRAcetam 500 milliGRAM(s) Oral two times a day  dextrose 5%. 1000 milliLiter(s) (125 mL/Hr) IV Continuous <Continuous>    MEDICATIONS  (PRN):  dextrose Gel 1 Dose(s) Oral once PRN Blood Glucose LESS THAN 70 milliGRAM(s)/deciliter  glucagon  Injectable 1 milliGRAM(s) IntraMuscular once PRN Glucose LESS THAN 70 milligrams/deciliter  haloperidol    Injectable 3 milliGRAM(s) IV Push every 6 hours PRN agitation      Vital Signs Last 24 Hrs  T(C): 36.8 (28 Jun 2017 11:10), Max: 98.9 (27 Jun 2017 14:45)  T(F): 98.2 (28 Jun 2017 11:10), Max: 210 (27 Jun 2017 14:45)  HR: 89 (28 Jun 2017 11:10) (81 - 94)  BP: 119/71 (28 Jun 2017 11:10) (101/70 - 124/85)  BP(mean): --  RR: 18 (28 Jun 2017 11:10) (18 - 20)  SpO2: 99% (28 Jun 2017 11:10) (99% - 100%)          PHYSICAL EXAM  GENERAL: NAD, disheveled middle aged female, euphoric affect  CHEST/LUNG: Clear to auscultation bilaterally; No wheeze, rales or rhonchi  HEART: Regular rate and rhythm; No murmurs, rubs, or gallops  ABDOMEN: Bowel sounds present. Soft, distended abdomen, nontender to palpation. No organomegaly.   EXTREMITIES:  2+ Peripheral Pulses, No clubbing, cyanosis, or edema  PSYCH: AAOx3, euphoric and elated affect, very tangential  SKIN: flaky dry skin throughout entire body    LABS: no new labs

## 2017-06-28 NOTE — PROGRESS NOTE ADULT - PROBLEM SELECTOR PLAN 1
- resolved.    - Will continue to hold desmopressin and pt and  educated on fluid restricting.  - pt agreeable to f/u with her PCP for repeat Na check early next week.- at bedside and agrees with plan

## 2017-07-06 ENCOUNTER — EMERGENCY (EMERGENCY)
Facility: HOSPITAL | Age: 47
LOS: 1 days | Discharge: HOME | End: 2017-07-06
Attending: EMERGENCY MEDICINE | Admitting: EMERGENCY MEDICINE
Payer: COMMERCIAL

## 2017-07-06 PROCEDURE — 82803 BLOOD GASES ANY COMBINATION: CPT

## 2017-07-06 PROCEDURE — 71046 X-RAY EXAM CHEST 2 VIEWS: CPT

## 2017-07-06 PROCEDURE — 84295 ASSAY OF SERUM SODIUM: CPT

## 2017-07-06 PROCEDURE — 96360 HYDRATION IV INFUSION INIT: CPT

## 2017-07-06 PROCEDURE — 82330 ASSAY OF CALCIUM: CPT

## 2017-07-06 PROCEDURE — 84100 ASSAY OF PHOSPHORUS: CPT

## 2017-07-06 PROCEDURE — 85027 COMPLETE CBC AUTOMATED: CPT

## 2017-07-06 PROCEDURE — 80053 COMPREHEN METABOLIC PANEL: CPT

## 2017-07-06 PROCEDURE — 81001 URINALYSIS AUTO W/SCOPE: CPT

## 2017-07-06 PROCEDURE — 82565 ASSAY OF CREATININE: CPT

## 2017-07-06 PROCEDURE — 85014 HEMATOCRIT: CPT

## 2017-07-06 PROCEDURE — 83605 ASSAY OF LACTIC ACID: CPT

## 2017-07-06 PROCEDURE — 80177 DRUG SCRN QUAN LEVETIRACETAM: CPT

## 2017-07-06 PROCEDURE — 84443 ASSAY THYROID STIM HORMONE: CPT

## 2017-07-06 PROCEDURE — 82435 ASSAY OF BLOOD CHLORIDE: CPT

## 2017-07-06 PROCEDURE — 83735 ASSAY OF MAGNESIUM: CPT

## 2017-07-06 PROCEDURE — 82947 ASSAY GLUCOSE BLOOD QUANT: CPT

## 2017-07-06 PROCEDURE — 93005 ELECTROCARDIOGRAM TRACING: CPT

## 2017-07-06 PROCEDURE — 71020: CPT | Mod: 26

## 2017-07-06 PROCEDURE — 84702 CHORIONIC GONADOTROPIN TEST: CPT

## 2017-07-06 PROCEDURE — 99284 EMERGENCY DEPT VISIT MOD MDM: CPT

## 2017-07-06 PROCEDURE — 99284 EMERGENCY DEPT VISIT MOD MDM: CPT | Mod: 25

## 2017-07-06 PROCEDURE — 84132 ASSAY OF SERUM POTASSIUM: CPT

## 2017-07-07 ENCOUNTER — EMERGENCY (EMERGENCY)
Facility: HOSPITAL | Age: 47
LOS: 1 days | Discharge: HOME | End: 2017-07-07
Attending: EMERGENCY MEDICINE | Admitting: EMERGENCY MEDICINE
Payer: COMMERCIAL

## 2017-07-07 VITALS
HEART RATE: 105 BPM | RESPIRATION RATE: 18 BRPM | SYSTOLIC BLOOD PRESSURE: 114 MMHG | TEMPERATURE: 99 F | OXYGEN SATURATION: 99 % | DIASTOLIC BLOOD PRESSURE: 79 MMHG

## 2017-07-07 LAB
ALBUMIN SERPL ELPH-MCNC: 4.3 G/DL — SIGNIFICANT CHANGE UP (ref 3.3–5)
ALP SERPL-CCNC: 93 U/L — SIGNIFICANT CHANGE UP (ref 40–120)
ALT FLD-CCNC: 18 U/L RC — SIGNIFICANT CHANGE UP (ref 10–45)
ANION GAP SERPL CALC-SCNC: 14 MMOL/L — SIGNIFICANT CHANGE UP (ref 5–17)
AST SERPL-CCNC: 16 U/L — SIGNIFICANT CHANGE UP (ref 10–40)
BASOPHILS # BLD AUTO: 0.07 K/UL — SIGNIFICANT CHANGE UP (ref 0–0.2)
BASOPHILS # BLD AUTO: 0.1 K/UL — SIGNIFICANT CHANGE UP (ref 0–0.2)
BASOPHILS NFR BLD AUTO: 0.9 % — SIGNIFICANT CHANGE UP (ref 0–2)
BASOPHILS NFR BLD AUTO: 1.1 % — SIGNIFICANT CHANGE UP (ref 0–2)
BILIRUB SERPL-MCNC: 0.3 MG/DL — SIGNIFICANT CHANGE UP (ref 0.2–1.2)
BUN SERPL-MCNC: 14 MG/DL — SIGNIFICANT CHANGE UP (ref 7–23)
CALCIUM SERPL-MCNC: 9.8 MG/DL — SIGNIFICANT CHANGE UP (ref 8.4–10.5)
CHLORIDE SERPL-SCNC: 105 MMOL/L — SIGNIFICANT CHANGE UP (ref 96–108)
CO2 SERPL-SCNC: 22 MMOL/L — SIGNIFICANT CHANGE UP (ref 22–31)
CREAT SERPL-MCNC: 0.77 MG/DL — SIGNIFICANT CHANGE UP (ref 0.5–1.3)
EOSINOPHIL # BLD AUTO: 0.35 K/UL — SIGNIFICANT CHANGE UP (ref 0–0.5)
EOSINOPHIL # BLD AUTO: 0.5 K/UL — SIGNIFICANT CHANGE UP (ref 0–0.5)
EOSINOPHIL NFR BLD AUTO: 5.3 % — SIGNIFICANT CHANGE UP (ref 0–6)
EOSINOPHIL NFR BLD AUTO: 7.7 % — HIGH (ref 0–6)
GLUCOSE SERPL-MCNC: 94 MG/DL — SIGNIFICANT CHANGE UP (ref 70–99)
HCT VFR BLD CALC: 35.5 % — SIGNIFICANT CHANGE UP (ref 34.5–45)
HCT VFR BLD CALC: 36.1 % — SIGNIFICANT CHANGE UP (ref 34.5–45)
HGB BLD-MCNC: 11.6 G/DL — SIGNIFICANT CHANGE UP (ref 11.5–15.5)
HGB BLD-MCNC: 12.1 G/DL — SIGNIFICANT CHANGE UP (ref 11.5–15.5)
IMM GRANULOCYTES # BLD AUTO: 0.03 # — SIGNIFICANT CHANGE UP
IMM GRANULOCYTES NFR BLD AUTO: 0.5 % — SIGNIFICANT CHANGE UP (ref 0–1.5)
LYMPHOCYTES # BLD AUTO: 1.29 K/UL — SIGNIFICANT CHANGE UP (ref 1–3.3)
LYMPHOCYTES # BLD AUTO: 1.7 K/UL — SIGNIFICANT CHANGE UP (ref 1–3.3)
LYMPHOCYTES # BLD AUTO: 19.4 % — SIGNIFICANT CHANGE UP (ref 13–44)
LYMPHOCYTES # BLD AUTO: 25.4 % — SIGNIFICANT CHANGE UP (ref 13–44)
MCHC RBC-ENTMCNC: 29.6 PG — SIGNIFICANT CHANGE UP (ref 27–34)
MCHC RBC-ENTMCNC: 30.7 PG — SIGNIFICANT CHANGE UP (ref 27–34)
MCHC RBC-ENTMCNC: 32.7 % — SIGNIFICANT CHANGE UP (ref 32–36)
MCHC RBC-ENTMCNC: 33.6 GM/DL — SIGNIFICANT CHANGE UP (ref 32–36)
MCV RBC AUTO: 90.6 FL — SIGNIFICANT CHANGE UP (ref 80–100)
MCV RBC AUTO: 91.4 FL — SIGNIFICANT CHANGE UP (ref 80–100)
MONOCYTES # BLD AUTO: 0.47 K/UL — SIGNIFICANT CHANGE UP (ref 0–0.9)
MONOCYTES # BLD AUTO: 0.5 K/UL — SIGNIFICANT CHANGE UP (ref 0–0.9)
MONOCYTES NFR BLD AUTO: 7.1 % — SIGNIFICANT CHANGE UP (ref 2–14)
MONOCYTES NFR BLD AUTO: 7.4 % — SIGNIFICANT CHANGE UP (ref 2–14)
NEUTROPHILS # BLD AUTO: 3.9 K/UL — SIGNIFICANT CHANGE UP (ref 1.8–7.4)
NEUTROPHILS # BLD AUTO: 4.44 K/UL — SIGNIFICANT CHANGE UP (ref 1.8–7.4)
NEUTROPHILS NFR BLD AUTO: 58.6 % — SIGNIFICANT CHANGE UP (ref 43–77)
NEUTROPHILS NFR BLD AUTO: 66.6 % — SIGNIFICANT CHANGE UP (ref 43–77)
NRBC # FLD: 0 — SIGNIFICANT CHANGE UP
PLATELET # BLD AUTO: 346 K/UL — SIGNIFICANT CHANGE UP (ref 150–400)
PLATELET # BLD AUTO: 376 K/UL — SIGNIFICANT CHANGE UP (ref 150–400)
PMV BLD: 8.8 FL — SIGNIFICANT CHANGE UP (ref 7–13)
POTASSIUM SERPL-MCNC: 4.3 MMOL/L — SIGNIFICANT CHANGE UP (ref 3.5–5.3)
POTASSIUM SERPL-SCNC: 4.3 MMOL/L — SIGNIFICANT CHANGE UP (ref 3.5–5.3)
PROT SERPL-MCNC: 6.8 G/DL — SIGNIFICANT CHANGE UP (ref 6–8.3)
RBC # BLD: 3.92 M/UL — SIGNIFICANT CHANGE UP (ref 3.8–5.2)
RBC # BLD: 3.95 M/UL — SIGNIFICANT CHANGE UP (ref 3.8–5.2)
RBC # FLD: 14.6 % — HIGH (ref 10.3–14.5)
RBC # FLD: 16.2 % — HIGH (ref 10.3–14.5)
SODIUM SERPL-SCNC: 141 MMOL/L — SIGNIFICANT CHANGE UP (ref 135–145)
WBC # BLD: 6.6 K/UL — SIGNIFICANT CHANGE UP (ref 3.8–10.5)
WBC # BLD: 6.65 K/UL — SIGNIFICANT CHANGE UP (ref 3.8–10.5)
WBC # FLD AUTO: 6.6 K/UL — SIGNIFICANT CHANGE UP (ref 3.8–10.5)
WBC # FLD AUTO: 6.65 K/UL — SIGNIFICANT CHANGE UP (ref 3.8–10.5)

## 2017-07-07 PROCEDURE — 99283 EMERGENCY DEPT VISIT LOW MDM: CPT

## 2017-07-07 PROCEDURE — 99284 EMERGENCY DEPT VISIT MOD MDM: CPT

## 2017-07-07 PROCEDURE — 80053 COMPREHEN METABOLIC PANEL: CPT

## 2017-07-07 PROCEDURE — 85027 COMPLETE CBC AUTOMATED: CPT

## 2017-07-07 NOTE — ED PROVIDER NOTE - ATTENDING CONTRIBUTION TO CARE
I have seen and evaluated this patient with the resident.   I agree with the findings  unless other wise stated.  I have made appropriate changes in documentations where needed, After my face to face bedside evaluation, I am further  noting: Pt with schizophrenia some weakness no n/v/d Compliant to meds normal PE labs checked in ED wnl pt better will d/c home--Benz

## 2017-07-07 NOTE — ED PROVIDER NOTE - OBJECTIVE STATEMENT
47 yo F hx Schizoaffective disorder, DM 2, HLD, hypothyroid, In May started having incontinence. Pt on keppra BID for seizure. has hx of hyponatremia in June. Pt reports feeling faint and worried about having low sodium. Pt reports complaint with medications. No fever/chills, N/V, CP, SOB, abd pain, constipation diarrhea.

## 2017-07-07 NOTE — ED PROVIDER NOTE - MEDICAL DECISION MAKING DETAILS
pt with hx of hypnatremia. will check CMP.    Na normal on CMP. Pt feels comfortable. will discharge to home.

## 2017-07-07 NOTE — ED ADULT NURSE NOTE - OBJECTIVE STATEMENT
2049 pt 46y f aox3 per  at bedside pt is low sodium and incontinent x 1 today, pt w/ ext pmhx, denies any co pain at this time/pending eval/nayana  2049 pt took clonazepam 300mg po tonight

## 2017-07-11 ENCOUNTER — EMERGENCY (EMERGENCY)
Facility: HOSPITAL | Age: 47
LOS: 1 days | Discharge: HOME | End: 2017-07-11
Attending: EMERGENCY MEDICINE | Admitting: EMERGENCY MEDICINE
Payer: COMMERCIAL

## 2017-07-11 VITALS
TEMPERATURE: 98 F | HEART RATE: 105 BPM | SYSTOLIC BLOOD PRESSURE: 96 MMHG | RESPIRATION RATE: 17 BRPM | OXYGEN SATURATION: 98 % | DIASTOLIC BLOOD PRESSURE: 68 MMHG

## 2017-07-11 LAB
ALBUMIN SERPL ELPH-MCNC: 4 G/DL — SIGNIFICANT CHANGE UP (ref 3.3–5)
ALP SERPL-CCNC: 92 U/L — SIGNIFICANT CHANGE UP (ref 40–120)
ALT FLD-CCNC: 14 U/L RC — SIGNIFICANT CHANGE UP (ref 10–45)
ANION GAP SERPL CALC-SCNC: 15 MMOL/L — SIGNIFICANT CHANGE UP (ref 5–17)
APPEARANCE UR: ABNORMAL
AST SERPL-CCNC: 12 U/L — SIGNIFICANT CHANGE UP (ref 10–40)
BACTERIA # UR AUTO: ABNORMAL /HPF
BASOPHILS # BLD AUTO: 0.07 K/UL — SIGNIFICANT CHANGE UP (ref 0–0.2)
BASOPHILS # BLD AUTO: 0.1 K/UL — SIGNIFICANT CHANGE UP (ref 0–0.2)
BASOPHILS NFR BLD AUTO: 0.9 % — SIGNIFICANT CHANGE UP (ref 0–2)
BASOPHILS NFR BLD AUTO: 1.1 % — SIGNIFICANT CHANGE UP (ref 0–2)
BILIRUB SERPL-MCNC: 0.2 MG/DL — SIGNIFICANT CHANGE UP (ref 0.2–1.2)
BILIRUB UR-MCNC: NEGATIVE — SIGNIFICANT CHANGE UP
BUN SERPL-MCNC: 12 MG/DL — SIGNIFICANT CHANGE UP (ref 7–23)
CALCIUM SERPL-MCNC: 9.7 MG/DL — SIGNIFICANT CHANGE UP (ref 8.4–10.5)
CHLORIDE SERPL-SCNC: 105 MMOL/L — SIGNIFICANT CHANGE UP (ref 96–108)
CO2 SERPL-SCNC: 24 MMOL/L — SIGNIFICANT CHANGE UP (ref 22–31)
COLOR SPEC: YELLOW — SIGNIFICANT CHANGE UP
CREAT SERPL-MCNC: 0.93 MG/DL — SIGNIFICANT CHANGE UP (ref 0.5–1.3)
DIFF PNL FLD: NEGATIVE — SIGNIFICANT CHANGE UP
EOSINOPHIL # BLD AUTO: 0.3 K/UL — SIGNIFICANT CHANGE UP (ref 0–0.5)
EOSINOPHIL # BLD AUTO: 0.4 K/UL — SIGNIFICANT CHANGE UP (ref 0–0.5)
EOSINOPHIL NFR BLD AUTO: 4.6 % — SIGNIFICANT CHANGE UP (ref 0–6)
EOSINOPHIL NFR BLD AUTO: 6 % — SIGNIFICANT CHANGE UP (ref 0–6)
EPI CELLS # UR: SIGNIFICANT CHANGE UP /HPF
GLUCOSE SERPL-MCNC: 89 MG/DL — SIGNIFICANT CHANGE UP (ref 70–99)
GLUCOSE UR QL: NEGATIVE — SIGNIFICANT CHANGE UP
HCT VFR BLD CALC: 36.3 % — SIGNIFICANT CHANGE UP (ref 34.5–45)
HCT VFR BLD CALC: 38.2 % — SIGNIFICANT CHANGE UP (ref 34.5–45)
HGB BLD-MCNC: 12.1 G/DL — SIGNIFICANT CHANGE UP (ref 11.5–15.5)
HGB BLD-MCNC: 12.5 G/DL — SIGNIFICANT CHANGE UP (ref 11.5–15.5)
IMM GRANULOCYTES # BLD AUTO: 0.02 # — SIGNIFICANT CHANGE UP
IMM GRANULOCYTES NFR BLD AUTO: 0.3 % — SIGNIFICANT CHANGE UP (ref 0–1.5)
KETONES UR-MCNC: NEGATIVE — SIGNIFICANT CHANGE UP
LEUKOCYTE ESTERASE UR-ACNC: ABNORMAL
LYMPHOCYTES # BLD AUTO: 1.27 K/UL — SIGNIFICANT CHANGE UP (ref 1–3.3)
LYMPHOCYTES # BLD AUTO: 1.4 K/UL — SIGNIFICANT CHANGE UP (ref 1–3.3)
LYMPHOCYTES # BLD AUTO: 19.5 % — SIGNIFICANT CHANGE UP (ref 13–44)
LYMPHOCYTES # BLD AUTO: 24.7 % — SIGNIFICANT CHANGE UP (ref 13–44)
MAGNESIUM SERPL-MCNC: 1.9 MG/DL — SIGNIFICANT CHANGE UP (ref 1.6–2.6)
MCHC RBC-ENTMCNC: 29.2 PG — SIGNIFICANT CHANGE UP (ref 27–34)
MCHC RBC-ENTMCNC: 30.5 PG — SIGNIFICANT CHANGE UP (ref 27–34)
MCHC RBC-ENTMCNC: 32.7 % — SIGNIFICANT CHANGE UP (ref 32–36)
MCHC RBC-ENTMCNC: 33.4 GM/DL — SIGNIFICANT CHANGE UP (ref 32–36)
MCV RBC AUTO: 89.3 FL — SIGNIFICANT CHANGE UP (ref 80–100)
MCV RBC AUTO: 91.1 FL — SIGNIFICANT CHANGE UP (ref 80–100)
MONOCYTES # BLD AUTO: 0.4 K/UL — SIGNIFICANT CHANGE UP (ref 0–0.9)
MONOCYTES # BLD AUTO: 0.41 K/UL — SIGNIFICANT CHANGE UP (ref 0–0.9)
MONOCYTES NFR BLD AUTO: 6.3 % — SIGNIFICANT CHANGE UP (ref 2–14)
MONOCYTES NFR BLD AUTO: 6.9 % — SIGNIFICANT CHANGE UP (ref 2–14)
NEUTROPHILS # BLD AUTO: 3.6 K/UL — SIGNIFICANT CHANGE UP (ref 1.8–7.4)
NEUTROPHILS # BLD AUTO: 4.45 K/UL — SIGNIFICANT CHANGE UP (ref 1.8–7.4)
NEUTROPHILS NFR BLD AUTO: 61.4 % — SIGNIFICANT CHANGE UP (ref 43–77)
NEUTROPHILS NFR BLD AUTO: 68.2 % — SIGNIFICANT CHANGE UP (ref 43–77)
NITRITE UR-MCNC: NEGATIVE — SIGNIFICANT CHANGE UP
NRBC # FLD: 0 — SIGNIFICANT CHANGE UP
PH UR: 6 — SIGNIFICANT CHANGE UP (ref 5–8)
PHOSPHATE SERPL-MCNC: 4.3 MG/DL — SIGNIFICANT CHANGE UP (ref 2.5–4.5)
PLATELET # BLD AUTO: 396 K/UL — SIGNIFICANT CHANGE UP (ref 150–400)
PLATELET # BLD AUTO: 420 K/UL — HIGH (ref 150–400)
PMV BLD: 9 FL — SIGNIFICANT CHANGE UP (ref 7–13)
POTASSIUM SERPL-MCNC: 4.2 MMOL/L — SIGNIFICANT CHANGE UP (ref 3.5–5.3)
POTASSIUM SERPL-SCNC: 4.2 MMOL/L — SIGNIFICANT CHANGE UP (ref 3.5–5.3)
PROT SERPL-MCNC: 6.5 G/DL — SIGNIFICANT CHANGE UP (ref 6–8.3)
PROT UR-MCNC: SIGNIFICANT CHANGE UP
RBC # BLD: 3.98 M/UL — SIGNIFICANT CHANGE UP (ref 3.8–5.2)
RBC # BLD: 4.28 M/UL — SIGNIFICANT CHANGE UP (ref 3.8–5.2)
RBC # FLD: 14.5 % — SIGNIFICANT CHANGE UP (ref 10.3–14.5)
RBC # FLD: 16.1 % — HIGH (ref 10.3–14.5)
RBC CASTS # UR COMP ASSIST: SIGNIFICANT CHANGE UP /HPF (ref 0–2)
SODIUM SERPL-SCNC: 144 MMOL/L — SIGNIFICANT CHANGE UP (ref 135–145)
SP GR SPEC: 1.01 — SIGNIFICANT CHANGE UP (ref 1.01–1.02)
UROBILINOGEN FLD QL: NEGATIVE — SIGNIFICANT CHANGE UP
WBC # BLD: 5.9 K/UL — SIGNIFICANT CHANGE UP (ref 3.8–10.5)
WBC # BLD: 6.52 K/UL — SIGNIFICANT CHANGE UP (ref 3.8–10.5)
WBC # FLD AUTO: 5.9 K/UL — SIGNIFICANT CHANGE UP (ref 3.8–10.5)
WBC # FLD AUTO: 6.52 K/UL — SIGNIFICANT CHANGE UP (ref 3.8–10.5)
WBC UR QL: SIGNIFICANT CHANGE UP /HPF (ref 0–5)

## 2017-07-11 PROCEDURE — 83735 ASSAY OF MAGNESIUM: CPT

## 2017-07-11 PROCEDURE — 99284 EMERGENCY DEPT VISIT MOD MDM: CPT | Mod: 25

## 2017-07-11 PROCEDURE — 93005 ELECTROCARDIOGRAM TRACING: CPT

## 2017-07-11 PROCEDURE — 80053 COMPREHEN METABOLIC PANEL: CPT

## 2017-07-11 PROCEDURE — 84100 ASSAY OF PHOSPHORUS: CPT

## 2017-07-11 PROCEDURE — 87086 URINE CULTURE/COLONY COUNT: CPT

## 2017-07-11 PROCEDURE — 81001 URINALYSIS AUTO W/SCOPE: CPT

## 2017-07-11 PROCEDURE — 85027 COMPLETE CBC AUTOMATED: CPT

## 2017-07-11 PROCEDURE — 96374 THER/PROPH/DIAG INJ IV PUSH: CPT

## 2017-07-11 PROCEDURE — 99284 EMERGENCY DEPT VISIT MOD MDM: CPT

## 2017-07-11 RX ORDER — CEFPODOXIME PROXETIL 100 MG
1 TABLET ORAL
Qty: 20 | Refills: 0 | OUTPATIENT
Start: 2017-07-11 | End: 2017-07-21

## 2017-07-11 RX ORDER — CEFTRIAXONE 500 MG/1
1 INJECTION, POWDER, FOR SOLUTION INTRAMUSCULAR; INTRAVENOUS EVERY 24 HOURS
Qty: 0 | Refills: 0 | Status: DISCONTINUED | OUTPATIENT
Start: 2017-07-11 | End: 2017-07-15

## 2017-07-11 RX ADMIN — CEFTRIAXONE 100 GRAM(S): 500 INJECTION, POWDER, FOR SOLUTION INTRAMUSCULAR; INTRAVENOUS at 23:11

## 2017-07-11 NOTE — ED PROVIDER NOTE - MEDICAL DECISION MAKING DETAILS
Ivana resident: 45 yo F hx Schizoaffective disorder, DM 2, HLD, hypothyroid, keppra for seizure disorder, hx of hyponatremia 2/2 to medication use p/w hypoptension seen at psych office this afternoon and called back when it was noticed and told to come to eD - reports mild lightheadedness w/o CP or SOB, N/V - will check labs w/ hx of hyponatremia, hydrate, orthostatics, ekg, and d/c if symptoms and vitals improve Ivana resident: 45 yo F hx Schizoaffective disorder, DM 2, HLD, hypothyroid, keppra for seizure disorder, hx of hyponatremia 2/2 to medication use p/w hypoptension seen at psych office this afternoon and called back when it was noticed and told to come to eD - reports mild lightheadedness w/o CP or SOB, N/V - will check labs w/ hx of hyponatremia, hydrate, orthostatics, ekg, and d/c if symptoms and vitals improve  Attending Stewart: 45 y/o female h/o psychiatric disease with intermittent fecal incontinence at baseline sent in for reported low blood pressure. pt states feeling well. h/o hyponatremia I nthe past that was thought to be secondary to medication. upon arrival pt well appearing in nad. no si or hi. pt reports not eating and drinking as much. given hydration. blood work unremarkable. UA shows signs of infection. pt with fecal intonince in the past making her at increased risk for uti. will treat with abx. no fevers or evidence of pyelo. will d/c. urine culture pending

## 2017-07-11 NOTE — ED PROVIDER NOTE - OBJECTIVE STATEMENT
47 yo F hx Schizoaffective disorder, DM 2, HLD, hypothyroid, keppra for seizure disorder, hx of hyponatremia 2/2 to medication use (Trieptal) p/w low BP. Per patient, was at Psych where she was noted to have a low BP and told to come ED. 47 yo F hx Schizoaffective disorder, DM 2, HLD, hypothyroid, keppra for seizure disorder, hx of hyponatremia 2/2 to medication use (Trieptal) p/w low BP. Per patient, was at Psych where she was noted to have a low BP and told to come ED. Per patient and , patient was at psychiatrist today w/ vitals showing BP of 90/60 and tachycardic. Psychiatrist did not notice vitals until later tonight ,so called patient at 7 pm and told them to go to ED. Patient walked into hospital w/ no issues. Reports feeling mildly lightheaded, but does not feel as if she is going to syncopize. Requesting food and a gingerale. No SOB, CP, N/V.

## 2017-07-11 NOTE — ED PROVIDER NOTE - PROGRESS NOTE DETAILS
Repeat  - orthostatics negative - will check UA and d/c home Dr. Nelson Note: pt's repeat BP and orthostatic normal, asked pt and s/o if she feels comfortable with discharge and she does, stable for dc home.

## 2017-07-11 NOTE — ED PROVIDER NOTE - CARE PLAN
Principal Discharge DX:	Cystitis  Secondary Diagnosis:	Dehydration Principal Discharge DX:	Cystitis  Secondary Diagnosis:	Dehydration  Secondary Diagnosis:	Psychiatric diagnosis

## 2017-07-12 VITALS
TEMPERATURE: 98 F | RESPIRATION RATE: 20 BRPM | HEART RATE: 96 BPM | SYSTOLIC BLOOD PRESSURE: 110 MMHG | OXYGEN SATURATION: 100 % | DIASTOLIC BLOOD PRESSURE: 68 MMHG

## 2017-07-12 RX ORDER — NITROFURANTOIN MACROCRYSTAL 50 MG
1 CAPSULE ORAL
Qty: 14 | Refills: 0 | OUTPATIENT
Start: 2017-07-12 | End: 2017-07-19

## 2017-07-12 NOTE — ED POST DISCHARGE NOTE - OTHER COMMUNICATION
Received call from patient pharmacy, patient w/ penicillin allergy d/c'd on cefpodoxime. Pharmacy and pt concerned. Sent Rx for macrobid to pharmacy, will switch if need be, pending Ucx at this time. -Viry Elizabeth PA-C

## 2017-07-13 LAB
CULTURE RESULTS: SIGNIFICANT CHANGE UP
SPECIMEN SOURCE: SIGNIFICANT CHANGE UP

## 2017-07-20 LAB
BASOPHILS # BLD AUTO: 0.06 K/UL — SIGNIFICANT CHANGE UP (ref 0–0.2)
BASOPHILS NFR BLD AUTO: 0.7 % — SIGNIFICANT CHANGE UP (ref 0–2)
EOSINOPHIL # BLD AUTO: 0.21 K/UL — SIGNIFICANT CHANGE UP (ref 0–0.5)
EOSINOPHIL NFR BLD AUTO: 2.4 % — SIGNIFICANT CHANGE UP (ref 0–6)
HCT VFR BLD CALC: 39.9 % — SIGNIFICANT CHANGE UP (ref 34.5–45)
HGB BLD-MCNC: 12.7 G/DL — SIGNIFICANT CHANGE UP (ref 11.5–15.5)
IMM GRANULOCYTES # BLD AUTO: 0.02 # — SIGNIFICANT CHANGE UP
IMM GRANULOCYTES NFR BLD AUTO: 0.2 % — SIGNIFICANT CHANGE UP (ref 0–1.5)
LYMPHOCYTES # BLD AUTO: 0.7 K/UL — LOW (ref 1–3.3)
LYMPHOCYTES # BLD AUTO: 8 % — LOW (ref 13–44)
MCHC RBC-ENTMCNC: 29.3 PG — SIGNIFICANT CHANGE UP (ref 27–34)
MCHC RBC-ENTMCNC: 31.8 % — LOW (ref 32–36)
MCV RBC AUTO: 92.1 FL — SIGNIFICANT CHANGE UP (ref 80–100)
MONOCYTES # BLD AUTO: 0.56 K/UL — SIGNIFICANT CHANGE UP (ref 0–0.9)
MONOCYTES NFR BLD AUTO: 6.4 % — SIGNIFICANT CHANGE UP (ref 2–14)
NEUTROPHILS # BLD AUTO: 7.23 K/UL — SIGNIFICANT CHANGE UP (ref 1.8–7.4)
NEUTROPHILS NFR BLD AUTO: 82.3 % — HIGH (ref 43–77)
NRBC # FLD: 0 — SIGNIFICANT CHANGE UP
PLATELET # BLD AUTO: 303 K/UL — SIGNIFICANT CHANGE UP (ref 150–400)
PMV BLD: 9.3 FL — SIGNIFICANT CHANGE UP (ref 7–13)
RBC # BLD: 4.33 M/UL — SIGNIFICANT CHANGE UP (ref 3.8–5.2)
RBC # FLD: 15.5 % — HIGH (ref 10.3–14.5)
WBC # BLD: 8.78 K/UL — SIGNIFICANT CHANGE UP (ref 3.8–10.5)
WBC # FLD AUTO: 8.78 K/UL — SIGNIFICANT CHANGE UP (ref 3.8–10.5)

## 2017-07-27 LAB
BASOPHILS # BLD AUTO: 0.06 K/UL — SIGNIFICANT CHANGE UP (ref 0–0.2)
BASOPHILS NFR BLD AUTO: 1 % — SIGNIFICANT CHANGE UP (ref 0–2)
EOSINOPHIL # BLD AUTO: 0.17 K/UL — SIGNIFICANT CHANGE UP (ref 0–0.5)
EOSINOPHIL NFR BLD AUTO: 3 % — SIGNIFICANT CHANGE UP (ref 0–6)
HCT VFR BLD CALC: 38.3 % — SIGNIFICANT CHANGE UP (ref 34.5–45)
HGB BLD-MCNC: 12.4 G/DL — SIGNIFICANT CHANGE UP (ref 11.5–15.5)
IMM GRANULOCYTES # BLD AUTO: 0.02 # — SIGNIFICANT CHANGE UP
IMM GRANULOCYTES NFR BLD AUTO: 0.3 % — SIGNIFICANT CHANGE UP (ref 0–1.5)
LYMPHOCYTES # BLD AUTO: 1.07 K/UL — SIGNIFICANT CHANGE UP (ref 1–3.3)
LYMPHOCYTES # BLD AUTO: 18.7 % — SIGNIFICANT CHANGE UP (ref 13–44)
MCHC RBC-ENTMCNC: 30.2 PG — SIGNIFICANT CHANGE UP (ref 27–34)
MCHC RBC-ENTMCNC: 32.4 % — SIGNIFICANT CHANGE UP (ref 32–36)
MCV RBC AUTO: 93.2 FL — SIGNIFICANT CHANGE UP (ref 80–100)
MONOCYTES # BLD AUTO: 0.32 K/UL — SIGNIFICANT CHANGE UP (ref 0–0.9)
MONOCYTES NFR BLD AUTO: 5.6 % — SIGNIFICANT CHANGE UP (ref 2–14)
NEUTROPHILS # BLD AUTO: 4.09 K/UL — SIGNIFICANT CHANGE UP (ref 1.8–7.4)
NEUTROPHILS NFR BLD AUTO: 71.4 % — SIGNIFICANT CHANGE UP (ref 43–77)
NRBC # FLD: 0 — SIGNIFICANT CHANGE UP
PLATELET # BLD AUTO: 231 K/UL — SIGNIFICANT CHANGE UP (ref 150–400)
PMV BLD: 9.7 FL — SIGNIFICANT CHANGE UP (ref 7–13)
RBC # BLD: 4.11 M/UL — SIGNIFICANT CHANGE UP (ref 3.8–5.2)
RBC # FLD: 15.4 % — HIGH (ref 10.3–14.5)
WBC # BLD: 5.73 K/UL — SIGNIFICANT CHANGE UP (ref 3.8–10.5)
WBC # FLD AUTO: 5.73 K/UL — SIGNIFICANT CHANGE UP (ref 3.8–10.5)

## 2017-08-04 LAB
BASOPHILS # BLD AUTO: 0.06 K/UL — SIGNIFICANT CHANGE UP (ref 0–0.2)
BASOPHILS NFR BLD AUTO: 1.4 % — SIGNIFICANT CHANGE UP (ref 0–2)
EOSINOPHIL # BLD AUTO: 0.11 K/UL — SIGNIFICANT CHANGE UP (ref 0–0.5)
EOSINOPHIL NFR BLD AUTO: 2.5 % — SIGNIFICANT CHANGE UP (ref 0–6)
HCT VFR BLD CALC: 40.8 % — SIGNIFICANT CHANGE UP (ref 34.5–45)
HGB BLD-MCNC: 13.3 G/DL — SIGNIFICANT CHANGE UP (ref 11.5–15.5)
IMM GRANULOCYTES # BLD AUTO: 0.01 # — SIGNIFICANT CHANGE UP
IMM GRANULOCYTES NFR BLD AUTO: 0.2 % — SIGNIFICANT CHANGE UP (ref 0–1.5)
LYMPHOCYTES # BLD AUTO: 0.81 K/UL — LOW (ref 1–3.3)
LYMPHOCYTES # BLD AUTO: 18.6 % — SIGNIFICANT CHANGE UP (ref 13–44)
MCHC RBC-ENTMCNC: 30.1 PG — SIGNIFICANT CHANGE UP (ref 27–34)
MCHC RBC-ENTMCNC: 32.6 % — SIGNIFICANT CHANGE UP (ref 32–36)
MCV RBC AUTO: 92.3 FL — SIGNIFICANT CHANGE UP (ref 80–100)
MONOCYTES # BLD AUTO: 0.33 K/UL — SIGNIFICANT CHANGE UP (ref 0–0.9)
MONOCYTES NFR BLD AUTO: 7.6 % — SIGNIFICANT CHANGE UP (ref 2–14)
NEUTROPHILS # BLD AUTO: 3.04 K/UL — SIGNIFICANT CHANGE UP (ref 1.8–7.4)
NEUTROPHILS NFR BLD AUTO: 69.7 % — SIGNIFICANT CHANGE UP (ref 43–77)
NRBC # FLD: 0 — SIGNIFICANT CHANGE UP
PLATELET # BLD AUTO: 244 K/UL — SIGNIFICANT CHANGE UP (ref 150–400)
PMV BLD: 9.4 FL — SIGNIFICANT CHANGE UP (ref 7–13)
RBC # BLD: 4.42 M/UL — SIGNIFICANT CHANGE UP (ref 3.8–5.2)
RBC # FLD: 14.7 % — HIGH (ref 10.3–14.5)
WBC # BLD: 4.36 K/UL — SIGNIFICANT CHANGE UP (ref 3.8–10.5)
WBC # FLD AUTO: 4.36 K/UL — SIGNIFICANT CHANGE UP (ref 3.8–10.5)

## 2017-08-08 ENCOUNTER — LABORATORY RESULT (OUTPATIENT)
Age: 47
End: 2017-08-08

## 2017-08-08 ENCOUNTER — NON-APPOINTMENT (OUTPATIENT)
Age: 47
End: 2017-08-08

## 2017-08-08 ENCOUNTER — APPOINTMENT (OUTPATIENT)
Dept: INTERNAL MEDICINE | Facility: CLINIC | Age: 47
End: 2017-08-08
Payer: COMMERCIAL

## 2017-08-08 VITALS — DIASTOLIC BLOOD PRESSURE: 60 MMHG | SYSTOLIC BLOOD PRESSURE: 100 MMHG

## 2017-08-08 VITALS
BODY MASS INDEX: 31.18 KG/M2 | SYSTOLIC BLOOD PRESSURE: 110 MMHG | DIASTOLIC BLOOD PRESSURE: 78 MMHG | TEMPERATURE: 97.5 F | WEIGHT: 176 LBS | HEIGHT: 63 IN

## 2017-08-08 PROCEDURE — 99396 PREV VISIT EST AGE 40-64: CPT | Mod: 25

## 2017-08-08 PROCEDURE — 36415 COLL VENOUS BLD VENIPUNCTURE: CPT

## 2017-08-08 PROCEDURE — 93000 ELECTROCARDIOGRAM COMPLETE: CPT

## 2017-08-09 DIAGNOSIS — R79.89 OTHER SPECIFIED ABNORMAL FINDINGS OF BLOOD CHEMISTRY: ICD-10-CM

## 2017-08-09 LAB
ALBUMIN SERPL ELPH-MCNC: 4.5 G/DL
ALP BLD-CCNC: 109 U/L
ALT SERPL-CCNC: 85 U/L
ANION GAP SERPL CALC-SCNC: 14 MMOL/L
AST SERPL-CCNC: 48 U/L
BASOPHILS # BLD AUTO: 0.03 K/UL
BASOPHILS NFR BLD AUTO: 0.8 %
BILIRUB SERPL-MCNC: 0.4 MG/DL
BUN SERPL-MCNC: 7 MG/DL
CALCIUM SERPL-MCNC: 9.8 MG/DL
CHLORIDE SERPL-SCNC: 100 MMOL/L
CO2 SERPL-SCNC: 24 MMOL/L
CREAT SERPL-MCNC: 0.78 MG/DL
EOSINOPHIL # BLD AUTO: 0.04 K/UL
EOSINOPHIL NFR BLD AUTO: 1 %
GLUCOSE SERPL-MCNC: 96 MG/DL
HBA1C MFR BLD HPLC: 4.9 %
HCT VFR BLD CALC: 40 %
HGB BLD-MCNC: 13.1 G/DL
IMM GRANULOCYTES NFR BLD AUTO: 0 %
LYMPHOCYTES # BLD AUTO: 1.03 K/UL
LYMPHOCYTES NFR BLD AUTO: 27 %
MAN DIFF?: NORMAL
MCHC RBC-ENTMCNC: 29.2 PG
MCHC RBC-ENTMCNC: 32.8 GM/DL
MCV RBC AUTO: 89.3 FL
MONOCYTES # BLD AUTO: 0.34 K/UL
MONOCYTES NFR BLD AUTO: 8.9 %
NEUTROPHILS # BLD AUTO: 2.37 K/UL
NEUTROPHILS NFR BLD AUTO: 62.3 %
PLATELET # BLD AUTO: 300 K/UL
POTASSIUM SERPL-SCNC: 4.4 MMOL/L
PROT SERPL-MCNC: 7.4 G/DL
RBC # BLD: 4.48 M/UL
RBC # FLD: 14.8 %
SODIUM SERPL-SCNC: 138 MMOL/L
T4 FREE SERPL-MCNC: 1.4 NG/DL
WBC # FLD AUTO: 3.81 K/UL

## 2017-08-10 LAB
BASOPHILS # BLD AUTO: 0.07 K/UL — SIGNIFICANT CHANGE UP (ref 0–0.2)
BASOPHILS NFR BLD AUTO: 1.6 % — SIGNIFICANT CHANGE UP (ref 0–2)
EOSINOPHIL # BLD AUTO: 0.15 K/UL — SIGNIFICANT CHANGE UP (ref 0–0.5)
EOSINOPHIL NFR BLD AUTO: 3.4 % — SIGNIFICANT CHANGE UP (ref 0–6)
HCT VFR BLD CALC: 38.9 % — SIGNIFICANT CHANGE UP (ref 34.5–45)
HGB BLD-MCNC: 12.5 G/DL — SIGNIFICANT CHANGE UP (ref 11.5–15.5)
IMM GRANULOCYTES # BLD AUTO: 0 # — SIGNIFICANT CHANGE UP
IMM GRANULOCYTES NFR BLD AUTO: 0 % — SIGNIFICANT CHANGE UP (ref 0–1.5)
LYMPHOCYTES # BLD AUTO: 1.05 K/UL — SIGNIFICANT CHANGE UP (ref 1–3.3)
LYMPHOCYTES # BLD AUTO: 23.7 % — SIGNIFICANT CHANGE UP (ref 13–44)
MCHC RBC-ENTMCNC: 29.3 PG — SIGNIFICANT CHANGE UP (ref 27–34)
MCHC RBC-ENTMCNC: 32.1 % — SIGNIFICANT CHANGE UP (ref 32–36)
MCV RBC AUTO: 91.1 FL — SIGNIFICANT CHANGE UP (ref 80–100)
MONOCYTES # BLD AUTO: 0.42 K/UL — SIGNIFICANT CHANGE UP (ref 0–0.9)
MONOCYTES NFR BLD AUTO: 9.5 % — SIGNIFICANT CHANGE UP (ref 2–14)
NEUTROPHILS # BLD AUTO: 2.74 K/UL — SIGNIFICANT CHANGE UP (ref 1.8–7.4)
NEUTROPHILS NFR BLD AUTO: 61.8 % — SIGNIFICANT CHANGE UP (ref 43–77)
NRBC # FLD: 0 — SIGNIFICANT CHANGE UP
PLATELET # BLD AUTO: 275 K/UL — SIGNIFICANT CHANGE UP (ref 150–400)
PMV BLD: 9.4 FL — SIGNIFICANT CHANGE UP (ref 7–13)
RBC # BLD: 4.27 M/UL — SIGNIFICANT CHANGE UP (ref 3.8–5.2)
RBC # FLD: 14.5 % — SIGNIFICANT CHANGE UP (ref 10.3–14.5)
WBC # BLD: 4.43 K/UL — SIGNIFICANT CHANGE UP (ref 3.8–10.5)
WBC # FLD AUTO: 4.43 K/UL — SIGNIFICANT CHANGE UP (ref 3.8–10.5)

## 2017-08-14 ENCOUNTER — RX RENEWAL (OUTPATIENT)
Age: 47
End: 2017-08-14

## 2017-08-22 LAB
BASOPHILS # BLD AUTO: 0.05 K/UL — SIGNIFICANT CHANGE UP (ref 0–0.2)
BASOPHILS NFR BLD AUTO: 1.1 % — SIGNIFICANT CHANGE UP (ref 0–2)
EOSINOPHIL # BLD AUTO: 0.1 K/UL — SIGNIFICANT CHANGE UP (ref 0–0.5)
EOSINOPHIL NFR BLD AUTO: 2.1 % — SIGNIFICANT CHANGE UP (ref 0–6)
HCT VFR BLD CALC: 44.3 % — SIGNIFICANT CHANGE UP (ref 34.5–45)
HGB BLD-MCNC: 14.3 G/DL — SIGNIFICANT CHANGE UP (ref 11.5–15.5)
IMM GRANULOCYTES # BLD AUTO: 0.01 # — SIGNIFICANT CHANGE UP
IMM GRANULOCYTES NFR BLD AUTO: 0.2 % — SIGNIFICANT CHANGE UP (ref 0–1.5)
LYMPHOCYTES # BLD AUTO: 0.84 K/UL — LOW (ref 1–3.3)
LYMPHOCYTES # BLD AUTO: 17.6 % — SIGNIFICANT CHANGE UP (ref 13–44)
MCHC RBC-ENTMCNC: 28.8 PG — SIGNIFICANT CHANGE UP (ref 27–34)
MCHC RBC-ENTMCNC: 32.3 % — SIGNIFICANT CHANGE UP (ref 32–36)
MCV RBC AUTO: 89.3 FL — SIGNIFICANT CHANGE UP (ref 80–100)
MONOCYTES # BLD AUTO: 0.42 K/UL — SIGNIFICANT CHANGE UP (ref 0–0.9)
MONOCYTES NFR BLD AUTO: 8.8 % — SIGNIFICANT CHANGE UP (ref 2–14)
NEUTROPHILS # BLD AUTO: 3.34 K/UL — SIGNIFICANT CHANGE UP (ref 1.8–7.4)
NEUTROPHILS NFR BLD AUTO: 70.2 % — SIGNIFICANT CHANGE UP (ref 43–77)
NRBC # FLD: 0 — SIGNIFICANT CHANGE UP
PLATELET # BLD AUTO: 256 K/UL — SIGNIFICANT CHANGE UP (ref 150–400)
PMV BLD: 9.4 FL — SIGNIFICANT CHANGE UP (ref 7–13)
RBC # BLD: 4.96 M/UL — SIGNIFICANT CHANGE UP (ref 3.8–5.2)
RBC # FLD: 13.4 % — SIGNIFICANT CHANGE UP (ref 10.3–14.5)
WBC # BLD: 4.76 K/UL — SIGNIFICANT CHANGE UP (ref 3.8–10.5)
WBC # FLD AUTO: 4.76 K/UL — SIGNIFICANT CHANGE UP (ref 3.8–10.5)

## 2017-08-29 LAB
BASOPHILS # BLD AUTO: 0.04 K/UL — SIGNIFICANT CHANGE UP (ref 0–0.2)
BASOPHILS NFR BLD AUTO: 1 % — SIGNIFICANT CHANGE UP (ref 0–2)
EOSINOPHIL # BLD AUTO: 0.21 K/UL — SIGNIFICANT CHANGE UP (ref 0–0.5)
EOSINOPHIL NFR BLD AUTO: 5 % — SIGNIFICANT CHANGE UP (ref 0–6)
HCT VFR BLD CALC: 41.9 % — SIGNIFICANT CHANGE UP (ref 34.5–45)
HGB BLD-MCNC: 13.5 G/DL — SIGNIFICANT CHANGE UP (ref 11.5–15.5)
IMM GRANULOCYTES # BLD AUTO: 0 # — SIGNIFICANT CHANGE UP
IMM GRANULOCYTES NFR BLD AUTO: 0 % — SIGNIFICANT CHANGE UP (ref 0–1.5)
LYMPHOCYTES # BLD AUTO: 0.76 K/UL — LOW (ref 1–3.3)
LYMPHOCYTES # BLD AUTO: 18.3 % — SIGNIFICANT CHANGE UP (ref 13–44)
MCHC RBC-ENTMCNC: 28.8 PG — SIGNIFICANT CHANGE UP (ref 27–34)
MCHC RBC-ENTMCNC: 32.2 % — SIGNIFICANT CHANGE UP (ref 32–36)
MCV RBC AUTO: 89.3 FL — SIGNIFICANT CHANGE UP (ref 80–100)
MONOCYTES # BLD AUTO: 0.36 K/UL — SIGNIFICANT CHANGE UP (ref 0–0.9)
MONOCYTES NFR BLD AUTO: 8.7 % — SIGNIFICANT CHANGE UP (ref 2–14)
NEUTROPHILS # BLD AUTO: 2.79 K/UL — SIGNIFICANT CHANGE UP (ref 1.8–7.4)
NEUTROPHILS NFR BLD AUTO: 67 % — SIGNIFICANT CHANGE UP (ref 43–77)
NRBC # FLD: 0 — SIGNIFICANT CHANGE UP
PLATELET # BLD AUTO: 246 K/UL — SIGNIFICANT CHANGE UP (ref 150–400)
PMV BLD: 9.6 FL — SIGNIFICANT CHANGE UP (ref 7–13)
RBC # BLD: 4.69 M/UL — SIGNIFICANT CHANGE UP (ref 3.8–5.2)
RBC # FLD: 13.2 % — SIGNIFICANT CHANGE UP (ref 10.3–14.5)
WBC # BLD: 4.16 K/UL — SIGNIFICANT CHANGE UP (ref 3.8–10.5)
WBC # FLD AUTO: 4.16 K/UL — SIGNIFICANT CHANGE UP (ref 3.8–10.5)

## 2017-08-31 ENCOUNTER — APPOINTMENT (OUTPATIENT)
Dept: INTERNAL MEDICINE | Facility: CLINIC | Age: 47
End: 2017-08-31
Payer: COMMERCIAL

## 2017-08-31 PROCEDURE — 93306 TTE W/DOPPLER COMPLETE: CPT

## 2017-09-01 ENCOUNTER — APPOINTMENT (OUTPATIENT)
Dept: NEUROLOGY | Facility: CLINIC | Age: 47
End: 2017-09-01
Payer: COMMERCIAL

## 2017-09-01 VITALS
SYSTOLIC BLOOD PRESSURE: 114 MMHG | WEIGHT: 175 LBS | BODY MASS INDEX: 31.01 KG/M2 | HEART RATE: 92 BPM | HEIGHT: 63 IN | DIASTOLIC BLOOD PRESSURE: 78 MMHG

## 2017-09-01 PROCEDURE — 99204 OFFICE O/P NEW MOD 45 MIN: CPT

## 2017-09-01 RX ORDER — NITROFURANTOIN (MONOHYDRATE/MACROCRYSTALS) 25; 75 MG/1; MG/1
100 CAPSULE ORAL
Qty: 14 | Refills: 0 | Status: DISCONTINUED | COMMUNITY
Start: 2017-07-12

## 2017-09-01 RX ORDER — OXCARBAZEPINE 300 MG/1
300 TABLET, FILM COATED ORAL
Qty: 45 | Refills: 0 | Status: DISCONTINUED | COMMUNITY
Start: 2017-06-14

## 2017-09-01 RX ORDER — DESMOPRESSIN ACETATE 0.1 MG/1
0.1 TABLET ORAL
Qty: 15 | Refills: 0 | Status: DISCONTINUED | COMMUNITY
Start: 2017-06-14

## 2017-09-05 ENCOUNTER — APPOINTMENT (OUTPATIENT)
Dept: NEUROLOGY | Facility: CLINIC | Age: 47
End: 2017-09-05

## 2017-09-05 LAB
BASOPHILS # BLD AUTO: 0.07 K/UL — SIGNIFICANT CHANGE UP (ref 0–0.2)
BASOPHILS NFR BLD AUTO: 1.3 % — SIGNIFICANT CHANGE UP (ref 0–2)
EOSINOPHIL # BLD AUTO: 0.13 K/UL — SIGNIFICANT CHANGE UP (ref 0–0.5)
EOSINOPHIL NFR BLD AUTO: 2.4 % — SIGNIFICANT CHANGE UP (ref 0–6)
HCT VFR BLD CALC: 43 % — SIGNIFICANT CHANGE UP (ref 34.5–45)
HGB BLD-MCNC: 13.5 G/DL — SIGNIFICANT CHANGE UP (ref 11.5–15.5)
IMM GRANULOCYTES # BLD AUTO: 0.01 # — SIGNIFICANT CHANGE UP
IMM GRANULOCYTES NFR BLD AUTO: 0.2 % — SIGNIFICANT CHANGE UP (ref 0–1.5)
LYMPHOCYTES # BLD AUTO: 1.07 K/UL — SIGNIFICANT CHANGE UP (ref 1–3.3)
LYMPHOCYTES # BLD AUTO: 20 % — SIGNIFICANT CHANGE UP (ref 13–44)
MCHC RBC-ENTMCNC: 28.5 PG — SIGNIFICANT CHANGE UP (ref 27–34)
MCHC RBC-ENTMCNC: 31.4 % — LOW (ref 32–36)
MCV RBC AUTO: 90.9 FL — SIGNIFICANT CHANGE UP (ref 80–100)
MONOCYTES # BLD AUTO: 0.44 K/UL — SIGNIFICANT CHANGE UP (ref 0–0.9)
MONOCYTES NFR BLD AUTO: 8.2 % — SIGNIFICANT CHANGE UP (ref 2–14)
NEUTROPHILS # BLD AUTO: 3.64 K/UL — SIGNIFICANT CHANGE UP (ref 1.8–7.4)
NEUTROPHILS NFR BLD AUTO: 67.9 % — SIGNIFICANT CHANGE UP (ref 43–77)
NRBC # FLD: 0 — SIGNIFICANT CHANGE UP
PLATELET # BLD AUTO: 256 K/UL — SIGNIFICANT CHANGE UP (ref 150–400)
PMV BLD: 9.3 FL — SIGNIFICANT CHANGE UP (ref 7–13)
RBC # BLD: 4.73 M/UL — SIGNIFICANT CHANGE UP (ref 3.8–5.2)
RBC # FLD: 12.7 % — SIGNIFICANT CHANGE UP (ref 10.3–14.5)
WBC # BLD: 5.36 K/UL — SIGNIFICANT CHANGE UP (ref 3.8–10.5)
WBC # FLD AUTO: 5.36 K/UL — SIGNIFICANT CHANGE UP (ref 3.8–10.5)

## 2017-09-06 ENCOUNTER — APPOINTMENT (OUTPATIENT)
Dept: INTERNAL MEDICINE | Facility: CLINIC | Age: 47
End: 2017-09-06
Payer: COMMERCIAL

## 2017-09-06 ENCOUNTER — APPOINTMENT (OUTPATIENT)
Dept: INTERNAL MEDICINE | Facility: CLINIC | Age: 47
End: 2017-09-06

## 2017-09-06 VITALS
TEMPERATURE: 98.2 F | SYSTOLIC BLOOD PRESSURE: 100 MMHG | WEIGHT: 175 LBS | DIASTOLIC BLOOD PRESSURE: 60 MMHG | BODY MASS INDEX: 31.01 KG/M2 | HEIGHT: 63 IN

## 2017-09-06 LAB — S PYO AG SPEC QL IA: NEGATIVE

## 2017-09-06 PROCEDURE — 99213 OFFICE O/P EST LOW 20 MIN: CPT | Mod: 25

## 2017-09-06 PROCEDURE — 87880 STREP A ASSAY W/OPTIC: CPT | Mod: QW

## 2017-09-11 LAB — BACTERIA THROAT CULT: NORMAL

## 2017-09-12 ENCOUNTER — APPOINTMENT (OUTPATIENT)
Dept: NEUROLOGY | Facility: CLINIC | Age: 47
End: 2017-09-12

## 2017-09-13 ENCOUNTER — APPOINTMENT (OUTPATIENT)
Dept: NEUROLOGY | Facility: CLINIC | Age: 47
End: 2017-09-13
Payer: COMMERCIAL

## 2017-09-13 PROCEDURE — 95819 EEG AWAKE AND ASLEEP: CPT

## 2017-09-13 PROCEDURE — 95957 EEG DIGITAL ANALYSIS: CPT

## 2017-09-15 LAB
BASOPHILS # BLD AUTO: 0.03 K/UL — SIGNIFICANT CHANGE UP (ref 0–0.2)
BASOPHILS NFR BLD AUTO: 0.8 % — SIGNIFICANT CHANGE UP (ref 0–2)
EOSINOPHIL # BLD AUTO: 0.19 K/UL — SIGNIFICANT CHANGE UP (ref 0–0.5)
EOSINOPHIL NFR BLD AUTO: 5.1 % — SIGNIFICANT CHANGE UP (ref 0–6)
HCT VFR BLD CALC: 44.1 % — SIGNIFICANT CHANGE UP (ref 34.5–45)
HGB BLD-MCNC: 14.1 G/DL — SIGNIFICANT CHANGE UP (ref 11.5–15.5)
IMM GRANULOCYTES # BLD AUTO: 0 # — SIGNIFICANT CHANGE UP
IMM GRANULOCYTES NFR BLD AUTO: 0 % — SIGNIFICANT CHANGE UP (ref 0–1.5)
LYMPHOCYTES # BLD AUTO: 0.85 K/UL — LOW (ref 1–3.3)
LYMPHOCYTES # BLD AUTO: 23 % — SIGNIFICANT CHANGE UP (ref 13–44)
MCHC RBC-ENTMCNC: 28.7 PG — SIGNIFICANT CHANGE UP (ref 27–34)
MCHC RBC-ENTMCNC: 32 % — SIGNIFICANT CHANGE UP (ref 32–36)
MCV RBC AUTO: 89.6 FL — SIGNIFICANT CHANGE UP (ref 80–100)
MONOCYTES # BLD AUTO: 0.35 K/UL — SIGNIFICANT CHANGE UP (ref 0–0.9)
MONOCYTES NFR BLD AUTO: 9.5 % — SIGNIFICANT CHANGE UP (ref 2–14)
NEUTROPHILS # BLD AUTO: 2.27 K/UL — SIGNIFICANT CHANGE UP (ref 1.8–7.4)
NEUTROPHILS NFR BLD AUTO: 61.6 % — SIGNIFICANT CHANGE UP (ref 43–77)
NRBC # FLD: 0 — SIGNIFICANT CHANGE UP
PLATELET # BLD AUTO: 278 K/UL — SIGNIFICANT CHANGE UP (ref 150–400)
PMV BLD: 9.7 FL — SIGNIFICANT CHANGE UP (ref 7–13)
RBC # BLD: 4.92 M/UL — SIGNIFICANT CHANGE UP (ref 3.8–5.2)
RBC # FLD: 12.6 % — SIGNIFICANT CHANGE UP (ref 10.3–14.5)
WBC # BLD: 3.69 K/UL — LOW (ref 3.8–10.5)
WBC # FLD AUTO: 3.69 K/UL — LOW (ref 3.8–10.5)

## 2017-09-20 LAB
BASOPHILS # BLD AUTO: 0.06 K/UL — SIGNIFICANT CHANGE UP (ref 0–0.2)
BASOPHILS NFR BLD AUTO: 1.4 % — SIGNIFICANT CHANGE UP (ref 0–2)
EOSINOPHIL # BLD AUTO: 0.24 K/UL — SIGNIFICANT CHANGE UP (ref 0–0.5)
EOSINOPHIL NFR BLD AUTO: 5.7 % — SIGNIFICANT CHANGE UP (ref 0–6)
HCT VFR BLD CALC: 44.2 % — SIGNIFICANT CHANGE UP (ref 34.5–45)
HGB BLD-MCNC: 13.9 G/DL — SIGNIFICANT CHANGE UP (ref 11.5–15.5)
IMM GRANULOCYTES # BLD AUTO: 0.01 # — SIGNIFICANT CHANGE UP
IMM GRANULOCYTES NFR BLD AUTO: 0.2 % — SIGNIFICANT CHANGE UP (ref 0–1.5)
LYMPHOCYTES # BLD AUTO: 0.94 K/UL — LOW (ref 1–3.3)
LYMPHOCYTES # BLD AUTO: 22.4 % — SIGNIFICANT CHANGE UP (ref 13–44)
MCHC RBC-ENTMCNC: 28.1 PG — SIGNIFICANT CHANGE UP (ref 27–34)
MCHC RBC-ENTMCNC: 31.4 % — LOW (ref 32–36)
MCV RBC AUTO: 89.5 FL — SIGNIFICANT CHANGE UP (ref 80–100)
MONOCYTES # BLD AUTO: 0.39 K/UL — SIGNIFICANT CHANGE UP (ref 0–0.9)
MONOCYTES NFR BLD AUTO: 9.3 % — SIGNIFICANT CHANGE UP (ref 2–14)
NEUTROPHILS # BLD AUTO: 2.56 K/UL — SIGNIFICANT CHANGE UP (ref 1.8–7.4)
NEUTROPHILS NFR BLD AUTO: 61 % — SIGNIFICANT CHANGE UP (ref 43–77)
NRBC # FLD: 0 — SIGNIFICANT CHANGE UP
PLATELET # BLD AUTO: 259 K/UL — SIGNIFICANT CHANGE UP (ref 150–400)
PMV BLD: 9.4 FL — SIGNIFICANT CHANGE UP (ref 7–13)
RBC # BLD: 4.94 M/UL — SIGNIFICANT CHANGE UP (ref 3.8–5.2)
RBC # FLD: 12.5 % — SIGNIFICANT CHANGE UP (ref 10.3–14.5)
WBC # BLD: 4.2 K/UL — SIGNIFICANT CHANGE UP (ref 3.8–10.5)
WBC # FLD AUTO: 4.2 K/UL — SIGNIFICANT CHANGE UP (ref 3.8–10.5)

## 2017-09-29 LAB
BASOPHILS # BLD AUTO: 0.06 K/UL — SIGNIFICANT CHANGE UP (ref 0–0.2)
BASOPHILS NFR BLD AUTO: 1.1 % — SIGNIFICANT CHANGE UP (ref 0–2)
EOSINOPHIL # BLD AUTO: 0.33 K/UL — SIGNIFICANT CHANGE UP (ref 0–0.5)
EOSINOPHIL NFR BLD AUTO: 6 % — SIGNIFICANT CHANGE UP (ref 0–6)
HCT VFR BLD CALC: 44.5 % — SIGNIFICANT CHANGE UP (ref 34.5–45)
HGB BLD-MCNC: 14.2 G/DL — SIGNIFICANT CHANGE UP (ref 11.5–15.5)
IMM GRANULOCYTES # BLD AUTO: 0.01 # — SIGNIFICANT CHANGE UP
IMM GRANULOCYTES NFR BLD AUTO: 0.2 % — SIGNIFICANT CHANGE UP (ref 0–1.5)
LYMPHOCYTES # BLD AUTO: 0.9 K/UL — LOW (ref 1–3.3)
LYMPHOCYTES # BLD AUTO: 16.3 % — SIGNIFICANT CHANGE UP (ref 13–44)
MCHC RBC-ENTMCNC: 28.7 PG — SIGNIFICANT CHANGE UP (ref 27–34)
MCHC RBC-ENTMCNC: 31.9 % — LOW (ref 32–36)
MCV RBC AUTO: 89.9 FL — SIGNIFICANT CHANGE UP (ref 80–100)
MONOCYTES # BLD AUTO: 0.41 K/UL — SIGNIFICANT CHANGE UP (ref 0–0.9)
MONOCYTES NFR BLD AUTO: 7.4 % — SIGNIFICANT CHANGE UP (ref 2–14)
NEUTROPHILS # BLD AUTO: 3.81 K/UL — SIGNIFICANT CHANGE UP (ref 1.8–7.4)
NEUTROPHILS NFR BLD AUTO: 69 % — SIGNIFICANT CHANGE UP (ref 43–77)
NRBC # FLD: 0 — SIGNIFICANT CHANGE UP
PLATELET # BLD AUTO: 194 K/UL — SIGNIFICANT CHANGE UP (ref 150–400)
PMV BLD: 10.2 FL — SIGNIFICANT CHANGE UP (ref 7–13)
RBC # BLD: 4.95 M/UL — SIGNIFICANT CHANGE UP (ref 3.8–5.2)
RBC # FLD: 12.2 % — SIGNIFICANT CHANGE UP (ref 10.3–14.5)
WBC # BLD: 5.52 K/UL — SIGNIFICANT CHANGE UP (ref 3.8–10.5)
WBC # FLD AUTO: 5.52 K/UL — SIGNIFICANT CHANGE UP (ref 3.8–10.5)

## 2017-10-01 ENCOUNTER — FORM ENCOUNTER (OUTPATIENT)
Age: 47
End: 2017-10-01

## 2017-10-02 ENCOUNTER — APPOINTMENT (OUTPATIENT)
Dept: ULTRASOUND IMAGING | Facility: CLINIC | Age: 47
End: 2017-10-02
Payer: COMMERCIAL

## 2017-10-02 ENCOUNTER — OUTPATIENT (OUTPATIENT)
Dept: OUTPATIENT SERVICES | Facility: HOSPITAL | Age: 47
LOS: 1 days | End: 2017-10-02
Payer: COMMERCIAL

## 2017-10-02 DIAGNOSIS — R79.89 OTHER SPECIFIED ABNORMAL FINDINGS OF BLOOD CHEMISTRY: ICD-10-CM

## 2017-10-02 PROCEDURE — 76700 US EXAM ABDOM COMPLETE: CPT

## 2017-10-02 PROCEDURE — 76700 US EXAM ABDOM COMPLETE: CPT | Mod: 26

## 2017-10-06 LAB
BASOPHILS # BLD AUTO: 0.06 K/UL — SIGNIFICANT CHANGE UP (ref 0–0.2)
BASOPHILS NFR BLD AUTO: 0.9 % — SIGNIFICANT CHANGE UP (ref 0–2)
EOSINOPHIL # BLD AUTO: 0.4 K/UL — SIGNIFICANT CHANGE UP (ref 0–0.5)
EOSINOPHIL NFR BLD AUTO: 6 % — SIGNIFICANT CHANGE UP (ref 0–6)
HCT VFR BLD CALC: 45.4 % — HIGH (ref 34.5–45)
HGB BLD-MCNC: 14.5 G/DL — SIGNIFICANT CHANGE UP (ref 11.5–15.5)
IMM GRANULOCYTES # BLD AUTO: 0.04 # — SIGNIFICANT CHANGE UP
IMM GRANULOCYTES NFR BLD AUTO: 0.6 % — SIGNIFICANT CHANGE UP (ref 0–1.5)
LYMPHOCYTES # BLD AUTO: 0.93 K/UL — LOW (ref 1–3.3)
LYMPHOCYTES # BLD AUTO: 14 % — SIGNIFICANT CHANGE UP (ref 13–44)
MCHC RBC-ENTMCNC: 27.9 PG — SIGNIFICANT CHANGE UP (ref 27–34)
MCHC RBC-ENTMCNC: 31.9 % — LOW (ref 32–36)
MCV RBC AUTO: 87.3 FL — SIGNIFICANT CHANGE UP (ref 80–100)
MONOCYTES # BLD AUTO: 0.38 K/UL — SIGNIFICANT CHANGE UP (ref 0–0.9)
MONOCYTES NFR BLD AUTO: 5.7 % — SIGNIFICANT CHANGE UP (ref 2–14)
NEUTROPHILS # BLD AUTO: 4.85 K/UL — SIGNIFICANT CHANGE UP (ref 1.8–7.4)
NEUTROPHILS NFR BLD AUTO: 72.8 % — SIGNIFICANT CHANGE UP (ref 43–77)
NRBC # FLD: 0 — SIGNIFICANT CHANGE UP
PLATELET # BLD AUTO: 247 K/UL — SIGNIFICANT CHANGE UP (ref 150–400)
PMV BLD: 9.7 FL — SIGNIFICANT CHANGE UP (ref 7–13)
RBC # BLD: 5.2 M/UL — SIGNIFICANT CHANGE UP (ref 3.8–5.2)
RBC # FLD: 12 % — SIGNIFICANT CHANGE UP (ref 10.3–14.5)
WBC # BLD: 6.66 K/UL — SIGNIFICANT CHANGE UP (ref 3.8–10.5)
WBC # FLD AUTO: 6.66 K/UL — SIGNIFICANT CHANGE UP (ref 3.8–10.5)

## 2017-10-10 LAB
BASOPHILS # BLD AUTO: 0.06 K/UL — SIGNIFICANT CHANGE UP (ref 0–0.2)
BASOPHILS NFR BLD AUTO: 1.4 % — SIGNIFICANT CHANGE UP (ref 0–2)
BUN SERPL-MCNC: 19 MG/DL — SIGNIFICANT CHANGE UP (ref 7–23)
CALCIUM SERPL-MCNC: 9.1 MG/DL — SIGNIFICANT CHANGE UP (ref 8.4–10.5)
CHLORIDE SERPL-SCNC: 101 MMOL/L — SIGNIFICANT CHANGE UP (ref 98–107)
CO2 SERPL-SCNC: 25 MMOL/L — SIGNIFICANT CHANGE UP (ref 22–31)
CREAT SERPL-MCNC: 0.89 MG/DL — SIGNIFICANT CHANGE UP (ref 0.5–1.3)
EOSINOPHIL # BLD AUTO: 0.4 K/UL — SIGNIFICANT CHANGE UP (ref 0–0.5)
EOSINOPHIL NFR BLD AUTO: 9.5 % — HIGH (ref 0–6)
GLUCOSE SERPL-MCNC: 57 MG/DL — LOW (ref 70–99)
HCT VFR BLD CALC: 43.5 % — SIGNIFICANT CHANGE UP (ref 34.5–45)
HGB BLD-MCNC: 14.1 G/DL — SIGNIFICANT CHANGE UP (ref 11.5–15.5)
IMM GRANULOCYTES # BLD AUTO: 0.01 # — SIGNIFICANT CHANGE UP
IMM GRANULOCYTES NFR BLD AUTO: 0.2 % — SIGNIFICANT CHANGE UP (ref 0–1.5)
LYMPHOCYTES # BLD AUTO: 0.87 K/UL — LOW (ref 1–3.3)
LYMPHOCYTES # BLD AUTO: 20.6 % — SIGNIFICANT CHANGE UP (ref 13–44)
MCHC RBC-ENTMCNC: 28 PG — SIGNIFICANT CHANGE UP (ref 27–34)
MCHC RBC-ENTMCNC: 32.4 % — SIGNIFICANT CHANGE UP (ref 32–36)
MCV RBC AUTO: 86.3 FL — SIGNIFICANT CHANGE UP (ref 80–100)
MONOCYTES # BLD AUTO: 0.27 K/UL — SIGNIFICANT CHANGE UP (ref 0–0.9)
MONOCYTES NFR BLD AUTO: 6.4 % — SIGNIFICANT CHANGE UP (ref 2–14)
NEUTROPHILS # BLD AUTO: 2.62 K/UL — SIGNIFICANT CHANGE UP (ref 1.8–7.4)
NEUTROPHILS NFR BLD AUTO: 61.9 % — SIGNIFICANT CHANGE UP (ref 43–77)
NRBC # FLD: 0 — SIGNIFICANT CHANGE UP
PLATELET # BLD AUTO: 256 K/UL — SIGNIFICANT CHANGE UP (ref 150–400)
PMV BLD: 9.7 FL — SIGNIFICANT CHANGE UP (ref 7–13)
POTASSIUM SERPL-MCNC: 4.3 MMOL/L — SIGNIFICANT CHANGE UP (ref 3.5–5.3)
POTASSIUM SERPL-SCNC: 4.3 MMOL/L — SIGNIFICANT CHANGE UP (ref 3.5–5.3)
RBC # BLD: 5.04 M/UL — SIGNIFICANT CHANGE UP (ref 3.8–5.2)
RBC # FLD: 12.3 % — SIGNIFICANT CHANGE UP (ref 10.3–14.5)
SODIUM SERPL-SCNC: 142 MMOL/L — SIGNIFICANT CHANGE UP (ref 135–145)
WBC # BLD: 4.23 K/UL — SIGNIFICANT CHANGE UP (ref 3.8–10.5)
WBC # FLD AUTO: 4.23 K/UL — SIGNIFICANT CHANGE UP (ref 3.8–10.5)

## 2017-10-17 LAB
BASOPHILS # BLD AUTO: 0.08 K/UL — SIGNIFICANT CHANGE UP (ref 0–0.2)
BASOPHILS NFR BLD AUTO: 1.9 % — SIGNIFICANT CHANGE UP (ref 0–2)
EOSINOPHIL # BLD AUTO: 0.34 K/UL — SIGNIFICANT CHANGE UP (ref 0–0.5)
EOSINOPHIL NFR BLD AUTO: 8.1 % — HIGH (ref 0–6)
HCT VFR BLD CALC: 43.8 % — SIGNIFICANT CHANGE UP (ref 34.5–45)
HGB BLD-MCNC: 14.3 G/DL — SIGNIFICANT CHANGE UP (ref 11.5–15.5)
IMM GRANULOCYTES # BLD AUTO: 0.01 # — SIGNIFICANT CHANGE UP
IMM GRANULOCYTES NFR BLD AUTO: 0.2 % — SIGNIFICANT CHANGE UP (ref 0–1.5)
LYMPHOCYTES # BLD AUTO: 0.94 K/UL — LOW (ref 1–3.3)
LYMPHOCYTES # BLD AUTO: 22.4 % — SIGNIFICANT CHANGE UP (ref 13–44)
MCHC RBC-ENTMCNC: 28 PG — SIGNIFICANT CHANGE UP (ref 27–34)
MCHC RBC-ENTMCNC: 32.6 % — SIGNIFICANT CHANGE UP (ref 32–36)
MCV RBC AUTO: 85.9 FL — SIGNIFICANT CHANGE UP (ref 80–100)
MONOCYTES # BLD AUTO: 0.24 K/UL — SIGNIFICANT CHANGE UP (ref 0–0.9)
MONOCYTES NFR BLD AUTO: 5.7 % — SIGNIFICANT CHANGE UP (ref 2–14)
NEUTROPHILS # BLD AUTO: 2.58 K/UL — SIGNIFICANT CHANGE UP (ref 1.8–7.4)
NEUTROPHILS NFR BLD AUTO: 61.7 % — SIGNIFICANT CHANGE UP (ref 43–77)
NRBC # FLD: 0 — SIGNIFICANT CHANGE UP
PLATELET # BLD AUTO: 245 K/UL — SIGNIFICANT CHANGE UP (ref 150–400)
PMV BLD: 9.3 FL — SIGNIFICANT CHANGE UP (ref 7–13)
RBC # BLD: 5.1 M/UL — SIGNIFICANT CHANGE UP (ref 3.8–5.2)
RBC # FLD: 12.1 % — SIGNIFICANT CHANGE UP (ref 10.3–14.5)
WBC # BLD: 4.19 K/UL — SIGNIFICANT CHANGE UP (ref 3.8–10.5)
WBC # FLD AUTO: 4.19 K/UL — SIGNIFICANT CHANGE UP (ref 3.8–10.5)

## 2017-10-20 ENCOUNTER — APPOINTMENT (OUTPATIENT)
Dept: OBGYN | Facility: CLINIC | Age: 47
End: 2017-10-20

## 2017-10-27 LAB
BASOPHILS # BLD AUTO: 0.04 K/UL — SIGNIFICANT CHANGE UP (ref 0–0.2)
BASOPHILS NFR BLD AUTO: 0.9 % — SIGNIFICANT CHANGE UP (ref 0–2)
EOSINOPHIL # BLD AUTO: 0.28 K/UL — SIGNIFICANT CHANGE UP (ref 0–0.5)
EOSINOPHIL NFR BLD AUTO: 6.4 % — HIGH (ref 0–6)
HCT VFR BLD CALC: 46.1 % — HIGH (ref 34.5–45)
HGB BLD-MCNC: 14.9 G/DL — SIGNIFICANT CHANGE UP (ref 11.5–15.5)
IMM GRANULOCYTES # BLD AUTO: 0 # — SIGNIFICANT CHANGE UP
IMM GRANULOCYTES NFR BLD AUTO: 0 % — SIGNIFICANT CHANGE UP (ref 0–1.5)
LYMPHOCYTES # BLD AUTO: 0.85 K/UL — LOW (ref 1–3.3)
LYMPHOCYTES # BLD AUTO: 19.5 % — SIGNIFICANT CHANGE UP (ref 13–44)
MCHC RBC-ENTMCNC: 27 PG — SIGNIFICANT CHANGE UP (ref 27–34)
MCHC RBC-ENTMCNC: 32.3 % — SIGNIFICANT CHANGE UP (ref 32–36)
MCV RBC AUTO: 83.7 FL — SIGNIFICANT CHANGE UP (ref 80–100)
MONOCYTES # BLD AUTO: 0.24 K/UL — SIGNIFICANT CHANGE UP (ref 0–0.9)
MONOCYTES NFR BLD AUTO: 5.5 % — SIGNIFICANT CHANGE UP (ref 2–14)
NEUTROPHILS # BLD AUTO: 2.95 K/UL — SIGNIFICANT CHANGE UP (ref 1.8–7.4)
NEUTROPHILS NFR BLD AUTO: 67.7 % — SIGNIFICANT CHANGE UP (ref 43–77)
NRBC # FLD: 0 — SIGNIFICANT CHANGE UP
PLATELET # BLD AUTO: 233 K/UL — SIGNIFICANT CHANGE UP (ref 150–400)
PMV BLD: 9.4 FL — SIGNIFICANT CHANGE UP (ref 7–13)
RBC # BLD: 5.51 M/UL — HIGH (ref 3.8–5.2)
RBC # FLD: 12.1 % — SIGNIFICANT CHANGE UP (ref 10.3–14.5)
WBC # BLD: 4.36 K/UL — SIGNIFICANT CHANGE UP (ref 3.8–10.5)
WBC # FLD AUTO: 4.36 K/UL — SIGNIFICANT CHANGE UP (ref 3.8–10.5)

## 2017-10-31 LAB
BASOPHILS # BLD AUTO: 0.05 K/UL — SIGNIFICANT CHANGE UP (ref 0–0.2)
BASOPHILS NFR BLD AUTO: 1.3 % — SIGNIFICANT CHANGE UP (ref 0–2)
EOSINOPHIL # BLD AUTO: 0.35 K/UL — SIGNIFICANT CHANGE UP (ref 0–0.5)
EOSINOPHIL NFR BLD AUTO: 8.8 % — HIGH (ref 0–6)
HCT VFR BLD CALC: 47 % — HIGH (ref 34.5–45)
HGB BLD-MCNC: 15.5 G/DL — SIGNIFICANT CHANGE UP (ref 11.5–15.5)
IMM GRANULOCYTES # BLD AUTO: 0.01 # — SIGNIFICANT CHANGE UP
IMM GRANULOCYTES NFR BLD AUTO: 0.3 % — SIGNIFICANT CHANGE UP (ref 0–1.5)
LYMPHOCYTES # BLD AUTO: 0.82 K/UL — LOW (ref 1–3.3)
LYMPHOCYTES # BLD AUTO: 20.7 % — SIGNIFICANT CHANGE UP (ref 13–44)
MCHC RBC-ENTMCNC: 27.8 PG — SIGNIFICANT CHANGE UP (ref 27–34)
MCHC RBC-ENTMCNC: 33 % — SIGNIFICANT CHANGE UP (ref 32–36)
MCV RBC AUTO: 84.2 FL — SIGNIFICANT CHANGE UP (ref 80–100)
MONOCYTES # BLD AUTO: 0.3 K/UL — SIGNIFICANT CHANGE UP (ref 0–0.9)
MONOCYTES NFR BLD AUTO: 7.6 % — SIGNIFICANT CHANGE UP (ref 2–14)
NEUTROPHILS # BLD AUTO: 2.43 K/UL — SIGNIFICANT CHANGE UP (ref 1.8–7.4)
NEUTROPHILS NFR BLD AUTO: 61.3 % — SIGNIFICANT CHANGE UP (ref 43–77)
NRBC # FLD: 0 — SIGNIFICANT CHANGE UP
PLATELET # BLD AUTO: 230 K/UL — SIGNIFICANT CHANGE UP (ref 150–400)
PMV BLD: 9.4 FL — SIGNIFICANT CHANGE UP (ref 7–13)
RBC # BLD: 5.58 M/UL — HIGH (ref 3.8–5.2)
RBC # FLD: 12.3 % — SIGNIFICANT CHANGE UP (ref 10.3–14.5)
WBC # BLD: 3.96 K/UL — SIGNIFICANT CHANGE UP (ref 3.8–10.5)
WBC # FLD AUTO: 3.96 K/UL — SIGNIFICANT CHANGE UP (ref 3.8–10.5)

## 2017-11-02 ENCOUNTER — APPOINTMENT (OUTPATIENT)
Dept: MRI IMAGING | Facility: CLINIC | Age: 47
End: 2017-11-02

## 2017-11-03 ENCOUNTER — APPOINTMENT (OUTPATIENT)
Dept: NEUROLOGY | Facility: CLINIC | Age: 47
End: 2017-11-03
Payer: COMMERCIAL

## 2017-11-03 VITALS
WEIGHT: 198 LBS | SYSTOLIC BLOOD PRESSURE: 126 MMHG | HEIGHT: 63 IN | HEART RATE: 103 BPM | DIASTOLIC BLOOD PRESSURE: 88 MMHG | BODY MASS INDEX: 35.08 KG/M2

## 2017-11-03 PROCEDURE — 99213 OFFICE O/P EST LOW 20 MIN: CPT

## 2017-11-08 LAB
BASOPHILS # BLD AUTO: 0.06 K/UL — SIGNIFICANT CHANGE UP (ref 0–0.2)
BASOPHILS NFR BLD AUTO: 1.5 % — SIGNIFICANT CHANGE UP (ref 0–2)
EOSINOPHIL # BLD AUTO: 0.27 K/UL — SIGNIFICANT CHANGE UP (ref 0–0.5)
EOSINOPHIL NFR BLD AUTO: 6.6 % — HIGH (ref 0–6)
HCT VFR BLD CALC: 43.5 % — SIGNIFICANT CHANGE UP (ref 34.5–45)
HGB BLD-MCNC: 14.1 G/DL — SIGNIFICANT CHANGE UP (ref 11.5–15.5)
IMM GRANULOCYTES # BLD AUTO: 0.01 # — SIGNIFICANT CHANGE UP
IMM GRANULOCYTES NFR BLD AUTO: 0.2 % — SIGNIFICANT CHANGE UP (ref 0–1.5)
LYMPHOCYTES # BLD AUTO: 1 K/UL — SIGNIFICANT CHANGE UP (ref 1–3.3)
LYMPHOCYTES # BLD AUTO: 24.3 % — SIGNIFICANT CHANGE UP (ref 13–44)
MCHC RBC-ENTMCNC: 26.7 PG — LOW (ref 27–34)
MCHC RBC-ENTMCNC: 32.4 % — SIGNIFICANT CHANGE UP (ref 32–36)
MCV RBC AUTO: 82.4 FL — SIGNIFICANT CHANGE UP (ref 80–100)
MONOCYTES # BLD AUTO: 0.28 K/UL — SIGNIFICANT CHANGE UP (ref 0–0.9)
MONOCYTES NFR BLD AUTO: 6.8 % — SIGNIFICANT CHANGE UP (ref 2–14)
NEUTROPHILS # BLD AUTO: 2.49 K/UL — SIGNIFICANT CHANGE UP (ref 1.8–7.4)
NEUTROPHILS NFR BLD AUTO: 60.6 % — SIGNIFICANT CHANGE UP (ref 43–77)
NRBC # FLD: 0 — SIGNIFICANT CHANGE UP
PLATELET # BLD AUTO: 225 K/UL — SIGNIFICANT CHANGE UP (ref 150–400)
PMV BLD: 9.5 FL — SIGNIFICANT CHANGE UP (ref 7–13)
RBC # BLD: 5.28 M/UL — HIGH (ref 3.8–5.2)
RBC # FLD: 12.3 % — SIGNIFICANT CHANGE UP (ref 10.3–14.5)
WBC # BLD: 4.11 K/UL — SIGNIFICANT CHANGE UP (ref 3.8–10.5)
WBC # FLD AUTO: 4.11 K/UL — SIGNIFICANT CHANGE UP (ref 3.8–10.5)

## 2017-11-09 ENCOUNTER — MESSAGE (OUTPATIENT)
Age: 47
End: 2017-11-09

## 2017-11-14 LAB
BASOPHILS # BLD AUTO: 0.06 K/UL — SIGNIFICANT CHANGE UP (ref 0–0.2)
BASOPHILS NFR BLD AUTO: 1.5 % — SIGNIFICANT CHANGE UP (ref 0–2)
BUN SERPL-MCNC: 13 MG/DL — SIGNIFICANT CHANGE UP (ref 7–23)
CALCIUM SERPL-MCNC: 9.4 MG/DL — SIGNIFICANT CHANGE UP (ref 8.4–10.5)
CHLORIDE SERPL-SCNC: 101 MMOL/L — SIGNIFICANT CHANGE UP (ref 98–107)
CO2 SERPL-SCNC: 25 MMOL/L — SIGNIFICANT CHANGE UP (ref 22–31)
CREAT SERPL-MCNC: 0.9 MG/DL — SIGNIFICANT CHANGE UP (ref 0.5–1.3)
EOSINOPHIL # BLD AUTO: 0.27 K/UL — SIGNIFICANT CHANGE UP (ref 0–0.5)
EOSINOPHIL NFR BLD AUTO: 6.8 % — HIGH (ref 0–6)
GLUCOSE SERPL-MCNC: 71 MG/DL — SIGNIFICANT CHANGE UP (ref 70–99)
HCT VFR BLD CALC: 44.4 % — SIGNIFICANT CHANGE UP (ref 34.5–45)
HGB BLD-MCNC: 14.2 G/DL — SIGNIFICANT CHANGE UP (ref 11.5–15.5)
IMM GRANULOCYTES # BLD AUTO: 0 # — SIGNIFICANT CHANGE UP
IMM GRANULOCYTES NFR BLD AUTO: 0 % — SIGNIFICANT CHANGE UP (ref 0–1.5)
LYMPHOCYTES # BLD AUTO: 1.02 K/UL — SIGNIFICANT CHANGE UP (ref 1–3.3)
LYMPHOCYTES # BLD AUTO: 25.6 % — SIGNIFICANT CHANGE UP (ref 13–44)
MCHC RBC-ENTMCNC: 27 PG — SIGNIFICANT CHANGE UP (ref 27–34)
MCHC RBC-ENTMCNC: 32 % — SIGNIFICANT CHANGE UP (ref 32–36)
MCV RBC AUTO: 84.6 FL — SIGNIFICANT CHANGE UP (ref 80–100)
MONOCYTES # BLD AUTO: 0.3 K/UL — SIGNIFICANT CHANGE UP (ref 0–0.9)
MONOCYTES NFR BLD AUTO: 7.5 % — SIGNIFICANT CHANGE UP (ref 2–14)
NEUTROPHILS # BLD AUTO: 2.33 K/UL — SIGNIFICANT CHANGE UP (ref 1.8–7.4)
NEUTROPHILS NFR BLD AUTO: 58.6 % — SIGNIFICANT CHANGE UP (ref 43–77)
NRBC # FLD: 0 — SIGNIFICANT CHANGE UP
PLATELET # BLD AUTO: 235 K/UL — SIGNIFICANT CHANGE UP (ref 150–400)
PMV BLD: 10.1 FL — SIGNIFICANT CHANGE UP (ref 7–13)
POTASSIUM SERPL-MCNC: 4.8 MMOL/L — SIGNIFICANT CHANGE UP (ref 3.5–5.3)
POTASSIUM SERPL-SCNC: 4.8 MMOL/L — SIGNIFICANT CHANGE UP (ref 3.5–5.3)
RBC # BLD: 5.25 M/UL — HIGH (ref 3.8–5.2)
RBC # FLD: 12.6 % — SIGNIFICANT CHANGE UP (ref 10.3–14.5)
SODIUM SERPL-SCNC: 139 MMOL/L — SIGNIFICANT CHANGE UP (ref 135–145)
WBC # BLD: 3.98 K/UL — SIGNIFICANT CHANGE UP (ref 3.8–10.5)
WBC # FLD AUTO: 3.98 K/UL — SIGNIFICANT CHANGE UP (ref 3.8–10.5)

## 2017-11-24 LAB
BASOPHILS # BLD AUTO: 0.06 K/UL — SIGNIFICANT CHANGE UP (ref 0–0.2)
BASOPHILS NFR BLD AUTO: 1.1 % — SIGNIFICANT CHANGE UP (ref 0–2)
EOSINOPHIL # BLD AUTO: 0.16 K/UL — SIGNIFICANT CHANGE UP (ref 0–0.5)
EOSINOPHIL NFR BLD AUTO: 3.1 % — SIGNIFICANT CHANGE UP (ref 0–6)
HCT VFR BLD CALC: 44.3 % — SIGNIFICANT CHANGE UP (ref 34.5–45)
HGB BLD-MCNC: 13.7 G/DL — SIGNIFICANT CHANGE UP (ref 11.5–15.5)
IMM GRANULOCYTES # BLD AUTO: 0.01 # — SIGNIFICANT CHANGE UP
IMM GRANULOCYTES NFR BLD AUTO: 0.2 % — SIGNIFICANT CHANGE UP (ref 0–1.5)
LYMPHOCYTES # BLD AUTO: 0.67 K/UL — LOW (ref 1–3.3)
LYMPHOCYTES # BLD AUTO: 12.8 % — LOW (ref 13–44)
MCHC RBC-ENTMCNC: 26 PG — LOW (ref 27–34)
MCHC RBC-ENTMCNC: 30.9 % — LOW (ref 32–36)
MCV RBC AUTO: 84.2 FL — SIGNIFICANT CHANGE UP (ref 80–100)
MONOCYTES # BLD AUTO: 0.29 K/UL — SIGNIFICANT CHANGE UP (ref 0–0.9)
MONOCYTES NFR BLD AUTO: 5.6 % — SIGNIFICANT CHANGE UP (ref 2–14)
NEUTROPHILS # BLD AUTO: 4.03 K/UL — SIGNIFICANT CHANGE UP (ref 1.8–7.4)
NEUTROPHILS NFR BLD AUTO: 77.2 % — HIGH (ref 43–77)
NRBC # FLD: 0 — SIGNIFICANT CHANGE UP
PLATELET # BLD AUTO: 224 K/UL — SIGNIFICANT CHANGE UP (ref 150–400)
PMV BLD: 9.5 FL — SIGNIFICANT CHANGE UP (ref 7–13)
RBC # BLD: 5.26 M/UL — HIGH (ref 3.8–5.2)
RBC # FLD: 13 % — SIGNIFICANT CHANGE UP (ref 10.3–14.5)
WBC # BLD: 5.22 K/UL — SIGNIFICANT CHANGE UP (ref 3.8–10.5)
WBC # FLD AUTO: 5.22 K/UL — SIGNIFICANT CHANGE UP (ref 3.8–10.5)

## 2017-11-28 LAB
BASOPHILS # BLD AUTO: 0.05 K/UL — SIGNIFICANT CHANGE UP (ref 0–0.2)
BASOPHILS NFR BLD AUTO: 1 % — SIGNIFICANT CHANGE UP (ref 0–2)
EOSINOPHIL # BLD AUTO: 0.11 K/UL — SIGNIFICANT CHANGE UP (ref 0–0.5)
EOSINOPHIL NFR BLD AUTO: 2.2 % — SIGNIFICANT CHANGE UP (ref 0–6)
HCT VFR BLD CALC: 45.5 % — HIGH (ref 34.5–45)
HGB BLD-MCNC: 14.7 G/DL — SIGNIFICANT CHANGE UP (ref 11.5–15.5)
IMM GRANULOCYTES # BLD AUTO: 0.01 # — SIGNIFICANT CHANGE UP
IMM GRANULOCYTES NFR BLD AUTO: 0.2 % — SIGNIFICANT CHANGE UP (ref 0–1.5)
LYMPHOCYTES # BLD AUTO: 0.74 K/UL — LOW (ref 1–3.3)
LYMPHOCYTES # BLD AUTO: 15.1 % — SIGNIFICANT CHANGE UP (ref 13–44)
MCHC RBC-ENTMCNC: 26.4 PG — LOW (ref 27–34)
MCHC RBC-ENTMCNC: 32.3 % — SIGNIFICANT CHANGE UP (ref 32–36)
MCV RBC AUTO: 81.7 FL — SIGNIFICANT CHANGE UP (ref 80–100)
MONOCYTES # BLD AUTO: 0.31 K/UL — SIGNIFICANT CHANGE UP (ref 0–0.9)
MONOCYTES NFR BLD AUTO: 6.3 % — SIGNIFICANT CHANGE UP (ref 2–14)
NEUTROPHILS # BLD AUTO: 3.68 K/UL — SIGNIFICANT CHANGE UP (ref 1.8–7.4)
NEUTROPHILS NFR BLD AUTO: 75.2 % — SIGNIFICANT CHANGE UP (ref 43–77)
NRBC # FLD: 0 — SIGNIFICANT CHANGE UP
PLATELET # BLD AUTO: 248 K/UL — SIGNIFICANT CHANGE UP (ref 150–400)
PMV BLD: 9.2 FL — SIGNIFICANT CHANGE UP (ref 7–13)
RBC # BLD: 5.57 M/UL — HIGH (ref 3.8–5.2)
RBC # FLD: 12.9 % — SIGNIFICANT CHANGE UP (ref 10.3–14.5)
WBC # BLD: 4.9 K/UL — SIGNIFICANT CHANGE UP (ref 3.8–10.5)
WBC # FLD AUTO: 4.9 K/UL — SIGNIFICANT CHANGE UP (ref 3.8–10.5)

## 2017-12-07 LAB
BASOPHILS # BLD AUTO: 0.05 K/UL — SIGNIFICANT CHANGE UP (ref 0–0.2)
BASOPHILS NFR BLD AUTO: 1 % — SIGNIFICANT CHANGE UP (ref 0–2)
EOSINOPHIL # BLD AUTO: 0.26 K/UL — SIGNIFICANT CHANGE UP (ref 0–0.5)
EOSINOPHIL NFR BLD AUTO: 5.3 % — SIGNIFICANT CHANGE UP (ref 0–6)
HCT VFR BLD CALC: 42.6 % — SIGNIFICANT CHANGE UP (ref 34.5–45)
HGB BLD-MCNC: 13.9 G/DL — SIGNIFICANT CHANGE UP (ref 11.5–15.5)
IMM GRANULOCYTES # BLD AUTO: 0.01 # — SIGNIFICANT CHANGE UP
IMM GRANULOCYTES NFR BLD AUTO: 0.2 % — SIGNIFICANT CHANGE UP (ref 0–1.5)
LYMPHOCYTES # BLD AUTO: 0.96 K/UL — LOW (ref 1–3.3)
LYMPHOCYTES # BLD AUTO: 19.8 % — SIGNIFICANT CHANGE UP (ref 13–44)
MCHC RBC-ENTMCNC: 27.3 PG — SIGNIFICANT CHANGE UP (ref 27–34)
MCHC RBC-ENTMCNC: 32.6 % — SIGNIFICANT CHANGE UP (ref 32–36)
MCV RBC AUTO: 83.5 FL — SIGNIFICANT CHANGE UP (ref 80–100)
MONOCYTES # BLD AUTO: 0.32 K/UL — SIGNIFICANT CHANGE UP (ref 0–0.9)
MONOCYTES NFR BLD AUTO: 6.6 % — SIGNIFICANT CHANGE UP (ref 2–14)
NEUTROPHILS # BLD AUTO: 3.26 K/UL — SIGNIFICANT CHANGE UP (ref 1.8–7.4)
NEUTROPHILS NFR BLD AUTO: 67.1 % — SIGNIFICANT CHANGE UP (ref 43–77)
NRBC # FLD: 0 — SIGNIFICANT CHANGE UP
PLATELET # BLD AUTO: 253 K/UL — SIGNIFICANT CHANGE UP (ref 150–400)
PMV BLD: 9.4 FL — SIGNIFICANT CHANGE UP (ref 7–13)
RBC # BLD: 5.1 M/UL — SIGNIFICANT CHANGE UP (ref 3.8–5.2)
RBC # FLD: 13.1 % — SIGNIFICANT CHANGE UP (ref 10.3–14.5)
WBC # BLD: 4.86 K/UL — SIGNIFICANT CHANGE UP (ref 3.8–10.5)
WBC # FLD AUTO: 4.86 K/UL — SIGNIFICANT CHANGE UP (ref 3.8–10.5)

## 2017-12-13 LAB
BASOPHILS # BLD AUTO: 0.07 K/UL — SIGNIFICANT CHANGE UP (ref 0–0.2)
BASOPHILS NFR BLD AUTO: 1.3 % — SIGNIFICANT CHANGE UP (ref 0–2)
EOSINOPHIL # BLD AUTO: 0.14 K/UL — SIGNIFICANT CHANGE UP (ref 0–0.5)
EOSINOPHIL NFR BLD AUTO: 2.6 % — SIGNIFICANT CHANGE UP (ref 0–6)
HCT VFR BLD CALC: 46 % — HIGH (ref 34.5–45)
HGB BLD-MCNC: 14.5 G/DL — SIGNIFICANT CHANGE UP (ref 11.5–15.5)
IMM GRANULOCYTES # BLD AUTO: 0.02 # — SIGNIFICANT CHANGE UP
IMM GRANULOCYTES NFR BLD AUTO: 0.4 % — SIGNIFICANT CHANGE UP (ref 0–1.5)
LYMPHOCYTES # BLD AUTO: 0.9 K/UL — LOW (ref 1–3.3)
LYMPHOCYTES # BLD AUTO: 16.9 % — SIGNIFICANT CHANGE UP (ref 13–44)
MCHC RBC-ENTMCNC: 26 PG — LOW (ref 27–34)
MCHC RBC-ENTMCNC: 31.5 % — LOW (ref 32–36)
MCV RBC AUTO: 82.6 FL — SIGNIFICANT CHANGE UP (ref 80–100)
MONOCYTES # BLD AUTO: 0.28 K/UL — SIGNIFICANT CHANGE UP (ref 0–0.9)
MONOCYTES NFR BLD AUTO: 5.3 % — SIGNIFICANT CHANGE UP (ref 2–14)
NEUTROPHILS # BLD AUTO: 3.92 K/UL — SIGNIFICANT CHANGE UP (ref 1.8–7.4)
NEUTROPHILS NFR BLD AUTO: 73.5 % — SIGNIFICANT CHANGE UP (ref 43–77)
NRBC # FLD: 0 — SIGNIFICANT CHANGE UP
PLATELET # BLD AUTO: 260 K/UL — SIGNIFICANT CHANGE UP (ref 150–400)
PMV BLD: 9.4 FL — SIGNIFICANT CHANGE UP (ref 7–13)
RBC # BLD: 5.57 M/UL — HIGH (ref 3.8–5.2)
RBC # FLD: 13.5 % — SIGNIFICANT CHANGE UP (ref 10.3–14.5)
WBC # BLD: 5.33 K/UL — SIGNIFICANT CHANGE UP (ref 3.8–10.5)
WBC # FLD AUTO: 5.33 K/UL — SIGNIFICANT CHANGE UP (ref 3.8–10.5)

## 2017-12-19 ENCOUNTER — EMERGENCY (EMERGENCY)
Facility: HOSPITAL | Age: 47
LOS: 1 days | Discharge: HOME | End: 2017-12-19
Attending: EMERGENCY MEDICINE | Admitting: EMERGENCY MEDICINE
Payer: COMMERCIAL

## 2017-12-19 ENCOUNTER — APPOINTMENT (OUTPATIENT)
Dept: INTERNAL MEDICINE | Facility: CLINIC | Age: 47
End: 2017-12-19
Payer: COMMERCIAL

## 2017-12-19 ENCOUNTER — NON-APPOINTMENT (OUTPATIENT)
Age: 47
End: 2017-12-19

## 2017-12-19 VITALS
WEIGHT: 206 LBS | SYSTOLIC BLOOD PRESSURE: 124 MMHG | TEMPERATURE: 97.3 F | BODY MASS INDEX: 36.5 KG/M2 | DIASTOLIC BLOOD PRESSURE: 70 MMHG | HEIGHT: 63 IN

## 2017-12-19 VITALS
HEART RATE: 113 BPM | OXYGEN SATURATION: 100 % | TEMPERATURE: 98 F | SYSTOLIC BLOOD PRESSURE: 112 MMHG | DIASTOLIC BLOOD PRESSURE: 73 MMHG

## 2017-12-19 VITALS — DIASTOLIC BLOOD PRESSURE: 70 MMHG | SYSTOLIC BLOOD PRESSURE: 100 MMHG

## 2017-12-19 VITALS
HEART RATE: 78 BPM | OXYGEN SATURATION: 97 % | RESPIRATION RATE: 17 BRPM | SYSTOLIC BLOOD PRESSURE: 168 MMHG | DIASTOLIC BLOOD PRESSURE: 90 MMHG

## 2017-12-19 DIAGNOSIS — R01.1 CARDIAC MURMUR, UNSPECIFIED: ICD-10-CM

## 2017-12-19 DIAGNOSIS — Z84.89 FAMILY HISTORY OF OTHER SPECIFIED CONDITIONS: ICD-10-CM

## 2017-12-19 DIAGNOSIS — Z86.19 PERSONAL HISTORY OF OTHER INFECTIOUS AND PARASITIC DISEASES: ICD-10-CM

## 2017-12-19 LAB
BASOPHILS # BLD AUTO: 0.05 K/UL — SIGNIFICANT CHANGE UP (ref 0–0.2)
BASOPHILS NFR BLD AUTO: 1 % — SIGNIFICANT CHANGE UP (ref 0–2)
EOSINOPHIL # BLD AUTO: 0.14 K/UL — SIGNIFICANT CHANGE UP (ref 0–0.5)
EOSINOPHIL NFR BLD AUTO: 2.9 % — SIGNIFICANT CHANGE UP (ref 0–6)
HCT VFR BLD CALC: 45.1 % — HIGH (ref 34.5–45)
HGB BLD-MCNC: 14.7 G/DL — SIGNIFICANT CHANGE UP (ref 11.5–15.5)
IMM GRANULOCYTES # BLD AUTO: 0.02 # — SIGNIFICANT CHANGE UP
IMM GRANULOCYTES NFR BLD AUTO: 0.4 % — SIGNIFICANT CHANGE UP (ref 0–1.5)
LYMPHOCYTES # BLD AUTO: 1.09 K/UL — SIGNIFICANT CHANGE UP (ref 1–3.3)
LYMPHOCYTES # BLD AUTO: 22.8 % — SIGNIFICANT CHANGE UP (ref 13–44)
MCHC RBC-ENTMCNC: 26.3 PG — LOW (ref 27–34)
MCHC RBC-ENTMCNC: 32.6 % — SIGNIFICANT CHANGE UP (ref 32–36)
MCV RBC AUTO: 80.5 FL — SIGNIFICANT CHANGE UP (ref 80–100)
MONOCYTES # BLD AUTO: 0.22 K/UL — SIGNIFICANT CHANGE UP (ref 0–0.9)
MONOCYTES NFR BLD AUTO: 4.6 % — SIGNIFICANT CHANGE UP (ref 2–14)
NEUTROPHILS # BLD AUTO: 3.26 K/UL — SIGNIFICANT CHANGE UP (ref 1.8–7.4)
NEUTROPHILS NFR BLD AUTO: 68.3 % — SIGNIFICANT CHANGE UP (ref 43–77)
NRBC # FLD: 0 — SIGNIFICANT CHANGE UP
PLATELET # BLD AUTO: 262 K/UL — SIGNIFICANT CHANGE UP (ref 150–400)
PMV BLD: 9.4 FL — SIGNIFICANT CHANGE UP (ref 7–13)
RBC # BLD: 5.6 M/UL — HIGH (ref 3.8–5.2)
RBC # FLD: 13.3 % — SIGNIFICANT CHANGE UP (ref 10.3–14.5)
WBC # BLD: 4.78 K/UL — SIGNIFICANT CHANGE UP (ref 3.8–10.5)
WBC # FLD AUTO: 4.78 K/UL — SIGNIFICANT CHANGE UP (ref 3.8–10.5)

## 2017-12-19 PROCEDURE — 99283 EMERGENCY DEPT VISIT LOW MDM: CPT | Mod: 25

## 2017-12-19 PROCEDURE — 93005 ELECTROCARDIOGRAM TRACING: CPT

## 2017-12-19 PROCEDURE — 99214 OFFICE O/P EST MOD 30 MIN: CPT | Mod: 25

## 2017-12-19 PROCEDURE — 82962 GLUCOSE BLOOD TEST: CPT

## 2017-12-19 PROCEDURE — 93000 ELECTROCARDIOGRAM COMPLETE: CPT

## 2017-12-19 PROCEDURE — 99284 EMERGENCY DEPT VISIT MOD MDM: CPT

## 2017-12-19 NOTE — ED ADULT NURSE NOTE - OBJECTIVE STATEMENT
47 y.o female pmh mental illness, hypothyroid, DM presenting to ED by ambulance from PCP office for EKG changes. pt states she was at her pcp office for regular routine check up and because she is on multiple psych meds they did an EKG which showed changes since pts prior EKG. MD called ambulance to bring pt to ED for cardiac enzymes and work up. pt denies any cp, sob, weakness, dizziness, numbness, tingling, pain or discomfort. EKG completed upon pts arrival and pt placed on continuous cardiac monitor. pt appears comfortable and well, smiling and denies feeling ill.  remains at the bedside.

## 2017-12-19 NOTE — ED PROVIDER NOTE - PLAN OF CARE
1) You were here for ekg changes.    2) Take your current medications as prescribed.    3) Follow up with your primary doctor for further evaluation and to answer any questions you have.    4) Return to the emergency department if you experience worsening symptoms, pain, fever, chills, nausea, vomiting or other concerning symptoms.

## 2017-12-19 NOTE — ED ADULT NURSE NOTE - CAS DISCH CONDITION
pts EKG same as multiple priuor ones done in ED. accidental lead reversal on EKG done at pcp office/Improved

## 2017-12-19 NOTE — ED PROVIDER NOTE - OBJECTIVE STATEMENT
47F with past medical history Diabetes Mellitus, Hypertension, hl, schizophrenia and depression presenting from primary medical doctor office for EKG changes.  Patient is asymptomatic, denies chest pain, shortness of breath, n/v, palpitations, diaphoresis, lightheadedness, tachycardia, abdominal pain, fever, chills, diarrhea, dysuria, hematuria, calf pain, cough. 47F with past medical history Diabetes Mellitus, Hypertension, hl, schizophrenia and depression presenting from primary medical doctor office for EKG changes. Per report PMD noted Qwaves on EKG. Patient is asymptomatic, denies chest pain, shortness of breath, n/v, palpitations, diaphoresis, lightheadedness, tachycardia, abdominal pain, fever, chills, diarrhea, dysuria, hematuria, calf pain, cough.

## 2017-12-19 NOTE — ED PROVIDER NOTE - CARE PLAN
Principal Discharge DX:	EKG abnormalities  Instructions for follow-up, activity and diet:	1) You were here for ekg changes.    2) Take your current medications as prescribed.    3) Follow up with your primary doctor for further evaluation and to answer any questions you have.    4) Return to the emergency department if you experience worsening symptoms, pain, fever, chills, nausea, vomiting or other concerning symptoms.

## 2017-12-19 NOTE — ED PROVIDER NOTE - ATTENDING CONTRIBUTION TO CARE
Rosi Glez MD - Attending Physician: I have personally seen and examined this patient with the resident/fellow.  I have fully participated in the care of this patient. I have reviewed all pertinent clinical information, including history, physical exam, plan and the Resident/Fellow’s note and agree except as noted. See MDM

## 2017-12-21 ENCOUNTER — CLINICAL ADVICE (OUTPATIENT)
Age: 47
End: 2017-12-21

## 2017-12-28 LAB
BASOPHILS # BLD AUTO: 0.06 K/UL — SIGNIFICANT CHANGE UP (ref 0–0.2)
BASOPHILS NFR BLD AUTO: 1.5 % — SIGNIFICANT CHANGE UP (ref 0–2)
EOSINOPHIL # BLD AUTO: 0.25 K/UL — SIGNIFICANT CHANGE UP (ref 0–0.5)
EOSINOPHIL NFR BLD AUTO: 6.2 % — HIGH (ref 0–6)
HCT VFR BLD CALC: 41.8 % — SIGNIFICANT CHANGE UP (ref 34.5–45)
HGB BLD-MCNC: 13.4 G/DL — SIGNIFICANT CHANGE UP (ref 11.5–15.5)
IMM GRANULOCYTES # BLD AUTO: 0.01 # — SIGNIFICANT CHANGE UP
IMM GRANULOCYTES NFR BLD AUTO: 0.2 % — SIGNIFICANT CHANGE UP (ref 0–1.5)
LYMPHOCYTES # BLD AUTO: 0.69 K/UL — LOW (ref 1–3.3)
LYMPHOCYTES # BLD AUTO: 17.1 % — SIGNIFICANT CHANGE UP (ref 13–44)
MCHC RBC-ENTMCNC: 27.3 PG — SIGNIFICANT CHANGE UP (ref 27–34)
MCHC RBC-ENTMCNC: 32.1 % — SIGNIFICANT CHANGE UP (ref 32–36)
MCV RBC AUTO: 85.1 FL — SIGNIFICANT CHANGE UP (ref 80–100)
MONOCYTES # BLD AUTO: 0.25 K/UL — SIGNIFICANT CHANGE UP (ref 0–0.9)
MONOCYTES NFR BLD AUTO: 6.2 % — SIGNIFICANT CHANGE UP (ref 2–14)
NEUTROPHILS # BLD AUTO: 2.78 K/UL — SIGNIFICANT CHANGE UP (ref 1.8–7.4)
NEUTROPHILS NFR BLD AUTO: 68.8 % — SIGNIFICANT CHANGE UP (ref 43–77)
NRBC # FLD: 0 — SIGNIFICANT CHANGE UP
PLATELET # BLD AUTO: 212 K/UL — SIGNIFICANT CHANGE UP (ref 150–400)
PMV BLD: 9.3 FL — SIGNIFICANT CHANGE UP (ref 7–13)
RBC # BLD: 4.91 M/UL — SIGNIFICANT CHANGE UP (ref 3.8–5.2)
RBC # FLD: 13.9 % — SIGNIFICANT CHANGE UP (ref 10.3–14.5)
WBC # BLD: 4.04 K/UL — SIGNIFICANT CHANGE UP (ref 3.8–10.5)
WBC # FLD AUTO: 4.04 K/UL — SIGNIFICANT CHANGE UP (ref 3.8–10.5)

## 2018-01-05 ENCOUNTER — MEDICATION RENEWAL (OUTPATIENT)
Age: 48
End: 2018-01-05

## 2018-01-05 ENCOUNTER — MESSAGE (OUTPATIENT)
Age: 48
End: 2018-01-05

## 2018-01-05 LAB
BASOPHILS # BLD AUTO: 0.06 K/UL — SIGNIFICANT CHANGE UP (ref 0–0.2)
BASOPHILS NFR BLD AUTO: 1.6 % — SIGNIFICANT CHANGE UP (ref 0–2)
EOSINOPHIL # BLD AUTO: 0.16 K/UL — SIGNIFICANT CHANGE UP (ref 0–0.5)
EOSINOPHIL NFR BLD AUTO: 4.2 % — SIGNIFICANT CHANGE UP (ref 0–6)
HCT VFR BLD CALC: 43.2 % — SIGNIFICANT CHANGE UP (ref 34.5–45)
HGB BLD-MCNC: 14.1 G/DL — SIGNIFICANT CHANGE UP (ref 11.5–15.5)
IMM GRANULOCYTES # BLD AUTO: 0 # — SIGNIFICANT CHANGE UP
IMM GRANULOCYTES NFR BLD AUTO: 0 % — SIGNIFICANT CHANGE UP (ref 0–1.5)
LYMPHOCYTES # BLD AUTO: 0.88 K/UL — LOW (ref 1–3.3)
LYMPHOCYTES # BLD AUTO: 23.2 % — SIGNIFICANT CHANGE UP (ref 13–44)
MCHC RBC-ENTMCNC: 27.4 PG — SIGNIFICANT CHANGE UP (ref 27–34)
MCHC RBC-ENTMCNC: 32.6 % — SIGNIFICANT CHANGE UP (ref 32–36)
MCV RBC AUTO: 83.9 FL — SIGNIFICANT CHANGE UP (ref 80–100)
MONOCYTES # BLD AUTO: 0.26 K/UL — SIGNIFICANT CHANGE UP (ref 0–0.9)
MONOCYTES NFR BLD AUTO: 6.9 % — SIGNIFICANT CHANGE UP (ref 2–14)
NEUTROPHILS # BLD AUTO: 2.43 K/UL — SIGNIFICANT CHANGE UP (ref 1.8–7.4)
NEUTROPHILS NFR BLD AUTO: 64.1 % — SIGNIFICANT CHANGE UP (ref 43–77)
NRBC # FLD: 0 — SIGNIFICANT CHANGE UP
PLATELET # BLD AUTO: 234 K/UL — SIGNIFICANT CHANGE UP (ref 150–400)
PMV BLD: 9.3 FL — SIGNIFICANT CHANGE UP (ref 7–13)
RBC # BLD: 5.15 M/UL — SIGNIFICANT CHANGE UP (ref 3.8–5.2)
RBC # FLD: 14.1 % — SIGNIFICANT CHANGE UP (ref 10.3–14.5)
WBC # BLD: 3.79 K/UL — LOW (ref 3.8–10.5)
WBC # FLD AUTO: 3.79 K/UL — LOW (ref 3.8–10.5)

## 2018-01-10 LAB
BASOPHILS # BLD AUTO: 0.06 K/UL — SIGNIFICANT CHANGE UP (ref 0–0.2)
BASOPHILS NFR BLD AUTO: 1.3 % — SIGNIFICANT CHANGE UP (ref 0–2)
EOSINOPHIL # BLD AUTO: 0.17 K/UL — SIGNIFICANT CHANGE UP (ref 0–0.5)
EOSINOPHIL NFR BLD AUTO: 3.8 % — SIGNIFICANT CHANGE UP (ref 0–6)
HCT VFR BLD CALC: 41.7 % — SIGNIFICANT CHANGE UP (ref 34.5–45)
HGB BLD-MCNC: 13.1 G/DL — SIGNIFICANT CHANGE UP (ref 11.5–15.5)
IMM GRANULOCYTES # BLD AUTO: 0.01 # — SIGNIFICANT CHANGE UP
IMM GRANULOCYTES NFR BLD AUTO: 0.2 % — SIGNIFICANT CHANGE UP (ref 0–1.5)
LYMPHOCYTES # BLD AUTO: 0.83 K/UL — LOW (ref 1–3.3)
LYMPHOCYTES # BLD AUTO: 18.6 % — SIGNIFICANT CHANGE UP (ref 13–44)
MCHC RBC-ENTMCNC: 26.2 PG — LOW (ref 27–34)
MCHC RBC-ENTMCNC: 31.4 % — LOW (ref 32–36)
MCV RBC AUTO: 83.4 FL — SIGNIFICANT CHANGE UP (ref 80–100)
MONOCYTES # BLD AUTO: 0.23 K/UL — SIGNIFICANT CHANGE UP (ref 0–0.9)
MONOCYTES NFR BLD AUTO: 5.1 % — SIGNIFICANT CHANGE UP (ref 2–14)
NEUTROPHILS # BLD AUTO: 3.17 K/UL — SIGNIFICANT CHANGE UP (ref 1.8–7.4)
NEUTROPHILS NFR BLD AUTO: 71 % — SIGNIFICANT CHANGE UP (ref 43–77)
NRBC # FLD: 0 — SIGNIFICANT CHANGE UP
PLATELET # BLD AUTO: 234 K/UL — SIGNIFICANT CHANGE UP (ref 150–400)
PMV BLD: 9.3 FL — SIGNIFICANT CHANGE UP (ref 7–13)
RBC # BLD: 5 M/UL — SIGNIFICANT CHANGE UP (ref 3.8–5.2)
RBC # FLD: 14.6 % — HIGH (ref 10.3–14.5)
WBC # BLD: 4.47 K/UL — SIGNIFICANT CHANGE UP (ref 3.8–10.5)
WBC # FLD AUTO: 4.47 K/UL — SIGNIFICANT CHANGE UP (ref 3.8–10.5)

## 2018-01-19 LAB
BASOPHILS # BLD AUTO: 0.07 K/UL — SIGNIFICANT CHANGE UP (ref 0–0.2)
BASOPHILS NFR BLD AUTO: 1.6 % — SIGNIFICANT CHANGE UP (ref 0–2)
EOSINOPHIL # BLD AUTO: 0.17 K/UL — SIGNIFICANT CHANGE UP (ref 0–0.5)
EOSINOPHIL NFR BLD AUTO: 3.9 % — SIGNIFICANT CHANGE UP (ref 0–6)
HCT VFR BLD CALC: 43 % — SIGNIFICANT CHANGE UP (ref 34.5–45)
HGB BLD-MCNC: 13.9 G/DL — SIGNIFICANT CHANGE UP (ref 11.5–15.5)
IMM GRANULOCYTES # BLD AUTO: 0.01 # — SIGNIFICANT CHANGE UP
IMM GRANULOCYTES NFR BLD AUTO: 0.2 % — SIGNIFICANT CHANGE UP (ref 0–1.5)
LYMPHOCYTES # BLD AUTO: 0.78 K/UL — LOW (ref 1–3.3)
LYMPHOCYTES # BLD AUTO: 18.1 % — SIGNIFICANT CHANGE UP (ref 13–44)
MCHC RBC-ENTMCNC: 27.4 PG — SIGNIFICANT CHANGE UP (ref 27–34)
MCHC RBC-ENTMCNC: 32.3 % — SIGNIFICANT CHANGE UP (ref 32–36)
MCV RBC AUTO: 84.8 FL — SIGNIFICANT CHANGE UP (ref 80–100)
MONOCYTES # BLD AUTO: 0.25 K/UL — SIGNIFICANT CHANGE UP (ref 0–0.9)
MONOCYTES NFR BLD AUTO: 5.8 % — SIGNIFICANT CHANGE UP (ref 2–14)
NEUTROPHILS # BLD AUTO: 3.03 K/UL — SIGNIFICANT CHANGE UP (ref 1.8–7.4)
NEUTROPHILS NFR BLD AUTO: 70.4 % — SIGNIFICANT CHANGE UP (ref 43–77)
NRBC # FLD: 0 — SIGNIFICANT CHANGE UP
PLATELET # BLD AUTO: 275 K/UL — SIGNIFICANT CHANGE UP (ref 150–400)
PMV BLD: 9.4 FL — SIGNIFICANT CHANGE UP (ref 7–13)
RBC # BLD: 5.07 M/UL — SIGNIFICANT CHANGE UP (ref 3.8–5.2)
RBC # FLD: 14.6 % — HIGH (ref 10.3–14.5)
WBC # BLD: 4.31 K/UL — SIGNIFICANT CHANGE UP (ref 3.8–10.5)
WBC # FLD AUTO: 4.31 K/UL — SIGNIFICANT CHANGE UP (ref 3.8–10.5)

## 2018-01-26 ENCOUNTER — APPOINTMENT (OUTPATIENT)
Dept: NEUROLOGY | Facility: CLINIC | Age: 48
End: 2018-01-26
Payer: COMMERCIAL

## 2018-01-26 VITALS
WEIGHT: 212 LBS | BODY MASS INDEX: 37.56 KG/M2 | SYSTOLIC BLOOD PRESSURE: 110 MMHG | DIASTOLIC BLOOD PRESSURE: 72 MMHG | HEART RATE: 96 BPM | HEIGHT: 63 IN

## 2018-01-26 DIAGNOSIS — R56.9 UNSPECIFIED CONVULSIONS: ICD-10-CM

## 2018-01-26 LAB
BASOPHILS # BLD AUTO: 0.08 K/UL — SIGNIFICANT CHANGE UP (ref 0–0.2)
BASOPHILS NFR BLD AUTO: 2.3 % — HIGH (ref 0–2)
EOSINOPHIL # BLD AUTO: 0.13 K/UL — SIGNIFICANT CHANGE UP (ref 0–0.5)
EOSINOPHIL NFR BLD AUTO: 3.8 % — SIGNIFICANT CHANGE UP (ref 0–6)
HCT VFR BLD CALC: 43.7 % — SIGNIFICANT CHANGE UP (ref 34.5–45)
HGB BLD-MCNC: 14 G/DL — SIGNIFICANT CHANGE UP (ref 11.5–15.5)
IMM GRANULOCYTES # BLD AUTO: 0.01 # — SIGNIFICANT CHANGE UP
IMM GRANULOCYTES NFR BLD AUTO: 0.3 % — SIGNIFICANT CHANGE UP (ref 0–1.5)
LYMPHOCYTES # BLD AUTO: 0.82 K/UL — LOW (ref 1–3.3)
LYMPHOCYTES # BLD AUTO: 24 % — SIGNIFICANT CHANGE UP (ref 13–44)
MCHC RBC-ENTMCNC: 27 PG — SIGNIFICANT CHANGE UP (ref 27–34)
MCHC RBC-ENTMCNC: 32 % — SIGNIFICANT CHANGE UP (ref 32–36)
MCV RBC AUTO: 84.2 FL — SIGNIFICANT CHANGE UP (ref 80–100)
MONOCYTES # BLD AUTO: 0.19 K/UL — SIGNIFICANT CHANGE UP (ref 0–0.9)
MONOCYTES NFR BLD AUTO: 5.6 % — SIGNIFICANT CHANGE UP (ref 2–14)
NEUTROPHILS # BLD AUTO: 2.18 K/UL — SIGNIFICANT CHANGE UP (ref 1.8–7.4)
NEUTROPHILS NFR BLD AUTO: 64 % — SIGNIFICANT CHANGE UP (ref 43–77)
NRBC # FLD: 0 — SIGNIFICANT CHANGE UP
PLATELET # BLD AUTO: 244 K/UL — SIGNIFICANT CHANGE UP (ref 150–400)
PMV BLD: 9.4 FL — SIGNIFICANT CHANGE UP (ref 7–13)
RBC # BLD: 5.19 M/UL — SIGNIFICANT CHANGE UP (ref 3.8–5.2)
RBC # FLD: 14.1 % — SIGNIFICANT CHANGE UP (ref 10.3–14.5)
WBC # BLD: 3.41 K/UL — LOW (ref 3.8–10.5)
WBC # FLD AUTO: 3.41 K/UL — LOW (ref 3.8–10.5)

## 2018-01-26 PROCEDURE — 99213 OFFICE O/P EST LOW 20 MIN: CPT

## 2018-01-27 PROBLEM — R56.9 SEIZURE: Status: ACTIVE | Noted: 2017-08-14

## 2018-01-27 RX ORDER — LEVETIRACETAM 500 MG/1
500 TABLET, FILM COATED ORAL
Qty: 30 | Refills: 0 | Status: DISCONTINUED | COMMUNITY
Start: 2017-08-09

## 2018-01-31 LAB
BASOPHILS # BLD AUTO: 0.07 K/UL — SIGNIFICANT CHANGE UP (ref 0–0.2)
BASOPHILS NFR BLD AUTO: 1.7 % — SIGNIFICANT CHANGE UP (ref 0–2)
EOSINOPHIL # BLD AUTO: 0.11 K/UL — SIGNIFICANT CHANGE UP (ref 0–0.5)
EOSINOPHIL NFR BLD AUTO: 2.6 % — SIGNIFICANT CHANGE UP (ref 0–6)
HCT VFR BLD CALC: 44.5 % — SIGNIFICANT CHANGE UP (ref 34.5–45)
HGB BLD-MCNC: 14.6 G/DL — SIGNIFICANT CHANGE UP (ref 11.5–15.5)
IMM GRANULOCYTES # BLD AUTO: 0.02 # — SIGNIFICANT CHANGE UP
IMM GRANULOCYTES NFR BLD AUTO: 0.5 % — SIGNIFICANT CHANGE UP (ref 0–1.5)
LYMPHOCYTES # BLD AUTO: 0.71 K/UL — LOW (ref 1–3.3)
LYMPHOCYTES # BLD AUTO: 16.9 % — SIGNIFICANT CHANGE UP (ref 13–44)
MCHC RBC-ENTMCNC: 27.6 PG — SIGNIFICANT CHANGE UP (ref 27–34)
MCHC RBC-ENTMCNC: 32.8 % — SIGNIFICANT CHANGE UP (ref 32–36)
MCV RBC AUTO: 84.1 FL — SIGNIFICANT CHANGE UP (ref 80–100)
MONOCYTES # BLD AUTO: 0.21 K/UL — SIGNIFICANT CHANGE UP (ref 0–0.9)
MONOCYTES NFR BLD AUTO: 5 % — SIGNIFICANT CHANGE UP (ref 2–14)
NEUTROPHILS # BLD AUTO: 3.09 K/UL — SIGNIFICANT CHANGE UP (ref 1.8–7.4)
NEUTROPHILS NFR BLD AUTO: 73.3 % — SIGNIFICANT CHANGE UP (ref 43–77)
NRBC # FLD: 0 — SIGNIFICANT CHANGE UP
PLATELET # BLD AUTO: 240 K/UL — SIGNIFICANT CHANGE UP (ref 150–400)
PMV BLD: 9.5 FL — SIGNIFICANT CHANGE UP (ref 7–13)
RBC # BLD: 5.29 M/UL — HIGH (ref 3.8–5.2)
RBC # FLD: 14.4 % — SIGNIFICANT CHANGE UP (ref 10.3–14.5)
WBC # BLD: 4.21 K/UL — SIGNIFICANT CHANGE UP (ref 3.8–10.5)
WBC # FLD AUTO: 4.21 K/UL — SIGNIFICANT CHANGE UP (ref 3.8–10.5)

## 2018-02-09 LAB
BASOPHILS # BLD AUTO: 0.04 K/UL — SIGNIFICANT CHANGE UP (ref 0–0.2)
BASOPHILS NFR BLD AUTO: 1.2 % — SIGNIFICANT CHANGE UP (ref 0–2)
EOSINOPHIL # BLD AUTO: 0.1 K/UL — SIGNIFICANT CHANGE UP (ref 0–0.5)
EOSINOPHIL NFR BLD AUTO: 2.9 % — SIGNIFICANT CHANGE UP (ref 0–6)
HCT VFR BLD CALC: 46.5 % — HIGH (ref 34.5–45)
HGB BLD-MCNC: 14.4 G/DL — SIGNIFICANT CHANGE UP (ref 11.5–15.5)
IMM GRANULOCYTES # BLD AUTO: 0.01 # — SIGNIFICANT CHANGE UP
IMM GRANULOCYTES NFR BLD AUTO: 0.3 % — SIGNIFICANT CHANGE UP (ref 0–1.5)
LYMPHOCYTES # BLD AUTO: 0.94 K/UL — LOW (ref 1–3.3)
LYMPHOCYTES # BLD AUTO: 27.3 % — SIGNIFICANT CHANGE UP (ref 13–44)
MCHC RBC-ENTMCNC: 26 PG — LOW (ref 27–34)
MCHC RBC-ENTMCNC: 31 % — LOW (ref 32–36)
MCV RBC AUTO: 83.9 FL — SIGNIFICANT CHANGE UP (ref 80–100)
MONOCYTES # BLD AUTO: 0.17 K/UL — SIGNIFICANT CHANGE UP (ref 0–0.9)
MONOCYTES NFR BLD AUTO: 4.9 % — SIGNIFICANT CHANGE UP (ref 2–14)
NEUTROPHILS # BLD AUTO: 2.18 K/UL — SIGNIFICANT CHANGE UP (ref 1.8–7.4)
NEUTROPHILS NFR BLD AUTO: 63.4 % — SIGNIFICANT CHANGE UP (ref 43–77)
NRBC # FLD: 0 — SIGNIFICANT CHANGE UP
PLATELET # BLD AUTO: 233 K/UL — SIGNIFICANT CHANGE UP (ref 150–400)
PMV BLD: 9.9 FL — SIGNIFICANT CHANGE UP (ref 7–13)
RBC # BLD: 5.54 M/UL — HIGH (ref 3.8–5.2)
RBC # FLD: 14 % — SIGNIFICANT CHANGE UP (ref 10.3–14.5)
WBC # BLD: 3.44 K/UL — LOW (ref 3.8–10.5)
WBC # FLD AUTO: 3.44 K/UL — LOW (ref 3.8–10.5)

## 2018-02-16 LAB
BASOPHILS # BLD AUTO: 0.04 K/UL — SIGNIFICANT CHANGE UP (ref 0–0.2)
BASOPHILS NFR BLD AUTO: 1.3 % — SIGNIFICANT CHANGE UP (ref 0–2)
BASOPHILS NFR SPEC: 0 % — SIGNIFICANT CHANGE UP (ref 0–2)
EOSINOPHIL # BLD AUTO: 0.07 K/UL — SIGNIFICANT CHANGE UP (ref 0–0.5)
EOSINOPHIL NFR BLD AUTO: 2.3 % — SIGNIFICANT CHANGE UP (ref 0–6)
EOSINOPHIL NFR FLD: 2 % — SIGNIFICANT CHANGE UP (ref 0–6)
HCT VFR BLD CALC: 44.4 % — SIGNIFICANT CHANGE UP (ref 34.5–45)
HGB BLD-MCNC: 14.6 G/DL — SIGNIFICANT CHANGE UP (ref 11.5–15.5)
IMM GRANULOCYTES # BLD AUTO: 0.01 # — SIGNIFICANT CHANGE UP
IMM GRANULOCYTES NFR BLD AUTO: 0.3 % — SIGNIFICANT CHANGE UP (ref 0–1.5)
LYMPHOCYTES # BLD AUTO: 0.87 K/UL — LOW (ref 1–3.3)
LYMPHOCYTES # BLD AUTO: 28.4 % — SIGNIFICANT CHANGE UP (ref 13–44)
LYMPHOCYTES NFR SPEC AUTO: 11 % — LOW (ref 13–44)
MANUAL SMEAR VERIFICATION: YES — SIGNIFICANT CHANGE UP
MCHC RBC-ENTMCNC: 27.3 PG — SIGNIFICANT CHANGE UP (ref 27–34)
MCHC RBC-ENTMCNC: 32.9 % — SIGNIFICANT CHANGE UP (ref 32–36)
MCV RBC AUTO: 83.1 FL — SIGNIFICANT CHANGE UP (ref 80–100)
METAMYELOCYTES # FLD: 1 % — SIGNIFICANT CHANGE UP (ref 0–1)
MONOCYTES # BLD AUTO: 0.12 K/UL — SIGNIFICANT CHANGE UP (ref 0–0.9)
MONOCYTES NFR BLD AUTO: 3.9 % — SIGNIFICANT CHANGE UP (ref 2–14)
MONOCYTES NFR BLD: 8 % — SIGNIFICANT CHANGE UP (ref 2–9)
NEUTROPHIL AB SER-ACNC: 77 % — SIGNIFICANT CHANGE UP (ref 43–77)
NEUTROPHILS # BLD AUTO: 1.95 K/UL — SIGNIFICANT CHANGE UP (ref 1.8–7.4)
NEUTROPHILS NFR BLD AUTO: 63.8 % — SIGNIFICANT CHANGE UP (ref 43–77)
NEUTS BAND # BLD: 1 % — SIGNIFICANT CHANGE UP (ref 0–6)
NRBC # BLD: 0 /100WBC — SIGNIFICANT CHANGE UP
NRBC # FLD: 0 — SIGNIFICANT CHANGE UP
PLATELET # BLD AUTO: 213 K/UL — SIGNIFICANT CHANGE UP (ref 150–400)
PLATELET COUNT - ESTIMATE: NORMAL — SIGNIFICANT CHANGE UP
PMV BLD: 9.9 FL — SIGNIFICANT CHANGE UP (ref 7–13)
RBC # BLD: 5.34 M/UL — HIGH (ref 3.8–5.2)
RBC # FLD: 13.8 % — SIGNIFICANT CHANGE UP (ref 10.3–14.5)
REVIEW TO FOLLOW: YES — SIGNIFICANT CHANGE UP
WBC # BLD: 3.06 K/UL — LOW (ref 3.8–10.5)
WBC # FLD AUTO: 3.06 K/UL — LOW (ref 3.8–10.5)

## 2018-02-23 LAB
BASOPHILS # BLD AUTO: 0.05 K/UL — SIGNIFICANT CHANGE UP (ref 0–0.2)
BASOPHILS NFR BLD AUTO: 2.1 % — HIGH (ref 0–2)
EOSINOPHIL # BLD AUTO: 0.05 K/UL — SIGNIFICANT CHANGE UP (ref 0–0.5)
EOSINOPHIL NFR BLD AUTO: 2.1 % — SIGNIFICANT CHANGE UP (ref 0–6)
HCT VFR BLD CALC: 46.2 % — HIGH (ref 34.5–45)
HGB BLD-MCNC: 15 G/DL — SIGNIFICANT CHANGE UP (ref 11.5–15.5)
IMM GRANULOCYTES # BLD AUTO: 0 # — SIGNIFICANT CHANGE UP
IMM GRANULOCYTES NFR BLD AUTO: 0 % — SIGNIFICANT CHANGE UP (ref 0–1.5)
LYMPHOCYTES # BLD AUTO: 0.84 K/UL — LOW (ref 1–3.3)
LYMPHOCYTES # BLD AUTO: 35.1 % — SIGNIFICANT CHANGE UP (ref 13–44)
MCHC RBC-ENTMCNC: 27.2 PG — SIGNIFICANT CHANGE UP (ref 27–34)
MCHC RBC-ENTMCNC: 32.5 % — SIGNIFICANT CHANGE UP (ref 32–36)
MCV RBC AUTO: 83.7 FL — SIGNIFICANT CHANGE UP (ref 80–100)
MONOCYTES # BLD AUTO: 0.11 K/UL — SIGNIFICANT CHANGE UP (ref 0–0.9)
MONOCYTES NFR BLD AUTO: 4.6 % — SIGNIFICANT CHANGE UP (ref 2–14)
NEUTROPHILS # BLD AUTO: 1.34 K/UL — LOW (ref 1.8–7.4)
NEUTROPHILS NFR BLD AUTO: 56.1 % — SIGNIFICANT CHANGE UP (ref 43–77)
NRBC # FLD: 0 — SIGNIFICANT CHANGE UP
PLATELET # BLD AUTO: 229 K/UL — SIGNIFICANT CHANGE UP (ref 150–400)
PMV BLD: 10.7 FL — SIGNIFICANT CHANGE UP (ref 7–13)
RBC # BLD: 5.52 M/UL — HIGH (ref 3.8–5.2)
RBC # FLD: 13.4 % — SIGNIFICANT CHANGE UP (ref 10.3–14.5)
WBC # BLD: 2.39 K/UL — LOW (ref 3.8–10.5)
WBC # FLD AUTO: 2.39 K/UL — LOW (ref 3.8–10.5)

## 2018-02-24 ENCOUNTER — EMERGENCY (EMERGENCY)
Facility: HOSPITAL | Age: 48
LOS: 1 days | Discharge: HOME | End: 2018-02-24
Attending: EMERGENCY MEDICINE | Admitting: EMERGENCY MEDICINE
Payer: COMMERCIAL

## 2018-02-24 VITALS
OXYGEN SATURATION: 100 % | HEART RATE: 79 BPM | RESPIRATION RATE: 20 BRPM | DIASTOLIC BLOOD PRESSURE: 73 MMHG | SYSTOLIC BLOOD PRESSURE: 117 MMHG | TEMPERATURE: 98 F

## 2018-02-24 VITALS
WEIGHT: 212.08 LBS | HEIGHT: 66 IN | RESPIRATION RATE: 18 BRPM | HEART RATE: 94 BPM | DIASTOLIC BLOOD PRESSURE: 82 MMHG | TEMPERATURE: 98 F | SYSTOLIC BLOOD PRESSURE: 125 MMHG

## 2018-02-24 LAB
BASOPHILS # BLD AUTO: 0 K/UL — SIGNIFICANT CHANGE UP (ref 0–0.2)
BASOPHILS NFR BLD AUTO: 0.4 % — SIGNIFICANT CHANGE UP (ref 0–2)
EOSINOPHIL # BLD AUTO: 0.1 K/UL — SIGNIFICANT CHANGE UP (ref 0–0.5)
EOSINOPHIL NFR BLD AUTO: 2.3 % — SIGNIFICANT CHANGE UP (ref 0–6)
HCT VFR BLD CALC: 44.3 % — SIGNIFICANT CHANGE UP (ref 34.5–45)
HGB BLD-MCNC: 14.4 G/DL — SIGNIFICANT CHANGE UP (ref 11.5–15.5)
LYMPHOCYTES # BLD AUTO: 0.8 K/UL — LOW (ref 1–3.3)
LYMPHOCYTES # BLD AUTO: 31.6 % — SIGNIFICANT CHANGE UP (ref 13–44)
MCHC RBC-ENTMCNC: 27.2 PG — SIGNIFICANT CHANGE UP (ref 27–34)
MCHC RBC-ENTMCNC: 32.6 GM/DL — SIGNIFICANT CHANGE UP (ref 32–36)
MCV RBC AUTO: 83.5 FL — SIGNIFICANT CHANGE UP (ref 80–100)
MONOCYTES # BLD AUTO: 0.1 K/UL — SIGNIFICANT CHANGE UP (ref 0–0.9)
MONOCYTES NFR BLD AUTO: 3.7 % — SIGNIFICANT CHANGE UP (ref 2–14)
NEUTROPHILS # BLD AUTO: 1.5 K/UL — LOW (ref 1.8–7.4)
NEUTROPHILS NFR BLD AUTO: 62 % — SIGNIFICANT CHANGE UP (ref 43–77)
PLAT MORPH BLD: NORMAL — SIGNIFICANT CHANGE UP
PLATELET # BLD AUTO: 213 K/UL — SIGNIFICANT CHANGE UP (ref 150–400)
RBC # BLD: 5.3 M/UL — HIGH (ref 3.8–5.2)
RBC # FLD: 12.9 % — SIGNIFICANT CHANGE UP (ref 10.3–14.5)
RBC BLD AUTO: NORMAL — SIGNIFICANT CHANGE UP
WBC # BLD: 2.5 K/UL — LOW (ref 3.8–10.5)
WBC # FLD AUTO: 2.5 K/UL — LOW (ref 3.8–10.5)

## 2018-02-24 PROCEDURE — 99284 EMERGENCY DEPT VISIT MOD MDM: CPT

## 2018-02-24 PROCEDURE — 99283 EMERGENCY DEPT VISIT LOW MDM: CPT

## 2018-02-24 PROCEDURE — 85027 COMPLETE CBC AUTOMATED: CPT

## 2018-02-24 NOTE — ED PROVIDER NOTE - PROGRESS NOTE DETAILS
Spoke with Dr. Figueroa, covering for Dr. Vegas who reports that with ANC of 1500, pt. should continue clozapine at current dose and f/u on Monday for repeat bloodwork.

## 2018-02-24 NOTE — ED PROVIDER NOTE - MEDICAL DECISION MAKING DETAILS
48 yo F with h/o HLD, Hypothyroidism, DM, and currently on clozapine for psychiatric diagnosis, who was sent to the ED for evaluation of low WBC found on outpatient labs.  Asymptomatic, no current other complaints.  Will check cbc, re-eval.

## 2018-02-24 NOTE — ED PROVIDER NOTE - ATTENDING CONTRIBUTION TO CARE
46 yo F with h/o HLD, Hypothyroidism, DM, and currently on clozapine for psychiatric diagnosis, who was sent to the ED for evaluation of low WBC found on outpatient labs.  Asymptomatic, no current other complaints.  ANC 1500 on labs in ED; stable for dc, plan d/w her outpatient provider.

## 2018-02-24 NOTE — ED ADULT TRIAGE NOTE - CHIEF COMPLAINT QUOTE
pt told by doctor from [facility] outpatient clinic her white blood cell came back low so sent for repeat test pt denies any physical complaints pt goes to clozapine clinic as outpatient

## 2018-02-24 NOTE — ED ADULT NURSE NOTE - OBJECTIVE STATEMENT
48 yo F presents to ED A+Ox3 accompanied by her family. Pt. states she goes weekly to the clozapine clinic to have levels tested. States she went for her weekly blood draw yesterday and was called in the evening and told to come to ED to have test repeated "because of a dropping white blood cell count." Pt. states "I feel good." Denies fever, chills, N/V/D, SOB, chest pain, abdominal pain, changes in PO intake, changes in urinary pattern. Breathing unlabored on RA. Skin warm pink and dry. Comfort and safety measures in place. Family at bedside.

## 2018-02-24 NOTE — ED ADULT NURSE NOTE - CHIEF COMPLAINT QUOTE
pt told by doctor from [facility] outpatient clinic her white blood cell came back low so sent for repeat test pt denies any physical complaints pt goes to clozapine clinic as outpatient

## 2018-02-24 NOTE — ED PROVIDER NOTE - OBJECTIVE STATEMENT
48yo female p/w low white blood cell count. Pt. takes clozapine, seen at [facility] outpatient, found to have lower WBC. Dr. Coni Vegas called and told patient to come to ED for repeat WBC testing. Pt. denies any infectious symptoms, no fevers, cough, vomiting, diarrhea. Pt. changed from 300mg/night to 200mg/night

## 2018-02-24 NOTE — ED PROVIDER NOTE - CARE PLAN
Principal Discharge DX:	Neutropenia  Assessment and plan of treatment:	You were seen in the Emergency Department for blood check. Your examination and lab tests were reassuring. Please follow up with the [facility] on Monday for a repeat test. Continue your medications as instructed. Please return to the Emergency Department if you have any new concerning symptoms such as severe pain, weakness, or any other concerning symptoms.

## 2018-02-24 NOTE — ED PROVIDER NOTE - PLAN OF CARE
You were seen in the Emergency Department for blood check. Your examination and lab tests were reassuring. Please follow up with the [facility] on Monday for a repeat test. Continue your medications as instructed. Please return to the Emergency Department if you have any new concerning symptoms such as severe pain, weakness, or any other concerning symptoms.

## 2018-02-26 LAB
BASOPHILS # BLD AUTO: 0.04 K/UL — SIGNIFICANT CHANGE UP (ref 0–0.2)
BASOPHILS NFR BLD AUTO: 1.9 % — SIGNIFICANT CHANGE UP (ref 0–2)
EOSINOPHIL # BLD AUTO: 0.05 K/UL — SIGNIFICANT CHANGE UP (ref 0–0.5)
EOSINOPHIL NFR BLD AUTO: 2.4 % — SIGNIFICANT CHANGE UP (ref 0–6)
HCT VFR BLD CALC: 43.5 % — SIGNIFICANT CHANGE UP (ref 34.5–45)
HGB BLD-MCNC: 14.1 G/DL — SIGNIFICANT CHANGE UP (ref 11.5–15.5)
IMM GRANULOCYTES # BLD AUTO: 0 # — SIGNIFICANT CHANGE UP
IMM GRANULOCYTES NFR BLD AUTO: 0 % — SIGNIFICANT CHANGE UP (ref 0–1.5)
LYMPHOCYTES # BLD AUTO: 0.78 K/UL — LOW (ref 1–3.3)
LYMPHOCYTES # BLD AUTO: 37.1 % — SIGNIFICANT CHANGE UP (ref 13–44)
MCHC RBC-ENTMCNC: 27.3 PG — SIGNIFICANT CHANGE UP (ref 27–34)
MCHC RBC-ENTMCNC: 32.4 % — SIGNIFICANT CHANGE UP (ref 32–36)
MCV RBC AUTO: 84.3 FL — SIGNIFICANT CHANGE UP (ref 80–100)
MONOCYTES # BLD AUTO: 0.1 K/UL — SIGNIFICANT CHANGE UP (ref 0–0.9)
MONOCYTES NFR BLD AUTO: 4.8 % — SIGNIFICANT CHANGE UP (ref 2–14)
NEUTROPHILS # BLD AUTO: 1.13 K/UL — LOW (ref 1.8–7.4)
NEUTROPHILS NFR BLD AUTO: 53.8 % — SIGNIFICANT CHANGE UP (ref 43–77)
NRBC # FLD: 0 — SIGNIFICANT CHANGE UP
PLATELET # BLD AUTO: 215 K/UL — SIGNIFICANT CHANGE UP (ref 150–400)
PMV BLD: 9.7 FL — SIGNIFICANT CHANGE UP (ref 7–13)
RBC # BLD: 5.16 M/UL — SIGNIFICANT CHANGE UP (ref 3.8–5.2)
RBC # FLD: 13.5 % — SIGNIFICANT CHANGE UP (ref 10.3–14.5)
WBC # BLD: 2.1 K/UL — LOW (ref 3.8–10.5)
WBC # FLD AUTO: 2.1 K/UL — LOW (ref 3.8–10.5)

## 2018-02-27 LAB
BASOPHILS # BLD AUTO: 0.03 K/UL — SIGNIFICANT CHANGE UP (ref 0–0.2)
BASOPHILS NFR BLD AUTO: 1.4 % — SIGNIFICANT CHANGE UP (ref 0–2)
EOSINOPHIL # BLD AUTO: 0.05 K/UL — SIGNIFICANT CHANGE UP (ref 0–0.5)
EOSINOPHIL NFR BLD AUTO: 2.3 % — SIGNIFICANT CHANGE UP (ref 0–6)
HCT VFR BLD CALC: 42.3 % — SIGNIFICANT CHANGE UP (ref 34.5–45)
HGB BLD-MCNC: 13.6 G/DL — SIGNIFICANT CHANGE UP (ref 11.5–15.5)
IMM GRANULOCYTES # BLD AUTO: 0.01 # — SIGNIFICANT CHANGE UP
IMM GRANULOCYTES NFR BLD AUTO: 0.5 % — SIGNIFICANT CHANGE UP (ref 0–1.5)
LYMPHOCYTES # BLD AUTO: 0.77 K/UL — LOW (ref 1–3.3)
LYMPHOCYTES # BLD AUTO: 35 % — SIGNIFICANT CHANGE UP (ref 13–44)
MCHC RBC-ENTMCNC: 26.4 PG — LOW (ref 27–34)
MCHC RBC-ENTMCNC: 32.2 % — SIGNIFICANT CHANGE UP (ref 32–36)
MCV RBC AUTO: 82.1 FL — SIGNIFICANT CHANGE UP (ref 80–100)
MONOCYTES # BLD AUTO: 0.12 K/UL — SIGNIFICANT CHANGE UP (ref 0–0.9)
MONOCYTES NFR BLD AUTO: 5.5 % — SIGNIFICANT CHANGE UP (ref 2–14)
NEUTROPHILS # BLD AUTO: 1.22 K/UL — LOW (ref 1.8–7.4)
NEUTROPHILS NFR BLD AUTO: 55.3 % — SIGNIFICANT CHANGE UP (ref 43–77)
NRBC # FLD: 0 — SIGNIFICANT CHANGE UP
PLATELET # BLD AUTO: 212 K/UL — SIGNIFICANT CHANGE UP (ref 150–400)
PMV BLD: 9.6 FL — SIGNIFICANT CHANGE UP (ref 7–13)
RBC # BLD: 5.15 M/UL — SIGNIFICANT CHANGE UP (ref 3.8–5.2)
RBC # FLD: 13.5 % — SIGNIFICANT CHANGE UP (ref 10.3–14.5)
WBC # BLD: 2.2 K/UL — LOW (ref 3.8–10.5)
WBC # FLD AUTO: 2.2 K/UL — LOW (ref 3.8–10.5)

## 2018-02-28 LAB
BASOPHILS # BLD AUTO: 0.04 K/UL — SIGNIFICANT CHANGE UP (ref 0–0.2)
BASOPHILS NFR BLD AUTO: 1.8 % — SIGNIFICANT CHANGE UP (ref 0–2)
EOSINOPHIL # BLD AUTO: 0.04 K/UL — SIGNIFICANT CHANGE UP (ref 0–0.5)
EOSINOPHIL NFR BLD AUTO: 1.8 % — SIGNIFICANT CHANGE UP (ref 0–6)
HCT VFR BLD CALC: 44.1 % — SIGNIFICANT CHANGE UP (ref 34.5–45)
HGB BLD-MCNC: 13.8 G/DL — SIGNIFICANT CHANGE UP (ref 11.5–15.5)
IMM GRANULOCYTES # BLD AUTO: 0 # — SIGNIFICANT CHANGE UP
IMM GRANULOCYTES NFR BLD AUTO: 0 % — SIGNIFICANT CHANGE UP (ref 0–1.5)
LYMPHOCYTES # BLD AUTO: 0.72 K/UL — LOW (ref 1–3.3)
LYMPHOCYTES # BLD AUTO: 32.7 % — SIGNIFICANT CHANGE UP (ref 13–44)
MCHC RBC-ENTMCNC: 26 PG — LOW (ref 27–34)
MCHC RBC-ENTMCNC: 31.3 % — LOW (ref 32–36)
MCV RBC AUTO: 83.1 FL — SIGNIFICANT CHANGE UP (ref 80–100)
MONOCYTES # BLD AUTO: 0.1 K/UL — SIGNIFICANT CHANGE UP (ref 0–0.9)
MONOCYTES NFR BLD AUTO: 4.5 % — SIGNIFICANT CHANGE UP (ref 2–14)
NEUTROPHILS # BLD AUTO: 1.3 K/UL — LOW (ref 1.8–7.4)
NEUTROPHILS NFR BLD AUTO: 59.2 % — SIGNIFICANT CHANGE UP (ref 43–77)
NRBC # FLD: 0 — SIGNIFICANT CHANGE UP
PLATELET # BLD AUTO: 244 K/UL — SIGNIFICANT CHANGE UP (ref 150–400)
PMV BLD: 9.8 FL — SIGNIFICANT CHANGE UP (ref 7–13)
RBC # BLD: 5.31 M/UL — HIGH (ref 3.8–5.2)
RBC # FLD: 13.4 % — SIGNIFICANT CHANGE UP (ref 10.3–14.5)
WBC # BLD: 2.2 K/UL — LOW (ref 3.8–10.5)
WBC # FLD AUTO: 2.2 K/UL — LOW (ref 3.8–10.5)

## 2018-03-01 LAB
ALBUMIN SERPL ELPH-MCNC: 4.7 G/DL — SIGNIFICANT CHANGE UP (ref 3.3–5)
ALP SERPL-CCNC: 80 U/L — SIGNIFICANT CHANGE UP (ref 40–120)
ALT FLD-CCNC: 17 U/L — SIGNIFICANT CHANGE UP (ref 4–33)
AST SERPL-CCNC: 15 U/L — SIGNIFICANT CHANGE UP (ref 4–32)
BASOPHILS # BLD AUTO: 0.05 K/UL — SIGNIFICANT CHANGE UP (ref 0–0.2)
BASOPHILS NFR BLD AUTO: 2.1 % — HIGH (ref 0–2)
BILIRUB SERPL-MCNC: 0.6 MG/DL — SIGNIFICANT CHANGE UP (ref 0.2–1.2)
BUN SERPL-MCNC: 10 MG/DL — SIGNIFICANT CHANGE UP (ref 7–23)
CALCIUM SERPL-MCNC: 9.3 MG/DL — SIGNIFICANT CHANGE UP (ref 8.4–10.5)
CHLORIDE SERPL-SCNC: 100 MMOL/L — SIGNIFICANT CHANGE UP (ref 98–107)
CO2 SERPL-SCNC: 25 MMOL/L — SIGNIFICANT CHANGE UP (ref 22–31)
CREAT SERPL-MCNC: 0.95 MG/DL — SIGNIFICANT CHANGE UP (ref 0.5–1.3)
EOSINOPHIL # BLD AUTO: 0.05 K/UL — SIGNIFICANT CHANGE UP (ref 0–0.5)
EOSINOPHIL NFR BLD AUTO: 2.1 % — SIGNIFICANT CHANGE UP (ref 0–6)
GLUCOSE SERPL-MCNC: 83 MG/DL — SIGNIFICANT CHANGE UP (ref 70–99)
HCT VFR BLD CALC: 44 % — SIGNIFICANT CHANGE UP (ref 34.5–45)
HGB BLD-MCNC: 14.1 G/DL — SIGNIFICANT CHANGE UP (ref 11.5–15.5)
IMM GRANULOCYTES # BLD AUTO: 0 # — SIGNIFICANT CHANGE UP
IMM GRANULOCYTES NFR BLD AUTO: 0 % — SIGNIFICANT CHANGE UP (ref 0–1.5)
LYMPHOCYTES # BLD AUTO: 0.87 K/UL — LOW (ref 1–3.3)
LYMPHOCYTES # BLD AUTO: 37.2 % — SIGNIFICANT CHANGE UP (ref 13–44)
MCHC RBC-ENTMCNC: 27 PG — SIGNIFICANT CHANGE UP (ref 27–34)
MCHC RBC-ENTMCNC: 32 % — SIGNIFICANT CHANGE UP (ref 32–36)
MCV RBC AUTO: 84.3 FL — SIGNIFICANT CHANGE UP (ref 80–100)
MONOCYTES # BLD AUTO: 0.09 K/UL — SIGNIFICANT CHANGE UP (ref 0–0.9)
MONOCYTES NFR BLD AUTO: 3.8 % — SIGNIFICANT CHANGE UP (ref 2–14)
NEUTROPHILS # BLD AUTO: 1.28 K/UL — LOW (ref 1.8–7.4)
NEUTROPHILS NFR BLD AUTO: 54.8 % — SIGNIFICANT CHANGE UP (ref 43–77)
NRBC # FLD: 0 — SIGNIFICANT CHANGE UP
PLATELET # BLD AUTO: 256 K/UL — SIGNIFICANT CHANGE UP (ref 150–400)
PMV BLD: 9.8 FL — SIGNIFICANT CHANGE UP (ref 7–13)
POTASSIUM SERPL-MCNC: 4.5 MMOL/L — SIGNIFICANT CHANGE UP (ref 3.5–5.3)
POTASSIUM SERPL-SCNC: 4.5 MMOL/L — SIGNIFICANT CHANGE UP (ref 3.5–5.3)
PROT SERPL-MCNC: 7 G/DL — SIGNIFICANT CHANGE UP (ref 6–8.3)
RBC # BLD: 5.22 M/UL — HIGH (ref 3.8–5.2)
RBC # FLD: 13.6 % — SIGNIFICANT CHANGE UP (ref 10.3–14.5)
SODIUM SERPL-SCNC: 139 MMOL/L — SIGNIFICANT CHANGE UP (ref 135–145)
TSH SERPL-MCNC: 2.39 UIU/ML — SIGNIFICANT CHANGE UP (ref 0.27–4.2)
WBC # BLD: 2.34 K/UL — LOW (ref 3.8–10.5)
WBC # FLD AUTO: 2.34 K/UL — LOW (ref 3.8–10.5)

## 2018-03-02 LAB
BASOPHILS # BLD AUTO: 0.06 K/UL — SIGNIFICANT CHANGE UP (ref 0–0.2)
BASOPHILS NFR BLD AUTO: 2.6 % — HIGH (ref 0–2)
EOSINOPHIL # BLD AUTO: 0.06 K/UL — SIGNIFICANT CHANGE UP (ref 0–0.5)
EOSINOPHIL NFR BLD AUTO: 2.6 % — SIGNIFICANT CHANGE UP (ref 0–6)
HCT VFR BLD CALC: 45.6 % — HIGH (ref 34.5–45)
HGB BLD-MCNC: 14.9 G/DL — SIGNIFICANT CHANGE UP (ref 11.5–15.5)
IMM GRANULOCYTES # BLD AUTO: 0 # — SIGNIFICANT CHANGE UP
IMM GRANULOCYTES NFR BLD AUTO: 0 % — SIGNIFICANT CHANGE UP (ref 0–1.5)
LITHIUM SERPL-MCNC: 0.65 MMOL/L — SIGNIFICANT CHANGE UP (ref 0.6–1.2)
LYMPHOCYTES # BLD AUTO: 0.98 K/UL — LOW (ref 1–3.3)
LYMPHOCYTES # BLD AUTO: 43 % — SIGNIFICANT CHANGE UP (ref 13–44)
MCHC RBC-ENTMCNC: 27 PG — SIGNIFICANT CHANGE UP (ref 27–34)
MCHC RBC-ENTMCNC: 32.7 % — SIGNIFICANT CHANGE UP (ref 32–36)
MCV RBC AUTO: 82.8 FL — SIGNIFICANT CHANGE UP (ref 80–100)
MONOCYTES # BLD AUTO: 0.1 K/UL — SIGNIFICANT CHANGE UP (ref 0–0.9)
MONOCYTES NFR BLD AUTO: 4.4 % — SIGNIFICANT CHANGE UP (ref 2–14)
NEUTROPHILS # BLD AUTO: 1.08 K/UL — LOW (ref 1.8–7.4)
NEUTROPHILS NFR BLD AUTO: 47.4 % — SIGNIFICANT CHANGE UP (ref 43–77)
NRBC # FLD: 0 — SIGNIFICANT CHANGE UP
PLATELET # BLD AUTO: 262 K/UL — SIGNIFICANT CHANGE UP (ref 150–400)
PMV BLD: 10 FL — SIGNIFICANT CHANGE UP (ref 7–13)
RBC # BLD: 5.51 M/UL — HIGH (ref 3.8–5.2)
RBC # FLD: 13.4 % — SIGNIFICANT CHANGE UP (ref 10.3–14.5)
WBC # BLD: 2.28 K/UL — LOW (ref 3.8–10.5)
WBC # FLD AUTO: 2.28 K/UL — LOW (ref 3.8–10.5)

## 2018-03-03 ENCOUNTER — EMERGENCY (EMERGENCY)
Facility: HOSPITAL | Age: 48
LOS: 1 days | Discharge: HOME | End: 2018-03-03
Attending: EMERGENCY MEDICINE | Admitting: EMERGENCY MEDICINE
Payer: COMMERCIAL

## 2018-03-03 VITALS
DIASTOLIC BLOOD PRESSURE: 75 MMHG | RESPIRATION RATE: 18 BRPM | OXYGEN SATURATION: 97 % | SYSTOLIC BLOOD PRESSURE: 109 MMHG | TEMPERATURE: 98 F | HEART RATE: 65 BPM

## 2018-03-03 VITALS
OXYGEN SATURATION: 98 % | DIASTOLIC BLOOD PRESSURE: 78 MMHG | SYSTOLIC BLOOD PRESSURE: 110 MMHG | TEMPERATURE: 98 F | RESPIRATION RATE: 18 BRPM | HEART RATE: 65 BPM

## 2018-03-03 LAB
ALBUMIN SERPL ELPH-MCNC: 4.5 G/DL — SIGNIFICANT CHANGE UP (ref 3.3–5)
ALP SERPL-CCNC: 80 U/L — SIGNIFICANT CHANGE UP (ref 40–120)
ALT FLD-CCNC: 11 U/L RC — SIGNIFICANT CHANGE UP (ref 10–45)
ANION GAP SERPL CALC-SCNC: 14 MMOL/L — SIGNIFICANT CHANGE UP (ref 5–17)
AST SERPL-CCNC: 9 U/L — LOW (ref 10–40)
BASOPHILS # BLD AUTO: 0 K/UL — SIGNIFICANT CHANGE UP (ref 0–0.2)
BASOPHILS NFR BLD AUTO: 1.2 % — SIGNIFICANT CHANGE UP (ref 0–2)
BILIRUB SERPL-MCNC: 0.6 MG/DL — SIGNIFICANT CHANGE UP (ref 0.2–1.2)
BUN SERPL-MCNC: 12 MG/DL — SIGNIFICANT CHANGE UP (ref 7–23)
CALCIUM SERPL-MCNC: 9.7 MG/DL — SIGNIFICANT CHANGE UP (ref 8.4–10.5)
CHLORIDE SERPL-SCNC: 104 MMOL/L — SIGNIFICANT CHANGE UP (ref 96–108)
CO2 SERPL-SCNC: 24 MMOL/L — SIGNIFICANT CHANGE UP (ref 22–31)
CREAT SERPL-MCNC: 0.83 MG/DL — SIGNIFICANT CHANGE UP (ref 0.5–1.3)
EOSINOPHIL # BLD AUTO: 0 K/UL — SIGNIFICANT CHANGE UP (ref 0–0.5)
EOSINOPHIL NFR BLD AUTO: 2.2 % — SIGNIFICANT CHANGE UP (ref 0–6)
GLUCOSE SERPL-MCNC: 85 MG/DL — SIGNIFICANT CHANGE UP (ref 70–99)
HCT VFR BLD CALC: 42.3 % — SIGNIFICANT CHANGE UP (ref 34.5–45)
HGB BLD-MCNC: 14.3 G/DL — SIGNIFICANT CHANGE UP (ref 11.5–15.5)
LITHIUM SERPL-MCNC: 0.6 MMOL/L — SIGNIFICANT CHANGE UP (ref 0.6–1.2)
LYMPHOCYTES # BLD AUTO: 0.8 K/UL — LOW (ref 1–3.3)
LYMPHOCYTES # BLD AUTO: 36.6 % — SIGNIFICANT CHANGE UP (ref 13–44)
MCHC RBC-ENTMCNC: 27.9 PG — SIGNIFICANT CHANGE UP (ref 27–34)
MCHC RBC-ENTMCNC: 33.8 GM/DL — SIGNIFICANT CHANGE UP (ref 32–36)
MCV RBC AUTO: 82.6 FL — SIGNIFICANT CHANGE UP (ref 80–100)
MONOCYTES # BLD AUTO: 0.1 K/UL — SIGNIFICANT CHANGE UP (ref 0–0.9)
MONOCYTES NFR BLD AUTO: 4.6 % — SIGNIFICANT CHANGE UP (ref 2–14)
NEUTROPHILS # BLD AUTO: 1.2 K/UL — LOW (ref 1.8–7.4)
NEUTROPHILS NFR BLD AUTO: 55.3 % — SIGNIFICANT CHANGE UP (ref 43–77)
PLATELET # BLD AUTO: 236 K/UL — SIGNIFICANT CHANGE UP (ref 150–400)
POTASSIUM SERPL-MCNC: 4 MMOL/L — SIGNIFICANT CHANGE UP (ref 3.5–5.3)
POTASSIUM SERPL-SCNC: 4 MMOL/L — SIGNIFICANT CHANGE UP (ref 3.5–5.3)
PROT SERPL-MCNC: 7 G/DL — SIGNIFICANT CHANGE UP (ref 6–8.3)
RBC # BLD: 5.13 M/UL — SIGNIFICANT CHANGE UP (ref 3.8–5.2)
RBC # FLD: 12.8 % — SIGNIFICANT CHANGE UP (ref 10.3–14.5)
SODIUM SERPL-SCNC: 142 MMOL/L — SIGNIFICANT CHANGE UP (ref 135–145)
WBC # BLD: 2.2 K/UL — LOW (ref 3.8–10.5)
WBC # FLD AUTO: 2.2 K/UL — LOW (ref 3.8–10.5)

## 2018-03-03 PROCEDURE — 85027 COMPLETE CBC AUTOMATED: CPT

## 2018-03-03 PROCEDURE — 99284 EMERGENCY DEPT VISIT MOD MDM: CPT

## 2018-03-03 PROCEDURE — 80178 ASSAY OF LITHIUM: CPT

## 2018-03-03 PROCEDURE — 80053 COMPREHEN METABOLIC PANEL: CPT

## 2018-03-03 PROCEDURE — 99283 EMERGENCY DEPT VISIT LOW MDM: CPT

## 2018-03-03 NOTE — ED PROVIDER NOTE - OBJECTIVE STATEMENT
47 YOF recently stopped clozapine, seen at outpatient [facility], was found to have lower WBC. Pt was sent in by Dr. Vegas was told her wbc was decreased and she should come for a workup. Pt denies any symptoms. Pt denies any fevers, chill, n/v/d, abd pain, chest pain, sob.

## 2018-03-03 NOTE — ED ADULT NURSE NOTE - OBJECTIVE STATEMENT
48 yo female presents to ED c/o low WBC count. Patients  reports that patients PMD "would like patient to be worked up for low white lood cells noted yesterday on labs". Patient and  deny that she has had any fevers, cough, n/v/d, chills, cough or runny nose recently. At this time in the ED the patient is well appearing and has no complaints. There is no acute distress.  at bedside. Will continue to monitor.

## 2018-03-03 NOTE — ED ADULT NURSE NOTE - CHPI ED SYMPTOMS NEG
no nausea/no tingling/no dizziness/no pain/no fever/no vomiting/no numbness/no weakness/no chills/no decreased eating/drinking

## 2018-03-03 NOTE — ED PROVIDER NOTE - PROGRESS NOTE DETAILS
Pagedolores lawson Spoke with On call dr for Dr. Vegas service [phone], neutrophil count is 1200 will d/c home with follow up at clozapine clinic.

## 2018-03-03 NOTE — ED PROVIDER NOTE - ATTENDING CONTRIBUTION TO CARE
47y F hx of psych disorder on clozapine and lithium presents for evaluation of leukopenia. Pt has been on clozapine for years, was on 300mg and about 1.5 weeks ago was dec to 200mg however continued to have dec WBC and so clozapine was DC on 2/25/18. Pt follows with clozapine clinic at [facility]. Last blood draw was yesterday. Results accessible in sunrise. No infectious concerns at present. No fever, chills, cough, nvd, abd pain, urinary sx. Will check CBC w diff, touch base w Dr Vegas at [facility]. Offending agent has already been DC. Will likely be able to DC with FU as [facility].

## 2018-03-03 NOTE — ED PROVIDER NOTE - NS ED ROS FT
CONSTITUTIONAL: No fevers, no chills  Eyes: no visual changes  Ears: no ear drainage, no ear pain  Nose: no nasal congestion  Mouth/Throat: no sore throat  Cardiovascular: No Chest pain  Respiratory: No SOB  Gastrointestinal: No n/v/d, no abd pain  Genitourinary: no dysuria, no hematuria  SKIN: no rashes.  NEURO: no headache

## 2018-03-05 LAB
BASOPHILS # BLD AUTO: 0.06 K/UL — SIGNIFICANT CHANGE UP (ref 0–0.2)
BASOPHILS NFR BLD AUTO: 2.5 % — HIGH (ref 0–2)
EOSINOPHIL # BLD AUTO: 0.06 K/UL — SIGNIFICANT CHANGE UP (ref 0–0.5)
EOSINOPHIL NFR BLD AUTO: 2.5 % — SIGNIFICANT CHANGE UP (ref 0–6)
HCT VFR BLD CALC: 44.7 % — SIGNIFICANT CHANGE UP (ref 34.5–45)
HGB BLD-MCNC: 13.8 G/DL — SIGNIFICANT CHANGE UP (ref 11.5–15.5)
IMM GRANULOCYTES # BLD AUTO: 0 # — SIGNIFICANT CHANGE UP
IMM GRANULOCYTES NFR BLD AUTO: 0 % — SIGNIFICANT CHANGE UP (ref 0–1.5)
LYMPHOCYTES # BLD AUTO: 0.92 K/UL — LOW (ref 1–3.3)
LYMPHOCYTES # BLD AUTO: 38.3 % — SIGNIFICANT CHANGE UP (ref 13–44)
MCHC RBC-ENTMCNC: 25.8 PG — LOW (ref 27–34)
MCHC RBC-ENTMCNC: 30.9 % — LOW (ref 32–36)
MCV RBC AUTO: 83.7 FL — SIGNIFICANT CHANGE UP (ref 80–100)
MONOCYTES # BLD AUTO: 0.11 K/UL — SIGNIFICANT CHANGE UP (ref 0–0.9)
MONOCYTES NFR BLD AUTO: 4.6 % — SIGNIFICANT CHANGE UP (ref 2–14)
NEUTROPHILS # BLD AUTO: 1.25 K/UL — LOW (ref 1.8–7.4)
NEUTROPHILS NFR BLD AUTO: 52.1 % — SIGNIFICANT CHANGE UP (ref 43–77)
NRBC # FLD: 0 — SIGNIFICANT CHANGE UP
PLATELET # BLD AUTO: 256 K/UL — SIGNIFICANT CHANGE UP (ref 150–400)
PMV BLD: 10 FL — SIGNIFICANT CHANGE UP (ref 7–13)
RBC # BLD: 5.34 M/UL — HIGH (ref 3.8–5.2)
RBC # FLD: 13.7 % — SIGNIFICANT CHANGE UP (ref 10.3–14.5)
WBC # BLD: 2.4 K/UL — LOW (ref 3.8–10.5)
WBC # FLD AUTO: 2.4 K/UL — LOW (ref 3.8–10.5)

## 2018-03-09 LAB
BASOPHILS # BLD AUTO: 0.08 K/UL — SIGNIFICANT CHANGE UP (ref 0–0.2)
BASOPHILS NFR BLD AUTO: 2.9 % — HIGH (ref 0–2)
BUN SERPL-MCNC: 14 MG/DL — SIGNIFICANT CHANGE UP (ref 7–23)
CALCIUM SERPL-MCNC: 9.3 MG/DL — SIGNIFICANT CHANGE UP (ref 8.4–10.5)
CHLORIDE SERPL-SCNC: 106 MMOL/L — SIGNIFICANT CHANGE UP (ref 98–107)
CO2 SERPL-SCNC: 22 MMOL/L — SIGNIFICANT CHANGE UP (ref 22–31)
CREAT SERPL-MCNC: 0.91 MG/DL — SIGNIFICANT CHANGE UP (ref 0.5–1.3)
EOSINOPHIL # BLD AUTO: 0.07 K/UL — SIGNIFICANT CHANGE UP (ref 0–0.5)
EOSINOPHIL NFR BLD AUTO: 2.5 % — SIGNIFICANT CHANGE UP (ref 0–6)
GLUCOSE SERPL-MCNC: 73 MG/DL — SIGNIFICANT CHANGE UP (ref 70–99)
HCT VFR BLD CALC: 43.9 % — SIGNIFICANT CHANGE UP (ref 34.5–45)
HGB BLD-MCNC: 14 G/DL — SIGNIFICANT CHANGE UP (ref 11.5–15.5)
IMM GRANULOCYTES # BLD AUTO: 0.01 # — SIGNIFICANT CHANGE UP
IMM GRANULOCYTES NFR BLD AUTO: 0.4 % — SIGNIFICANT CHANGE UP (ref 0–1.5)
LITHIUM SERPL-MCNC: 0.71 MMOL/L — SIGNIFICANT CHANGE UP (ref 0.6–1.2)
LYMPHOCYTES # BLD AUTO: 1.14 K/UL — SIGNIFICANT CHANGE UP (ref 1–3.3)
LYMPHOCYTES # BLD AUTO: 40.7 % — SIGNIFICANT CHANGE UP (ref 13–44)
MCHC RBC-ENTMCNC: 26.1 PG — LOW (ref 27–34)
MCHC RBC-ENTMCNC: 31.9 % — LOW (ref 32–36)
MCV RBC AUTO: 81.9 FL — SIGNIFICANT CHANGE UP (ref 80–100)
MONOCYTES # BLD AUTO: 0.14 K/UL — SIGNIFICANT CHANGE UP (ref 0–0.9)
MONOCYTES NFR BLD AUTO: 5 % — SIGNIFICANT CHANGE UP (ref 2–14)
NEUTROPHILS # BLD AUTO: 1.36 K/UL — LOW (ref 1.8–7.4)
NEUTROPHILS NFR BLD AUTO: 48.5 % — SIGNIFICANT CHANGE UP (ref 43–77)
NRBC # FLD: 0 — SIGNIFICANT CHANGE UP
PLATELET # BLD AUTO: 266 K/UL — SIGNIFICANT CHANGE UP (ref 150–400)
PMV BLD: 10.2 FL — SIGNIFICANT CHANGE UP (ref 7–13)
POTASSIUM SERPL-MCNC: 4.2 MMOL/L — SIGNIFICANT CHANGE UP (ref 3.5–5.3)
POTASSIUM SERPL-SCNC: 4.2 MMOL/L — SIGNIFICANT CHANGE UP (ref 3.5–5.3)
RBC # BLD: 5.36 M/UL — HIGH (ref 3.8–5.2)
RBC # FLD: 13.5 % — SIGNIFICANT CHANGE UP (ref 10.3–14.5)
SODIUM SERPL-SCNC: 142 MMOL/L — SIGNIFICANT CHANGE UP (ref 135–145)
WBC # BLD: 2.8 K/UL — LOW (ref 3.8–10.5)
WBC # FLD AUTO: 2.8 K/UL — LOW (ref 3.8–10.5)

## 2018-03-12 LAB
BASOPHILS # BLD AUTO: 0.07 K/UL — SIGNIFICANT CHANGE UP (ref 0–0.2)
BASOPHILS NFR BLD AUTO: 2.7 % — HIGH (ref 0–2)
EOSINOPHIL # BLD AUTO: 0.1 K/UL — SIGNIFICANT CHANGE UP (ref 0–0.5)
EOSINOPHIL NFR BLD AUTO: 3.8 % — SIGNIFICANT CHANGE UP (ref 0–6)
HCT VFR BLD CALC: 44.6 % — SIGNIFICANT CHANGE UP (ref 34.5–45)
HGB BLD-MCNC: 14.4 G/DL — SIGNIFICANT CHANGE UP (ref 11.5–15.5)
IMM GRANULOCYTES # BLD AUTO: 0 # — SIGNIFICANT CHANGE UP
IMM GRANULOCYTES NFR BLD AUTO: 0 % — SIGNIFICANT CHANGE UP (ref 0–1.5)
LYMPHOCYTES # BLD AUTO: 1.12 K/UL — SIGNIFICANT CHANGE UP (ref 1–3.3)
LYMPHOCYTES # BLD AUTO: 42.6 % — SIGNIFICANT CHANGE UP (ref 13–44)
MCHC RBC-ENTMCNC: 26.3 PG — LOW (ref 27–34)
MCHC RBC-ENTMCNC: 32.3 % — SIGNIFICANT CHANGE UP (ref 32–36)
MCV RBC AUTO: 81.4 FL — SIGNIFICANT CHANGE UP (ref 80–100)
MONOCYTES # BLD AUTO: 0.19 K/UL — SIGNIFICANT CHANGE UP (ref 0–0.9)
MONOCYTES NFR BLD AUTO: 7.2 % — SIGNIFICANT CHANGE UP (ref 2–14)
NEUTROPHILS # BLD AUTO: 1.15 K/UL — LOW (ref 1.8–7.4)
NEUTROPHILS NFR BLD AUTO: 43.7 % — SIGNIFICANT CHANGE UP (ref 43–77)
NRBC # FLD: 0 — SIGNIFICANT CHANGE UP
PLATELET # BLD AUTO: 263 K/UL — SIGNIFICANT CHANGE UP (ref 150–400)
PMV BLD: 10.1 FL — SIGNIFICANT CHANGE UP (ref 7–13)
RBC # BLD: 5.48 M/UL — HIGH (ref 3.8–5.2)
RBC # FLD: 13.7 % — SIGNIFICANT CHANGE UP (ref 10.3–14.5)
WBC # BLD: 2.63 K/UL — LOW (ref 3.8–10.5)
WBC # FLD AUTO: 2.63 K/UL — LOW (ref 3.8–10.5)

## 2018-03-14 LAB
ANISOCYTOSIS BLD QL: SLIGHT — SIGNIFICANT CHANGE UP
BASOPHILS # BLD AUTO: 0.09 K/UL — SIGNIFICANT CHANGE UP (ref 0–0.2)
BASOPHILS NFR BLD AUTO: 3.5 % — HIGH (ref 0–2)
BASOPHILS NFR SPEC: 0.9 % — SIGNIFICANT CHANGE UP (ref 0–2)
ELLIPTOCYTES BLD QL SMEAR: SLIGHT — SIGNIFICANT CHANGE UP
EOSINOPHIL # BLD AUTO: 0.08 K/UL — SIGNIFICANT CHANGE UP (ref 0–0.5)
EOSINOPHIL NFR BLD AUTO: 3.1 % — SIGNIFICANT CHANGE UP (ref 0–6)
EOSINOPHIL NFR FLD: 6.3 % — HIGH (ref 0–6)
GIANT PLATELETS BLD QL SMEAR: PRESENT — SIGNIFICANT CHANGE UP
HCT VFR BLD CALC: 44 % — SIGNIFICANT CHANGE UP (ref 34.5–45)
HGB BLD-MCNC: 13.9 G/DL — SIGNIFICANT CHANGE UP (ref 11.5–15.5)
HYPOCHROMIA BLD QL: SLIGHT — SIGNIFICANT CHANGE UP
IMM GRANULOCYTES # BLD AUTO: 0 # — SIGNIFICANT CHANGE UP
IMM GRANULOCYTES NFR BLD AUTO: 0 % — SIGNIFICANT CHANGE UP (ref 0–1.5)
LYMPHOCYTES # BLD AUTO: 1.21 K/UL — SIGNIFICANT CHANGE UP (ref 1–3.3)
LYMPHOCYTES # BLD AUTO: 47.6 % — HIGH (ref 13–44)
LYMPHOCYTES NFR SPEC AUTO: 43.3 % — SIGNIFICANT CHANGE UP (ref 13–44)
MCHC RBC-ENTMCNC: 26.3 PG — LOW (ref 27–34)
MCHC RBC-ENTMCNC: 31.6 % — LOW (ref 32–36)
MCV RBC AUTO: 83.2 FL — SIGNIFICANT CHANGE UP (ref 80–100)
MONOCYTES # BLD AUTO: 0.22 K/UL — SIGNIFICANT CHANGE UP (ref 0–0.9)
MONOCYTES NFR BLD AUTO: 8.7 % — SIGNIFICANT CHANGE UP (ref 2–14)
MONOCYTES NFR BLD: 9 % — SIGNIFICANT CHANGE UP (ref 2–9)
NEUTROPHIL AB SER-ACNC: 32.4 % — LOW (ref 43–77)
NEUTROPHILS # BLD AUTO: 0.94 K/UL — LOW (ref 1.8–7.4)
NEUTROPHILS NFR BLD AUTO: 37.1 % — LOW (ref 43–77)
NEUTS BAND # BLD: 0.9 % — SIGNIFICANT CHANGE UP (ref 0–6)
NRBC # FLD: 0 — SIGNIFICANT CHANGE UP
PLATELET # BLD AUTO: 262 K/UL — SIGNIFICANT CHANGE UP (ref 150–400)
PLATELET COUNT - ESTIMATE: NORMAL — SIGNIFICANT CHANGE UP
PMV BLD: 10.5 FL — SIGNIFICANT CHANGE UP (ref 7–13)
RBC # BLD: 5.29 M/UL — HIGH (ref 3.8–5.2)
RBC # FLD: 14.2 % — SIGNIFICANT CHANGE UP (ref 10.3–14.5)
VARIANT LYMPHS # BLD: 7.2 % — SIGNIFICANT CHANGE UP
WBC # BLD: 2.54 K/UL — LOW (ref 3.8–10.5)
WBC # FLD AUTO: 2.54 K/UL — LOW (ref 3.8–10.5)

## 2018-03-15 ENCOUNTER — OUTPATIENT (OUTPATIENT)
Dept: OUTPATIENT SERVICES | Facility: HOSPITAL | Age: 48
LOS: 1 days | Discharge: HOME | End: 2018-03-15

## 2018-03-15 DIAGNOSIS — D72.828 OTHER ELEVATED WHITE BLOOD CELL COUNT: ICD-10-CM

## 2018-03-15 LAB
BASOPHILS # BLD AUTO: 0.07 K/UL — SIGNIFICANT CHANGE UP (ref 0–0.2)
BASOPHILS NFR BLD AUTO: 2.9 % — HIGH (ref 0–2)
EOSINOPHIL # BLD AUTO: 0.08 K/UL — SIGNIFICANT CHANGE UP (ref 0–0.5)
EOSINOPHIL NFR BLD AUTO: 3.3 % — SIGNIFICANT CHANGE UP (ref 0–6)
HCT VFR BLD CALC: 42.5 % — SIGNIFICANT CHANGE UP (ref 34.5–45)
HGB BLD-MCNC: 13.9 G/DL — SIGNIFICANT CHANGE UP (ref 11.5–15.5)
IMM GRANULOCYTES # BLD AUTO: 0 # — SIGNIFICANT CHANGE UP
IMM GRANULOCYTES NFR BLD AUTO: 0 % — SIGNIFICANT CHANGE UP (ref 0–1.5)
LITHIUM SERPL-MCNC: 0.72 MMOL/L — SIGNIFICANT CHANGE UP (ref 0.6–1.2)
LYMPHOCYTES # BLD AUTO: 1.17 K/UL — SIGNIFICANT CHANGE UP (ref 1–3.3)
LYMPHOCYTES # BLD AUTO: 49 % — HIGH (ref 13–44)
MCHC RBC-ENTMCNC: 27.3 PG — SIGNIFICANT CHANGE UP (ref 27–34)
MCHC RBC-ENTMCNC: 32.7 % — SIGNIFICANT CHANGE UP (ref 32–36)
MCV RBC AUTO: 83.3 FL — SIGNIFICANT CHANGE UP (ref 80–100)
MONOCYTES # BLD AUTO: 0.17 K/UL — SIGNIFICANT CHANGE UP (ref 0–0.9)
MONOCYTES NFR BLD AUTO: 7.1 % — SIGNIFICANT CHANGE UP (ref 2–14)
NEUTROPHILS # BLD AUTO: 0.9 K/UL — LOW (ref 1.8–7.4)
NEUTROPHILS NFR BLD AUTO: 37.7 % — LOW (ref 43–77)
NRBC # FLD: 0 — SIGNIFICANT CHANGE UP
PLATELET # BLD AUTO: 233 K/UL — SIGNIFICANT CHANGE UP (ref 150–400)
PMV BLD: 10.3 FL — SIGNIFICANT CHANGE UP (ref 7–13)
RBC # BLD: 5.1 M/UL — SIGNIFICANT CHANGE UP (ref 3.8–5.2)
RBC # FLD: 14 % — SIGNIFICANT CHANGE UP (ref 10.3–14.5)
WBC # BLD: 2.39 K/UL — LOW (ref 3.8–10.5)
WBC # FLD AUTO: 2.39 K/UL — LOW (ref 3.8–10.5)

## 2018-03-16 LAB
BASOPHILS # BLD AUTO: 0.08 K/UL — SIGNIFICANT CHANGE UP (ref 0–0.2)
BASOPHILS NFR BLD AUTO: 3 % — HIGH (ref 0–2)
EOSINOPHIL # BLD AUTO: 0.07 K/UL — SIGNIFICANT CHANGE UP (ref 0–0.5)
EOSINOPHIL NFR BLD AUTO: 2.7 % — SIGNIFICANT CHANGE UP (ref 0–6)
HCT VFR BLD CALC: 43.1 % — SIGNIFICANT CHANGE UP (ref 34.5–45)
HGB BLD-MCNC: 14.2 G/DL — SIGNIFICANT CHANGE UP (ref 11.5–15.5)
IMM GRANULOCYTES # BLD AUTO: 0.01 # — SIGNIFICANT CHANGE UP
IMM GRANULOCYTES NFR BLD AUTO: 0.4 % — SIGNIFICANT CHANGE UP (ref 0–1.5)
LITHIUM SERPL-MCNC: 0.79 MMOL/L — SIGNIFICANT CHANGE UP (ref 0.6–1.2)
LYMPHOCYTES # BLD AUTO: 1.36 K/UL — SIGNIFICANT CHANGE UP (ref 1–3.3)
LYMPHOCYTES # BLD AUTO: 51.7 % — HIGH (ref 13–44)
MCHC RBC-ENTMCNC: 27.5 PG — SIGNIFICANT CHANGE UP (ref 27–34)
MCHC RBC-ENTMCNC: 32.9 % — SIGNIFICANT CHANGE UP (ref 32–36)
MCV RBC AUTO: 83.4 FL — SIGNIFICANT CHANGE UP (ref 80–100)
MONOCYTES # BLD AUTO: 0.25 K/UL — SIGNIFICANT CHANGE UP (ref 0–0.9)
MONOCYTES NFR BLD AUTO: 9.5 % — SIGNIFICANT CHANGE UP (ref 2–14)
NEUTROPHILS # BLD AUTO: 0.86 K/UL — LOW (ref 1.8–7.4)
NEUTROPHILS NFR BLD AUTO: 32.7 % — LOW (ref 43–77)
NRBC # FLD: 0 — SIGNIFICANT CHANGE UP
PLATELET # BLD AUTO: 270 K/UL — SIGNIFICANT CHANGE UP (ref 150–400)
PMV BLD: 10.5 FL — SIGNIFICANT CHANGE UP (ref 7–13)
RBC # BLD: 5.17 M/UL — SIGNIFICANT CHANGE UP (ref 3.8–5.2)
RBC # FLD: 14 % — SIGNIFICANT CHANGE UP (ref 10.3–14.5)
WBC # BLD: 2.63 K/UL — LOW (ref 3.8–10.5)
WBC # FLD AUTO: 2.63 K/UL — LOW (ref 3.8–10.5)

## 2018-03-19 ENCOUNTER — RESULT REVIEW (OUTPATIENT)
Age: 48
End: 2018-03-19

## 2018-03-19 ENCOUNTER — APPOINTMENT (OUTPATIENT)
Dept: HEMATOLOGY ONCOLOGY | Facility: CLINIC | Age: 48
End: 2018-03-19
Payer: COMMERCIAL

## 2018-03-19 VITALS
SYSTOLIC BLOOD PRESSURE: 140 MMHG | BODY MASS INDEX: 36.12 KG/M2 | OXYGEN SATURATION: 100 % | TEMPERATURE: 98 F | RESPIRATION RATE: 16 BRPM | HEART RATE: 79 BPM | WEIGHT: 203.93 LBS | DIASTOLIC BLOOD PRESSURE: 80 MMHG

## 2018-03-19 DIAGNOSIS — Z87.891 PERSONAL HISTORY OF NICOTINE DEPENDENCE: ICD-10-CM

## 2018-03-19 DIAGNOSIS — Z87.09 PERSONAL HISTORY OF OTHER DISEASES OF THE RESPIRATORY SYSTEM: ICD-10-CM

## 2018-03-19 DIAGNOSIS — Z83.42 FAMILY HISTORY OF FAMILIAL HYPERCHOLESTEROLEMIA: ICD-10-CM

## 2018-03-19 DIAGNOSIS — Z82.49 FAMILY HISTORY OF ISCHEMIC HEART DISEASE AND OTHER DISEASES OF THE CIRCULATORY SYSTEM: ICD-10-CM

## 2018-03-19 DIAGNOSIS — Z86.2 PERSONAL HISTORY OF DISEASES OF THE BLOOD AND BLOOD-FORMING ORGANS AND CERTAIN DISORDERS INVOLVING THE IMMUNE MECHANISM: ICD-10-CM

## 2018-03-19 DIAGNOSIS — Z87.898 PERSONAL HISTORY OF OTHER SPECIFIED CONDITIONS: ICD-10-CM

## 2018-03-19 LAB
BASOPHILS # BLD AUTO: 0.1 K/UL — SIGNIFICANT CHANGE UP (ref 0–0.2)
BASOPHILS NFR BLD AUTO: 2 % — SIGNIFICANT CHANGE UP (ref 0–2)
EOSINOPHIL # BLD AUTO: 0.1 K/UL — SIGNIFICANT CHANGE UP (ref 0–0.5)
EOSINOPHIL NFR BLD AUTO: 3.3 % — SIGNIFICANT CHANGE UP (ref 0–6)
HCT VFR BLD CALC: 41.1 % — SIGNIFICANT CHANGE UP (ref 34.5–45)
HGB BLD-MCNC: 14.4 G/DL — SIGNIFICANT CHANGE UP (ref 11.5–15.5)
LYMPHOCYTES # BLD AUTO: 1.5 K/UL — SIGNIFICANT CHANGE UP (ref 1–3.3)
LYMPHOCYTES # BLD AUTO: 43.4 % — SIGNIFICANT CHANGE UP (ref 13–44)
MCHC RBC-ENTMCNC: 28 PG — SIGNIFICANT CHANGE UP (ref 27–34)
MCHC RBC-ENTMCNC: 35 G/DL — SIGNIFICANT CHANGE UP (ref 32–36)
MCV RBC AUTO: 80.1 FL — SIGNIFICANT CHANGE UP (ref 80–100)
MONOCYTES # BLD AUTO: 0.4 K/UL — SIGNIFICANT CHANGE UP (ref 0–0.9)
MONOCYTES NFR BLD AUTO: 12.7 % — SIGNIFICANT CHANGE UP (ref 2–14)
NEUTROPHILS # BLD AUTO: 1.3 K/UL — LOW (ref 1.8–7.4)
NEUTROPHILS NFR BLD AUTO: 38.5 % — LOW (ref 43–77)
PLATELET # BLD AUTO: 261 K/UL — SIGNIFICANT CHANGE UP (ref 150–400)
RBC # BLD: 5.13 M/UL — SIGNIFICANT CHANGE UP (ref 3.8–5.2)
RBC # FLD: 13.1 % — SIGNIFICANT CHANGE UP (ref 10.3–14.5)
WBC # BLD: 3.4 K/UL — LOW (ref 3.8–10.5)
WBC # FLD AUTO: 3.4 K/UL — LOW (ref 3.8–10.5)

## 2018-03-19 PROCEDURE — 99205 OFFICE O/P NEW HI 60 MIN: CPT

## 2018-03-19 RX ORDER — CLOZAPINE 100 MG/1
100 TABLET ORAL
Qty: 21 | Refills: 0 | Status: DISCONTINUED | COMMUNITY
Start: 2017-06-22 | End: 2018-03-19

## 2018-03-19 RX ORDER — LEVETIRACETAM 250 MG/1
250 TABLET, FILM COATED ORAL AT BEDTIME
Qty: 30 | Refills: 0 | Status: DISCONTINUED | COMMUNITY
Start: 2017-08-08 | End: 2018-03-19

## 2018-03-19 RX ORDER — PNV NO.95/FERROUS FUM/FOLIC AC 28MG-0.8MG
TABLET ORAL
Refills: 0 | Status: DISCONTINUED | COMMUNITY
End: 2018-03-19

## 2018-03-19 RX ORDER — ALPRAZOLAM 0.25 MG/1
0.25 TABLET ORAL
Refills: 0 | Status: DISCONTINUED | COMMUNITY
End: 2018-03-19

## 2018-03-23 LAB
BASOPHILS # BLD AUTO: 0.08 K/UL — SIGNIFICANT CHANGE UP (ref 0–0.2)
BASOPHILS NFR BLD AUTO: 1.8 % — SIGNIFICANT CHANGE UP (ref 0–2)
EOSINOPHIL # BLD AUTO: 0.07 K/UL — SIGNIFICANT CHANGE UP (ref 0–0.5)
EOSINOPHIL NFR BLD AUTO: 1.6 % — SIGNIFICANT CHANGE UP (ref 0–6)
HCT VFR BLD CALC: 45.1 % — HIGH (ref 34.5–45)
HGB BLD-MCNC: 14.5 G/DL — SIGNIFICANT CHANGE UP (ref 11.5–15.5)
IMM GRANULOCYTES # BLD AUTO: 0.03 # — SIGNIFICANT CHANGE UP
IMM GRANULOCYTES NFR BLD AUTO: 0.7 % — SIGNIFICANT CHANGE UP (ref 0–1.5)
LITHIUM SERPL-MCNC: 0.71 MMOL/L — SIGNIFICANT CHANGE UP (ref 0.6–1.2)
LYMPHOCYTES # BLD AUTO: 1.34 K/UL — SIGNIFICANT CHANGE UP (ref 1–3.3)
LYMPHOCYTES # BLD AUTO: 29.8 % — SIGNIFICANT CHANGE UP (ref 13–44)
MCHC RBC-ENTMCNC: 26.6 PG — LOW (ref 27–34)
MCHC RBC-ENTMCNC: 32.2 % — SIGNIFICANT CHANGE UP (ref 32–36)
MCV RBC AUTO: 82.6 FL — SIGNIFICANT CHANGE UP (ref 80–100)
MONOCYTES # BLD AUTO: 0.57 K/UL — SIGNIFICANT CHANGE UP (ref 0–0.9)
MONOCYTES NFR BLD AUTO: 12.7 % — SIGNIFICANT CHANGE UP (ref 2–14)
NEUTROPHILS # BLD AUTO: 2.41 K/UL — SIGNIFICANT CHANGE UP (ref 1.8–7.4)
NEUTROPHILS NFR BLD AUTO: 53.4 % — SIGNIFICANT CHANGE UP (ref 43–77)
NRBC # FLD: 0 — SIGNIFICANT CHANGE UP
PLATELET # BLD AUTO: 298 K/UL — SIGNIFICANT CHANGE UP (ref 150–400)
PMV BLD: 10.4 FL — SIGNIFICANT CHANGE UP (ref 7–13)
RBC # BLD: 5.46 M/UL — HIGH (ref 3.8–5.2)
RBC # FLD: 14.6 % — HIGH (ref 10.3–14.5)
WBC # BLD: 4.5 K/UL — SIGNIFICANT CHANGE UP (ref 3.8–10.5)
WBC # FLD AUTO: 4.5 K/UL — SIGNIFICANT CHANGE UP (ref 3.8–10.5)

## 2018-03-30 ENCOUNTER — RESULT REVIEW (OUTPATIENT)
Age: 48
End: 2018-03-30

## 2018-03-30 ENCOUNTER — APPOINTMENT (OUTPATIENT)
Dept: HEMATOLOGY ONCOLOGY | Facility: CLINIC | Age: 48
End: 2018-03-30

## 2018-03-30 LAB
BASOPHILS # BLD AUTO: 0 K/UL — SIGNIFICANT CHANGE UP (ref 0–0.2)
BASOPHILS NFR BLD AUTO: 0.5 % — SIGNIFICANT CHANGE UP (ref 0–2)
BUN SERPL-MCNC: 14 MG/DL — SIGNIFICANT CHANGE UP (ref 7–23)
CALCIUM SERPL-MCNC: 9.5 MG/DL — SIGNIFICANT CHANGE UP (ref 8.4–10.5)
CHLORIDE SERPL-SCNC: 103 MMOL/L — SIGNIFICANT CHANGE UP (ref 98–107)
CO2 SERPL-SCNC: 23 MMOL/L — SIGNIFICANT CHANGE UP (ref 22–31)
CREAT SERPL-MCNC: 0.93 MG/DL — SIGNIFICANT CHANGE UP (ref 0.5–1.3)
EOSINOPHIL # BLD AUTO: 0.2 K/UL — SIGNIFICANT CHANGE UP (ref 0–0.5)
EOSINOPHIL NFR BLD AUTO: 1.8 % — SIGNIFICANT CHANGE UP (ref 0–6)
GLUCOSE SERPL-MCNC: 87 MG/DL — SIGNIFICANT CHANGE UP (ref 70–99)
HCT VFR BLD CALC: 41.4 % — SIGNIFICANT CHANGE UP (ref 34.5–45)
HGB BLD-MCNC: 14.5 G/DL — SIGNIFICANT CHANGE UP (ref 11.5–15.5)
LITHIUM SERPL-MCNC: 0.59 MMOL/L — LOW (ref 0.6–1.2)
LYMPHOCYTES # BLD AUTO: 1.6 K/UL — SIGNIFICANT CHANGE UP (ref 1–3.3)
LYMPHOCYTES # BLD AUTO: 17.5 % — SIGNIFICANT CHANGE UP (ref 13–44)
MCHC RBC-ENTMCNC: 28.1 PG — SIGNIFICANT CHANGE UP (ref 27–34)
MCHC RBC-ENTMCNC: 34.9 G/DL — SIGNIFICANT CHANGE UP (ref 32–36)
MCV RBC AUTO: 80.5 FL — SIGNIFICANT CHANGE UP (ref 80–100)
MONOCYTES # BLD AUTO: 0.6 K/UL — SIGNIFICANT CHANGE UP (ref 0–0.9)
MONOCYTES NFR BLD AUTO: 6.2 % — SIGNIFICANT CHANGE UP (ref 2–14)
NEUTROPHILS # BLD AUTO: 6.7 K/UL — SIGNIFICANT CHANGE UP (ref 1.8–7.4)
NEUTROPHILS NFR BLD AUTO: 74.2 % — SIGNIFICANT CHANGE UP (ref 43–77)
PLATELET # BLD AUTO: 181 K/UL — SIGNIFICANT CHANGE UP (ref 150–400)
POTASSIUM SERPL-MCNC: 4.5 MMOL/L — SIGNIFICANT CHANGE UP (ref 3.5–5.3)
POTASSIUM SERPL-SCNC: 4.5 MMOL/L — SIGNIFICANT CHANGE UP (ref 3.5–5.3)
RBC # BLD: 5.14 M/UL — SIGNIFICANT CHANGE UP (ref 3.8–5.2)
RBC # FLD: 13.8 % — SIGNIFICANT CHANGE UP (ref 10.3–14.5)
SODIUM SERPL-SCNC: 140 MMOL/L — SIGNIFICANT CHANGE UP (ref 135–145)
WBC # BLD: 9.1 K/UL — SIGNIFICANT CHANGE UP (ref 3.8–10.5)
WBC # FLD AUTO: 9.1 K/UL — SIGNIFICANT CHANGE UP (ref 3.8–10.5)

## 2018-04-06 LAB
BASOPHILS # BLD AUTO: 0.08 K/UL — SIGNIFICANT CHANGE UP (ref 0–0.2)
BASOPHILS NFR BLD AUTO: 1 % — SIGNIFICANT CHANGE UP (ref 0–2)
EOSINOPHIL # BLD AUTO: 0.2 K/UL — SIGNIFICANT CHANGE UP (ref 0–0.5)
EOSINOPHIL NFR BLD AUTO: 2.6 % — SIGNIFICANT CHANGE UP (ref 0–6)
HCT VFR BLD CALC: 46.2 % — HIGH (ref 34.5–45)
HGB BLD-MCNC: 14.8 G/DL — SIGNIFICANT CHANGE UP (ref 11.5–15.5)
IMM GRANULOCYTES # BLD AUTO: 0.02 # — SIGNIFICANT CHANGE UP
IMM GRANULOCYTES NFR BLD AUTO: 0.3 % — SIGNIFICANT CHANGE UP (ref 0–1.5)
LITHIUM SERPL-MCNC: 0.82 MMOL/L — SIGNIFICANT CHANGE UP (ref 0.6–1.2)
LYMPHOCYTES # BLD AUTO: 1.5 K/UL — SIGNIFICANT CHANGE UP (ref 1–3.3)
LYMPHOCYTES # BLD AUTO: 19.1 % — SIGNIFICANT CHANGE UP (ref 13–44)
MCHC RBC-ENTMCNC: 26.7 PG — LOW (ref 27–34)
MCHC RBC-ENTMCNC: 32 % — SIGNIFICANT CHANGE UP (ref 32–36)
MCV RBC AUTO: 83.2 FL — SIGNIFICANT CHANGE UP (ref 80–100)
MONOCYTES # BLD AUTO: 0.46 K/UL — SIGNIFICANT CHANGE UP (ref 0–0.9)
MONOCYTES NFR BLD AUTO: 5.9 % — SIGNIFICANT CHANGE UP (ref 2–14)
NEUTROPHILS # BLD AUTO: 5.58 K/UL — SIGNIFICANT CHANGE UP (ref 1.8–7.4)
NEUTROPHILS NFR BLD AUTO: 71.1 % — SIGNIFICANT CHANGE UP (ref 43–77)
NRBC # FLD: 0 — SIGNIFICANT CHANGE UP
PLATELET # BLD AUTO: 197 K/UL — SIGNIFICANT CHANGE UP (ref 150–400)
PMV BLD: 10.5 FL — SIGNIFICANT CHANGE UP (ref 7–13)
RBC # BLD: 5.55 M/UL — HIGH (ref 3.8–5.2)
RBC # FLD: 15 % — HIGH (ref 10.3–14.5)
WBC # BLD: 7.84 K/UL — SIGNIFICANT CHANGE UP (ref 3.8–10.5)
WBC # FLD AUTO: 7.84 K/UL — SIGNIFICANT CHANGE UP (ref 3.8–10.5)

## 2018-04-11 LAB
BASOPHILS # BLD AUTO: 0.09 K/UL — SIGNIFICANT CHANGE UP (ref 0–0.2)
BASOPHILS NFR BLD AUTO: 1.1 % — SIGNIFICANT CHANGE UP (ref 0–2)
BUN SERPL-MCNC: 12 MG/DL — SIGNIFICANT CHANGE UP (ref 7–23)
CALCIUM SERPL-MCNC: 10 MG/DL — SIGNIFICANT CHANGE UP (ref 8.4–10.5)
CHLORIDE SERPL-SCNC: 105 MMOL/L — SIGNIFICANT CHANGE UP (ref 98–107)
CO2 SERPL-SCNC: 20 MMOL/L — LOW (ref 22–31)
CREAT SERPL-MCNC: 0.87 MG/DL — SIGNIFICANT CHANGE UP (ref 0.5–1.3)
EOSINOPHIL # BLD AUTO: 0.38 K/UL — SIGNIFICANT CHANGE UP (ref 0–0.5)
EOSINOPHIL NFR BLD AUTO: 4.4 % — SIGNIFICANT CHANGE UP (ref 0–6)
GLUCOSE SERPL-MCNC: 90 MG/DL — SIGNIFICANT CHANGE UP (ref 70–99)
HCT VFR BLD CALC: 46.3 % — HIGH (ref 34.5–45)
HGB BLD-MCNC: 14.6 G/DL — SIGNIFICANT CHANGE UP (ref 11.5–15.5)
IMM GRANULOCYTES # BLD AUTO: 0.03 # — SIGNIFICANT CHANGE UP
IMM GRANULOCYTES NFR BLD AUTO: 0.4 % — SIGNIFICANT CHANGE UP (ref 0–1.5)
LITHIUM SERPL-MCNC: 0.76 MMOL/L — SIGNIFICANT CHANGE UP (ref 0.6–1.2)
LYMPHOCYTES # BLD AUTO: 1.41 K/UL — SIGNIFICANT CHANGE UP (ref 1–3.3)
LYMPHOCYTES # BLD AUTO: 16.5 % — SIGNIFICANT CHANGE UP (ref 13–44)
MCHC RBC-ENTMCNC: 26.7 PG — LOW (ref 27–34)
MCHC RBC-ENTMCNC: 31.5 % — LOW (ref 32–36)
MCV RBC AUTO: 84.8 FL — SIGNIFICANT CHANGE UP (ref 80–100)
MONOCYTES # BLD AUTO: 0.4 K/UL — SIGNIFICANT CHANGE UP (ref 0–0.9)
MONOCYTES NFR BLD AUTO: 4.7 % — SIGNIFICANT CHANGE UP (ref 2–14)
NEUTROPHILS # BLD AUTO: 6.26 K/UL — SIGNIFICANT CHANGE UP (ref 1.8–7.4)
NEUTROPHILS NFR BLD AUTO: 72.9 % — SIGNIFICANT CHANGE UP (ref 43–77)
NRBC # FLD: 0 — SIGNIFICANT CHANGE UP
PLATELET # BLD AUTO: 190 K/UL — SIGNIFICANT CHANGE UP (ref 150–400)
PMV BLD: 10.2 FL — SIGNIFICANT CHANGE UP (ref 7–13)
POTASSIUM SERPL-MCNC: 4.3 MMOL/L — SIGNIFICANT CHANGE UP (ref 3.5–5.3)
POTASSIUM SERPL-SCNC: 4.3 MMOL/L — SIGNIFICANT CHANGE UP (ref 3.5–5.3)
RBC # BLD: 5.46 M/UL — HIGH (ref 3.8–5.2)
RBC # FLD: 15.2 % — HIGH (ref 10.3–14.5)
SODIUM SERPL-SCNC: 141 MMOL/L — SIGNIFICANT CHANGE UP (ref 135–145)
WBC # BLD: 8.57 K/UL — SIGNIFICANT CHANGE UP (ref 3.8–10.5)
WBC # FLD AUTO: 8.57 K/UL — SIGNIFICANT CHANGE UP (ref 3.8–10.5)

## 2018-04-13 ENCOUNTER — RESULT REVIEW (OUTPATIENT)
Age: 48
End: 2018-04-13

## 2018-04-13 ENCOUNTER — APPOINTMENT (OUTPATIENT)
Dept: HEMATOLOGY ONCOLOGY | Facility: CLINIC | Age: 48
End: 2018-04-13

## 2018-04-13 LAB
BASOPHILS # BLD AUTO: 0.1 K/UL — SIGNIFICANT CHANGE UP (ref 0–0.2)
BASOPHILS NFR BLD AUTO: 0.6 % — SIGNIFICANT CHANGE UP (ref 0–2)
EOSINOPHIL # BLD AUTO: 0.5 K/UL — SIGNIFICANT CHANGE UP (ref 0–0.5)
EOSINOPHIL NFR BLD AUTO: 5.8 % — SIGNIFICANT CHANGE UP (ref 0–6)
HCT VFR BLD CALC: 41.7 % — SIGNIFICANT CHANGE UP (ref 34.5–45)
HGB BLD-MCNC: 14.3 G/DL — SIGNIFICANT CHANGE UP (ref 11.5–15.5)
LYMPHOCYTES # BLD AUTO: 1.8 K/UL — SIGNIFICANT CHANGE UP (ref 1–3.3)
LYMPHOCYTES # BLD AUTO: 21.7 % — SIGNIFICANT CHANGE UP (ref 13–44)
MCHC RBC-ENTMCNC: 27.8 PG — SIGNIFICANT CHANGE UP (ref 27–34)
MCHC RBC-ENTMCNC: 34.3 G/DL — SIGNIFICANT CHANGE UP (ref 32–36)
MCV RBC AUTO: 81.2 FL — SIGNIFICANT CHANGE UP (ref 80–100)
MONOCYTES # BLD AUTO: 0.5 K/UL — SIGNIFICANT CHANGE UP (ref 0–0.9)
MONOCYTES NFR BLD AUTO: 6.1 % — SIGNIFICANT CHANGE UP (ref 2–14)
NEUTROPHILS # BLD AUTO: 5.6 K/UL — SIGNIFICANT CHANGE UP (ref 1.8–7.4)
NEUTROPHILS NFR BLD AUTO: 65.8 % — SIGNIFICANT CHANGE UP (ref 43–77)
PLATELET # BLD AUTO: 192 K/UL — SIGNIFICANT CHANGE UP (ref 150–400)
RBC # BLD: 5.14 M/UL — SIGNIFICANT CHANGE UP (ref 3.8–5.2)
RBC # FLD: 14.4 % — SIGNIFICANT CHANGE UP (ref 10.3–14.5)
WBC # BLD: 8.5 K/UL — SIGNIFICANT CHANGE UP (ref 3.8–10.5)
WBC # FLD AUTO: 8.5 K/UL — SIGNIFICANT CHANGE UP (ref 3.8–10.5)

## 2018-04-14 ENCOUNTER — APPOINTMENT (OUTPATIENT)
Dept: INTERNAL MEDICINE | Facility: CLINIC | Age: 48
End: 2018-04-14
Payer: COMMERCIAL

## 2018-04-14 ENCOUNTER — NON-APPOINTMENT (OUTPATIENT)
Age: 48
End: 2018-04-14

## 2018-04-14 VITALS — SYSTOLIC BLOOD PRESSURE: 130 MMHG | DIASTOLIC BLOOD PRESSURE: 80 MMHG

## 2018-04-14 VITALS — SYSTOLIC BLOOD PRESSURE: 110 MMHG | DIASTOLIC BLOOD PRESSURE: 90 MMHG

## 2018-04-14 VITALS — DIASTOLIC BLOOD PRESSURE: 90 MMHG | SYSTOLIC BLOOD PRESSURE: 120 MMHG

## 2018-04-14 VITALS
SYSTOLIC BLOOD PRESSURE: 126 MMHG | WEIGHT: 196 LBS | TEMPERATURE: 98.4 F | DIASTOLIC BLOOD PRESSURE: 90 MMHG | BODY MASS INDEX: 34.72 KG/M2

## 2018-04-14 VITALS — DIASTOLIC BLOOD PRESSURE: 100 MMHG | SYSTOLIC BLOOD PRESSURE: 130 MMHG

## 2018-04-14 VITALS — DIASTOLIC BLOOD PRESSURE: 100 MMHG | SYSTOLIC BLOOD PRESSURE: 140 MMHG

## 2018-04-14 DIAGNOSIS — R00.0 TACHYCARDIA, UNSPECIFIED: ICD-10-CM

## 2018-04-14 PROCEDURE — 93000 ELECTROCARDIOGRAM COMPLETE: CPT

## 2018-04-14 PROCEDURE — 99214 OFFICE O/P EST MOD 30 MIN: CPT | Mod: 25

## 2018-04-14 PROCEDURE — 36415 COLL VENOUS BLD VENIPUNCTURE: CPT

## 2018-04-15 LAB
ALBUMIN SERPL ELPH-MCNC: 4.8 G/DL
ALP BLD-CCNC: 61 U/L
ALT SERPL-CCNC: 6 U/L
ANION GAP SERPL CALC-SCNC: 16 MMOL/L
AST SERPL-CCNC: 10 U/L
BILIRUB SERPL-MCNC: 1.1 MG/DL
BUN SERPL-MCNC: 13 MG/DL
CALCIUM SERPL-MCNC: 9.9 MG/DL
CHLORIDE SERPL-SCNC: 108 MMOL/L
CHOLEST SERPL-MCNC: 140 MG/DL
CHOLEST/HDLC SERPL: 2.1 RATIO
CO2 SERPL-SCNC: 18 MMOL/L
CREAT SERPL-MCNC: 0.89 MG/DL
GLUCOSE SERPL-MCNC: 91 MG/DL
HBA1C MFR BLD HPLC: 5.1 %
HDLC SERPL-MCNC: 68 MG/DL
LDLC SERPL CALC-MCNC: 56 MG/DL
LDLC SERPL DIRECT ASSAY-MCNC: 65 MG/DL
POTASSIUM SERPL-SCNC: 4.3 MMOL/L
PROT SERPL-MCNC: 7.3 G/DL
SAVE SPECIMEN: NORMAL
SODIUM SERPL-SCNC: 142 MMOL/L
T4 FREE SERPL-MCNC: 1.6 NG/DL
TRIGL SERPL-MCNC: 79 MG/DL
TSH SERPL-ACNC: 4.23 UIU/ML

## 2018-04-20 LAB
ALT FLD-CCNC: 4 U/L — SIGNIFICANT CHANGE UP (ref 4–33)
AST SERPL-CCNC: 8 U/L — SIGNIFICANT CHANGE UP (ref 4–32)
BASOPHILS # BLD AUTO: 0.06 K/UL — SIGNIFICANT CHANGE UP (ref 0–0.2)
BASOPHILS NFR BLD AUTO: 0.9 % — SIGNIFICANT CHANGE UP (ref 0–2)
BUN SERPL-MCNC: 16 MG/DL — SIGNIFICANT CHANGE UP (ref 7–23)
CALCIUM SERPL-MCNC: 9.9 MG/DL — SIGNIFICANT CHANGE UP (ref 8.4–10.5)
CHLORIDE SERPL-SCNC: 103 MMOL/L — SIGNIFICANT CHANGE UP (ref 98–107)
CO2 SERPL-SCNC: 22 MMOL/L — SIGNIFICANT CHANGE UP (ref 22–31)
CREAT SERPL-MCNC: 0.86 MG/DL — SIGNIFICANT CHANGE UP (ref 0.5–1.3)
EOSINOPHIL # BLD AUTO: 0.41 K/UL — SIGNIFICANT CHANGE UP (ref 0–0.5)
EOSINOPHIL NFR BLD AUTO: 6 % — SIGNIFICANT CHANGE UP (ref 0–6)
GLUCOSE SERPL-MCNC: 88 MG/DL — SIGNIFICANT CHANGE UP (ref 70–99)
HCT VFR BLD CALC: 45.3 % — HIGH (ref 34.5–45)
HGB BLD-MCNC: 14.7 G/DL — SIGNIFICANT CHANGE UP (ref 11.5–15.5)
IMM GRANULOCYTES # BLD AUTO: 0.02 # — SIGNIFICANT CHANGE UP
IMM GRANULOCYTES NFR BLD AUTO: 0.3 % — SIGNIFICANT CHANGE UP (ref 0–1.5)
LITHIUM SERPL-MCNC: 0.57 MMOL/L — LOW (ref 0.6–1.2)
LYMPHOCYTES # BLD AUTO: 1.67 K/UL — SIGNIFICANT CHANGE UP (ref 1–3.3)
LYMPHOCYTES # BLD AUTO: 24.5 % — SIGNIFICANT CHANGE UP (ref 13–44)
MCHC RBC-ENTMCNC: 27.1 PG — SIGNIFICANT CHANGE UP (ref 27–34)
MCHC RBC-ENTMCNC: 32.5 % — SIGNIFICANT CHANGE UP (ref 32–36)
MCV RBC AUTO: 83.6 FL — SIGNIFICANT CHANGE UP (ref 80–100)
MONOCYTES # BLD AUTO: 0.32 K/UL — SIGNIFICANT CHANGE UP (ref 0–0.9)
MONOCYTES NFR BLD AUTO: 4.7 % — SIGNIFICANT CHANGE UP (ref 2–14)
NEUTROPHILS # BLD AUTO: 4.33 K/UL — SIGNIFICANT CHANGE UP (ref 1.8–7.4)
NEUTROPHILS NFR BLD AUTO: 63.6 % — SIGNIFICANT CHANGE UP (ref 43–77)
NRBC # FLD: 0 — SIGNIFICANT CHANGE UP
PLATELET # BLD AUTO: 265 K/UL — SIGNIFICANT CHANGE UP (ref 150–400)
PMV BLD: 9.9 FL — SIGNIFICANT CHANGE UP (ref 7–13)
POTASSIUM SERPL-MCNC: 4.3 MMOL/L — SIGNIFICANT CHANGE UP (ref 3.5–5.3)
POTASSIUM SERPL-SCNC: 4.3 MMOL/L — SIGNIFICANT CHANGE UP (ref 3.5–5.3)
RBC # BLD: 5.42 M/UL — HIGH (ref 3.8–5.2)
RBC # FLD: 15.5 % — HIGH (ref 10.3–14.5)
SODIUM SERPL-SCNC: 140 MMOL/L — SIGNIFICANT CHANGE UP (ref 135–145)
WBC # BLD: 6.81 K/UL — SIGNIFICANT CHANGE UP (ref 3.8–10.5)
WBC # FLD AUTO: 6.81 K/UL — SIGNIFICANT CHANGE UP (ref 3.8–10.5)

## 2018-04-26 ENCOUNTER — OUTPATIENT (OUTPATIENT)
Dept: OUTPATIENT SERVICES | Facility: HOSPITAL | Age: 48
LOS: 1 days | Discharge: HOME | End: 2018-04-26

## 2018-04-26 DIAGNOSIS — D72.828 OTHER ELEVATED WHITE BLOOD CELL COUNT: ICD-10-CM

## 2018-04-27 ENCOUNTER — OUTPATIENT (OUTPATIENT)
Dept: OUTPATIENT SERVICES | Facility: HOSPITAL | Age: 48
LOS: 1 days | Discharge: HOME | End: 2018-04-27
Payer: COMMERCIAL

## 2018-04-27 LAB
BASOPHILS # BLD AUTO: 0.06 K/UL — SIGNIFICANT CHANGE UP (ref 0–0.2)
BASOPHILS NFR BLD AUTO: 0.8 % — SIGNIFICANT CHANGE UP (ref 0–2)
BUN SERPL-MCNC: 15 MG/DL — SIGNIFICANT CHANGE UP (ref 7–23)
CALCIUM SERPL-MCNC: 10.1 MG/DL — SIGNIFICANT CHANGE UP (ref 8.4–10.5)
CHLORIDE SERPL-SCNC: 106 MMOL/L — SIGNIFICANT CHANGE UP (ref 98–107)
CO2 SERPL-SCNC: 21 MMOL/L — LOW (ref 22–31)
CREAT SERPL-MCNC: 0.88 MG/DL — SIGNIFICANT CHANGE UP (ref 0.5–1.3)
EOSINOPHIL # BLD AUTO: 0.27 K/UL — SIGNIFICANT CHANGE UP (ref 0–0.5)
EOSINOPHIL NFR BLD AUTO: 3.7 % — SIGNIFICANT CHANGE UP (ref 0–6)
GLUCOSE SERPL-MCNC: 87 MG/DL — SIGNIFICANT CHANGE UP (ref 70–99)
HCT VFR BLD CALC: 44.2 % — SIGNIFICANT CHANGE UP (ref 34.5–45)
HGB BLD-MCNC: 14.1 G/DL — SIGNIFICANT CHANGE UP (ref 11.5–15.5)
IMM GRANULOCYTES # BLD AUTO: 0.01 # — SIGNIFICANT CHANGE UP
IMM GRANULOCYTES NFR BLD AUTO: 0.1 % — SIGNIFICANT CHANGE UP (ref 0–1.5)
LITHIUM SERPL-MCNC: 0.71 MMOL/L — SIGNIFICANT CHANGE UP (ref 0.6–1.2)
LYMPHOCYTES # BLD AUTO: 1.46 K/UL — SIGNIFICANT CHANGE UP (ref 1–3.3)
LYMPHOCYTES # BLD AUTO: 20 % — SIGNIFICANT CHANGE UP (ref 13–44)
MCHC RBC-ENTMCNC: 26.9 PG — LOW (ref 27–34)
MCHC RBC-ENTMCNC: 31.9 % — LOW (ref 32–36)
MCV RBC AUTO: 84.4 FL — SIGNIFICANT CHANGE UP (ref 80–100)
MONOCYTES # BLD AUTO: 0.38 K/UL — SIGNIFICANT CHANGE UP (ref 0–0.9)
MONOCYTES NFR BLD AUTO: 5.2 % — SIGNIFICANT CHANGE UP (ref 2–14)
NEUTROPHILS # BLD AUTO: 5.12 K/UL — SIGNIFICANT CHANGE UP (ref 1.8–7.4)
NEUTROPHILS NFR BLD AUTO: 70.2 % — SIGNIFICANT CHANGE UP (ref 43–77)
NRBC # FLD: 0 — SIGNIFICANT CHANGE UP
PLATELET # BLD AUTO: 255 K/UL — SIGNIFICANT CHANGE UP (ref 150–400)
PMV BLD: 9.8 FL — SIGNIFICANT CHANGE UP (ref 7–13)
POTASSIUM SERPL-MCNC: 4.4 MMOL/L — SIGNIFICANT CHANGE UP (ref 3.5–5.3)
POTASSIUM SERPL-SCNC: 4.4 MMOL/L — SIGNIFICANT CHANGE UP (ref 3.5–5.3)
RBC # BLD: 5.24 M/UL — HIGH (ref 3.8–5.2)
RBC # FLD: 15.8 % — HIGH (ref 10.3–14.5)
SODIUM SERPL-SCNC: 142 MMOL/L — SIGNIFICANT CHANGE UP (ref 135–145)
WBC # BLD: 7.3 K/UL — SIGNIFICANT CHANGE UP (ref 3.8–10.5)
WBC # FLD AUTO: 7.3 K/UL — SIGNIFICANT CHANGE UP (ref 3.8–10.5)

## 2018-04-30 ENCOUNTER — APPOINTMENT (OUTPATIENT)
Dept: HEMATOLOGY ONCOLOGY | Facility: CLINIC | Age: 48
End: 2018-04-30

## 2018-04-30 DIAGNOSIS — F25.9 SCHIZOAFFECTIVE DISORDER, UNSPECIFIED: ICD-10-CM

## 2018-05-04 LAB
BASOPHILS # BLD AUTO: 0.06 K/UL — SIGNIFICANT CHANGE UP (ref 0–0.2)
BASOPHILS NFR BLD AUTO: 1 % — SIGNIFICANT CHANGE UP (ref 0–2)
BUN SERPL-MCNC: 15 MG/DL — SIGNIFICANT CHANGE UP (ref 7–23)
CALCIUM SERPL-MCNC: 10 MG/DL — SIGNIFICANT CHANGE UP (ref 8.4–10.5)
CHLORIDE SERPL-SCNC: 103 MMOL/L — SIGNIFICANT CHANGE UP (ref 98–107)
CO2 SERPL-SCNC: 25 MMOL/L — SIGNIFICANT CHANGE UP (ref 22–31)
CREAT SERPL-MCNC: 0.84 MG/DL — SIGNIFICANT CHANGE UP (ref 0.5–1.3)
EOSINOPHIL # BLD AUTO: 0.13 K/UL — SIGNIFICANT CHANGE UP (ref 0–0.5)
EOSINOPHIL NFR BLD AUTO: 2.1 % — SIGNIFICANT CHANGE UP (ref 0–6)
GLUCOSE SERPL-MCNC: 87 MG/DL — SIGNIFICANT CHANGE UP (ref 70–99)
HCT VFR BLD CALC: 44.6 % — SIGNIFICANT CHANGE UP (ref 34.5–45)
HGB BLD-MCNC: 14.2 G/DL — SIGNIFICANT CHANGE UP (ref 11.5–15.5)
IMM GRANULOCYTES # BLD AUTO: 0.01 # — SIGNIFICANT CHANGE UP
IMM GRANULOCYTES NFR BLD AUTO: 0.2 % — SIGNIFICANT CHANGE UP (ref 0–1.5)
LITHIUM SERPL-MCNC: 0.43 MMOL/L — LOW (ref 0.6–1.2)
LYMPHOCYTES # BLD AUTO: 1.52 K/UL — SIGNIFICANT CHANGE UP (ref 1–3.3)
LYMPHOCYTES # BLD AUTO: 24.7 % — SIGNIFICANT CHANGE UP (ref 13–44)
MCHC RBC-ENTMCNC: 27.2 PG — SIGNIFICANT CHANGE UP (ref 27–34)
MCHC RBC-ENTMCNC: 31.8 % — LOW (ref 32–36)
MCV RBC AUTO: 85.4 FL — SIGNIFICANT CHANGE UP (ref 80–100)
MONOCYTES # BLD AUTO: 0.33 K/UL — SIGNIFICANT CHANGE UP (ref 0–0.9)
MONOCYTES NFR BLD AUTO: 5.4 % — SIGNIFICANT CHANGE UP (ref 2–14)
NEUTROPHILS # BLD AUTO: 4.11 K/UL — SIGNIFICANT CHANGE UP (ref 1.8–7.4)
NEUTROPHILS NFR BLD AUTO: 66.6 % — SIGNIFICANT CHANGE UP (ref 43–77)
NRBC # FLD: 0 — SIGNIFICANT CHANGE UP
PLATELET # BLD AUTO: 230 K/UL — SIGNIFICANT CHANGE UP (ref 150–400)
PMV BLD: 9.9 FL — SIGNIFICANT CHANGE UP (ref 7–13)
POTASSIUM SERPL-MCNC: 5.1 MMOL/L — SIGNIFICANT CHANGE UP (ref 3.5–5.3)
POTASSIUM SERPL-SCNC: 5.1 MMOL/L — SIGNIFICANT CHANGE UP (ref 3.5–5.3)
RBC # BLD: 5.22 M/UL — HIGH (ref 3.8–5.2)
RBC # FLD: 15.8 % — HIGH (ref 10.3–14.5)
SODIUM SERPL-SCNC: 141 MMOL/L — SIGNIFICANT CHANGE UP (ref 135–145)
WBC # BLD: 6.16 K/UL — SIGNIFICANT CHANGE UP (ref 3.8–10.5)
WBC # FLD AUTO: 6.16 K/UL — SIGNIFICANT CHANGE UP (ref 3.8–10.5)

## 2018-05-14 ENCOUNTER — RESULT REVIEW (OUTPATIENT)
Age: 48
End: 2018-05-14

## 2018-05-14 ENCOUNTER — APPOINTMENT (OUTPATIENT)
Dept: HEMATOLOGY ONCOLOGY | Facility: CLINIC | Age: 48
End: 2018-05-14

## 2018-05-14 LAB
BASOPHILS # BLD AUTO: 0 K/UL — SIGNIFICANT CHANGE UP (ref 0–0.2)
BASOPHILS # BLD AUTO: 0.08 K/UL — SIGNIFICANT CHANGE UP (ref 0–0.2)
BASOPHILS NFR BLD AUTO: 0.5 % — SIGNIFICANT CHANGE UP (ref 0–2)
BASOPHILS NFR BLD AUTO: 0.9 % — SIGNIFICANT CHANGE UP (ref 0–2)
BUN SERPL-MCNC: 13 MG/DL — SIGNIFICANT CHANGE UP (ref 7–23)
CALCIUM SERPL-MCNC: 9.9 MG/DL — SIGNIFICANT CHANGE UP (ref 8.4–10.5)
CHLORIDE SERPL-SCNC: 99 MMOL/L — SIGNIFICANT CHANGE UP (ref 98–107)
CO2 SERPL-SCNC: 19 MMOL/L — LOW (ref 22–31)
CREAT SERPL-MCNC: 0.87 MG/DL — SIGNIFICANT CHANGE UP (ref 0.5–1.3)
EOSINOPHIL # BLD AUTO: 0.17 K/UL — SIGNIFICANT CHANGE UP (ref 0–0.5)
EOSINOPHIL # BLD AUTO: 0.4 K/UL — SIGNIFICANT CHANGE UP (ref 0–0.5)
EOSINOPHIL NFR BLD AUTO: 2 % — SIGNIFICANT CHANGE UP (ref 0–6)
EOSINOPHIL NFR BLD AUTO: 4.9 % — SIGNIFICANT CHANGE UP (ref 0–6)
GLUCOSE SERPL-MCNC: 98 MG/DL — SIGNIFICANT CHANGE UP (ref 70–99)
HCT VFR BLD CALC: 42 % — SIGNIFICANT CHANGE UP (ref 34.5–45)
HCT VFR BLD CALC: 42 % — SIGNIFICANT CHANGE UP (ref 34.5–45)
HGB BLD-MCNC: 14 G/DL — SIGNIFICANT CHANGE UP (ref 11.5–15.5)
HGB BLD-MCNC: 14.7 G/DL — SIGNIFICANT CHANGE UP (ref 11.5–15.5)
IMM GRANULOCYTES # BLD AUTO: 0.02 # — SIGNIFICANT CHANGE UP
IMM GRANULOCYTES NFR BLD AUTO: 0.2 % — SIGNIFICANT CHANGE UP (ref 0–1.5)
LITHIUM SERPL-MCNC: 0.35 MMOL/L — LOW (ref 0.6–1.2)
LYMPHOCYTES # BLD AUTO: 1.8 K/UL — SIGNIFICANT CHANGE UP (ref 1–3.3)
LYMPHOCYTES # BLD AUTO: 1.91 K/UL — SIGNIFICANT CHANGE UP (ref 1–3.3)
LYMPHOCYTES # BLD AUTO: 22.2 % — SIGNIFICANT CHANGE UP (ref 13–44)
LYMPHOCYTES # BLD AUTO: 24.2 % — SIGNIFICANT CHANGE UP (ref 13–44)
MCHC RBC-ENTMCNC: 27.9 PG — SIGNIFICANT CHANGE UP (ref 27–34)
MCHC RBC-ENTMCNC: 28.7 PG — SIGNIFICANT CHANGE UP (ref 27–34)
MCHC RBC-ENTMCNC: 33.3 % — SIGNIFICANT CHANGE UP (ref 32–36)
MCHC RBC-ENTMCNC: 34.9 G/DL — SIGNIFICANT CHANGE UP (ref 32–36)
MCV RBC AUTO: 82.1 FL — SIGNIFICANT CHANGE UP (ref 80–100)
MCV RBC AUTO: 83.8 FL — SIGNIFICANT CHANGE UP (ref 80–100)
MONOCYTES # BLD AUTO: 0.4 K/UL — SIGNIFICANT CHANGE UP (ref 0–0.9)
MONOCYTES # BLD AUTO: 0.43 K/UL — SIGNIFICANT CHANGE UP (ref 0–0.9)
MONOCYTES NFR BLD AUTO: 5 % — SIGNIFICANT CHANGE UP (ref 2–14)
MONOCYTES NFR BLD AUTO: 5.8 % — SIGNIFICANT CHANGE UP (ref 2–14)
NEUTROPHILS # BLD AUTO: 4.9 K/UL — SIGNIFICANT CHANGE UP (ref 1.8–7.4)
NEUTROPHILS # BLD AUTO: 5.98 K/UL — SIGNIFICANT CHANGE UP (ref 1.8–7.4)
NEUTROPHILS NFR BLD AUTO: 64.6 % — SIGNIFICANT CHANGE UP (ref 43–77)
NEUTROPHILS NFR BLD AUTO: 69.7 % — SIGNIFICANT CHANGE UP (ref 43–77)
NRBC # FLD: 0 — SIGNIFICANT CHANGE UP
PLATELET # BLD AUTO: 222 K/UL — SIGNIFICANT CHANGE UP (ref 150–400)
PLATELET # BLD AUTO: 247 K/UL — SIGNIFICANT CHANGE UP (ref 150–400)
PMV BLD: 9.6 FL — SIGNIFICANT CHANGE UP (ref 7–13)
POTASSIUM SERPL-MCNC: 4.2 MMOL/L — SIGNIFICANT CHANGE UP (ref 3.5–5.3)
POTASSIUM SERPL-SCNC: 4.2 MMOL/L — SIGNIFICANT CHANGE UP (ref 3.5–5.3)
RBC # BLD: 5.01 M/UL — SIGNIFICANT CHANGE UP (ref 3.8–5.2)
RBC # BLD: 5.12 M/UL — SIGNIFICANT CHANGE UP (ref 3.8–5.2)
RBC # FLD: 14.5 % — SIGNIFICANT CHANGE UP (ref 10.3–14.5)
RBC # FLD: 15.8 % — HIGH (ref 10.3–14.5)
SODIUM SERPL-SCNC: 136 MMOL/L — SIGNIFICANT CHANGE UP (ref 135–145)
WBC # BLD: 7.6 K/UL — SIGNIFICANT CHANGE UP (ref 3.8–10.5)
WBC # BLD: 8.59 K/UL — SIGNIFICANT CHANGE UP (ref 3.8–10.5)
WBC # FLD AUTO: 7.6 K/UL — SIGNIFICANT CHANGE UP (ref 3.8–10.5)
WBC # FLD AUTO: 8.59 K/UL — SIGNIFICANT CHANGE UP (ref 3.8–10.5)

## 2018-05-21 ENCOUNTER — APPOINTMENT (OUTPATIENT)
Dept: INTERNAL MEDICINE | Facility: CLINIC | Age: 48
End: 2018-05-21

## 2018-05-21 LAB
BASOPHILS # BLD AUTO: 0.07 K/UL — SIGNIFICANT CHANGE UP (ref 0–0.2)
BASOPHILS NFR BLD AUTO: 1 % — SIGNIFICANT CHANGE UP (ref 0–2)
BUN SERPL-MCNC: 16 MG/DL — SIGNIFICANT CHANGE UP (ref 7–23)
CALCIUM SERPL-MCNC: 10.1 MG/DL — SIGNIFICANT CHANGE UP (ref 8.4–10.5)
CHLORIDE SERPL-SCNC: 101 MMOL/L — SIGNIFICANT CHANGE UP (ref 98–107)
CO2 SERPL-SCNC: 21 MMOL/L — LOW (ref 22–31)
CREAT SERPL-MCNC: 1.01 MG/DL — SIGNIFICANT CHANGE UP (ref 0.5–1.3)
EOSINOPHIL # BLD AUTO: 0.14 K/UL — SIGNIFICANT CHANGE UP (ref 0–0.5)
EOSINOPHIL NFR BLD AUTO: 2 % — SIGNIFICANT CHANGE UP (ref 0–6)
GLUCOSE SERPL-MCNC: 73 MG/DL — SIGNIFICANT CHANGE UP (ref 70–99)
HCT VFR BLD CALC: 44 % — SIGNIFICANT CHANGE UP (ref 34.5–45)
HGB BLD-MCNC: 14.5 G/DL — SIGNIFICANT CHANGE UP (ref 11.5–15.5)
IMM GRANULOCYTES # BLD AUTO: 0.02 # — SIGNIFICANT CHANGE UP
IMM GRANULOCYTES NFR BLD AUTO: 0.3 % — SIGNIFICANT CHANGE UP (ref 0–1.5)
LYMPHOCYTES # BLD AUTO: 1.74 K/UL — SIGNIFICANT CHANGE UP (ref 1–3.3)
LYMPHOCYTES # BLD AUTO: 24.8 % — SIGNIFICANT CHANGE UP (ref 13–44)
MCHC RBC-ENTMCNC: 27.4 PG — SIGNIFICANT CHANGE UP (ref 27–34)
MCHC RBC-ENTMCNC: 33 % — SIGNIFICANT CHANGE UP (ref 32–36)
MCV RBC AUTO: 83 FL — SIGNIFICANT CHANGE UP (ref 80–100)
MONOCYTES # BLD AUTO: 0.4 K/UL — SIGNIFICANT CHANGE UP (ref 0–0.9)
MONOCYTES NFR BLD AUTO: 5.7 % — SIGNIFICANT CHANGE UP (ref 2–14)
NEUTROPHILS # BLD AUTO: 4.65 K/UL — SIGNIFICANT CHANGE UP (ref 1.8–7.4)
NEUTROPHILS NFR BLD AUTO: 66.2 % — SIGNIFICANT CHANGE UP (ref 43–77)
NRBC # FLD: 0 — SIGNIFICANT CHANGE UP
PLATELET # BLD AUTO: 272 K/UL — SIGNIFICANT CHANGE UP (ref 150–400)
PMV BLD: 10.2 FL — SIGNIFICANT CHANGE UP (ref 7–13)
POTASSIUM SERPL-MCNC: 4.6 MMOL/L — SIGNIFICANT CHANGE UP (ref 3.5–5.3)
POTASSIUM SERPL-SCNC: 4.6 MMOL/L — SIGNIFICANT CHANGE UP (ref 3.5–5.3)
RBC # BLD: 5.3 M/UL — HIGH (ref 3.8–5.2)
RBC # FLD: 16 % — HIGH (ref 10.3–14.5)
SODIUM SERPL-SCNC: 139 MMOL/L — SIGNIFICANT CHANGE UP (ref 135–145)
WBC # BLD: 7.02 K/UL — SIGNIFICANT CHANGE UP (ref 3.8–10.5)
WBC # FLD AUTO: 7.02 K/UL — SIGNIFICANT CHANGE UP (ref 3.8–10.5)

## 2018-05-29 LAB
BASOPHILS # BLD AUTO: 0.05 K/UL — SIGNIFICANT CHANGE UP (ref 0–0.2)
BASOPHILS NFR BLD AUTO: 0.7 % — SIGNIFICANT CHANGE UP (ref 0–2)
EOSINOPHIL # BLD AUTO: 0.04 K/UL — SIGNIFICANT CHANGE UP (ref 0–0.5)
EOSINOPHIL NFR BLD AUTO: 0.6 % — SIGNIFICANT CHANGE UP (ref 0–6)
HCT VFR BLD CALC: 42 % — SIGNIFICANT CHANGE UP (ref 34.5–45)
HGB BLD-MCNC: 14 G/DL — SIGNIFICANT CHANGE UP (ref 11.5–15.5)
IMM GRANULOCYTES # BLD AUTO: 0.02 # — SIGNIFICANT CHANGE UP
IMM GRANULOCYTES NFR BLD AUTO: 0.3 % — SIGNIFICANT CHANGE UP (ref 0–1.5)
LITHIUM SERPL-MCNC: 0.59 MMOL/L — LOW (ref 0.6–1.2)
LYMPHOCYTES # BLD AUTO: 1.17 K/UL — SIGNIFICANT CHANGE UP (ref 1–3.3)
LYMPHOCYTES # BLD AUTO: 16.2 % — SIGNIFICANT CHANGE UP (ref 13–44)
MCHC RBC-ENTMCNC: 27.5 PG — SIGNIFICANT CHANGE UP (ref 27–34)
MCHC RBC-ENTMCNC: 33.3 % — SIGNIFICANT CHANGE UP (ref 32–36)
MCV RBC AUTO: 82.4 FL — SIGNIFICANT CHANGE UP (ref 80–100)
MONOCYTES # BLD AUTO: 0.34 K/UL — SIGNIFICANT CHANGE UP (ref 0–0.9)
MONOCYTES NFR BLD AUTO: 4.7 % — SIGNIFICANT CHANGE UP (ref 2–14)
NEUTROPHILS # BLD AUTO: 5.59 K/UL — SIGNIFICANT CHANGE UP (ref 1.8–7.4)
NEUTROPHILS NFR BLD AUTO: 77.5 % — HIGH (ref 43–77)
NRBC # FLD: 0 — SIGNIFICANT CHANGE UP
PLATELET # BLD AUTO: 281 K/UL — SIGNIFICANT CHANGE UP (ref 150–400)
PMV BLD: 9.7 FL — SIGNIFICANT CHANGE UP (ref 7–13)
RBC # BLD: 5.1 M/UL — SIGNIFICANT CHANGE UP (ref 3.8–5.2)
RBC # FLD: 15.9 % — HIGH (ref 10.3–14.5)
WBC # BLD: 7.21 K/UL — SIGNIFICANT CHANGE UP (ref 3.8–10.5)
WBC # FLD AUTO: 7.21 K/UL — SIGNIFICANT CHANGE UP (ref 3.8–10.5)

## 2018-05-31 PROCEDURE — 90792 PSYCH DIAG EVAL W/MED SRVCS: CPT | Mod: GT

## 2018-06-01 ENCOUNTER — INPATIENT (INPATIENT)
Facility: HOSPITAL | Age: 48
LOS: 45 days | Discharge: ANOTHER HOSPITAL | End: 2018-07-17
Attending: PSYCHIATRY & NEUROLOGY | Admitting: PSYCHIATRY & NEUROLOGY
Payer: COMMERCIAL

## 2018-06-01 ENCOUNTER — EMERGENCY (EMERGENCY)
Facility: HOSPITAL | Age: 48
LOS: 1 days | Discharge: PSYCHIATRIC OR SUBSTANCE USE FACILITY | End: 2018-06-01
Attending: EMERGENCY MEDICINE
Payer: COMMERCIAL

## 2018-06-01 VITALS
TEMPERATURE: 99 F | HEIGHT: 66 IN | WEIGHT: 188.27 LBS | SYSTOLIC BLOOD PRESSURE: 119 MMHG | OXYGEN SATURATION: 99 % | DIASTOLIC BLOOD PRESSURE: 62 MMHG | RESPIRATION RATE: 16 BRPM

## 2018-06-01 VITALS
TEMPERATURE: 98 F | OXYGEN SATURATION: 100 % | DIASTOLIC BLOOD PRESSURE: 87 MMHG | RESPIRATION RATE: 18 BRPM | HEART RATE: 115 BPM | SYSTOLIC BLOOD PRESSURE: 136 MMHG

## 2018-06-01 VITALS
DIASTOLIC BLOOD PRESSURE: 80 MMHG | SYSTOLIC BLOOD PRESSURE: 138 MMHG | RESPIRATION RATE: 16 BRPM | OXYGEN SATURATION: 100 % | TEMPERATURE: 98 F | HEART RATE: 95 BPM

## 2018-06-01 DIAGNOSIS — R69 ILLNESS, UNSPECIFIED: ICD-10-CM

## 2018-06-01 DIAGNOSIS — F25.9 SCHIZOAFFECTIVE DISORDER, UNSPECIFIED: ICD-10-CM

## 2018-06-01 LAB
ALBUMIN SERPL ELPH-MCNC: 5.1 G/DL — HIGH (ref 3.3–5)
ALP SERPL-CCNC: 78 U/L — SIGNIFICANT CHANGE UP (ref 40–120)
ALT FLD-CCNC: 10 U/L — SIGNIFICANT CHANGE UP (ref 10–45)
AMPHET UR-MCNC: NEGATIVE — SIGNIFICANT CHANGE UP
ANION GAP SERPL CALC-SCNC: 20 MMOL/L — HIGH (ref 5–17)
APPEARANCE UR: ABNORMAL
AST SERPL-CCNC: 21 U/L — SIGNIFICANT CHANGE UP (ref 10–40)
BACTERIA # UR AUTO: ABNORMAL /HPF
BARBITURATES UR SCN-MCNC: NEGATIVE — SIGNIFICANT CHANGE UP
BASE EXCESS BLDV CALC-SCNC: -2 MMOL/L — SIGNIFICANT CHANGE UP (ref -2–2)
BASOPHILS # BLD AUTO: 0 K/UL — SIGNIFICANT CHANGE UP (ref 0–0.2)
BASOPHILS NFR BLD AUTO: 0.4 % — SIGNIFICANT CHANGE UP (ref 0–2)
BENZODIAZ UR-MCNC: NEGATIVE — SIGNIFICANT CHANGE UP
BILIRUB SERPL-MCNC: 1 MG/DL — SIGNIFICANT CHANGE UP (ref 0.2–1.2)
BILIRUB UR-MCNC: NEGATIVE — SIGNIFICANT CHANGE UP
BUN SERPL-MCNC: 10 MG/DL — SIGNIFICANT CHANGE UP (ref 7–23)
CA-I SERPL-SCNC: 1.26 MMOL/L — SIGNIFICANT CHANGE UP (ref 1.12–1.3)
CALCIUM SERPL-MCNC: 10.2 MG/DL — SIGNIFICANT CHANGE UP (ref 8.4–10.5)
CHLORIDE BLDV-SCNC: 102 MMOL/L — SIGNIFICANT CHANGE UP (ref 96–108)
CHLORIDE SERPL-SCNC: 93 MMOL/L — LOW (ref 96–108)
CO2 BLDV-SCNC: 24 MMOL/L — SIGNIFICANT CHANGE UP (ref 22–30)
CO2 SERPL-SCNC: 16 MMOL/L — LOW (ref 22–31)
COCAINE METAB.OTHER UR-MCNC: NEGATIVE — SIGNIFICANT CHANGE UP
COLOR SPEC: COLORLESS — SIGNIFICANT CHANGE UP
CREAT SERPL-MCNC: 0.84 MG/DL — SIGNIFICANT CHANGE UP (ref 0.5–1.3)
DIFF PNL FLD: NEGATIVE — SIGNIFICANT CHANGE UP
EOSINOPHIL # BLD AUTO: 0.1 K/UL — SIGNIFICANT CHANGE UP (ref 0–0.5)
EOSINOPHIL NFR BLD AUTO: 0.6 % — SIGNIFICANT CHANGE UP (ref 0–6)
EPI CELLS # UR: SIGNIFICANT CHANGE UP /HPF
ETHANOL SERPL-MCNC: SIGNIFICANT CHANGE UP MG/DL (ref 0–10)
GAS PNL BLDV: 134 MMOL/L — LOW (ref 136–145)
GAS PNL BLDV: SIGNIFICANT CHANGE UP
GAS PNL BLDV: SIGNIFICANT CHANGE UP
GLUCOSE BLDV-MCNC: 88 MG/DL — SIGNIFICANT CHANGE UP (ref 70–99)
GLUCOSE SERPL-MCNC: 93 MG/DL — SIGNIFICANT CHANGE UP (ref 70–99)
GLUCOSE UR QL: NEGATIVE — SIGNIFICANT CHANGE UP
HCG UR QL: NEGATIVE — SIGNIFICANT CHANGE UP
HCO3 BLDV-SCNC: 23 MMOL/L — SIGNIFICANT CHANGE UP (ref 21–29)
HCT VFR BLD CALC: 42 % — SIGNIFICANT CHANGE UP (ref 34.5–45)
HCT VFR BLDA CALC: 42 % — SIGNIFICANT CHANGE UP (ref 39–50)
HGB BLD CALC-MCNC: 13.8 G/DL — SIGNIFICANT CHANGE UP (ref 11.5–15.5)
HGB BLD-MCNC: 14.5 G/DL — SIGNIFICANT CHANGE UP (ref 11.5–15.5)
KETONES UR-MCNC: ABNORMAL
LACTATE BLDV-MCNC: 1 MMOL/L — SIGNIFICANT CHANGE UP (ref 0.7–2)
LEUKOCYTE ESTERASE UR-ACNC: ABNORMAL
LITHIUM SERPL-MCNC: 0.7 MMOL/L — SIGNIFICANT CHANGE UP (ref 0.6–1.2)
LYMPHOCYTES # BLD AUTO: 1.8 K/UL — SIGNIFICANT CHANGE UP (ref 1–3.3)
LYMPHOCYTES # BLD AUTO: 16.2 % — SIGNIFICANT CHANGE UP (ref 13–44)
MCHC RBC-ENTMCNC: 29 PG — SIGNIFICANT CHANGE UP (ref 27–34)
MCHC RBC-ENTMCNC: 34.6 GM/DL — SIGNIFICANT CHANGE UP (ref 32–36)
MCV RBC AUTO: 83.8 FL — SIGNIFICANT CHANGE UP (ref 80–100)
METHADONE UR-MCNC: NEGATIVE — SIGNIFICANT CHANGE UP
MONOCYTES # BLD AUTO: 0.6 K/UL — SIGNIFICANT CHANGE UP (ref 0–0.9)
MONOCYTES NFR BLD AUTO: 5.4 % — SIGNIFICANT CHANGE UP (ref 2–14)
NEUTROPHILS # BLD AUTO: 8.6 K/UL — HIGH (ref 1.8–7.4)
NEUTROPHILS NFR BLD AUTO: 77.3 % — HIGH (ref 43–77)
NITRITE UR-MCNC: NEGATIVE — SIGNIFICANT CHANGE UP
OPIATES UR-MCNC: NEGATIVE — SIGNIFICANT CHANGE UP
OTHER CELLS CSF MANUAL: 10 ML/DL — LOW (ref 18–22)
OXYCODONE UR-MCNC: NEGATIVE — SIGNIFICANT CHANGE UP
PCO2 BLDV: 44 MMHG — SIGNIFICANT CHANGE UP (ref 35–50)
PCP SPEC-MCNC: SIGNIFICANT CHANGE UP
PCP UR-MCNC: NEGATIVE — SIGNIFICANT CHANGE UP
PH BLDV: 7.35 — SIGNIFICANT CHANGE UP (ref 7.35–7.45)
PH UR: 6.5 — SIGNIFICANT CHANGE UP (ref 5–8)
PLATELET # BLD AUTO: 279 K/UL — SIGNIFICANT CHANGE UP (ref 150–400)
PO2 BLDV: 30 MMHG — SIGNIFICANT CHANGE UP (ref 25–45)
POTASSIUM BLDV-SCNC: 3.7 MMOL/L — SIGNIFICANT CHANGE UP (ref 3.5–5)
POTASSIUM SERPL-MCNC: 4.4 MMOL/L — SIGNIFICANT CHANGE UP (ref 3.5–5.3)
POTASSIUM SERPL-SCNC: 4.4 MMOL/L — SIGNIFICANT CHANGE UP (ref 3.5–5.3)
PROT SERPL-MCNC: 7.5 G/DL — SIGNIFICANT CHANGE UP (ref 6–8.3)
PROT UR-MCNC: NEGATIVE — SIGNIFICANT CHANGE UP
RBC # BLD: 5.01 M/UL — SIGNIFICANT CHANGE UP (ref 3.8–5.2)
RBC # FLD: 14.3 % — SIGNIFICANT CHANGE UP (ref 10.3–14.5)
RBC CASTS # UR COMP ASSIST: SIGNIFICANT CHANGE UP /HPF (ref 0–2)
SALICYLATES SERPL-MCNC: <2 MG/DL — LOW (ref 15–30)
SAO2 % BLDV: 52 % — LOW (ref 67–88)
SODIUM SERPL-SCNC: 129 MMOL/L — LOW (ref 135–145)
SP GR SPEC: 1 — LOW (ref 1.01–1.02)
THC UR QL: NEGATIVE — SIGNIFICANT CHANGE UP
UROBILINOGEN FLD QL: NEGATIVE — SIGNIFICANT CHANGE UP
WBC # BLD: 11.1 K/UL — HIGH (ref 3.8–10.5)
WBC # FLD AUTO: 11.1 K/UL — HIGH (ref 3.8–10.5)
WBC UR QL: SIGNIFICANT CHANGE UP /HPF (ref 0–5)

## 2018-06-01 PROCEDURE — 84132 ASSAY OF SERUM POTASSIUM: CPT

## 2018-06-01 PROCEDURE — 80307 DRUG TEST PRSMV CHEM ANLYZR: CPT

## 2018-06-01 PROCEDURE — 81025 URINE PREGNANCY TEST: CPT

## 2018-06-01 PROCEDURE — 93010 ELECTROCARDIOGRAM REPORT: CPT | Mod: 77

## 2018-06-01 PROCEDURE — 85027 COMPLETE CBC AUTOMATED: CPT

## 2018-06-01 PROCEDURE — 83605 ASSAY OF LACTIC ACID: CPT

## 2018-06-01 PROCEDURE — 93005 ELECTROCARDIOGRAM TRACING: CPT

## 2018-06-01 PROCEDURE — 80178 ASSAY OF LITHIUM: CPT

## 2018-06-01 PROCEDURE — 99285 EMERGENCY DEPT VISIT HI MDM: CPT

## 2018-06-01 PROCEDURE — 80053 COMPREHEN METABOLIC PANEL: CPT

## 2018-06-01 PROCEDURE — 82435 ASSAY OF BLOOD CHLORIDE: CPT

## 2018-06-01 PROCEDURE — 85014 HEMATOCRIT: CPT

## 2018-06-01 PROCEDURE — 84295 ASSAY OF SERUM SODIUM: CPT

## 2018-06-01 PROCEDURE — 82947 ASSAY GLUCOSE BLOOD QUANT: CPT

## 2018-06-01 PROCEDURE — 81001 URINALYSIS AUTO W/SCOPE: CPT

## 2018-06-01 PROCEDURE — 82803 BLOOD GASES ANY COMBINATION: CPT

## 2018-06-01 PROCEDURE — 99285 EMERGENCY DEPT VISIT HI MDM: CPT | Mod: 25

## 2018-06-01 PROCEDURE — 82330 ASSAY OF CALCIUM: CPT

## 2018-06-01 PROCEDURE — 99223 1ST HOSP IP/OBS HIGH 75: CPT

## 2018-06-01 PROCEDURE — 82565 ASSAY OF CREATININE: CPT

## 2018-06-01 RX ORDER — METFORMIN HYDROCHLORIDE 850 MG/1
850 TABLET ORAL
Qty: 0 | Refills: 0 | Status: DISCONTINUED | OUTPATIENT
Start: 2018-06-01 | End: 2018-07-17

## 2018-06-01 RX ORDER — LURASIDONE HYDROCHLORIDE 40 MG/1
40 TABLET ORAL
Qty: 0 | Refills: 0 | Status: DISCONTINUED | OUTPATIENT
Start: 2018-06-01 | End: 2018-06-05

## 2018-06-01 RX ORDER — SODIUM CHLORIDE 9 MG/ML
1000 INJECTION INTRAMUSCULAR; INTRAVENOUS; SUBCUTANEOUS ONCE
Qty: 0 | Refills: 0 | Status: COMPLETED | OUTPATIENT
Start: 2018-06-01 | End: 2018-06-01

## 2018-06-01 RX ORDER — LITHIUM CARBONATE 300 MG/1
450 TABLET, EXTENDED RELEASE ORAL AT BEDTIME
Qty: 0 | Refills: 0 | Status: DISCONTINUED | OUTPATIENT
Start: 2018-06-01 | End: 2018-06-25

## 2018-06-01 RX ORDER — LEVOTHYROXINE SODIUM 125 MCG
25 TABLET ORAL DAILY
Qty: 0 | Refills: 0 | Status: DISCONTINUED | OUTPATIENT
Start: 2018-06-01 | End: 2018-07-17

## 2018-06-01 RX ORDER — INSULIN LISPRO 100/ML
VIAL (ML) SUBCUTANEOUS
Qty: 0 | Refills: 0 | Status: DISCONTINUED | OUTPATIENT
Start: 2018-06-01 | End: 2018-06-04

## 2018-06-01 RX ORDER — LEVOTHYROXINE SODIUM 125 MCG
25 TABLET ORAL DAILY
Qty: 0 | Refills: 0 | Status: DISCONTINUED | OUTPATIENT
Start: 2018-06-01 | End: 2018-06-05

## 2018-06-01 RX ORDER — SODIUM CHLORIDE 9 MG/ML
1000 INJECTION INTRAMUSCULAR; INTRAVENOUS; SUBCUTANEOUS ONCE
Qty: 0 | Refills: 0 | Status: DISCONTINUED | OUTPATIENT
Start: 2018-06-01 | End: 2018-06-05

## 2018-06-01 RX ORDER — INSULIN LISPRO 100/ML
VIAL (ML) SUBCUTANEOUS AT BEDTIME
Qty: 0 | Refills: 0 | Status: DISCONTINUED | OUTPATIENT
Start: 2018-06-01 | End: 2018-06-04

## 2018-06-01 RX ORDER — ALPRAZOLAM 0.25 MG
0.25 TABLET ORAL THREE TIMES A DAY
Qty: 0 | Refills: 0 | Status: DISCONTINUED | OUTPATIENT
Start: 2018-06-01 | End: 2018-06-06

## 2018-06-01 RX ORDER — METFORMIN HYDROCHLORIDE 850 MG/1
850 TABLET ORAL ONCE
Qty: 0 | Refills: 0 | Status: COMPLETED | OUTPATIENT
Start: 2018-06-01 | End: 2018-06-01

## 2018-06-01 RX ORDER — HYDROXYZINE HCL 10 MG
50 TABLET ORAL EVERY 6 HOURS
Qty: 0 | Refills: 0 | Status: DISCONTINUED | OUTPATIENT
Start: 2018-06-01 | End: 2018-06-25

## 2018-06-01 RX ORDER — ATORVASTATIN CALCIUM 80 MG/1
10 TABLET, FILM COATED ORAL AT BEDTIME
Qty: 0 | Refills: 0 | Status: DISCONTINUED | OUTPATIENT
Start: 2018-06-01 | End: 2018-07-17

## 2018-06-01 RX ADMIN — Medication 1 MILLIGRAM(S): at 08:52

## 2018-06-01 RX ADMIN — Medication 0.25 MILLIGRAM(S): at 21:14

## 2018-06-01 RX ADMIN — Medication 25 MICROGRAM(S): at 08:53

## 2018-06-01 RX ADMIN — METFORMIN HYDROCHLORIDE 850 MILLIGRAM(S): 850 TABLET ORAL at 16:29

## 2018-06-01 RX ADMIN — METFORMIN HYDROCHLORIDE 850 MILLIGRAM(S): 850 TABLET ORAL at 08:53

## 2018-06-01 RX ADMIN — ATORVASTATIN CALCIUM 10 MILLIGRAM(S): 80 TABLET, FILM COATED ORAL at 21:14

## 2018-06-01 RX ADMIN — LITHIUM CARBONATE 450 MILLIGRAM(S): 300 TABLET, EXTENDED RELEASE ORAL at 21:15

## 2018-06-01 RX ADMIN — Medication 0.25 MILLIGRAM(S): at 14:51

## 2018-06-01 RX ADMIN — SODIUM CHLORIDE 1000 MILLILITER(S): 9 INJECTION INTRAMUSCULAR; INTRAVENOUS; SUBCUTANEOUS at 08:56

## 2018-06-01 RX ADMIN — LURASIDONE HYDROCHLORIDE 40 MILLIGRAM(S): 40 TABLET ORAL at 16:29

## 2018-06-01 NOTE — ED BEHAVIORAL HEALTH ASSESSMENT NOTE - RISK ASSESSMENT
At this time, patient represents an imminent risk of harm to self and others and requires inpatient psychiatric hospitalization for safety and stabilization.

## 2018-06-01 NOTE — ED BEHAVIORAL HEALTH ASSESSMENT NOTE - SUMMARY
Lupe is a 48 y/o  female, currently living with , non-caregiver, psychiatrically disabled, with psychiatric history of Schizoaffective Disorder, bipolar type, with multiple prior hospitalizations, most recently in 2017 at [facility], recently moved from Clozapine clinic to [facility] (seeing Dr. Vegas since 4/27/18, seen 8 times In past 30 days), with long history of Schizoaffective Disorder, bipolar type, chronic issues with longstanding residual delusions and intermittent non-adherence, without history of legal/violence/substance history, now presenting referred by  for worsening bizarre and paranoid behavior. At this time, patient is demonstrating symptoms consistent with sukhjinder with psychoti delusins and requires inpatient stay.

## 2018-06-01 NOTE — ED PROVIDER NOTE - ATTENDING CONTRIBUTION TO CARE
Pt with active symptoms of paranoia, delusions to point that  brought patient in to ER.  Denies SI/HI/AH/VH and denies all symptoms described by , but her history is not consistent.  Some paranoia includes feeling the FBI is out to get her and that the front door is open.  Pt is mildly anxious, tachycardic, clear lungs.

## 2018-06-01 NOTE — CHART NOTE - NSCHARTNOTEFT_GEN_A_CORE
EMERGENCY SOCIAL WORK: Pt is 47 year old, female  Pt seen by telepsych recommended for inpatient voluntary admission. Pt accepted at [facility] unit Low [facility], accepted MD Giang.  LMSW made outreach to insurance provider [employer] [phone] phone. EMERGENCY SOCIAL WORK: Pt is 47 year old, , white, English speaking, female presents with c/o psyc eval. Per ED chart   Pt seen by telepsych recommended for inpatient voluntary admission. Pt accepted at [facility] unit Low 4, accepted MD Giang.  [facility] made outreach to insurance provider [employer] [phone] phone. EMERGENCY SOCIAL WORK: Pt is 47 year old, , white, English speaking, female presents with c/o psych eval. Per ED chart pt with pmhx: Schizophrenia on meds. Pt BIB EMS s/p spouse called stating pt kept him up all night. Per spouse pt has been paranoid thought FBI coming for her. Tele psych saw pt in ED recommending inpatient psych voluntary admission. Pt accepted [facility] Low [facility], Accepting MD Giang [phone] phone. Per ED Res Morgan pt medically cleared. LARRY Worley called report. Ambulance scheduled 11am. Psych CL MD Parekh completed legals. Transfer packet complete placed in chart. LMSW  met with pt introduced role, made pt aware transfer to [facility] and provided support at bedside. LMSW made outreach to insurance provider [employer] [phone] phone. Completed review with oRsi DOZIER 2 days authorized. Authorization: [id number]. Sent telepsych email to [facility] auth team and made [facility] aware auth. Social work to remain available.

## 2018-06-01 NOTE — ED PROVIDER NOTE - MEDICAL DECISION MAKING DETAILS
47 y.o. female hx of schizoaffective disorder pw paranoia and hallucinations at home for past few days. Will obtain labs, ekg, psych consult and dispo per psych.

## 2018-06-01 NOTE — ED PROVIDER NOTE - PHYSICAL EXAMINATION
Gen: Well appearing, NAD  Head: NCAT  HEENT: MMM, Normal conjunctiva  Lung: CTAB, no rales, rhonchi or wheezing  CV: RRR, no murmurs, rubs or gallops  Abd: soft, NTND, no rebound or guarding  MSK: No edema, no visible deformities  Neuro: No focal neurologic deficits.   Skin: Warm and dry, no evidence of rash  Psych:  A&Ox3

## 2018-06-01 NOTE — ED PROVIDER NOTE - PROGRESS NOTE DETAILS
Tessy: took over pt at 7am. Discussed with psychiatry. Admission advised, ativan po given. Hyponatremia reevaluated with ABG with sodium of 134.  Patient cleared for psychiatry at that time. Tessy: Discussed with psychiatry, bed arranged at [place] [id number]. paperwork completed for transfer.

## 2018-06-01 NOTE — ED ADULT NURSE NOTE - CHPI ED SYMPTOMS NEG
no change in level of consciousness/no paranoia/no hallucinations/no agitation/no confusion/no disorientation/no weakness/no suicidal/no weight loss/no homicidal

## 2018-06-01 NOTE — ED BEHAVIORAL HEALTH ASSESSMENT NOTE - DESCRIPTION
PMH significant of hypothyroidism, HLD, DM2, non epilitiform seizures vs seizures Patient on psychiatric disability, lives with  in [place].  No children. hgihly anxious, but calm and cooperative

## 2018-06-01 NOTE — ED BEHAVIORAL HEALTH ASSESSMENT NOTE - HPI (INCLUDE ILLNESS QUALITY, SEVERITY, DURATION, TIMING, CONTEXT, MODIFYING FACTORS, ASSOCIATED SIGNS AND SYMPTOMS)
Lupe is a 46 y/o  female, currently living with , non-caregiver, psychiatrically disabled, with psychiatric history of Schizoaffective Disorder, bipolar type, with multiple prior hospitalizations, most recently in 2017 at [facility]     She says that at home, there are dirty sheets and a dog who appeared. She says that the dog appeared all of a sudden and it was a big dog and "I got scared." SHe says that the home environment is not clean and that was bothering her. She states that    . She says that she is afraid to be raped in the bathroom, feeling fearful of being raped. At this time, she is afraid of being tortured, saying that she feels that is a bad person, "I don't know why." She feels that her eyes are so big and she is so fat and "Im hungry." This writer asked patient about FBI, and she says that she thought that they were outside of her apartment and "there were helicopters." She states that she is tired. 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 Lupe is a 48 y/o  female, currently living with , non-caregiver, psychiatrically disabled, with psychiatric history of Schizoaffective Disorder, bipolar type, with multiple prior hospitalizations, most recently in 2017 at [facility], recently moved from [facility] to [facility] (seeing Dr. Vegas since 4/27/18, seen 8 times In past 30 days), with long history of Schizoaffective Disorder, bipolar type, chronic issues with longstanding residual delusions and intermittent non-adherence, without history of legal/violence/substance history, now presenting referred by  for worsening bizarre and paranoid behavior.    BTCM (Carolann Torres) spoke with pt's  who states that:  patient has been off baseline since her medication change from Abilify to Latuda. Collateral reports over the last two days patient has become increasingly paranoid and reports that patient reports that there are people in their home and a the FBI is looking for her. Collateral reports that patient has no slept in the last 36 hours and he is concerns with patients current presentation. Collateral reports that patient is in need of hospitalization. Collateral confirms patient has no history of SHIIP, no history of self-injury, no access to weapons, no history of violence or aggression.     She was most recently seen on 4/30/18 by Dr. Vegas and as per chart review, she has been struggling with paranoid and delusional thoughts, which have resulted in recent medication changes. However, her presenting paranoid symptoms have been consistent especially over the past couple of weeks. She has significant difficulty participating in the interview as she is highly paranoid. She is anxious and somewhat disorganized, stating that she is fearful and she does not know why. She discusses how she has been concerned about being raped or tortured, even to the point of telling the 1:1 that she is fearful of this writer, insisting that "I think he could rape me." She discusses how at home, she has been preoccupied with a dog who "suddenly showed up" in her house as well as concerns about "dirty sheets." At this time, she is afraid of being tortured, saying that she feels that is a bad person, "I don't know why." She feels that her eyes are so big and she is so fat and "Im hungry." This writer asked patient about FBI, and she says that she thought that they were outside of her apartment and "there were helicopters." She states that she is tired. She makes mulitple self-deprecating comments. 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 She is requesting "whatever you think will help."

## 2018-06-01 NOTE — ED PROVIDER NOTE - OBJECTIVE STATEMENT
47 y.o. female hx of schizoaffective disorder on Latuda, bibems called by  who states that "she kept me up all night". As per , pt has been paranoid, thought FBI was coming for her, heard helicopters outside. Also claimed there was a dog in the house, which was not there per , and door was opening on its own. Pt complaint with meds. Denies SI/HI.

## 2018-06-01 NOTE — CHART NOTE - NSCHARTNOTEFT_GEN_A_CORE
Screening Medical Evaluation  Patient Admitted from: [facility] admitting diagnosis: Schizoaffective disorder    PAST MEDICAL & SURGICAL HISTORY:  Diabetes  High cholesterol  Hypothyroid  Psychiatric diagnosis  No significant past surgical history        Allergies    Depakote (Rash)  penicillin (Rash)    Intolerances        Social History:     FAMILY HISTORY:  No pertinent family history in first degree relatives      MEDICATIONS  (STANDING):  ALPRAZolam 0.25 milliGRAM(s) Oral three times a day  atorvastatin 10 milliGRAM(s) Oral at bedtime  insulin lispro (HumaLOG) corrective regimen sliding scale   SubCutaneous three times a day before meals  insulin lispro (HumaLOG) corrective regimen sliding scale   SubCutaneous at bedtime  levothyroxine 25 MICROGram(s) Oral daily  lithium CR (ESKALITH-CR) 450 milliGRAM(s) Oral at bedtime  lurasidone 40 milliGRAM(s) Oral <User Schedule>  metFORMIN 850 milliGRAM(s) Oral <User Schedule>    MEDICATIONS  (PRN):  hydrOXYzine hydrochloride 50 milliGRAM(s) Oral every 6 hours PRN Anxiety      Vital Signs Last 24 Hrs  T(C): 37 (2018 11:43), Max: 37 (2018 11:43)  T(F): 98.6 (2018 11:43), Max: 98.6 (2018 11:43)  HR: 95 (2018 08:57) (95 - 115)  BP: 119/62 (2018 11:43) (119/62 - 138/80)  BP(mean): 80 (2018 11:43) (80 - 80)  RR: 16 (2018 11:43) (16 - 18)  SpO2: 99% (2018 11:43) (99% - 100%)  CAPILLARY BLOOD GLUCOSE            PHYSICAL EXAM:  GENERAL: NAD, well-developed  HEAD:  Atraumatic, Normocephalic  EYES: EOMI, PERRLA, conjunctiva and sclera clear  NECK: Supple, No JVD  CHEST/LUNG: Clear to auscultation bilaterally; No wheeze  HEART: Regular rate and rhythm; No murmurs, rubs, or gallops  ABDOMEN: Soft, Nontender, Nondistended; Bowel sounds present  EXTREMITIES:  2+ Peripheral Pulses, No clubbing, cyanosis, or edema  PSYCH: AAOx3  NEUROLOGY: non-focal  SKIN:     LABS:                        14.5   11.1  )-----------( 279      ( 2018 06:28 )             42.0     06-    129<L>  |  93<L>  |  10  ----------------------------<  93  4.4   |  16<L>  |  0.84    Ca    10.2      2018 06:28    TPro  7.5  /  Alb  5.1<H>  /  TBili  1.0  /  DBili  x   /  AST  21  /  ALT  10  /  AlkPhos  78  06-          Urinalysis Basic - ( 2018 06:28 )    Color: Colorless / Appearance: SL Turbid / S.005 / pH: x  Gluc: x / Ketone: Trace  / Bili: Negative / Urobili: Negative   Blood: x / Protein: Negative / Nitrite: Negative   Leuk Esterase: Small / RBC: 0-2 /HPF / WBC 0-2 /HPF   Sq Epi: x / Non Sq Epi: OCC /HPF / Bacteria: Few /HPF        RADIOLOGY & ADDITIONAL TESTS:    Assessment and Plan: 46 yo F with PMH of diabetes, hyperlipidemia, and hypothyroidism is admitted to [facility] with a primary psychiatric diagnosis of Schizoaffective disorder. The pt currently denies having any medical complaints such as fever, chills, chest pain, sob, abdominal pain, n/v/d/c, or any problems with urination or bowel movements. The rest of her screening physical is unremarkable.    1.Schizoaffective disorder-Plan: continue with meds as per primary psychiatric team  2. Diabetes-Plan: continue with metformin and insulin  3. Hypothyroidism-Plan: continue with levothyroxine   4. Hyperlipidemia-Plan: continue with atorvastatin Screening Medical Evaluation  Patient Admitted from: [facility] admitting diagnosis: Schizoaffective disorder    PAST MEDICAL & SURGICAL HISTORY:  Diabetes  High cholesterol  Hypothyroid  Psychiatric diagnosis  No significant past surgical history        Allergies    Depakote (Rash)  penicillin (Rash)    Intolerances        Social History:     FAMILY HISTORY:  No pertinent family history in first degree relatives      MEDICATIONS  (STANDING):  ALPRAZolam 0.25 milliGRAM(s) Oral three times a day  atorvastatin 10 milliGRAM(s) Oral at bedtime  insulin lispro (HumaLOG) corrective regimen sliding scale   SubCutaneous three times a day before meals  insulin lispro (HumaLOG) corrective regimen sliding scale   SubCutaneous at bedtime  levothyroxine 25 MICROGram(s) Oral daily  lithium CR (ESKALITH-CR) 450 milliGRAM(s) Oral at bedtime  lurasidone 40 milliGRAM(s) Oral <User Schedule>  metFORMIN 850 milliGRAM(s) Oral <User Schedule>    MEDICATIONS  (PRN):  hydrOXYzine hydrochloride 50 milliGRAM(s) Oral every 6 hours PRN Anxiety      Vital Signs Last 24 Hrs  T(C): 37 (2018 11:43), Max: 37 (2018 11:43)  T(F): 98.6 (2018 11:43), Max: 98.6 (2018 11:43)  HR: 95 (2018 08:57) (95 - 115)  BP: 119/62 (2018 11:43) (119/62 - 138/80)  BP(mean): 80 (2018 11:43) (80 - 80)  RR: 16 (2018 11:43) (16 - 18)  SpO2: 99% (2018 11:43) (99% - 100%)  CAPILLARY BLOOD GLUCOSE            PHYSICAL EXAM:  GENERAL: NAD, well-developed  HEAD:  Atraumatic, Normocephalic  EYES: EOMI, PERRLA, conjunctiva and sclera clear  NECK: Supple, No JVD  CHEST/LUNG: Clear to auscultation bilaterally; No wheeze  HEART: Regular rate and rhythm; No murmurs, rubs, or gallops  ABDOMEN: Soft, Nontender, Nondistended; Bowel sounds present  EXTREMITIES:  2+ Peripheral Pulses, No clubbing, cyanosis, or edema  PSYCH: AAOx3  NEUROLOGY: non-focal  SKIN: In tact    LABS:                        14.5   11.1  )-----------( 279      ( 2018 06:28 )             42.0     06-    129<L>  |  93<L>  |  10  ----------------------------<  93  4.4   |  16<L>  |  0.84    Ca    10.2      2018 06:28    TPro  7.5  /  Alb  5.1<H>  /  TBili  1.0  /  DBili  x   /  AST  21  /  ALT  10  /  AlkPhos  78  06-          Urinalysis Basic - ( 2018 06:28 )    Color: Colorless / Appearance: SL Turbid / S.005 / pH: x  Gluc: x / Ketone: Trace  / Bili: Negative / Urobili: Negative   Blood: x / Protein: Negative / Nitrite: Negative   Leuk Esterase: Small / RBC: 0-2 /HPF / WBC 0-2 /HPF   Sq Epi: x / Non Sq Epi: OCC /HPF / Bacteria: Few /HPF        RADIOLOGY & ADDITIONAL TESTS:    Assessment and Plan: 48 yo F with PMH of diabetes, hyperlipidemia, and hypothyroidism is admitted to [facility] with a primary psychiatric diagnosis of Schizoaffective disorder. The pt currently denies having any medical complaints such as fever, chills, chest pain, sob, abdominal pain, n/v/d/c, or any problems with urination or bowel movements. The rest of her screening physical is unremarkable.    1.Schizoaffective disorder-Plan: continue with meds as per primary psychiatric team  2. Diabetes-Plan: continue with metformin and insulin  3. Hypothyroidism-Plan: continue with levothyroxine   4. Hyperlipidemia-Plan: continue with atorvastatin

## 2018-06-01 NOTE — ED ADULT NURSE REASSESSMENT NOTE - NS ED NURSE REASSESS COMMENT FT1
pt placed on one to one for safety. family and one to one at bedside. security called for pt to be wanded.

## 2018-06-01 NOTE — ED ADULT NURSE NOTE - OBJECTIVE STATEMENT
48 yo female pt with history of seizures, schizoaffective disorder on latuda presents to ed via ems for psych eval. as per ems "her  called us because he wants her to have a psych evaluation." as per pt's  "she was supposed to have an appointment with her psychologist at [facility] this morning, but I have to cancel it because she is now in the hospital. but I called ems because she kept me up all night. yesterday she was saying the FBI was in the apartment, and she was hearing helicopters. and she kept me up all night saying she doesn't feel safe in the apartment and that she does not feel safe with me." upon arrival to ed pt is denying what the  says. pt aox3. pt states "I have a psych eval but am unsure of what they are." as per pt's  pt's last seizure was one year ago. pt placed on one to one upon arrival to ed. pt denies pain of any kind/chest pain/sob/n/v/dizziness/weakness/sob. safety maintained. 48 yo female pt with history of seizures, schizoaffective disorder on latuda presents to ed via ems for psych eval. as per ems "her  called us because he wants her to have a psych evaluation." as per pt's  "she was supposed to have an appointment with her psychologist at [facility] this morning, but I have to cancel it because she is now in the hospital. but I called ems because she kept me up all night. yesterday she was saying the FBI was in the apartment, and she was hearing helicopters. and she kept me up all night saying she doesn't feel safe in the apartment and that she does not feel safe with me." upon arrival to ed pt is denying what the  says. pt aox3. pt states "I have a psych eval but am unsure of what they are." as per pt's  pt's last seizure was one year ago. pt placed on one to one upon arrival to ed. pt denies hi/si. pt states "I saw a dog last night in my home and I got scared, the house was dirty and I couldn't be in the house anymore." pt's  states "the house is clean and we don't have a dog." pt denies pain of any kind/chest pain/sob/n/v/dizziness/weakness/sob. safety maintained.

## 2018-06-01 NOTE — ED BEHAVIORAL HEALTH ASSESSMENT NOTE - OTHER PAST PSYCHIATRIC HISTORY (INCLUDE DETAILS REGARDING ONSET, COURSE OF ILLNESS, INPATIENT/OUTPATIENT TREATMENT)
Schizoaffective disorder + 5 hospitalizations, no SI/SA, last hospitalized in 2013    Risperdal 4mg BID , Klonopin 1mg BID, Cogentin 0.5mg BID (cannot confirm compliance)    Outpt treatment wLyric Velázquez (10 years) hx of Schizoaffective disorder + 5 hospitalizations, no SI/SA, last hospitalized in 2017

## 2018-06-02 LAB
BUN SERPL-MCNC: 11 MG/DL — SIGNIFICANT CHANGE UP (ref 7–23)
CALCIUM SERPL-MCNC: 9.5 MG/DL — SIGNIFICANT CHANGE UP (ref 8.4–10.5)
CHLORIDE SERPL-SCNC: 100 MMOL/L — SIGNIFICANT CHANGE UP (ref 98–107)
CHOLEST SERPL-MCNC: 125 MG/DL — SIGNIFICANT CHANGE UP (ref 120–199)
CO2 SERPL-SCNC: 20 MMOL/L — LOW (ref 22–31)
CREAT SERPL-MCNC: 1.07 MG/DL — SIGNIFICANT CHANGE UP (ref 0.5–1.3)
GLUCOSE SERPL-MCNC: 78 MG/DL — SIGNIFICANT CHANGE UP (ref 70–99)
HBA1C BLD-MCNC: 5.1 % — SIGNIFICANT CHANGE UP (ref 4–5.6)
HDLC SERPL-MCNC: 72 MG/DL — HIGH (ref 45–65)
LIPID PNL WITH DIRECT LDL SERPL: 41 MG/DL — SIGNIFICANT CHANGE UP
POTASSIUM SERPL-MCNC: 3.7 MMOL/L — SIGNIFICANT CHANGE UP (ref 3.5–5.3)
POTASSIUM SERPL-SCNC: 3.7 MMOL/L — SIGNIFICANT CHANGE UP (ref 3.5–5.3)
SODIUM SERPL-SCNC: 137 MMOL/L — SIGNIFICANT CHANGE UP (ref 135–145)
TRIGL SERPL-MCNC: 68 MG/DL — SIGNIFICANT CHANGE UP (ref 10–149)
TSH SERPL-MCNC: 3.82 UIU/ML — SIGNIFICANT CHANGE UP (ref 0.27–4.2)

## 2018-06-02 RX ADMIN — Medication 0.25 MILLIGRAM(S): at 12:44

## 2018-06-02 RX ADMIN — Medication 25 MICROGRAM(S): at 08:52

## 2018-06-02 RX ADMIN — METFORMIN HYDROCHLORIDE 850 MILLIGRAM(S): 850 TABLET ORAL at 17:27

## 2018-06-02 RX ADMIN — LITHIUM CARBONATE 450 MILLIGRAM(S): 300 TABLET, EXTENDED RELEASE ORAL at 20:20

## 2018-06-02 RX ADMIN — ATORVASTATIN CALCIUM 10 MILLIGRAM(S): 80 TABLET, FILM COATED ORAL at 20:20

## 2018-06-02 RX ADMIN — Medication 0.25 MILLIGRAM(S): at 08:52

## 2018-06-02 RX ADMIN — Medication 0.25 MILLIGRAM(S): at 20:20

## 2018-06-02 RX ADMIN — LURASIDONE HYDROCHLORIDE 40 MILLIGRAM(S): 40 TABLET ORAL at 17:27

## 2018-06-03 PROCEDURE — 99232 SBSQ HOSP IP/OBS MODERATE 35: CPT

## 2018-06-03 RX ADMIN — Medication 25 MICROGRAM(S): at 08:32

## 2018-06-03 RX ADMIN — METFORMIN HYDROCHLORIDE 850 MILLIGRAM(S): 850 TABLET ORAL at 17:20

## 2018-06-03 RX ADMIN — ATORVASTATIN CALCIUM 10 MILLIGRAM(S): 80 TABLET, FILM COATED ORAL at 20:24

## 2018-06-03 RX ADMIN — LITHIUM CARBONATE 450 MILLIGRAM(S): 300 TABLET, EXTENDED RELEASE ORAL at 20:24

## 2018-06-03 RX ADMIN — Medication 0.25 MILLIGRAM(S): at 13:14

## 2018-06-03 RX ADMIN — LURASIDONE HYDROCHLORIDE 40 MILLIGRAM(S): 40 TABLET ORAL at 17:20

## 2018-06-03 RX ADMIN — Medication 0.25 MILLIGRAM(S): at 08:32

## 2018-06-03 RX ADMIN — Medication 0.25 MILLIGRAM(S): at 20:24

## 2018-06-04 PROCEDURE — 99232 SBSQ HOSP IP/OBS MODERATE 35: CPT

## 2018-06-04 RX ORDER — INSULIN LISPRO 100/ML
VIAL (ML) SUBCUTANEOUS
Qty: 0 | Refills: 0 | Status: DISCONTINUED | OUTPATIENT
Start: 2018-06-04 | End: 2018-07-17

## 2018-06-04 RX ADMIN — LURASIDONE HYDROCHLORIDE 40 MILLIGRAM(S): 40 TABLET ORAL at 17:11

## 2018-06-04 RX ADMIN — Medication 25 MICROGRAM(S): at 08:20

## 2018-06-04 RX ADMIN — METFORMIN HYDROCHLORIDE 850 MILLIGRAM(S): 850 TABLET ORAL at 17:11

## 2018-06-04 RX ADMIN — ATORVASTATIN CALCIUM 10 MILLIGRAM(S): 80 TABLET, FILM COATED ORAL at 20:43

## 2018-06-04 RX ADMIN — LITHIUM CARBONATE 450 MILLIGRAM(S): 300 TABLET, EXTENDED RELEASE ORAL at 20:43

## 2018-06-04 RX ADMIN — Medication 0.25 MILLIGRAM(S): at 08:20

## 2018-06-04 RX ADMIN — Medication 0.25 MILLIGRAM(S): at 12:20

## 2018-06-04 RX ADMIN — Medication 0.25 MILLIGRAM(S): at 20:43

## 2018-06-05 PROCEDURE — 99232 SBSQ HOSP IP/OBS MODERATE 35: CPT

## 2018-06-05 RX ORDER — LURASIDONE HYDROCHLORIDE 40 MG/1
60 TABLET ORAL
Qty: 0 | Refills: 0 | Status: DISCONTINUED | OUTPATIENT
Start: 2018-06-05 | End: 2018-06-07

## 2018-06-05 RX ADMIN — LITHIUM CARBONATE 450 MILLIGRAM(S): 300 TABLET, EXTENDED RELEASE ORAL at 21:20

## 2018-06-05 RX ADMIN — ATORVASTATIN CALCIUM 10 MILLIGRAM(S): 80 TABLET, FILM COATED ORAL at 21:20

## 2018-06-05 RX ADMIN — LURASIDONE HYDROCHLORIDE 60 MILLIGRAM(S): 40 TABLET ORAL at 17:21

## 2018-06-05 RX ADMIN — Medication 0.25 MILLIGRAM(S): at 08:27

## 2018-06-05 RX ADMIN — Medication 25 MICROGRAM(S): at 08:27

## 2018-06-05 RX ADMIN — Medication 0.25 MILLIGRAM(S): at 21:20

## 2018-06-05 RX ADMIN — METFORMIN HYDROCHLORIDE 850 MILLIGRAM(S): 850 TABLET ORAL at 17:21

## 2018-06-05 RX ADMIN — Medication 0.25 MILLIGRAM(S): at 12:40

## 2018-06-06 PROCEDURE — 99232 SBSQ HOSP IP/OBS MODERATE 35: CPT

## 2018-06-06 RX ORDER — ACETAMINOPHEN 500 MG
650 TABLET ORAL EVERY 6 HOURS
Qty: 0 | Refills: 0 | Status: DISCONTINUED | OUTPATIENT
Start: 2018-06-06 | End: 2018-07-17

## 2018-06-06 RX ORDER — ALPRAZOLAM 0.25 MG
0.25 TABLET ORAL THREE TIMES A DAY
Qty: 0 | Refills: 0 | Status: DISCONTINUED | OUTPATIENT
Start: 2018-06-06 | End: 2018-06-11

## 2018-06-06 RX ADMIN — METFORMIN HYDROCHLORIDE 850 MILLIGRAM(S): 850 TABLET ORAL at 17:28

## 2018-06-06 RX ADMIN — Medication 25 MICROGRAM(S): at 08:33

## 2018-06-06 RX ADMIN — Medication 0.25 MILLIGRAM(S): at 12:49

## 2018-06-06 RX ADMIN — Medication 0.25 MILLIGRAM(S): at 08:33

## 2018-06-06 RX ADMIN — LITHIUM CARBONATE 450 MILLIGRAM(S): 300 TABLET, EXTENDED RELEASE ORAL at 22:06

## 2018-06-06 RX ADMIN — ATORVASTATIN CALCIUM 10 MILLIGRAM(S): 80 TABLET, FILM COATED ORAL at 22:06

## 2018-06-06 RX ADMIN — LURASIDONE HYDROCHLORIDE 60 MILLIGRAM(S): 40 TABLET ORAL at 17:28

## 2018-06-06 RX ADMIN — Medication 0.25 MILLIGRAM(S): at 22:06

## 2018-06-07 PROCEDURE — 99232 SBSQ HOSP IP/OBS MODERATE 35: CPT

## 2018-06-07 RX ORDER — LURASIDONE HYDROCHLORIDE 40 MG/1
80 TABLET ORAL
Qty: 0 | Refills: 0 | Status: DISCONTINUED | OUTPATIENT
Start: 2018-06-07 | End: 2018-06-11

## 2018-06-07 RX ADMIN — ATORVASTATIN CALCIUM 10 MILLIGRAM(S): 80 TABLET, FILM COATED ORAL at 21:25

## 2018-06-07 RX ADMIN — Medication 25 MICROGRAM(S): at 08:24

## 2018-06-07 RX ADMIN — Medication 0.25 MILLIGRAM(S): at 13:28

## 2018-06-07 RX ADMIN — LITHIUM CARBONATE 450 MILLIGRAM(S): 300 TABLET, EXTENDED RELEASE ORAL at 21:25

## 2018-06-07 RX ADMIN — Medication 0.25 MILLIGRAM(S): at 08:23

## 2018-06-07 RX ADMIN — LURASIDONE HYDROCHLORIDE 80 MILLIGRAM(S): 40 TABLET ORAL at 17:34

## 2018-06-07 RX ADMIN — METFORMIN HYDROCHLORIDE 850 MILLIGRAM(S): 850 TABLET ORAL at 17:34

## 2018-06-07 RX ADMIN — Medication 0.25 MILLIGRAM(S): at 21:25

## 2018-06-08 PROCEDURE — 99232 SBSQ HOSP IP/OBS MODERATE 35: CPT

## 2018-06-08 RX ADMIN — ATORVASTATIN CALCIUM 10 MILLIGRAM(S): 80 TABLET, FILM COATED ORAL at 20:20

## 2018-06-08 RX ADMIN — Medication 0.25 MILLIGRAM(S): at 20:20

## 2018-06-08 RX ADMIN — Medication 0.25 MILLIGRAM(S): at 10:21

## 2018-06-08 RX ADMIN — LITHIUM CARBONATE 450 MILLIGRAM(S): 300 TABLET, EXTENDED RELEASE ORAL at 20:20

## 2018-06-08 RX ADMIN — Medication 0.25 MILLIGRAM(S): at 13:02

## 2018-06-08 RX ADMIN — METFORMIN HYDROCHLORIDE 850 MILLIGRAM(S): 850 TABLET ORAL at 17:13

## 2018-06-08 RX ADMIN — Medication 25 MICROGRAM(S): at 10:21

## 2018-06-08 RX ADMIN — LURASIDONE HYDROCHLORIDE 80 MILLIGRAM(S): 40 TABLET ORAL at 17:13

## 2018-06-09 RX ADMIN — Medication 25 MICROGRAM(S): at 09:07

## 2018-06-09 RX ADMIN — LURASIDONE HYDROCHLORIDE 80 MILLIGRAM(S): 40 TABLET ORAL at 16:42

## 2018-06-09 RX ADMIN — ATORVASTATIN CALCIUM 10 MILLIGRAM(S): 80 TABLET, FILM COATED ORAL at 21:23

## 2018-06-09 RX ADMIN — LITHIUM CARBONATE 450 MILLIGRAM(S): 300 TABLET, EXTENDED RELEASE ORAL at 21:23

## 2018-06-09 RX ADMIN — Medication 0.25 MILLIGRAM(S): at 09:08

## 2018-06-09 RX ADMIN — Medication 0.25 MILLIGRAM(S): at 21:23

## 2018-06-09 RX ADMIN — Medication 0.25 MILLIGRAM(S): at 16:38

## 2018-06-09 RX ADMIN — METFORMIN HYDROCHLORIDE 850 MILLIGRAM(S): 850 TABLET ORAL at 16:42

## 2018-06-10 RX ADMIN — Medication 0.25 MILLIGRAM(S): at 08:10

## 2018-06-10 RX ADMIN — METFORMIN HYDROCHLORIDE 850 MILLIGRAM(S): 850 TABLET ORAL at 17:03

## 2018-06-10 RX ADMIN — Medication 0.25 MILLIGRAM(S): at 21:27

## 2018-06-10 RX ADMIN — ATORVASTATIN CALCIUM 10 MILLIGRAM(S): 80 TABLET, FILM COATED ORAL at 21:27

## 2018-06-10 RX ADMIN — LITHIUM CARBONATE 450 MILLIGRAM(S): 300 TABLET, EXTENDED RELEASE ORAL at 21:27

## 2018-06-10 RX ADMIN — Medication 0.25 MILLIGRAM(S): at 13:04

## 2018-06-10 RX ADMIN — Medication 25 MICROGRAM(S): at 08:10

## 2018-06-10 RX ADMIN — LURASIDONE HYDROCHLORIDE 80 MILLIGRAM(S): 40 TABLET ORAL at 17:03

## 2018-06-11 PROCEDURE — 99232 SBSQ HOSP IP/OBS MODERATE 35: CPT

## 2018-06-11 RX ORDER — ALPRAZOLAM 0.25 MG
0.25 TABLET ORAL THREE TIMES A DAY
Qty: 0 | Refills: 0 | Status: DISCONTINUED | OUTPATIENT
Start: 2018-06-11 | End: 2018-06-15

## 2018-06-11 RX ORDER — LURASIDONE HYDROCHLORIDE 40 MG/1
100 TABLET ORAL
Qty: 0 | Refills: 0 | Status: DISCONTINUED | OUTPATIENT
Start: 2018-06-11 | End: 2018-06-12

## 2018-06-11 RX ADMIN — Medication 0.25 MILLIGRAM(S): at 12:59

## 2018-06-11 RX ADMIN — Medication 0.25 MILLIGRAM(S): at 08:38

## 2018-06-11 RX ADMIN — LURASIDONE HYDROCHLORIDE 100 MILLIGRAM(S): 40 TABLET ORAL at 17:35

## 2018-06-11 RX ADMIN — LITHIUM CARBONATE 450 MILLIGRAM(S): 300 TABLET, EXTENDED RELEASE ORAL at 20:58

## 2018-06-11 RX ADMIN — Medication 25 MICROGRAM(S): at 08:38

## 2018-06-11 RX ADMIN — METFORMIN HYDROCHLORIDE 850 MILLIGRAM(S): 850 TABLET ORAL at 17:35

## 2018-06-11 RX ADMIN — ATORVASTATIN CALCIUM 10 MILLIGRAM(S): 80 TABLET, FILM COATED ORAL at 20:58

## 2018-06-11 RX ADMIN — Medication 0.25 MILLIGRAM(S): at 20:58

## 2018-06-12 PROCEDURE — 99232 SBSQ HOSP IP/OBS MODERATE 35: CPT

## 2018-06-12 RX ORDER — LURASIDONE HYDROCHLORIDE 40 MG/1
120 TABLET ORAL
Qty: 0 | Refills: 0 | Status: DISCONTINUED | OUTPATIENT
Start: 2018-06-12 | End: 2018-07-05

## 2018-06-12 RX ADMIN — LURASIDONE HYDROCHLORIDE 120 MILLIGRAM(S): 40 TABLET ORAL at 17:22

## 2018-06-12 RX ADMIN — METFORMIN HYDROCHLORIDE 850 MILLIGRAM(S): 850 TABLET ORAL at 17:22

## 2018-06-12 RX ADMIN — Medication 0.25 MILLIGRAM(S): at 08:57

## 2018-06-12 RX ADMIN — Medication 50 MILLIGRAM(S): at 05:22

## 2018-06-12 RX ADMIN — LITHIUM CARBONATE 450 MILLIGRAM(S): 300 TABLET, EXTENDED RELEASE ORAL at 21:13

## 2018-06-12 RX ADMIN — Medication 25 MICROGRAM(S): at 08:57

## 2018-06-13 PROCEDURE — 99232 SBSQ HOSP IP/OBS MODERATE 35: CPT

## 2018-06-13 RX ADMIN — Medication 0.25 MILLIGRAM(S): at 21:21

## 2018-06-13 RX ADMIN — Medication 25 MICROGRAM(S): at 09:15

## 2018-06-13 RX ADMIN — ATORVASTATIN CALCIUM 10 MILLIGRAM(S): 80 TABLET, FILM COATED ORAL at 21:21

## 2018-06-13 RX ADMIN — Medication 0.25 MILLIGRAM(S): at 09:15

## 2018-06-13 RX ADMIN — LITHIUM CARBONATE 450 MILLIGRAM(S): 300 TABLET, EXTENDED RELEASE ORAL at 21:21

## 2018-06-13 RX ADMIN — METFORMIN HYDROCHLORIDE 850 MILLIGRAM(S): 850 TABLET ORAL at 17:07

## 2018-06-13 RX ADMIN — Medication 0.25 MILLIGRAM(S): at 12:50

## 2018-06-13 RX ADMIN — LURASIDONE HYDROCHLORIDE 120 MILLIGRAM(S): 40 TABLET ORAL at 17:07

## 2018-06-14 PROCEDURE — 99232 SBSQ HOSP IP/OBS MODERATE 35: CPT

## 2018-06-14 RX ORDER — PERPHENAZINE 8 MG/1
4 TABLET, FILM COATED ORAL
Qty: 0 | Refills: 0 | Status: DISCONTINUED | OUTPATIENT
Start: 2018-06-14 | End: 2018-06-15

## 2018-06-14 RX ORDER — TRAZODONE HCL 50 MG
50 TABLET ORAL AT BEDTIME
Qty: 0 | Refills: 0 | Status: DISCONTINUED | OUTPATIENT
Start: 2018-06-14 | End: 2018-06-19

## 2018-06-14 RX ADMIN — PERPHENAZINE 4 MILLIGRAM(S): 8 TABLET, FILM COATED ORAL at 22:41

## 2018-06-14 RX ADMIN — Medication 0.25 MILLIGRAM(S): at 09:02

## 2018-06-14 RX ADMIN — Medication 25 MICROGRAM(S): at 09:02

## 2018-06-14 RX ADMIN — ATORVASTATIN CALCIUM 10 MILLIGRAM(S): 80 TABLET, FILM COATED ORAL at 22:41

## 2018-06-14 RX ADMIN — METFORMIN HYDROCHLORIDE 850 MILLIGRAM(S): 850 TABLET ORAL at 18:02

## 2018-06-14 RX ADMIN — Medication 50 MILLIGRAM(S): at 22:41

## 2018-06-14 RX ADMIN — Medication 0.25 MILLIGRAM(S): at 22:41

## 2018-06-14 RX ADMIN — LITHIUM CARBONATE 450 MILLIGRAM(S): 300 TABLET, EXTENDED RELEASE ORAL at 22:41

## 2018-06-14 RX ADMIN — LURASIDONE HYDROCHLORIDE 120 MILLIGRAM(S): 40 TABLET ORAL at 18:02

## 2018-06-14 RX ADMIN — Medication 0.25 MILLIGRAM(S): at 13:24

## 2018-06-15 LAB
BASOPHILS # BLD AUTO: 0.06 K/UL — SIGNIFICANT CHANGE UP (ref 0–0.2)
BASOPHILS NFR BLD AUTO: 0.7 % — SIGNIFICANT CHANGE UP (ref 0–2)
EOSINOPHIL # BLD AUTO: 0.23 K/UL — SIGNIFICANT CHANGE UP (ref 0–0.5)
EOSINOPHIL NFR BLD AUTO: 2.7 % — SIGNIFICANT CHANGE UP (ref 0–6)
HCT VFR BLD CALC: 42.8 % — SIGNIFICANT CHANGE UP (ref 34.5–45)
HGB BLD-MCNC: 13.7 G/DL — SIGNIFICANT CHANGE UP (ref 11.5–15.5)
IMM GRANULOCYTES # BLD AUTO: 0.03 # — SIGNIFICANT CHANGE UP
IMM GRANULOCYTES NFR BLD AUTO: 0.4 % — SIGNIFICANT CHANGE UP (ref 0–1.5)
LYMPHOCYTES # BLD AUTO: 1.73 K/UL — SIGNIFICANT CHANGE UP (ref 1–3.3)
LYMPHOCYTES # BLD AUTO: 20.2 % — SIGNIFICANT CHANGE UP (ref 13–44)
MCHC RBC-ENTMCNC: 28 PG — SIGNIFICANT CHANGE UP (ref 27–34)
MCHC RBC-ENTMCNC: 32 % — SIGNIFICANT CHANGE UP (ref 32–36)
MCV RBC AUTO: 87.3 FL — SIGNIFICANT CHANGE UP (ref 80–100)
MONOCYTES # BLD AUTO: 0.51 K/UL — SIGNIFICANT CHANGE UP (ref 0–0.9)
MONOCYTES NFR BLD AUTO: 6 % — SIGNIFICANT CHANGE UP (ref 2–14)
NEUTROPHILS # BLD AUTO: 6 K/UL — SIGNIFICANT CHANGE UP (ref 1.8–7.4)
NEUTROPHILS NFR BLD AUTO: 70 % — SIGNIFICANT CHANGE UP (ref 43–77)
NRBC # FLD: 0 — SIGNIFICANT CHANGE UP
PLATELET # BLD AUTO: 272 K/UL — SIGNIFICANT CHANGE UP (ref 150–400)
PMV BLD: 10 FL — SIGNIFICANT CHANGE UP (ref 7–13)
RBC # BLD: 4.9 M/UL — SIGNIFICANT CHANGE UP (ref 3.8–5.2)
RBC # FLD: 15 % — HIGH (ref 10.3–14.5)
WBC # BLD: 8.56 K/UL — SIGNIFICANT CHANGE UP (ref 3.8–10.5)
WBC # FLD AUTO: 8.56 K/UL — SIGNIFICANT CHANGE UP (ref 3.8–10.5)

## 2018-06-15 PROCEDURE — 99232 SBSQ HOSP IP/OBS MODERATE 35: CPT

## 2018-06-15 PROCEDURE — 93010 ELECTROCARDIOGRAM REPORT: CPT

## 2018-06-15 RX ORDER — PERPHENAZINE 8 MG/1
8 TABLET, FILM COATED ORAL
Qty: 0 | Refills: 0 | Status: DISCONTINUED | OUTPATIENT
Start: 2018-06-15 | End: 2018-06-18

## 2018-06-15 RX ORDER — ALPRAZOLAM 0.25 MG
0.25 TABLET ORAL THREE TIMES A DAY
Qty: 0 | Refills: 0 | Status: DISCONTINUED | OUTPATIENT
Start: 2018-06-15 | End: 2018-06-21

## 2018-06-15 RX ADMIN — Medication 50 MILLIGRAM(S): at 21:06

## 2018-06-15 RX ADMIN — LITHIUM CARBONATE 450 MILLIGRAM(S): 300 TABLET, EXTENDED RELEASE ORAL at 21:06

## 2018-06-15 RX ADMIN — Medication 25 MICROGRAM(S): at 09:51

## 2018-06-15 RX ADMIN — Medication 0.25 MILLIGRAM(S): at 21:06

## 2018-06-15 RX ADMIN — PERPHENAZINE 8 MILLIGRAM(S): 8 TABLET, FILM COATED ORAL at 21:06

## 2018-06-15 RX ADMIN — ATORVASTATIN CALCIUM 10 MILLIGRAM(S): 80 TABLET, FILM COATED ORAL at 21:06

## 2018-06-15 RX ADMIN — Medication 0.25 MILLIGRAM(S): at 09:51

## 2018-06-15 RX ADMIN — PERPHENAZINE 4 MILLIGRAM(S): 8 TABLET, FILM COATED ORAL at 09:51

## 2018-06-16 RX ADMIN — Medication 0.25 MILLIGRAM(S): at 09:00

## 2018-06-16 RX ADMIN — METFORMIN HYDROCHLORIDE 850 MILLIGRAM(S): 850 TABLET ORAL at 17:13

## 2018-06-16 RX ADMIN — Medication 25 MICROGRAM(S): at 09:00

## 2018-06-16 RX ADMIN — PERPHENAZINE 8 MILLIGRAM(S): 8 TABLET, FILM COATED ORAL at 09:00

## 2018-06-16 RX ADMIN — Medication 50 MILLIGRAM(S): at 21:16

## 2018-06-16 RX ADMIN — ATORVASTATIN CALCIUM 10 MILLIGRAM(S): 80 TABLET, FILM COATED ORAL at 21:15

## 2018-06-16 RX ADMIN — Medication 0.25 MILLIGRAM(S): at 12:50

## 2018-06-16 RX ADMIN — LURASIDONE HYDROCHLORIDE 120 MILLIGRAM(S): 40 TABLET ORAL at 17:13

## 2018-06-16 RX ADMIN — PERPHENAZINE 8 MILLIGRAM(S): 8 TABLET, FILM COATED ORAL at 21:16

## 2018-06-16 RX ADMIN — LITHIUM CARBONATE 450 MILLIGRAM(S): 300 TABLET, EXTENDED RELEASE ORAL at 21:16

## 2018-06-16 RX ADMIN — Medication 0.25 MILLIGRAM(S): at 21:15

## 2018-06-17 RX ADMIN — ATORVASTATIN CALCIUM 10 MILLIGRAM(S): 80 TABLET, FILM COATED ORAL at 21:20

## 2018-06-17 RX ADMIN — LURASIDONE HYDROCHLORIDE 120 MILLIGRAM(S): 40 TABLET ORAL at 17:19

## 2018-06-17 RX ADMIN — Medication 0.25 MILLIGRAM(S): at 21:20

## 2018-06-17 RX ADMIN — LITHIUM CARBONATE 450 MILLIGRAM(S): 300 TABLET, EXTENDED RELEASE ORAL at 21:20

## 2018-06-17 RX ADMIN — Medication 25 MICROGRAM(S): at 10:10

## 2018-06-17 RX ADMIN — Medication 50 MILLIGRAM(S): at 21:20

## 2018-06-17 RX ADMIN — Medication 0.25 MILLIGRAM(S): at 13:05

## 2018-06-17 RX ADMIN — Medication 0.25 MILLIGRAM(S): at 10:10

## 2018-06-17 RX ADMIN — PERPHENAZINE 8 MILLIGRAM(S): 8 TABLET, FILM COATED ORAL at 21:20

## 2018-06-17 RX ADMIN — METFORMIN HYDROCHLORIDE 850 MILLIGRAM(S): 850 TABLET ORAL at 17:19

## 2018-06-17 RX ADMIN — PERPHENAZINE 8 MILLIGRAM(S): 8 TABLET, FILM COATED ORAL at 10:10

## 2018-06-18 PROCEDURE — 99232 SBSQ HOSP IP/OBS MODERATE 35: CPT

## 2018-06-18 RX ORDER — PERPHENAZINE 8 MG/1
12 TABLET, FILM COATED ORAL
Qty: 0 | Refills: 0 | Status: DISCONTINUED | OUTPATIENT
Start: 2018-06-18 | End: 2018-06-20

## 2018-06-18 RX ADMIN — Medication 0.25 MILLIGRAM(S): at 09:00

## 2018-06-18 RX ADMIN — Medication 0.25 MILLIGRAM(S): at 21:47

## 2018-06-18 RX ADMIN — PERPHENAZINE 8 MILLIGRAM(S): 8 TABLET, FILM COATED ORAL at 09:00

## 2018-06-18 RX ADMIN — METFORMIN HYDROCHLORIDE 850 MILLIGRAM(S): 850 TABLET ORAL at 17:01

## 2018-06-18 RX ADMIN — LURASIDONE HYDROCHLORIDE 120 MILLIGRAM(S): 40 TABLET ORAL at 17:00

## 2018-06-18 RX ADMIN — ATORVASTATIN CALCIUM 10 MILLIGRAM(S): 80 TABLET, FILM COATED ORAL at 21:47

## 2018-06-18 RX ADMIN — Medication 25 MICROGRAM(S): at 09:00

## 2018-06-18 RX ADMIN — PERPHENAZINE 12 MILLIGRAM(S): 8 TABLET, FILM COATED ORAL at 21:47

## 2018-06-18 RX ADMIN — LITHIUM CARBONATE 450 MILLIGRAM(S): 300 TABLET, EXTENDED RELEASE ORAL at 21:47

## 2018-06-18 RX ADMIN — Medication 50 MILLIGRAM(S): at 21:47

## 2018-06-18 RX ADMIN — Medication 0.25 MILLIGRAM(S): at 13:26

## 2018-06-19 PROCEDURE — 99232 SBSQ HOSP IP/OBS MODERATE 35: CPT

## 2018-06-19 RX ORDER — TRAZODONE HCL 50 MG
100 TABLET ORAL AT BEDTIME
Qty: 0 | Refills: 0 | Status: DISCONTINUED | OUTPATIENT
Start: 2018-06-19 | End: 2018-06-25

## 2018-06-19 RX ADMIN — LURASIDONE HYDROCHLORIDE 120 MILLIGRAM(S): 40 TABLET ORAL at 17:31

## 2018-06-19 RX ADMIN — PERPHENAZINE 12 MILLIGRAM(S): 8 TABLET, FILM COATED ORAL at 22:11

## 2018-06-19 RX ADMIN — Medication 0.25 MILLIGRAM(S): at 12:48

## 2018-06-19 RX ADMIN — PERPHENAZINE 12 MILLIGRAM(S): 8 TABLET, FILM COATED ORAL at 09:03

## 2018-06-19 RX ADMIN — Medication 25 MICROGRAM(S): at 09:03

## 2018-06-19 RX ADMIN — METFORMIN HYDROCHLORIDE 850 MILLIGRAM(S): 850 TABLET ORAL at 17:31

## 2018-06-19 RX ADMIN — ATORVASTATIN CALCIUM 10 MILLIGRAM(S): 80 TABLET, FILM COATED ORAL at 22:11

## 2018-06-19 RX ADMIN — LITHIUM CARBONATE 450 MILLIGRAM(S): 300 TABLET, EXTENDED RELEASE ORAL at 22:11

## 2018-06-19 RX ADMIN — Medication 0.25 MILLIGRAM(S): at 22:11

## 2018-06-19 RX ADMIN — Medication 100 MILLIGRAM(S): at 22:11

## 2018-06-19 RX ADMIN — Medication 0.25 MILLIGRAM(S): at 09:03

## 2018-06-20 PROCEDURE — 99232 SBSQ HOSP IP/OBS MODERATE 35: CPT

## 2018-06-20 RX ORDER — PERPHENAZINE 8 MG/1
16 TABLET, FILM COATED ORAL
Qty: 0 | Refills: 0 | Status: DISCONTINUED | OUTPATIENT
Start: 2018-06-20 | End: 2018-06-25

## 2018-06-20 RX ADMIN — ATORVASTATIN CALCIUM 10 MILLIGRAM(S): 80 TABLET, FILM COATED ORAL at 21:48

## 2018-06-20 RX ADMIN — PERPHENAZINE 12 MILLIGRAM(S): 8 TABLET, FILM COATED ORAL at 09:10

## 2018-06-20 RX ADMIN — METFORMIN HYDROCHLORIDE 850 MILLIGRAM(S): 850 TABLET ORAL at 17:37

## 2018-06-20 RX ADMIN — PERPHENAZINE 16 MILLIGRAM(S): 8 TABLET, FILM COATED ORAL at 21:48

## 2018-06-20 RX ADMIN — Medication 0.25 MILLIGRAM(S): at 21:48

## 2018-06-20 RX ADMIN — LURASIDONE HYDROCHLORIDE 120 MILLIGRAM(S): 40 TABLET ORAL at 17:37

## 2018-06-20 RX ADMIN — Medication 25 MICROGRAM(S): at 09:10

## 2018-06-20 RX ADMIN — LITHIUM CARBONATE 450 MILLIGRAM(S): 300 TABLET, EXTENDED RELEASE ORAL at 21:48

## 2018-06-20 RX ADMIN — Medication 0.25 MILLIGRAM(S): at 09:10

## 2018-06-20 RX ADMIN — Medication 100 MILLIGRAM(S): at 21:48

## 2018-06-21 ENCOUNTER — OUTPATIENT (OUTPATIENT)
Dept: OUTPATIENT SERVICES | Facility: HOSPITAL | Age: 48
LOS: 1 days | Discharge: HOME | End: 2018-06-21

## 2018-06-21 DIAGNOSIS — D72.828 OTHER ELEVATED WHITE BLOOD CELL COUNT: ICD-10-CM

## 2018-06-21 PROCEDURE — 99232 SBSQ HOSP IP/OBS MODERATE 35: CPT

## 2018-06-21 RX ORDER — ALPRAZOLAM 0.25 MG
0.25 TABLET ORAL THREE TIMES A DAY
Qty: 0 | Refills: 0 | Status: DISCONTINUED | OUTPATIENT
Start: 2018-06-21 | End: 2018-06-26

## 2018-06-21 RX ADMIN — Medication 0.25 MILLIGRAM(S): at 13:18

## 2018-06-21 RX ADMIN — LURASIDONE HYDROCHLORIDE 120 MILLIGRAM(S): 40 TABLET ORAL at 17:01

## 2018-06-21 RX ADMIN — ATORVASTATIN CALCIUM 10 MILLIGRAM(S): 80 TABLET, FILM COATED ORAL at 21:28

## 2018-06-21 RX ADMIN — Medication 100 MILLIGRAM(S): at 21:28

## 2018-06-21 RX ADMIN — METFORMIN HYDROCHLORIDE 850 MILLIGRAM(S): 850 TABLET ORAL at 17:01

## 2018-06-21 RX ADMIN — PERPHENAZINE 16 MILLIGRAM(S): 8 TABLET, FILM COATED ORAL at 21:28

## 2018-06-21 RX ADMIN — Medication 0.25 MILLIGRAM(S): at 08:58

## 2018-06-21 RX ADMIN — Medication 0.25 MILLIGRAM(S): at 21:28

## 2018-06-21 RX ADMIN — PERPHENAZINE 16 MILLIGRAM(S): 8 TABLET, FILM COATED ORAL at 10:34

## 2018-06-21 RX ADMIN — Medication 25 MICROGRAM(S): at 08:58

## 2018-06-22 PROCEDURE — 99232 SBSQ HOSP IP/OBS MODERATE 35: CPT

## 2018-06-22 RX ADMIN — Medication 100 MILLIGRAM(S): at 20:41

## 2018-06-22 RX ADMIN — PERPHENAZINE 16 MILLIGRAM(S): 8 TABLET, FILM COATED ORAL at 09:00

## 2018-06-22 RX ADMIN — PERPHENAZINE 16 MILLIGRAM(S): 8 TABLET, FILM COATED ORAL at 20:41

## 2018-06-22 RX ADMIN — Medication 0.25 MILLIGRAM(S): at 13:00

## 2018-06-22 RX ADMIN — METFORMIN HYDROCHLORIDE 850 MILLIGRAM(S): 850 TABLET ORAL at 18:13

## 2018-06-22 RX ADMIN — Medication 0.25 MILLIGRAM(S): at 20:40

## 2018-06-22 RX ADMIN — LURASIDONE HYDROCHLORIDE 120 MILLIGRAM(S): 40 TABLET ORAL at 18:13

## 2018-06-22 RX ADMIN — LITHIUM CARBONATE 450 MILLIGRAM(S): 300 TABLET, EXTENDED RELEASE ORAL at 20:41

## 2018-06-22 RX ADMIN — Medication 25 MICROGRAM(S): at 09:00

## 2018-06-22 RX ADMIN — ATORVASTATIN CALCIUM 10 MILLIGRAM(S): 80 TABLET, FILM COATED ORAL at 20:40

## 2018-06-23 RX ADMIN — Medication 0.25 MILLIGRAM(S): at 10:25

## 2018-06-23 RX ADMIN — Medication 100 MILLIGRAM(S): at 20:26

## 2018-06-23 RX ADMIN — ATORVASTATIN CALCIUM 10 MILLIGRAM(S): 80 TABLET, FILM COATED ORAL at 20:26

## 2018-06-23 RX ADMIN — Medication 0.25 MILLIGRAM(S): at 14:24

## 2018-06-23 RX ADMIN — PERPHENAZINE 16 MILLIGRAM(S): 8 TABLET, FILM COATED ORAL at 10:25

## 2018-06-23 RX ADMIN — LITHIUM CARBONATE 450 MILLIGRAM(S): 300 TABLET, EXTENDED RELEASE ORAL at 20:26

## 2018-06-23 RX ADMIN — Medication 0.25 MILLIGRAM(S): at 20:26

## 2018-06-23 RX ADMIN — Medication 25 MICROGRAM(S): at 10:25

## 2018-06-23 RX ADMIN — PERPHENAZINE 16 MILLIGRAM(S): 8 TABLET, FILM COATED ORAL at 20:26

## 2018-06-24 RX ADMIN — Medication 0.25 MILLIGRAM(S): at 21:25

## 2018-06-24 RX ADMIN — Medication 0.25 MILLIGRAM(S): at 09:01

## 2018-06-24 RX ADMIN — Medication 0.25 MILLIGRAM(S): at 12:58

## 2018-06-24 RX ADMIN — LURASIDONE HYDROCHLORIDE 120 MILLIGRAM(S): 40 TABLET ORAL at 18:45

## 2018-06-24 RX ADMIN — ATORVASTATIN CALCIUM 10 MILLIGRAM(S): 80 TABLET, FILM COATED ORAL at 21:25

## 2018-06-24 RX ADMIN — PERPHENAZINE 16 MILLIGRAM(S): 8 TABLET, FILM COATED ORAL at 09:01

## 2018-06-24 RX ADMIN — PERPHENAZINE 16 MILLIGRAM(S): 8 TABLET, FILM COATED ORAL at 21:25

## 2018-06-24 RX ADMIN — METFORMIN HYDROCHLORIDE 850 MILLIGRAM(S): 850 TABLET ORAL at 18:45

## 2018-06-24 RX ADMIN — Medication 25 MICROGRAM(S): at 09:01

## 2018-06-24 RX ADMIN — Medication 100 MILLIGRAM(S): at 21:25

## 2018-06-24 RX ADMIN — LITHIUM CARBONATE 450 MILLIGRAM(S): 300 TABLET, EXTENDED RELEASE ORAL at 21:25

## 2018-06-25 ENCOUNTER — APPOINTMENT (OUTPATIENT)
Dept: HEMATOLOGY ONCOLOGY | Facility: CLINIC | Age: 48
End: 2018-06-25

## 2018-06-25 PROCEDURE — 99232 SBSQ HOSP IP/OBS MODERATE 35: CPT

## 2018-06-25 RX ORDER — TRAZODONE HCL 50 MG
150 TABLET ORAL AT BEDTIME
Qty: 0 | Refills: 0 | Status: DISCONTINUED | OUTPATIENT
Start: 2018-06-25 | End: 2018-06-27

## 2018-06-25 RX ORDER — LITHIUM CARBONATE 300 MG/1
600 TABLET, EXTENDED RELEASE ORAL AT BEDTIME
Qty: 0 | Refills: 0 | Status: DISCONTINUED | OUTPATIENT
Start: 2018-06-25 | End: 2018-06-28

## 2018-06-25 RX ORDER — LITHIUM CARBONATE 300 MG/1
600 TABLET, EXTENDED RELEASE ORAL AT BEDTIME
Qty: 0 | Refills: 0 | Status: DISCONTINUED | OUTPATIENT
Start: 2018-06-25 | End: 2018-06-25

## 2018-06-25 RX ORDER — PERPHENAZINE 8 MG/1
20 TABLET, FILM COATED ORAL EVERY 12 HOURS
Qty: 0 | Refills: 0 | Status: DISCONTINUED | OUTPATIENT
Start: 2018-06-25 | End: 2018-06-27

## 2018-06-25 RX ADMIN — PERPHENAZINE 16 MILLIGRAM(S): 8 TABLET, FILM COATED ORAL at 09:08

## 2018-06-25 RX ADMIN — Medication 0.25 MILLIGRAM(S): at 13:07

## 2018-06-25 RX ADMIN — Medication 0.25 MILLIGRAM(S): at 09:08

## 2018-06-25 RX ADMIN — ATORVASTATIN CALCIUM 10 MILLIGRAM(S): 80 TABLET, FILM COATED ORAL at 21:20

## 2018-06-25 RX ADMIN — Medication 150 MILLIGRAM(S): at 21:20

## 2018-06-25 RX ADMIN — Medication 0.25 MILLIGRAM(S): at 21:20

## 2018-06-25 RX ADMIN — Medication 25 MICROGRAM(S): at 09:08

## 2018-06-25 RX ADMIN — LITHIUM CARBONATE 600 MILLIGRAM(S): 300 TABLET, EXTENDED RELEASE ORAL at 21:20

## 2018-06-25 RX ADMIN — LURASIDONE HYDROCHLORIDE 120 MILLIGRAM(S): 40 TABLET ORAL at 17:49

## 2018-06-25 RX ADMIN — PERPHENAZINE 20 MILLIGRAM(S): 8 TABLET, FILM COATED ORAL at 21:20

## 2018-06-25 RX ADMIN — METFORMIN HYDROCHLORIDE 850 MILLIGRAM(S): 850 TABLET ORAL at 17:49

## 2018-06-26 LAB — VIT B12 SERPL-MCNC: 608 PG/ML — SIGNIFICANT CHANGE UP (ref 200–900)

## 2018-06-26 PROCEDURE — 99232 SBSQ HOSP IP/OBS MODERATE 35: CPT

## 2018-06-26 RX ORDER — ALPRAZOLAM 0.25 MG
0.25 TABLET ORAL THREE TIMES A DAY
Qty: 0 | Refills: 0 | Status: DISCONTINUED | OUTPATIENT
Start: 2018-06-26 | End: 2018-06-29

## 2018-06-26 RX ORDER — ALPRAZOLAM 0.25 MG
0.25 TABLET ORAL THREE TIMES A DAY
Qty: 0 | Refills: 0 | Status: DISCONTINUED | OUTPATIENT
Start: 2018-06-26 | End: 2018-06-26

## 2018-06-26 RX ADMIN — Medication 150 MILLIGRAM(S): at 21:26

## 2018-06-26 RX ADMIN — ATORVASTATIN CALCIUM 10 MILLIGRAM(S): 80 TABLET, FILM COATED ORAL at 21:26

## 2018-06-26 RX ADMIN — Medication 0.25 MILLIGRAM(S): at 10:12

## 2018-06-26 RX ADMIN — PERPHENAZINE 20 MILLIGRAM(S): 8 TABLET, FILM COATED ORAL at 21:26

## 2018-06-26 RX ADMIN — LURASIDONE HYDROCHLORIDE 120 MILLIGRAM(S): 40 TABLET ORAL at 17:33

## 2018-06-26 RX ADMIN — Medication 0.25 MILLIGRAM(S): at 14:00

## 2018-06-26 RX ADMIN — PERPHENAZINE 20 MILLIGRAM(S): 8 TABLET, FILM COATED ORAL at 10:13

## 2018-06-26 RX ADMIN — Medication 25 MICROGRAM(S): at 10:12

## 2018-06-26 RX ADMIN — LITHIUM CARBONATE 600 MILLIGRAM(S): 300 TABLET, EXTENDED RELEASE ORAL at 21:26

## 2018-06-26 RX ADMIN — METFORMIN HYDROCHLORIDE 850 MILLIGRAM(S): 850 TABLET ORAL at 17:33

## 2018-06-27 PROCEDURE — 99232 SBSQ HOSP IP/OBS MODERATE 35: CPT

## 2018-06-27 RX ORDER — PERPHENAZINE 8 MG/1
24 TABLET, FILM COATED ORAL EVERY 12 HOURS
Qty: 0 | Refills: 0 | Status: DISCONTINUED | OUTPATIENT
Start: 2018-06-27 | End: 2018-06-29

## 2018-06-27 RX ORDER — TRAZODONE HCL 50 MG
200 TABLET ORAL AT BEDTIME
Qty: 0 | Refills: 0 | Status: DISCONTINUED | OUTPATIENT
Start: 2018-06-27 | End: 2018-07-17

## 2018-06-27 RX ADMIN — Medication 200 MILLIGRAM(S): at 22:17

## 2018-06-27 RX ADMIN — ATORVASTATIN CALCIUM 10 MILLIGRAM(S): 80 TABLET, FILM COATED ORAL at 22:17

## 2018-06-27 RX ADMIN — PERPHENAZINE 24 MILLIGRAM(S): 8 TABLET, FILM COATED ORAL at 22:17

## 2018-06-27 RX ADMIN — PERPHENAZINE 20 MILLIGRAM(S): 8 TABLET, FILM COATED ORAL at 09:15

## 2018-06-27 RX ADMIN — LITHIUM CARBONATE 600 MILLIGRAM(S): 300 TABLET, EXTENDED RELEASE ORAL at 22:17

## 2018-06-27 RX ADMIN — Medication 25 MICROGRAM(S): at 09:15

## 2018-06-28 LAB
BUN SERPL-MCNC: 11 MG/DL — SIGNIFICANT CHANGE UP (ref 7–23)
CALCIUM SERPL-MCNC: 9.8 MG/DL — SIGNIFICANT CHANGE UP (ref 8.4–10.5)
CHLORIDE SERPL-SCNC: 100 MMOL/L — SIGNIFICANT CHANGE UP (ref 98–107)
CO2 SERPL-SCNC: 26 MMOL/L — SIGNIFICANT CHANGE UP (ref 22–31)
CREAT SERPL-MCNC: 0.9 MG/DL — SIGNIFICANT CHANGE UP (ref 0.5–1.3)
GLUCOSE SERPL-MCNC: 94 MG/DL — SIGNIFICANT CHANGE UP (ref 70–99)
LITHIUM SERPL-MCNC: 0.86 MMOL/L — SIGNIFICANT CHANGE UP (ref 0.6–1.2)
POTASSIUM SERPL-MCNC: 4.6 MMOL/L — SIGNIFICANT CHANGE UP (ref 3.5–5.3)
POTASSIUM SERPL-SCNC: 4.6 MMOL/L — SIGNIFICANT CHANGE UP (ref 3.5–5.3)
SODIUM SERPL-SCNC: 139 MMOL/L — SIGNIFICANT CHANGE UP (ref 135–145)

## 2018-06-28 PROCEDURE — 99232 SBSQ HOSP IP/OBS MODERATE 35: CPT

## 2018-06-28 RX ORDER — VENLAFAXINE HCL 75 MG
75 CAPSULE, EXT RELEASE 24 HR ORAL DAILY
Qty: 0 | Refills: 0 | Status: COMPLETED | OUTPATIENT
Start: 2018-06-29 | End: 2018-06-29

## 2018-06-28 RX ORDER — LITHIUM CARBONATE 300 MG/1
900 TABLET, EXTENDED RELEASE ORAL AT BEDTIME
Qty: 0 | Refills: 0 | Status: DISCONTINUED | OUTPATIENT
Start: 2018-06-28 | End: 2018-07-05

## 2018-06-28 RX ADMIN — LITHIUM CARBONATE 900 MILLIGRAM(S): 300 TABLET, EXTENDED RELEASE ORAL at 20:56

## 2018-06-28 RX ADMIN — Medication 200 MILLIGRAM(S): at 20:56

## 2018-06-28 RX ADMIN — LURASIDONE HYDROCHLORIDE 120 MILLIGRAM(S): 40 TABLET ORAL at 16:42

## 2018-06-28 RX ADMIN — ATORVASTATIN CALCIUM 10 MILLIGRAM(S): 80 TABLET, FILM COATED ORAL at 20:56

## 2018-06-28 RX ADMIN — Medication 25 MICROGRAM(S): at 10:00

## 2018-06-28 RX ADMIN — PERPHENAZINE 24 MILLIGRAM(S): 8 TABLET, FILM COATED ORAL at 20:55

## 2018-06-28 RX ADMIN — PERPHENAZINE 24 MILLIGRAM(S): 8 TABLET, FILM COATED ORAL at 10:00

## 2018-06-28 RX ADMIN — METFORMIN HYDROCHLORIDE 850 MILLIGRAM(S): 850 TABLET ORAL at 16:43

## 2018-06-29 PROCEDURE — 99232 SBSQ HOSP IP/OBS MODERATE 35: CPT

## 2018-06-29 RX ORDER — PERPHENAZINE 8 MG/1
28 TABLET, FILM COATED ORAL EVERY 12 HOURS
Qty: 0 | Refills: 0 | Status: DISCONTINUED | OUTPATIENT
Start: 2018-06-29 | End: 2018-07-02

## 2018-06-29 RX ADMIN — LITHIUM CARBONATE 900 MILLIGRAM(S): 300 TABLET, EXTENDED RELEASE ORAL at 21:41

## 2018-06-29 RX ADMIN — PERPHENAZINE 24 MILLIGRAM(S): 8 TABLET, FILM COATED ORAL at 09:43

## 2018-06-29 RX ADMIN — Medication 75 MILLIGRAM(S): at 09:43

## 2018-06-29 RX ADMIN — PERPHENAZINE 28 MILLIGRAM(S): 8 TABLET, FILM COATED ORAL at 21:41

## 2018-06-29 RX ADMIN — Medication 200 MILLIGRAM(S): at 21:41

## 2018-06-29 RX ADMIN — Medication 25 MICROGRAM(S): at 09:43

## 2018-06-29 RX ADMIN — METFORMIN HYDROCHLORIDE 850 MILLIGRAM(S): 850 TABLET ORAL at 17:58

## 2018-06-29 RX ADMIN — LURASIDONE HYDROCHLORIDE 120 MILLIGRAM(S): 40 TABLET ORAL at 17:58

## 2018-06-29 RX ADMIN — ATORVASTATIN CALCIUM 10 MILLIGRAM(S): 80 TABLET, FILM COATED ORAL at 21:41

## 2018-06-30 LAB — VIT B1 SERPL-MCNC: 121.5 NMOL/L — SIGNIFICANT CHANGE UP (ref 66.5–200)

## 2018-06-30 RX ADMIN — ATORVASTATIN CALCIUM 10 MILLIGRAM(S): 80 TABLET, FILM COATED ORAL at 21:11

## 2018-06-30 RX ADMIN — Medication 200 MILLIGRAM(S): at 21:11

## 2018-06-30 RX ADMIN — PERPHENAZINE 28 MILLIGRAM(S): 8 TABLET, FILM COATED ORAL at 08:52

## 2018-06-30 RX ADMIN — LITHIUM CARBONATE 900 MILLIGRAM(S): 300 TABLET, EXTENDED RELEASE ORAL at 21:11

## 2018-06-30 RX ADMIN — PERPHENAZINE 28 MILLIGRAM(S): 8 TABLET, FILM COATED ORAL at 21:11

## 2018-06-30 RX ADMIN — Medication 25 MICROGRAM(S): at 08:52

## 2018-07-01 RX ADMIN — Medication 25 MICROGRAM(S): at 08:24

## 2018-07-01 RX ADMIN — METFORMIN HYDROCHLORIDE 850 MILLIGRAM(S): 850 TABLET ORAL at 17:24

## 2018-07-01 RX ADMIN — ATORVASTATIN CALCIUM 10 MILLIGRAM(S): 80 TABLET, FILM COATED ORAL at 20:50

## 2018-07-01 RX ADMIN — PERPHENAZINE 28 MILLIGRAM(S): 8 TABLET, FILM COATED ORAL at 08:24

## 2018-07-01 RX ADMIN — PERPHENAZINE 28 MILLIGRAM(S): 8 TABLET, FILM COATED ORAL at 20:50

## 2018-07-01 RX ADMIN — LITHIUM CARBONATE 900 MILLIGRAM(S): 300 TABLET, EXTENDED RELEASE ORAL at 20:50

## 2018-07-01 RX ADMIN — Medication 200 MILLIGRAM(S): at 20:50

## 2018-07-01 RX ADMIN — LURASIDONE HYDROCHLORIDE 120 MILLIGRAM(S): 40 TABLET ORAL at 17:24

## 2018-07-02 ENCOUNTER — APPOINTMENT (OUTPATIENT)
Dept: INTERNAL MEDICINE | Facility: CLINIC | Age: 48
End: 2018-07-02

## 2018-07-02 PROCEDURE — 99232 SBSQ HOSP IP/OBS MODERATE 35: CPT

## 2018-07-02 RX ORDER — LITHIUM CARBONATE 300 MG/1
150 TABLET, EXTENDED RELEASE ORAL AT BEDTIME
Qty: 0 | Refills: 0 | Status: DISCONTINUED | OUTPATIENT
Start: 2018-07-02 | End: 2018-07-03

## 2018-07-02 RX ORDER — PERPHENAZINE 8 MG/1
32 TABLET, FILM COATED ORAL EVERY 12 HOURS
Qty: 0 | Refills: 0 | Status: DISCONTINUED | OUTPATIENT
Start: 2018-07-02 | End: 2018-07-05

## 2018-07-02 RX ORDER — VENLAFAXINE HCL 75 MG
75 CAPSULE, EXT RELEASE 24 HR ORAL DAILY
Qty: 0 | Refills: 0 | Status: DISCONTINUED | OUTPATIENT
Start: 2018-07-03 | End: 2018-07-17

## 2018-07-02 RX ADMIN — ATORVASTATIN CALCIUM 10 MILLIGRAM(S): 80 TABLET, FILM COATED ORAL at 22:06

## 2018-07-02 RX ADMIN — Medication 200 MILLIGRAM(S): at 22:06

## 2018-07-02 RX ADMIN — Medication 25 MICROGRAM(S): at 09:09

## 2018-07-02 RX ADMIN — PERPHENAZINE 28 MILLIGRAM(S): 8 TABLET, FILM COATED ORAL at 11:50

## 2018-07-02 RX ADMIN — PERPHENAZINE 32 MILLIGRAM(S): 8 TABLET, FILM COATED ORAL at 22:06

## 2018-07-02 RX ADMIN — LITHIUM CARBONATE 150 MILLIGRAM(S): 300 TABLET, EXTENDED RELEASE ORAL at 22:06

## 2018-07-02 RX ADMIN — LITHIUM CARBONATE 900 MILLIGRAM(S): 300 TABLET, EXTENDED RELEASE ORAL at 22:06

## 2018-07-03 PROCEDURE — 99232 SBSQ HOSP IP/OBS MODERATE 35: CPT

## 2018-07-03 RX ORDER — HALOPERIDOL DECANOATE 100 MG/ML
5 INJECTION INTRAMUSCULAR EVERY 6 HOURS
Qty: 0 | Refills: 0 | Status: DISCONTINUED | OUTPATIENT
Start: 2018-07-03 | End: 2018-07-17

## 2018-07-03 RX ORDER — HALOPERIDOL DECANOATE 100 MG/ML
5 INJECTION INTRAMUSCULAR EVERY 8 HOURS
Qty: 0 | Refills: 0 | Status: DISCONTINUED | OUTPATIENT
Start: 2018-07-03 | End: 2018-07-17

## 2018-07-03 RX ADMIN — METFORMIN HYDROCHLORIDE 850 MILLIGRAM(S): 850 TABLET ORAL at 16:55

## 2018-07-03 RX ADMIN — Medication 200 MILLIGRAM(S): at 21:30

## 2018-07-03 RX ADMIN — LITHIUM CARBONATE 900 MILLIGRAM(S): 300 TABLET, EXTENDED RELEASE ORAL at 21:30

## 2018-07-03 RX ADMIN — ATORVASTATIN CALCIUM 10 MILLIGRAM(S): 80 TABLET, FILM COATED ORAL at 21:30

## 2018-07-03 RX ADMIN — Medication 75 MILLIGRAM(S): at 10:06

## 2018-07-03 RX ADMIN — PERPHENAZINE 32 MILLIGRAM(S): 8 TABLET, FILM COATED ORAL at 10:06

## 2018-07-03 RX ADMIN — Medication 25 MICROGRAM(S): at 10:06

## 2018-07-03 RX ADMIN — LURASIDONE HYDROCHLORIDE 120 MILLIGRAM(S): 40 TABLET ORAL at 16:55

## 2018-07-03 RX ADMIN — PERPHENAZINE 32 MILLIGRAM(S): 8 TABLET, FILM COATED ORAL at 21:30

## 2018-07-04 RX ADMIN — Medication 25 MICROGRAM(S): at 09:05

## 2018-07-04 RX ADMIN — Medication 75 MILLIGRAM(S): at 09:05

## 2018-07-04 RX ADMIN — PERPHENAZINE 32 MILLIGRAM(S): 8 TABLET, FILM COATED ORAL at 21:05

## 2018-07-04 RX ADMIN — PERPHENAZINE 32 MILLIGRAM(S): 8 TABLET, FILM COATED ORAL at 09:05

## 2018-07-04 RX ADMIN — LITHIUM CARBONATE 900 MILLIGRAM(S): 300 TABLET, EXTENDED RELEASE ORAL at 21:05

## 2018-07-04 RX ADMIN — ATORVASTATIN CALCIUM 10 MILLIGRAM(S): 80 TABLET, FILM COATED ORAL at 21:05

## 2018-07-04 RX ADMIN — Medication 200 MILLIGRAM(S): at 21:05

## 2018-07-04 RX ADMIN — METFORMIN HYDROCHLORIDE 850 MILLIGRAM(S): 850 TABLET ORAL at 17:19

## 2018-07-04 RX ADMIN — LURASIDONE HYDROCHLORIDE 120 MILLIGRAM(S): 40 TABLET ORAL at 17:19

## 2018-07-05 LAB — LITHIUM SERPL-MCNC: 1.63 MMOL/L — CRITICAL HIGH (ref 0.6–1.2)

## 2018-07-05 RX ORDER — PALIPERIDONE 1.5 MG/1
3 TABLET, EXTENDED RELEASE ORAL AT BEDTIME
Qty: 0 | Refills: 0 | Status: COMPLETED | OUTPATIENT
Start: 2018-07-05 | End: 2018-07-05

## 2018-07-05 RX ORDER — PERPHENAZINE 8 MG/1
24 TABLET, FILM COATED ORAL EVERY 12 HOURS
Qty: 0 | Refills: 0 | Status: DISCONTINUED | OUTPATIENT
Start: 2018-07-05 | End: 2018-07-06

## 2018-07-05 RX ORDER — LURASIDONE HYDROCHLORIDE 40 MG/1
80 TABLET ORAL AT BEDTIME
Qty: 0 | Refills: 0 | Status: DISCONTINUED | OUTPATIENT
Start: 2018-07-06 | End: 2018-07-06

## 2018-07-05 RX ORDER — PALIPERIDONE 1.5 MG/1
6 TABLET, EXTENDED RELEASE ORAL AT BEDTIME
Qty: 0 | Refills: 0 | Status: DISCONTINUED | OUTPATIENT
Start: 2018-07-06 | End: 2018-07-09

## 2018-07-05 RX ADMIN — Medication 25 MICROGRAM(S): at 08:45

## 2018-07-05 RX ADMIN — Medication 200 MILLIGRAM(S): at 21:07

## 2018-07-05 RX ADMIN — LURASIDONE HYDROCHLORIDE 120 MILLIGRAM(S): 40 TABLET ORAL at 16:58

## 2018-07-05 RX ADMIN — PERPHENAZINE 32 MILLIGRAM(S): 8 TABLET, FILM COATED ORAL at 08:46

## 2018-07-05 RX ADMIN — ATORVASTATIN CALCIUM 10 MILLIGRAM(S): 80 TABLET, FILM COATED ORAL at 21:08

## 2018-07-05 RX ADMIN — PALIPERIDONE 3 MILLIGRAM(S): 1.5 TABLET, EXTENDED RELEASE ORAL at 21:07

## 2018-07-05 RX ADMIN — METFORMIN HYDROCHLORIDE 850 MILLIGRAM(S): 850 TABLET ORAL at 16:58

## 2018-07-05 RX ADMIN — PERPHENAZINE 24 MILLIGRAM(S): 8 TABLET, FILM COATED ORAL at 21:07

## 2018-07-05 RX ADMIN — Medication 75 MILLIGRAM(S): at 08:46

## 2018-07-06 RX ORDER — LURASIDONE HYDROCHLORIDE 40 MG/1
40 TABLET ORAL AT BEDTIME
Qty: 0 | Refills: 0 | Status: COMPLETED | OUTPATIENT
Start: 2018-07-08 | End: 2018-07-08

## 2018-07-06 RX ORDER — PERPHENAZINE 8 MG/1
8 TABLET, FILM COATED ORAL EVERY 12 HOURS
Qty: 0 | Refills: 0 | Status: DISCONTINUED | OUTPATIENT
Start: 2018-07-09 | End: 2018-07-10

## 2018-07-06 RX ORDER — LURASIDONE HYDROCHLORIDE 40 MG/1
60 TABLET ORAL AT BEDTIME
Qty: 0 | Refills: 0 | Status: COMPLETED | OUTPATIENT
Start: 2018-07-07 | End: 2018-07-07

## 2018-07-06 RX ORDER — PERPHENAZINE 8 MG/1
12 TABLET, FILM COATED ORAL EVERY 12 HOURS
Qty: 0 | Refills: 0 | Status: COMPLETED | OUTPATIENT
Start: 2018-07-08 | End: 2018-07-08

## 2018-07-06 RX ORDER — PERPHENAZINE 8 MG/1
16 TABLET, FILM COATED ORAL EVERY 12 HOURS
Qty: 0 | Refills: 0 | Status: COMPLETED | OUTPATIENT
Start: 2018-07-07 | End: 2018-07-07

## 2018-07-06 RX ADMIN — METFORMIN HYDROCHLORIDE 850 MILLIGRAM(S): 850 TABLET ORAL at 18:46

## 2018-07-06 RX ADMIN — LURASIDONE HYDROCHLORIDE 80 MILLIGRAM(S): 40 TABLET ORAL at 20:40

## 2018-07-06 RX ADMIN — Medication 75 MILLIGRAM(S): at 09:26

## 2018-07-06 RX ADMIN — PALIPERIDONE 6 MILLIGRAM(S): 1.5 TABLET, EXTENDED RELEASE ORAL at 20:40

## 2018-07-06 RX ADMIN — PERPHENAZINE 24 MILLIGRAM(S): 8 TABLET, FILM COATED ORAL at 09:26

## 2018-07-06 RX ADMIN — Medication 25 MICROGRAM(S): at 09:26

## 2018-07-06 RX ADMIN — ATORVASTATIN CALCIUM 10 MILLIGRAM(S): 80 TABLET, FILM COATED ORAL at 20:40

## 2018-07-06 RX ADMIN — Medication 200 MILLIGRAM(S): at 20:40

## 2018-07-06 RX ADMIN — PERPHENAZINE 24 MILLIGRAM(S): 8 TABLET, FILM COATED ORAL at 20:40

## 2018-07-07 RX ADMIN — LURASIDONE HYDROCHLORIDE 60 MILLIGRAM(S): 40 TABLET ORAL at 20:26

## 2018-07-07 RX ADMIN — Medication 200 MILLIGRAM(S): at 20:26

## 2018-07-07 RX ADMIN — METFORMIN HYDROCHLORIDE 850 MILLIGRAM(S): 850 TABLET ORAL at 16:59

## 2018-07-07 RX ADMIN — PERPHENAZINE 16 MILLIGRAM(S): 8 TABLET, FILM COATED ORAL at 09:14

## 2018-07-07 RX ADMIN — PERPHENAZINE 16 MILLIGRAM(S): 8 TABLET, FILM COATED ORAL at 20:26

## 2018-07-07 RX ADMIN — ATORVASTATIN CALCIUM 10 MILLIGRAM(S): 80 TABLET, FILM COATED ORAL at 20:26

## 2018-07-07 RX ADMIN — Medication 75 MILLIGRAM(S): at 09:14

## 2018-07-07 RX ADMIN — PALIPERIDONE 6 MILLIGRAM(S): 1.5 TABLET, EXTENDED RELEASE ORAL at 20:26

## 2018-07-07 RX ADMIN — Medication 25 MICROGRAM(S): at 09:14

## 2018-07-08 RX ADMIN — Medication 25 MICROGRAM(S): at 08:36

## 2018-07-08 RX ADMIN — Medication 75 MILLIGRAM(S): at 08:36

## 2018-07-08 RX ADMIN — METFORMIN HYDROCHLORIDE 850 MILLIGRAM(S): 850 TABLET ORAL at 16:52

## 2018-07-08 RX ADMIN — ATORVASTATIN CALCIUM 10 MILLIGRAM(S): 80 TABLET, FILM COATED ORAL at 21:15

## 2018-07-08 RX ADMIN — PERPHENAZINE 12 MILLIGRAM(S): 8 TABLET, FILM COATED ORAL at 08:36

## 2018-07-08 RX ADMIN — LURASIDONE HYDROCHLORIDE 40 MILLIGRAM(S): 40 TABLET ORAL at 21:15

## 2018-07-08 RX ADMIN — PALIPERIDONE 6 MILLIGRAM(S): 1.5 TABLET, EXTENDED RELEASE ORAL at 21:15

## 2018-07-08 RX ADMIN — Medication 200 MILLIGRAM(S): at 21:16

## 2018-07-08 RX ADMIN — PERPHENAZINE 12 MILLIGRAM(S): 8 TABLET, FILM COATED ORAL at 21:15

## 2018-07-09 PROCEDURE — 99232 SBSQ HOSP IP/OBS MODERATE 35: CPT

## 2018-07-09 RX ORDER — PALIPERIDONE 1.5 MG/1
9 TABLET, EXTENDED RELEASE ORAL AT BEDTIME
Qty: 0 | Refills: 0 | Status: DISCONTINUED | OUTPATIENT
Start: 2018-07-09 | End: 2018-07-13

## 2018-07-09 RX ADMIN — Medication 75 MILLIGRAM(S): at 10:12

## 2018-07-09 RX ADMIN — PERPHENAZINE 8 MILLIGRAM(S): 8 TABLET, FILM COATED ORAL at 21:07

## 2018-07-09 RX ADMIN — ATORVASTATIN CALCIUM 10 MILLIGRAM(S): 80 TABLET, FILM COATED ORAL at 21:07

## 2018-07-09 RX ADMIN — PERPHENAZINE 8 MILLIGRAM(S): 8 TABLET, FILM COATED ORAL at 10:12

## 2018-07-09 RX ADMIN — Medication 25 MICROGRAM(S): at 10:12

## 2018-07-09 RX ADMIN — METFORMIN HYDROCHLORIDE 850 MILLIGRAM(S): 850 TABLET ORAL at 17:46

## 2018-07-09 RX ADMIN — PALIPERIDONE 9 MILLIGRAM(S): 1.5 TABLET, EXTENDED RELEASE ORAL at 21:07

## 2018-07-09 RX ADMIN — Medication 200 MILLIGRAM(S): at 21:07

## 2018-07-10 PROCEDURE — 99232 SBSQ HOSP IP/OBS MODERATE 35: CPT

## 2018-07-10 RX ORDER — PALIPERIDONE 1.5 MG/1
234 TABLET, EXTENDED RELEASE ORAL
Qty: 234 | Refills: 0 | OUTPATIENT
Start: 2018-07-10 | End: 2018-08-08

## 2018-07-10 RX ORDER — PERPHENAZINE 8 MG/1
4 TABLET, FILM COATED ORAL EVERY 12 HOURS
Qty: 0 | Refills: 0 | Status: DISCONTINUED | OUTPATIENT
Start: 2018-07-10 | End: 2018-07-12

## 2018-07-10 RX ADMIN — Medication 200 MILLIGRAM(S): at 21:22

## 2018-07-10 RX ADMIN — Medication 25 MICROGRAM(S): at 08:31

## 2018-07-10 RX ADMIN — METFORMIN HYDROCHLORIDE 850 MILLIGRAM(S): 850 TABLET ORAL at 17:02

## 2018-07-10 RX ADMIN — PALIPERIDONE 9 MILLIGRAM(S): 1.5 TABLET, EXTENDED RELEASE ORAL at 21:22

## 2018-07-10 RX ADMIN — Medication 75 MILLIGRAM(S): at 08:31

## 2018-07-10 RX ADMIN — PERPHENAZINE 8 MILLIGRAM(S): 8 TABLET, FILM COATED ORAL at 08:31

## 2018-07-10 RX ADMIN — ATORVASTATIN CALCIUM 10 MILLIGRAM(S): 80 TABLET, FILM COATED ORAL at 21:22

## 2018-07-10 RX ADMIN — PERPHENAZINE 4 MILLIGRAM(S): 8 TABLET, FILM COATED ORAL at 21:22

## 2018-07-11 ENCOUNTER — APPOINTMENT (OUTPATIENT)
Dept: INTERNAL MEDICINE | Facility: CLINIC | Age: 48
End: 2018-07-11

## 2018-07-11 PROCEDURE — 99232 SBSQ HOSP IP/OBS MODERATE 35: CPT

## 2018-07-11 RX ADMIN — PERPHENAZINE 4 MILLIGRAM(S): 8 TABLET, FILM COATED ORAL at 10:16

## 2018-07-11 RX ADMIN — Medication 25 MICROGRAM(S): at 10:13

## 2018-07-11 RX ADMIN — METFORMIN HYDROCHLORIDE 850 MILLIGRAM(S): 850 TABLET ORAL at 17:27

## 2018-07-11 RX ADMIN — PALIPERIDONE 9 MILLIGRAM(S): 1.5 TABLET, EXTENDED RELEASE ORAL at 21:08

## 2018-07-11 RX ADMIN — PERPHENAZINE 4 MILLIGRAM(S): 8 TABLET, FILM COATED ORAL at 21:08

## 2018-07-11 RX ADMIN — Medication 200 MILLIGRAM(S): at 21:08

## 2018-07-11 RX ADMIN — ATORVASTATIN CALCIUM 10 MILLIGRAM(S): 80 TABLET, FILM COATED ORAL at 21:07

## 2018-07-11 RX ADMIN — Medication 75 MILLIGRAM(S): at 10:14

## 2018-07-12 PROCEDURE — 99232 SBSQ HOSP IP/OBS MODERATE 35: CPT

## 2018-07-12 RX ADMIN — Medication 25 MICROGRAM(S): at 09:14

## 2018-07-12 RX ADMIN — PALIPERIDONE 9 MILLIGRAM(S): 1.5 TABLET, EXTENDED RELEASE ORAL at 21:03

## 2018-07-12 RX ADMIN — PERPHENAZINE 4 MILLIGRAM(S): 8 TABLET, FILM COATED ORAL at 09:14

## 2018-07-12 RX ADMIN — Medication 200 MILLIGRAM(S): at 21:03

## 2018-07-12 RX ADMIN — Medication 75 MILLIGRAM(S): at 09:14

## 2018-07-12 RX ADMIN — ATORVASTATIN CALCIUM 10 MILLIGRAM(S): 80 TABLET, FILM COATED ORAL at 21:03

## 2018-07-12 RX ADMIN — METFORMIN HYDROCHLORIDE 850 MILLIGRAM(S): 850 TABLET ORAL at 17:12

## 2018-07-13 PROCEDURE — 99232 SBSQ HOSP IP/OBS MODERATE 35: CPT

## 2018-07-13 RX ORDER — PALIPERIDONE 1.5 MG/1
12 TABLET, EXTENDED RELEASE ORAL AT BEDTIME
Qty: 0 | Refills: 0 | Status: DISCONTINUED | OUTPATIENT
Start: 2018-07-13 | End: 2018-07-17

## 2018-07-13 RX ADMIN — Medication 25 MICROGRAM(S): at 09:08

## 2018-07-13 RX ADMIN — Medication 200 MILLIGRAM(S): at 22:12

## 2018-07-13 RX ADMIN — ATORVASTATIN CALCIUM 10 MILLIGRAM(S): 80 TABLET, FILM COATED ORAL at 22:12

## 2018-07-13 RX ADMIN — Medication 75 MILLIGRAM(S): at 09:08

## 2018-07-13 RX ADMIN — METFORMIN HYDROCHLORIDE 850 MILLIGRAM(S): 850 TABLET ORAL at 17:28

## 2018-07-13 RX ADMIN — PALIPERIDONE 12 MILLIGRAM(S): 1.5 TABLET, EXTENDED RELEASE ORAL at 22:12

## 2018-07-14 RX ADMIN — METFORMIN HYDROCHLORIDE 850 MILLIGRAM(S): 850 TABLET ORAL at 17:22

## 2018-07-14 RX ADMIN — ATORVASTATIN CALCIUM 10 MILLIGRAM(S): 80 TABLET, FILM COATED ORAL at 21:05

## 2018-07-14 RX ADMIN — Medication 75 MILLIGRAM(S): at 09:09

## 2018-07-14 RX ADMIN — Medication 25 MICROGRAM(S): at 09:09

## 2018-07-14 RX ADMIN — Medication 200 MILLIGRAM(S): at 21:06

## 2018-07-14 RX ADMIN — PALIPERIDONE 12 MILLIGRAM(S): 1.5 TABLET, EXTENDED RELEASE ORAL at 21:06

## 2018-07-15 RX ADMIN — Medication 25 MICROGRAM(S): at 08:48

## 2018-07-15 RX ADMIN — PALIPERIDONE 12 MILLIGRAM(S): 1.5 TABLET, EXTENDED RELEASE ORAL at 20:56

## 2018-07-15 RX ADMIN — METFORMIN HYDROCHLORIDE 850 MILLIGRAM(S): 850 TABLET ORAL at 18:20

## 2018-07-15 RX ADMIN — Medication 200 MILLIGRAM(S): at 20:56

## 2018-07-15 RX ADMIN — Medication 75 MILLIGRAM(S): at 08:48

## 2018-07-15 RX ADMIN — ATORVASTATIN CALCIUM 10 MILLIGRAM(S): 80 TABLET, FILM COATED ORAL at 20:55

## 2018-07-16 PROCEDURE — 99232 SBSQ HOSP IP/OBS MODERATE 35: CPT

## 2018-07-16 RX ORDER — LURASIDONE HYDROCHLORIDE 40 MG/1
1 TABLET ORAL
Qty: 0 | Refills: 0 | COMMUNITY

## 2018-07-16 RX ORDER — ALPRAZOLAM 0.25 MG
1 TABLET ORAL
Qty: 0 | Refills: 0 | COMMUNITY

## 2018-07-16 RX ORDER — VENLAFAXINE HCL 75 MG
1 CAPSULE, EXT RELEASE 24 HR ORAL
Qty: 14 | Refills: 0 | OUTPATIENT
Start: 2018-07-16 | End: 2018-07-29

## 2018-07-16 RX ORDER — LEVOTHYROXINE SODIUM 125 MCG
1 TABLET ORAL
Qty: 14 | Refills: 0 | OUTPATIENT
Start: 2018-07-16 | End: 2018-07-29

## 2018-07-16 RX ORDER — METFORMIN HYDROCHLORIDE 850 MG/1
1 TABLET ORAL
Qty: 14 | Refills: 0 | OUTPATIENT
Start: 2018-07-16 | End: 2018-07-29

## 2018-07-16 RX ORDER — ATORVASTATIN CALCIUM 80 MG/1
1 TABLET, FILM COATED ORAL
Qty: 14 | Refills: 0 | OUTPATIENT
Start: 2018-07-16 | End: 2018-07-29

## 2018-07-16 RX ORDER — LITHIUM CARBONATE 300 MG/1
1 TABLET, EXTENDED RELEASE ORAL
Qty: 0 | Refills: 0 | COMMUNITY

## 2018-07-16 RX ORDER — TRAZODONE HCL 50 MG
2 TABLET ORAL
Qty: 28 | Refills: 0 | OUTPATIENT
Start: 2018-07-16 | End: 2018-07-29

## 2018-07-16 RX ORDER — PALIPERIDONE 1.5 MG/1
2 TABLET, EXTENDED RELEASE ORAL
Qty: 28 | Refills: 0 | OUTPATIENT
Start: 2018-07-16 | End: 2018-07-29

## 2018-07-16 RX ADMIN — Medication 25 MICROGRAM(S): at 08:17

## 2018-07-16 RX ADMIN — Medication 200 MILLIGRAM(S): at 21:32

## 2018-07-16 RX ADMIN — ATORVASTATIN CALCIUM 10 MILLIGRAM(S): 80 TABLET, FILM COATED ORAL at 21:32

## 2018-07-16 RX ADMIN — Medication 75 MILLIGRAM(S): at 08:17

## 2018-07-16 RX ADMIN — METFORMIN HYDROCHLORIDE 850 MILLIGRAM(S): 850 TABLET ORAL at 16:58

## 2018-07-16 RX ADMIN — PALIPERIDONE 12 MILLIGRAM(S): 1.5 TABLET, EXTENDED RELEASE ORAL at 21:32

## 2018-07-17 ENCOUNTER — APPOINTMENT (OUTPATIENT)
Dept: INTERNAL MEDICINE | Facility: CLINIC | Age: 48
End: 2018-07-17

## 2018-07-17 VITALS — HEART RATE: 99 BPM | TEMPERATURE: 97 F | DIASTOLIC BLOOD PRESSURE: 69 MMHG | SYSTOLIC BLOOD PRESSURE: 105 MMHG

## 2018-07-17 PROCEDURE — 99238 HOSP IP/OBS DSCHRG MGMT 30/<: CPT

## 2018-07-17 RX ADMIN — Medication 25 MICROGRAM(S): at 09:00

## 2018-07-17 RX ADMIN — Medication 75 MILLIGRAM(S): at 09:00

## 2018-07-23 ENCOUNTER — OUTPATIENT (OUTPATIENT)
Dept: OUTPATIENT SERVICES | Facility: HOSPITAL | Age: 48
LOS: 1 days | Discharge: NURSING, REHAB OR LONG-TERM CARE | End: 2018-07-23
Payer: MEDICARE

## 2018-07-24 DIAGNOSIS — E11.9 TYPE 2 DIABETES MELLITUS WITHOUT COMPLICATIONS: ICD-10-CM

## 2018-07-24 DIAGNOSIS — F25.9 SCHIZOAFFECTIVE DISORDER, UNSPECIFIED: ICD-10-CM

## 2018-07-24 DIAGNOSIS — G40.909 EPILEPSY, UNSPECIFIED, NOT INTRACTABLE, WITHOUT STATUS EPILEPTICUS: ICD-10-CM

## 2018-08-02 ENCOUNTER — APPOINTMENT (OUTPATIENT)
Dept: INTERNAL MEDICINE | Facility: CLINIC | Age: 48
End: 2018-08-02
Payer: COMMERCIAL

## 2018-08-02 PROCEDURE — 36415 COLL VENOUS BLD VENIPUNCTURE: CPT

## 2018-08-04 LAB
ALBUMIN SERPL ELPH-MCNC: 4.8 G/DL
ALP BLD-CCNC: 57 U/L
ALT SERPL-CCNC: 10 U/L
ANION GAP SERPL CALC-SCNC: 18 MMOL/L
AST SERPL-CCNC: 17 U/L
BASOPHILS # BLD AUTO: 0.02 K/UL
BASOPHILS NFR BLD AUTO: 0.3 %
BILIRUB SERPL-MCNC: 0.2 MG/DL
BUN SERPL-MCNC: 19 MG/DL
CALCIUM SERPL-MCNC: 9.2 MG/DL
CHLORIDE SERPL-SCNC: 102 MMOL/L
CHOLEST SERPL-MCNC: 194 MG/DL
CHOLEST/HDLC SERPL: 2.2 RATIO
CO2 SERPL-SCNC: 18 MMOL/L
CREAT SERPL-MCNC: 0.71 MG/DL
EOSINOPHIL # BLD AUTO: 0.19 K/UL
EOSINOPHIL NFR BLD AUTO: 2.9 %
ERYTHROCYTE [SEDIMENTATION RATE] IN BLOOD BY WESTERGREN METHOD: 11 MM/HR
GLUCOSE SERPL-MCNC: 67 MG/DL
HBA1C MFR BLD HPLC: 5.2 %
HCT VFR BLD CALC: 42.7 %
HDLC SERPL-MCNC: 89 MG/DL
HGB BLD-MCNC: 12.8 G/DL
IMM GRANULOCYTES NFR BLD AUTO: 0.2 %
LDLC SERPL CALC-MCNC: 97 MG/DL
LDLC SERPL DIRECT ASSAY-MCNC: 100 MG/DL
LYMPHOCYTES # BLD AUTO: 1.22 K/UL
LYMPHOCYTES NFR BLD AUTO: 18.6 %
MAN DIFF?: NORMAL
MCHC RBC-ENTMCNC: 29.1 PG
MCHC RBC-ENTMCNC: 30 GM/DL
MCV RBC AUTO: 97 FL
MONOCYTES # BLD AUTO: 0.4 K/UL
MONOCYTES NFR BLD AUTO: 6.1 %
NEUTROPHILS # BLD AUTO: 4.71 K/UL
NEUTROPHILS NFR BLD AUTO: 71.9 %
PLATELET # BLD AUTO: 277 K/UL
POTASSIUM SERPL-SCNC: 4.3 MMOL/L
PROT SERPL-MCNC: 7.2 G/DL
RBC # BLD: 4.4 M/UL
RBC # FLD: 16.3 %
SAVE SPECIMEN: NORMAL
SODIUM SERPL-SCNC: 138 MMOL/L
T4 FREE SERPL-MCNC: 1.4 NG/DL
TRIGL SERPL-MCNC: 39 MG/DL
TSH SERPL-ACNC: 3.69 UIU/ML
WBC # FLD AUTO: 6.55 K/UL

## 2018-08-09 ENCOUNTER — NON-APPOINTMENT (OUTPATIENT)
Age: 48
End: 2018-08-09

## 2018-08-09 ENCOUNTER — APPOINTMENT (OUTPATIENT)
Dept: INTERNAL MEDICINE | Facility: CLINIC | Age: 48
End: 2018-08-09
Payer: COMMERCIAL

## 2018-08-09 VITALS
BODY MASS INDEX: 34.82 KG/M2 | WEIGHT: 199 LBS | TEMPERATURE: 98.2 F | HEIGHT: 63.5 IN | SYSTOLIC BLOOD PRESSURE: 130 MMHG | DIASTOLIC BLOOD PRESSURE: 80 MMHG

## 2018-08-09 VITALS — SYSTOLIC BLOOD PRESSURE: 120 MMHG | DIASTOLIC BLOOD PRESSURE: 80 MMHG

## 2018-08-09 DIAGNOSIS — E78.00 PURE HYPERCHOLESTEROLEMIA, UNSPECIFIED: ICD-10-CM

## 2018-08-09 DIAGNOSIS — Z00.00 ENCOUNTER FOR GENERAL ADULT MEDICAL EXAMINATION W/OUT ABNORMAL FINDINGS: ICD-10-CM

## 2018-08-09 DIAGNOSIS — I95.1 ORTHOSTATIC HYPOTENSION: ICD-10-CM

## 2018-08-09 PROCEDURE — 99396 PREV VISIT EST AGE 40-64: CPT | Mod: 25

## 2018-08-09 PROCEDURE — 93000 ELECTROCARDIOGRAM COMPLETE: CPT

## 2018-08-09 RX ORDER — LITHIUM CARBONATE 450 MG/1
450 TABLET ORAL DAILY
Refills: 0 | Status: DISCONTINUED | COMMUNITY
Start: 2018-03-19 | End: 2018-08-09

## 2018-08-09 RX ORDER — ALPRAZOLAM 0.25 MG/1
0.25 TABLET ORAL
Qty: 30 | Refills: 0 | Status: DISCONTINUED | COMMUNITY
Start: 2017-12-14 | End: 2018-08-09

## 2018-08-09 NOTE — HISTORY OF PRESENT ILLNESS
[FreeTextEntry1] : none [de-identified] : \par hosp. recently at [facility] --47 days for medication adjustment; schizoaffective disorder;

## 2018-08-09 NOTE — ASSESSMENT
[FreeTextEntry1] : \par \par \par ecg-----SR; LAD;  NSCSPT\par pfts--declined\par cxr---declined\par \par imp---schizo-affective disorder under psychiatric care\par          b7gz---fdg is low; A1C=5.2\par          primary orthostatic hypotension---normotensive on exam\par          hypercholesterolemia---taking statin consistently (per ) but LDLs higher than  last check;\par          probably dietary\par \par plan---FBW reviewed with pt. and \par            ongoing psych. care (Dr. Vegas)\par            heart healthy diet given\par            smoking cessation counseling---not optimistic about success here\par            OV 3 mos with FBW..lipids, LDL, CMP,A1C\par            to discuss lowering dose of metformin with endocrine (? d/c)\par          \par          \par          \par

## 2018-08-09 NOTE — HEALTH RISK ASSESSMENT
[Good] : ~his/her~  mood as  good [No falls in past year] : Patient reported no falls in the past year [Patient declined mammogram] : Patient declined mammogram [Patient declined PAP Smear] : Patient declined PAP Smear [Patient declined bone density test] : Patient declined bone density test [Patient declined colonoscopy] : Patient declined colonoscopy [Change in mental status noted] : Change in mental status noted [Behavior] : difficulty with behavior [Learning/Retaining New Information] : difficulty learning/retaining new information [Handling Complex Tasks] : difficulty handling complex tasks [Reasoning] : difficulty with reasoning [Behavioral] : behavioral [With Family] : lives with family [Unemployed] : unemployed [] :  [Discussed at today's visit] : Advance Directives Discussed at today's visit [] : No [de-identified] : psych. [de-identified] : smoking ppd [FreeTextEntry1] : being treated by psychiatrist...Dr. Sunita Vegas ([phone] fax; [phone] phone) [[id number]] : see PI [Language] : denies difficulty with language [Spatial Ability and Orientation] : denies difficulty with spatial ability and orientation [Reports changes in hearing] : Reports no changes in hearing [Reports changes in vision] : Reports no changes in vision [Reports changes in dental health] : Reports no changes in dental health [de-identified] : see above [de-identified] : hasn't seen dentist in several yrs.

## 2018-08-10 LAB
APPEARANCE: CLEAR
BACTERIA: NEGATIVE
BILIRUBIN URINE: NEGATIVE
BLOOD URINE: NEGATIVE
COLOR: YELLOW
GLUCOSE QUALITATIVE U: NEGATIVE MG/DL
HYALINE CASTS: 0 /LPF
KETONES URINE: NEGATIVE
LEUKOCYTE ESTERASE URINE: NEGATIVE
MICROSCOPIC-UA: NORMAL
NITRITE URINE: NEGATIVE
PH URINE: 6
PROTEIN URINE: NEGATIVE MG/DL
RED BLOOD CELLS URINE: 0 /HPF
SPECIFIC GRAVITY URINE: 1
SQUAMOUS EPITHELIAL CELLS: 0 /HPF
UROBILINOGEN URINE: NEGATIVE MG/DL
WHITE BLOOD CELLS URINE: 1 /HPF

## 2018-08-13 ENCOUNTER — APPOINTMENT (OUTPATIENT)
Dept: INTERNAL MEDICINE | Facility: CLINIC | Age: 48
End: 2018-08-13
Payer: COMMERCIAL

## 2018-08-13 PROCEDURE — 93306 TTE W/DOPPLER COMPLETE: CPT | Mod: 26

## 2018-08-13 PROCEDURE — 93306 TTE W/DOPPLER COMPLETE: CPT | Mod: TC

## 2018-09-27 NOTE — ED BEHAVIORAL HEALTH ASSESSMENT NOTE - SAFETY PLAN DETAILS
Adequate: hears normal conversation without difficulty Call 911 if thoughts of harming self or others or acute sukhjinder or psychosis

## 2018-11-21 ENCOUNTER — APPOINTMENT (OUTPATIENT)
Dept: INTERNAL MEDICINE | Facility: CLINIC | Age: 48
End: 2018-11-21
Payer: COMMERCIAL

## 2018-11-21 LAB
HBA1C MFR BLD HPLC: 5.5 %
SAVE SPECIMEN: NORMAL

## 2018-11-21 PROCEDURE — 36415 COLL VENOUS BLD VENIPUNCTURE: CPT

## 2018-11-22 LAB
ALBUMIN SERPL ELPH-MCNC: 4.7 G/DL
ALP BLD-CCNC: 72 U/L
ALT SERPL-CCNC: <5 U/L
ANION GAP SERPL CALC-SCNC: 14 MMOL/L
AST SERPL-CCNC: 10 U/L
BILIRUB SERPL-MCNC: 0.3 MG/DL
BUN SERPL-MCNC: 18 MG/DL
CALCIUM SERPL-MCNC: 9.6 MG/DL
CHLORIDE SERPL-SCNC: 104 MMOL/L
CHOLEST SERPL-MCNC: 156 MG/DL
CHOLEST/HDLC SERPL: 1.8 RATIO
CO2 SERPL-SCNC: 21 MMOL/L
CREAT SERPL-MCNC: 0.78 MG/DL
GLUCOSE SERPL-MCNC: 88 MG/DL
HDLC SERPL-MCNC: 87 MG/DL
LDLC SERPL CALC-MCNC: 61 MG/DL
LDLC SERPL DIRECT ASSAY-MCNC: 75 MG/DL
POTASSIUM SERPL-SCNC: 4.2 MMOL/L
PROT SERPL-MCNC: 7.1 G/DL
SODIUM SERPL-SCNC: 139 MMOL/L
TRIGL SERPL-MCNC: 41 MG/DL

## 2018-11-29 ENCOUNTER — APPOINTMENT (OUTPATIENT)
Dept: INTERNAL MEDICINE | Facility: CLINIC | Age: 48
End: 2018-11-29
Payer: COMMERCIAL

## 2018-11-29 VITALS
TEMPERATURE: 98.5 F | SYSTOLIC BLOOD PRESSURE: 130 MMHG | BODY MASS INDEX: 39.23 KG/M2 | WEIGHT: 225 LBS | DIASTOLIC BLOOD PRESSURE: 80 MMHG

## 2018-11-29 VITALS — SYSTOLIC BLOOD PRESSURE: 130 MMHG | DIASTOLIC BLOOD PRESSURE: 74 MMHG

## 2018-11-29 DIAGNOSIS — E03.9 HYPOTHYROIDISM, UNSPECIFIED: ICD-10-CM

## 2018-11-29 DIAGNOSIS — R05 COUGH: ICD-10-CM

## 2018-11-29 PROCEDURE — 99214 OFFICE O/P EST MOD 30 MIN: CPT | Mod: 25

## 2018-11-29 PROCEDURE — 93000 ELECTROCARDIOGRAM COMPLETE: CPT

## 2018-11-29 NOTE — PHYSICAL EXAM
[General Appearance - Well Developed] : well developed [Normal Appearance] : normal appearance [Well Groomed] : well groomed [General Appearance - Well Nourished] : well nourished [No Deformities] : no deformities [General Appearance - In No Acute Distress] : no acute distress [Normal Conjunctiva] : the conjunctiva exhibited no abnormalities [Eyelids - No Xanthelasma] : the eyelids demonstrated no xanthelasmas [Normal Oral Mucosa] : normal oral mucosa [No Oral Pallor] : no oral pallor [No Oral Cyanosis] : no oral cyanosis [Normal Jugular Venous A Waves Present] : normal jugular venous A waves present [Normal Jugular Venous V Waves Present] : normal jugular venous V waves present [No Jugular Venous Cordero A Waves] : no jugular venous cordero A waves [Respiration, Rhythm And Depth] : normal respiratory rhythm and effort [Exaggerated Use Of Accessory Muscles For Inspiration] : no accessory muscle use [Auscultation Breath Sounds / Voice Sounds] : lungs were clear to auscultation bilaterally [Heart Rate And Rhythm] : heart rate and rhythm were normal [Heart Sounds] : normal S1 and S2 [Murmurs] : no murmurs present [Abdomen Soft] : soft [Abdomen Tenderness] : non-tender [Abdomen Mass (___ Cm)] : no abdominal mass palpated [Abnormal Walk] : normal gait [Gait - Sufficient For Exercise Testing] : the gait was sufficient for exercise testing [Nail Clubbing] : no clubbing of the fingernails [Cyanosis, Localized] : no localized cyanosis [Petechial Hemorrhages (___cm)] : no petechial hemorrhages [] : no ischemic changes

## 2018-11-29 NOTE — REASON FOR VISIT
[Follow-Up - Clinic] : a clinic follow-up of [FreeTextEntry1] : \par \par smoking 1-2 ppd x "several years";  gave up for 14 mos but resumed in July; chronic cough\par \par diet---gaining weight; pizza, Chinese food; not cooking\par not exercising\par

## 2018-11-29 NOTE — ASSESSMENT
[FreeTextEntry1] : \par \par \par \par ecg----SR; low voltage in limb leads; NSCSPT\par \par imp--current smoker ---v.s.\par         chronic cough---secondary to above\par         hypertension----normotensive; not on meds\par         hypothyroidism---normal TFTs on current dose Levo.\par         t2dm---normal A1C; on metformin\par \par plan---counseled re: adverse effects of cigarette smoking; doesn't appear to be motivated to d/c\par            FBW reviewed with pt. and spouse\par            continue current meds\par            cxr pa/lat ---rx given\par            psych. f/u\par            OV 3 mos with FBW\par       \par     \par

## 2018-12-04 ENCOUNTER — NON-APPOINTMENT (OUTPATIENT)
Age: 48
End: 2018-12-04

## 2018-12-11 ENCOUNTER — APPOINTMENT (OUTPATIENT)
Dept: RADIOLOGY | Facility: CLINIC | Age: 48
End: 2018-12-11

## 2018-12-16 ENCOUNTER — FORM ENCOUNTER (OUTPATIENT)
Age: 48
End: 2018-12-16

## 2018-12-17 ENCOUNTER — APPOINTMENT (OUTPATIENT)
Dept: RADIOLOGY | Facility: CLINIC | Age: 48
End: 2018-12-17
Payer: COMMERCIAL

## 2018-12-17 ENCOUNTER — OUTPATIENT (OUTPATIENT)
Dept: OUTPATIENT SERVICES | Facility: HOSPITAL | Age: 48
LOS: 1 days | End: 2018-12-17
Payer: COMMERCIAL

## 2018-12-17 DIAGNOSIS — I10 ESSENTIAL (PRIMARY) HYPERTENSION: ICD-10-CM

## 2018-12-17 DIAGNOSIS — Z00.00 ENCOUNTER FOR GENERAL ADULT MEDICAL EXAMINATION WITHOUT ABNORMAL FINDINGS: ICD-10-CM

## 2018-12-17 PROCEDURE — 71046 X-RAY EXAM CHEST 2 VIEWS: CPT

## 2018-12-17 PROCEDURE — 71046 X-RAY EXAM CHEST 2 VIEWS: CPT | Mod: 26

## 2019-01-18 ENCOUNTER — EMERGENCY (EMERGENCY)
Facility: HOSPITAL | Age: 49
LOS: 1 days | Discharge: HOME | End: 2019-01-18
Attending: EMERGENCY MEDICINE
Payer: COMMERCIAL

## 2019-01-18 VITALS
OXYGEN SATURATION: 99 % | HEIGHT: 63 IN | HEART RATE: 58 BPM | WEIGHT: 220.02 LBS | SYSTOLIC BLOOD PRESSURE: 126 MMHG | DIASTOLIC BLOOD PRESSURE: 91 MMHG | TEMPERATURE: 100 F | RESPIRATION RATE: 20 BRPM

## 2019-01-18 VITALS
HEART RATE: 62 BPM | TEMPERATURE: 99 F | DIASTOLIC BLOOD PRESSURE: 86 MMHG | SYSTOLIC BLOOD PRESSURE: 122 MMHG | RESPIRATION RATE: 16 BRPM | OXYGEN SATURATION: 98 %

## 2019-01-18 LAB
ALBUMIN SERPL ELPH-MCNC: 4.8 G/DL — SIGNIFICANT CHANGE UP (ref 3.3–5)
ALP SERPL-CCNC: 94 U/L — SIGNIFICANT CHANGE UP (ref 40–120)
ALT FLD-CCNC: 9 U/L — LOW (ref 10–45)
ANION GAP SERPL CALC-SCNC: 13 MMOL/L — SIGNIFICANT CHANGE UP (ref 5–17)
APAP SERPL-MCNC: <15 UG/ML — SIGNIFICANT CHANGE UP (ref 10–30)
APPEARANCE UR: CLEAR — SIGNIFICANT CHANGE UP
AST SERPL-CCNC: 11 U/L — SIGNIFICANT CHANGE UP (ref 10–40)
BASOPHILS # BLD AUTO: 0.1 K/UL — SIGNIFICANT CHANGE UP (ref 0–0.2)
BASOPHILS NFR BLD AUTO: 0.5 % — SIGNIFICANT CHANGE UP (ref 0–2)
BILIRUB SERPL-MCNC: 0.3 MG/DL — SIGNIFICANT CHANGE UP (ref 0.2–1.2)
BILIRUB UR-MCNC: NEGATIVE — SIGNIFICANT CHANGE UP
BUN SERPL-MCNC: 26 MG/DL — HIGH (ref 7–23)
CALCIUM SERPL-MCNC: 9.7 MG/DL — SIGNIFICANT CHANGE UP (ref 8.4–10.5)
CHLORIDE SERPL-SCNC: 103 MMOL/L — SIGNIFICANT CHANGE UP (ref 96–108)
CO2 SERPL-SCNC: 20 MMOL/L — LOW (ref 22–31)
COLOR SPEC: YELLOW — SIGNIFICANT CHANGE UP
CREAT SERPL-MCNC: 0.71 MG/DL — SIGNIFICANT CHANGE UP (ref 0.5–1.3)
DIFF PNL FLD: NEGATIVE — SIGNIFICANT CHANGE UP
EOSINOPHIL # BLD AUTO: 0.3 K/UL — SIGNIFICANT CHANGE UP (ref 0–0.5)
EOSINOPHIL NFR BLD AUTO: 2.4 % — SIGNIFICANT CHANGE UP (ref 0–6)
ETHANOL SERPL-MCNC: SIGNIFICANT CHANGE UP MG/DL (ref 0–10)
GAS PNL BLDV: SIGNIFICANT CHANGE UP
GLUCOSE SERPL-MCNC: 111 MG/DL — HIGH (ref 70–99)
GLUCOSE UR QL: NEGATIVE — SIGNIFICANT CHANGE UP
HCT VFR BLD CALC: 44.1 % — SIGNIFICANT CHANGE UP (ref 34.5–45)
HGB BLD-MCNC: 15 G/DL — SIGNIFICANT CHANGE UP (ref 11.5–15.5)
KETONES UR-MCNC: NEGATIVE — SIGNIFICANT CHANGE UP
LEUKOCYTE ESTERASE UR-ACNC: NEGATIVE — SIGNIFICANT CHANGE UP
LYMPHOCYTES # BLD AUTO: 2.1 K/UL — SIGNIFICANT CHANGE UP (ref 1–3.3)
LYMPHOCYTES # BLD AUTO: 20.1 % — SIGNIFICANT CHANGE UP (ref 13–44)
MCHC RBC-ENTMCNC: 29.7 PG — SIGNIFICANT CHANGE UP (ref 27–34)
MCHC RBC-ENTMCNC: 34.1 GM/DL — SIGNIFICANT CHANGE UP (ref 32–36)
MCV RBC AUTO: 87 FL — SIGNIFICANT CHANGE UP (ref 80–100)
MONOCYTES # BLD AUTO: 0.8 K/UL — SIGNIFICANT CHANGE UP (ref 0–0.9)
MONOCYTES NFR BLD AUTO: 7.3 % — SIGNIFICANT CHANGE UP (ref 2–14)
NEUTROPHILS # BLD AUTO: 7.3 K/UL — SIGNIFICANT CHANGE UP (ref 1.8–7.4)
NEUTROPHILS NFR BLD AUTO: 69.7 % — SIGNIFICANT CHANGE UP (ref 43–77)
NITRITE UR-MCNC: NEGATIVE — SIGNIFICANT CHANGE UP
PH UR: 5.5 — SIGNIFICANT CHANGE UP (ref 5–8)
PLATELET # BLD AUTO: 257 K/UL — SIGNIFICANT CHANGE UP (ref 150–400)
POTASSIUM SERPL-MCNC: 4.5 MMOL/L — SIGNIFICANT CHANGE UP (ref 3.5–5.3)
POTASSIUM SERPL-SCNC: 4.5 MMOL/L — SIGNIFICANT CHANGE UP (ref 3.5–5.3)
PROT SERPL-MCNC: 7.3 G/DL — SIGNIFICANT CHANGE UP (ref 6–8.3)
PROT UR-MCNC: NEGATIVE — SIGNIFICANT CHANGE UP
RBC # BLD: 5.07 M/UL — SIGNIFICANT CHANGE UP (ref 3.8–5.2)
RBC # FLD: 13.5 % — SIGNIFICANT CHANGE UP (ref 10.3–14.5)
SALICYLATES SERPL-MCNC: <2 MG/DL — LOW (ref 15–30)
SODIUM SERPL-SCNC: 136 MMOL/L — SIGNIFICANT CHANGE UP (ref 135–145)
SP GR SPEC: 1 — LOW (ref 1.01–1.02)
UROBILINOGEN FLD QL: NEGATIVE — SIGNIFICANT CHANGE UP
WBC # BLD: 10.5 K/UL — SIGNIFICANT CHANGE UP (ref 3.8–10.5)
WBC # FLD AUTO: 10.5 K/UL — SIGNIFICANT CHANGE UP (ref 3.8–10.5)

## 2019-01-18 PROCEDURE — 99285 EMERGENCY DEPT VISIT HI MDM: CPT

## 2019-01-18 PROCEDURE — 81003 URINALYSIS AUTO W/O SCOPE: CPT

## 2019-01-18 PROCEDURE — 99284 EMERGENCY DEPT VISIT MOD MDM: CPT

## 2019-01-18 PROCEDURE — 80307 DRUG TEST PRSMV CHEM ANLYZR: CPT

## 2019-01-18 PROCEDURE — 36415 COLL VENOUS BLD VENIPUNCTURE: CPT

## 2019-01-18 PROCEDURE — 83605 ASSAY OF LACTIC ACID: CPT

## 2019-01-18 PROCEDURE — 84295 ASSAY OF SERUM SODIUM: CPT

## 2019-01-18 PROCEDURE — 93005 ELECTROCARDIOGRAM TRACING: CPT

## 2019-01-18 PROCEDURE — 82330 ASSAY OF CALCIUM: CPT

## 2019-01-18 PROCEDURE — 82435 ASSAY OF BLOOD CHLORIDE: CPT

## 2019-01-18 PROCEDURE — 82803 BLOOD GASES ANY COMBINATION: CPT

## 2019-01-18 PROCEDURE — 85014 HEMATOCRIT: CPT

## 2019-01-18 PROCEDURE — 80053 COMPREHEN METABOLIC PANEL: CPT

## 2019-01-18 PROCEDURE — 85027 COMPLETE CBC AUTOMATED: CPT

## 2019-01-18 PROCEDURE — 82947 ASSAY GLUCOSE BLOOD QUANT: CPT

## 2019-01-18 PROCEDURE — 84132 ASSAY OF SERUM POTASSIUM: CPT

## 2019-01-18 NOTE — ED PROVIDER NOTE - OBJECTIVE STATEMENT
49yo F w/ pmhx of HLD, DM, Hypothyroidism, paranoid schizophrenia, bipolar, depression, BIBEMS for inability to sleep tonight  she cant sleep and also upset that she can't smoke at her building any more. here with  at the bedside. patient denies SI / HI, no aud or vis hallucinations. patient states she understands the risk of smoking and that she can lose her apartment but can't stop smoking.  with her at the bedside. 49yo F w/ pmhx of HLD, DM, Hypothyroidism, paranoid schizophrenia, bipolar, depression, BIBEMS for inability to sleep at home tonight because she is no longer allowed to smoke in her apartment building anymore. Denies any SI or HI, no auditory of visual hallucinations. Pt seems to understand the risk of smoking , but seems to be willing to lose apartment over not being able to smoke in there, when she can just smoke outside, especially when she lives on the first floor.

## 2019-01-18 NOTE — ED PROVIDER NOTE - MEDICAL DECISION MAKING DETAILS
ATTG: : patient cooperative but despite being explained at length the risk of losing her home for smoking and the risks of smoking, she does not want to stop. concern for her insight to the issue. will check labs, psych consult. re eval for dispo.

## 2019-01-18 NOTE — ED ADULT NURSE NOTE - OBJECTIVE STATEMENT
48F bibems from home activated by pt herself accompanied by spouse with h/o Diabetes  High cholesterol  Hypothyroid  Psyche and obesity, presented to ED for Psyche eval secondary to patients illogical behavior. Patient reports "I want to smoke and I cant stop smoking", as per spouse the building management prohibited her from smoking in the building but patient doesn't want to follow and patient doesn't want to go outside to smoke either, and she's rather prefer to be evicted than not smoking. Offered alternative to smoking and also offered that pt can smoke outside but patient refused the offer.  Patient lives with spouse with no kids and pt not working.   Patient denies any foreign travel, long drive, no sick contacts. Denies any physical symptoms, no SI/HI.  Patient undress and wanded by security and placed on one to one observation for safety. Labs drawn and UA sent to labs, will continue to monitor.

## 2019-01-18 NOTE — ED ADULT NURSE REASSESSMENT NOTE - NS ED NURSE REASSESS COMMENT FT1
Patient agreeable to smoke out of the building that she's living. Labs resulted, MD aware. Psyche consulted by ER resident.   DC home and advised to ff-up with outpt psyche in 1-2 days.

## 2019-01-18 NOTE — ED PROVIDER NOTE - PMH
Plan: Wear compression socks. Take time to go for a walk. Detail Level: Simple Diabetes    High cholesterol    Hypothyroid    Psychiatric diagnosis

## 2019-01-18 NOTE — ED PROVIDER NOTE - PROGRESS NOTE DETAILS
Pt called provider team into her room and stated that she is willing to smoke outside of her apartment, and sleep at home. She states that she is ready to be discharged. Lab results are unremarkable. Pt is stable and ready to be discharged. ATTG: : patient states she realizes she can lose her home and would prefer to smoke outside. she is cooperative and wants to go home. she again denies si or hi.  with her at the bedside.given the above, we will not consult psychiatry but patient will follow with her psychiatrist as an outpt. offered smoking cessation and she refused.

## 2019-01-18 NOTE — ED PROVIDER NOTE - ATTENDING CONTRIBUTION TO CARE
47 y/o f with pmhx paranoid schizophrenia, bipolar, presents by [facility] for eval fo feeling like she cant sleep and also upset that she can't smoke at her building any more. here with  at the bedside. 49 y/o f with pmhx paranoid schizophrenia, bipolar, presents by [facility] for eval fo feeling like she cant sleep and also upset that she can't smoke at her building any more. here with  at the bedside. patient denies SI / HI, no aud or vis hallucinations. patient states she understands the risk of smoking and that she can lose her apartment but can't stop smoking.  with her at the bedside.  Gen.  no acute distress  HEENT:  perrl eomi  Lungs:  b/l BS  CVS: S1S2   Abd;  soft non tender  Ext: no pitting edema  Neuro: aaox3  MSK: walking with steady gait, muscle strength 5/5 x 4 ext

## 2019-01-18 NOTE — ED PROVIDER NOTE - NSFOLLOWUPINSTRUCTIONS_ED_ALL_ED_FT
Please continue to follow up with your psychiatrist outpatient  Please return to the ER for any worsening or new symptoms.

## 2019-03-19 ENCOUNTER — APPOINTMENT (OUTPATIENT)
Dept: INTERNAL MEDICINE | Facility: CLINIC | Age: 49
End: 2019-03-19
Payer: COMMERCIAL

## 2019-03-19 ENCOUNTER — NON-APPOINTMENT (OUTPATIENT)
Age: 49
End: 2019-03-19

## 2019-03-19 VITALS
BODY MASS INDEX: 38.71 KG/M2 | WEIGHT: 222 LBS | DIASTOLIC BLOOD PRESSURE: 88 MMHG | SYSTOLIC BLOOD PRESSURE: 150 MMHG | TEMPERATURE: 97 F

## 2019-03-19 VITALS — SYSTOLIC BLOOD PRESSURE: 140 MMHG | DIASTOLIC BLOOD PRESSURE: 86 MMHG

## 2019-03-19 DIAGNOSIS — F25.9 SCHIZOAFFECTIVE DISORDER, UNSPECIFIED: ICD-10-CM

## 2019-03-19 DIAGNOSIS — F17.200 NICOTINE DEPENDENCE, UNSPECIFIED, UNCOMPLICATED: ICD-10-CM

## 2019-03-19 DIAGNOSIS — E11.9 TYPE 2 DIABETES MELLITUS W/OUT COMPLICATIONS: ICD-10-CM

## 2019-03-19 PROCEDURE — 93000 ELECTROCARDIOGRAM COMPLETE: CPT

## 2019-03-19 PROCEDURE — 99214 OFFICE O/P EST MOD 30 MIN: CPT | Mod: 25

## 2019-03-19 NOTE — ASSESSMENT
[FreeTextEntry1] : \par \par ecg---SB; LAD; NSCSPT\par \par imp---hypertension---borderline  BPs; probably smoked a cigarette just before coming to office\par          schizoaffective disorder---following up with psych.; on new med Topamax 25 mg daily\par          current smoker--- doesn't want to stop; resistant to educational efforts\par          u3dg---I9K=2.6\par \par plan---recent FBW reviewed with pt. and spouse\par            continue current meds\par            consider initiating antihypertensive meds next OV\par            OV 3 mos.\par

## 2019-03-19 NOTE — PHYSICAL EXAM
[General Appearance - Well Developed] : well developed [Well Groomed] : well groomed [Normal Appearance] : normal appearance [General Appearance - Well Nourished] : well nourished [General Appearance - In No Acute Distress] : no acute distress [No Deformities] : no deformities [Eyelids - No Xanthelasma] : the eyelids demonstrated no xanthelasmas [Normal Conjunctiva] : the conjunctiva exhibited no abnormalities [No Oral Pallor] : no oral pallor [Normal Oral Mucosa] : normal oral mucosa [Normal Jugular Venous A Waves Present] : normal jugular venous A waves present [No Oral Cyanosis] : no oral cyanosis [Normal Jugular Venous V Waves Present] : normal jugular venous V waves present [No Jugular Venous Cordero A Waves] : no jugular venous cordero A waves [Respiration, Rhythm And Depth] : normal respiratory rhythm and effort [Exaggerated Use Of Accessory Muscles For Inspiration] : no accessory muscle use [Auscultation Breath Sounds / Voice Sounds] : lungs were clear to auscultation bilaterally [Heart Rate And Rhythm] : heart rate and rhythm were normal [Murmurs] : no murmurs present [Heart Sounds] : normal S1 and S2 [Abdomen Soft] : soft [Abdomen Tenderness] : non-tender [Abdomen Mass (___ Cm)] : no abdominal mass palpated [Abnormal Walk] : normal gait [Nail Clubbing] : no clubbing of the fingernails [Gait - Sufficient For Exercise Testing] : the gait was sufficient for exercise testing [Petechial Hemorrhages (___cm)] : no petechial hemorrhages [] : no ischemic changes [Cyanosis, Localized] : no localized cyanosis

## 2019-05-20 NOTE — ED PROVIDER NOTE - CROS ED GI ALL NEG
Patient presented after waiting 30 minutes with no reaction to  injections. Discharged from clinic.    Kristy Sutton RN      
negative...

## 2019-05-29 ENCOUNTER — APPOINTMENT (OUTPATIENT)
Dept: INTERNAL MEDICINE | Facility: CLINIC | Age: 49
End: 2019-05-29

## 2019-06-05 ENCOUNTER — EMERGENCY (EMERGENCY)
Facility: HOSPITAL | Age: 49
LOS: 1 days | Discharge: HOME | End: 2019-06-05
Attending: EMERGENCY MEDICINE
Payer: COMMERCIAL

## 2019-06-05 VITALS
TEMPERATURE: 98 F | WEIGHT: 149.91 LBS | DIASTOLIC BLOOD PRESSURE: 79 MMHG | HEIGHT: 63 IN | HEART RATE: 96 BPM | OXYGEN SATURATION: 95 % | SYSTOLIC BLOOD PRESSURE: 120 MMHG | RESPIRATION RATE: 18 BRPM

## 2019-06-05 PROCEDURE — 93005 ELECTROCARDIOGRAM TRACING: CPT

## 2019-06-05 PROCEDURE — 93010 ELECTROCARDIOGRAM REPORT: CPT

## 2019-06-05 PROCEDURE — 99283 EMERGENCY DEPT VISIT LOW MDM: CPT

## 2019-06-05 PROCEDURE — 99284 EMERGENCY DEPT VISIT MOD MDM: CPT | Mod: 25

## 2019-06-05 NOTE — ED PROVIDER NOTE - OBJECTIVE STATEMENT
49 y/o female with PMHx psychiatric disorder, HLD, DM, hypothyroidism sent by PCP in which they prefer not to tell us who due to elevated HR and BP. pt  notes they were advised concern for dehydration as HR elevated when standing. pt admits to feeling well without acute complaints. pt denies cp, Sob, dizziness, LOC, palpitations, fever, chills, N/V, intent to harm self or others, or any other complaints.

## 2019-06-05 NOTE — ED PROVIDER NOTE - NSFOLLOWUPINSTRUCTIONS_ED_ALL_ED_FT
Followup with your doctor for re-evaluation. return to ER if symptoms present, your heart rate is elevated, or blood pressure is elevated.

## 2019-06-05 NOTE — ED PROVIDER NOTE - CLINICAL SUMMARY MEDICAL DECISION MAKING FREE TEXT BOX
47 y/o female with PMHx psychiatric disorder, HLD, DM, hypothyroidism sent by PCP in which they prefer not to tell us who due to elevated HR and BP. pt  notes they were advised concern for dehydration as HR elevated when standing. pt admits to feeling well without acute complaints. benign exam. Plan includes EKg, orthostatic vitals.

## 2019-06-05 NOTE — ED ADULT NURSE NOTE - OBJECTIVE STATEMENT
48 yr old female arrived to the ED from home after seeing [employer] and was told to come into ED for high HR and BP.  states PMD was concerned fro dehydration. pt is A&ox4, denies feeling unwell. lungs clear edwin, abd is soft, non tender. pt has no complaints and is acting normal per

## 2019-06-05 NOTE — ED PROVIDER NOTE - PROGRESS NOTE DETAILS
Vitals WNL. afebrile. asymptomatic. Exam WNL. Advised to follow up with PCP. advised of symptoms to cautious of warranting acute return to ER.

## 2019-06-05 NOTE — ED PROVIDER NOTE - ATTENDING CONTRIBUTION TO CARE
49 y/o female with PMHx psychiatric disorder, HLD, DM, hypothyroidism sent by PCP in which they prefer not to tell us who due to elevated HR and BP.  pt with vss in er, neg orthostatics, ekg nl, pt without complaint, doesn't feel like she needs to spoke with psych, denies si/hi, compliant with her meds.

## 2019-06-05 NOTE — ED ADULT TRIAGE NOTE - CHIEF COMPLAINT QUOTE
doctor concerned for high BP and heart rate as per  pmd concerned for high BP and heart rate and told to come to ER for eval

## 2019-06-17 ENCOUNTER — APPOINTMENT (OUTPATIENT)
Dept: INTERNAL MEDICINE | Facility: CLINIC | Age: 49
End: 2019-06-17

## 2019-07-16 ENCOUNTER — EMERGENCY (EMERGENCY)
Facility: HOSPITAL | Age: 49
LOS: 1 days | Discharge: HOME | End: 2019-07-16
Attending: EMERGENCY MEDICINE
Payer: COMMERCIAL

## 2019-07-16 VITALS
HEART RATE: 79 BPM | TEMPERATURE: 98 F | SYSTOLIC BLOOD PRESSURE: 104 MMHG | RESPIRATION RATE: 18 BRPM | DIASTOLIC BLOOD PRESSURE: 71 MMHG | OXYGEN SATURATION: 95 %

## 2019-07-16 VITALS
SYSTOLIC BLOOD PRESSURE: 98 MMHG | HEART RATE: 100 BPM | DIASTOLIC BLOOD PRESSURE: 68 MMHG | OXYGEN SATURATION: 95 % | TEMPERATURE: 98 F | RESPIRATION RATE: 16 BRPM

## 2019-07-16 LAB
ALBUMIN SERPL ELPH-MCNC: 4.6 G/DL — SIGNIFICANT CHANGE UP (ref 3.3–5)
ALP SERPL-CCNC: 86 U/L — SIGNIFICANT CHANGE UP (ref 40–120)
ALT FLD-CCNC: 5 U/L — LOW (ref 10–45)
ANION GAP SERPL CALC-SCNC: 19 MMOL/L — HIGH (ref 5–17)
APTT BLD: 28.7 SEC — SIGNIFICANT CHANGE UP (ref 27.5–36.3)
AST SERPL-CCNC: 8 U/L — LOW (ref 10–40)
BASOPHILS # BLD AUTO: 0.1 K/UL — SIGNIFICANT CHANGE UP (ref 0–0.2)
BASOPHILS NFR BLD AUTO: 0.6 % — SIGNIFICANT CHANGE UP (ref 0–2)
BILIRUB SERPL-MCNC: 0.5 MG/DL — SIGNIFICANT CHANGE UP (ref 0.2–1.2)
BUN SERPL-MCNC: 7 MG/DL — SIGNIFICANT CHANGE UP (ref 7–23)
CALCIUM SERPL-MCNC: 10 MG/DL — SIGNIFICANT CHANGE UP (ref 8.4–10.5)
CHLORIDE SERPL-SCNC: 103 MMOL/L — SIGNIFICANT CHANGE UP (ref 96–108)
CO2 SERPL-SCNC: 16 MMOL/L — LOW (ref 22–31)
CREAT SERPL-MCNC: 1.15 MG/DL — SIGNIFICANT CHANGE UP (ref 0.5–1.3)
EOSINOPHIL # BLD AUTO: 0.1 K/UL — SIGNIFICANT CHANGE UP (ref 0–0.5)
EOSINOPHIL NFR BLD AUTO: 0.5 % — SIGNIFICANT CHANGE UP (ref 0–6)
GLUCOSE SERPL-MCNC: 115 MG/DL — HIGH (ref 70–99)
HCG SERPL-ACNC: <2 MIU/ML — SIGNIFICANT CHANGE UP
HCT VFR BLD CALC: 43.3 % — SIGNIFICANT CHANGE UP (ref 34.5–45)
HGB BLD-MCNC: 15.5 G/DL — SIGNIFICANT CHANGE UP (ref 11.5–15.5)
INR BLD: 1.18 RATIO — HIGH (ref 0.88–1.16)
LYMPHOCYTES # BLD AUTO: 1.5 K/UL — SIGNIFICANT CHANGE UP (ref 1–3.3)
LYMPHOCYTES # BLD AUTO: 12 % — LOW (ref 13–44)
MCHC RBC-ENTMCNC: 31 PG — SIGNIFICANT CHANGE UP (ref 27–34)
MCHC RBC-ENTMCNC: 35.7 GM/DL — SIGNIFICANT CHANGE UP (ref 32–36)
MCV RBC AUTO: 86.8 FL — SIGNIFICANT CHANGE UP (ref 80–100)
MONOCYTES # BLD AUTO: 0.7 K/UL — SIGNIFICANT CHANGE UP (ref 0–0.9)
MONOCYTES NFR BLD AUTO: 5.8 % — SIGNIFICANT CHANGE UP (ref 2–14)
NEUTROPHILS # BLD AUTO: 9.8 K/UL — HIGH (ref 1.8–7.4)
NEUTROPHILS NFR BLD AUTO: 81.1 % — HIGH (ref 43–77)
PLATELET # BLD AUTO: 241 K/UL — SIGNIFICANT CHANGE UP (ref 150–400)
POTASSIUM SERPL-MCNC: 3.8 MMOL/L — SIGNIFICANT CHANGE UP (ref 3.5–5.3)
POTASSIUM SERPL-SCNC: 3.8 MMOL/L — SIGNIFICANT CHANGE UP (ref 3.5–5.3)
PROT SERPL-MCNC: 6.7 G/DL — SIGNIFICANT CHANGE UP (ref 6–8.3)
PROTHROM AB SERPL-ACNC: 13.6 SEC — HIGH (ref 10–12.9)
RBC # BLD: 4.99 M/UL — SIGNIFICANT CHANGE UP (ref 3.8–5.2)
RBC # FLD: 13.5 % — SIGNIFICANT CHANGE UP (ref 10.3–14.5)
SODIUM SERPL-SCNC: 138 MMOL/L — SIGNIFICANT CHANGE UP (ref 135–145)
TROPONIN T, HIGH SENSITIVITY RESULT: 6 NG/L — SIGNIFICANT CHANGE UP (ref 0–51)
WBC # BLD: 12.1 K/UL — HIGH (ref 3.8–10.5)
WBC # FLD AUTO: 12.1 K/UL — HIGH (ref 3.8–10.5)

## 2019-07-16 PROCEDURE — 82962 GLUCOSE BLOOD TEST: CPT

## 2019-07-16 PROCEDURE — 84484 ASSAY OF TROPONIN QUANT: CPT

## 2019-07-16 PROCEDURE — 93005 ELECTROCARDIOGRAM TRACING: CPT

## 2019-07-16 PROCEDURE — 85027 COMPLETE CBC AUTOMATED: CPT

## 2019-07-16 PROCEDURE — 99285 EMERGENCY DEPT VISIT HI MDM: CPT

## 2019-07-16 PROCEDURE — 93010 ELECTROCARDIOGRAM REPORT: CPT

## 2019-07-16 PROCEDURE — 85610 PROTHROMBIN TIME: CPT

## 2019-07-16 PROCEDURE — 80053 COMPREHEN METABOLIC PANEL: CPT

## 2019-07-16 PROCEDURE — 71046 X-RAY EXAM CHEST 2 VIEWS: CPT

## 2019-07-16 PROCEDURE — 99283 EMERGENCY DEPT VISIT LOW MDM: CPT

## 2019-07-16 PROCEDURE — 84702 CHORIONIC GONADOTROPIN TEST: CPT

## 2019-07-16 PROCEDURE — 85730 THROMBOPLASTIN TIME PARTIAL: CPT

## 2019-07-16 PROCEDURE — 71046 X-RAY EXAM CHEST 2 VIEWS: CPT | Mod: 26

## 2019-07-16 NOTE — ED PROVIDER NOTE - PROGRESS NOTE DETAILS
Patient reports feeling better. Labs show mild acidosis likely from dehydration. Patient informed to continue drinking plenty of fluids. Otherwise normal. The patients work up thus far is negative, and they are improving with their current care.  There is no further indication to continue the work up and the patient is asking to be discharged.   All labs and imaging results (if any), were reviewed with the patient. Patient understands to follow up with their regular doctor.= Patient understands to return to the ED is symptoms worsen or progress.  Discharge instructions were given to the patient and discussed with patient.

## 2019-07-16 NOTE — ED PROVIDER NOTE - ATTENDING CONTRIBUTION TO CARE
48F BIBEMS, patient states that she was walking in the street trying to get home when she felt lightheaded, states that she had to sit down, denies any head injury/falls. Denies any exacerbating/alleviating factors for the lightheadedness. States that a bystander saw her sitting down in the street and called EMS. Patient states that this has never happened to her. States that she feels fine now.    ROS:  GEN: (-) fevers/chills, (-) weight loss, (+) fatigue  HEENT: (-) visual change, (-) photophobia, (-) nasal congestion/rhinorrhea, (-) difficulty swallowing  NECK: (-) stiffness, (-) swelling  RESP: (-) shortness of breath, (-) cough, (-) sputum, (-) hemoptysis, (-) MENENDEZ  CV: (-) chest pain, (-) palpitations  GI: (-) nausea, (-) vomiting, (-) pain, (-) constipation, (-) diarrhea, (-) melena, (-) BRBPR  : (-) frequency/urgency, (-) hematuria, (-) dysuria, (-) incontinence, (-) discharge  EXT: (-) edema, (-) cyanosis  ENDO: (-) heat/cold intolerance, (-) polyuria  NEURO: (-) paresthesias, (-) weakness, (-) headache, (-) dizziness, (-) syncope, (+) light headedness    PMHx: NIDDM  PSHx: Denies  Allergies: Depakote/Penicillin  SocHx: Denies ETOH/drugs    VITALS REVIEWED.  VS normal  GENERALIZED APPEARANCE:  Comfortable, no acute distress, ambulating without difficulty  SKIN:  Warm, dry, no cyanosis  HEAD:  No obvious scalp lesions  EYES:  Conjunctiva pink, no icterus  ENMT:  Mucus membranes moist, no stridor  NECK:  Supple, non-tender  CHEST AND RESPIRATORY:  Clear to auscultation B/L, good air entry B/L, equal chest expansion  HEART AND CARDIOVASCULAR:  Regular rate, no obvious murmur  ABDOMEN AND GI:  Soft, non-tender, non-distended.  No rebound, no guarding  EXTREMITIES:  No deformity, edema, or calf tenderness  NEURO: AAOx3, gross motor and sensory intact, CN2-12 intact, cerebellar exam (finger to nose/JONH WNL)    Impression:  Pre-syncope, r/o dehydration, r/o ACS, r/o electrolyte abnormality; normal neurological exam  Labs, EKG, imaging, re-evaluation

## 2019-07-16 NOTE — ED ADULT NURSE NOTE - OBJECTIVE STATEMENT
47 yo female presents to ED via EMS for near syncopal episode. Per EMS, patient was walking down the street when bystander noticed patient swaying and called police. Police called EMS. Patient states she was very tired and unsure if she was maurice to be able to walk home due to weakness. Patient states she did not drink a lot today and feels dehydrated. Patient denies SOB, CP, nvd, fever/chills, recent illness/sick contacts, travel, falls/loc. Patient A&Ox3, breathing spontaneously, airway patent, bl clear lungs, abdomen nontender, +pulses, cap refill <2 seconds. Patient resting in bed, plan of care explained.

## 2019-07-16 NOTE — ED PROVIDER NOTE - NSFOLLOWUPINSTRUCTIONS_ED_ALL_ED_FT
Near syncope    Near syncope is when you feel like you are going to faint or pass out. It is caused by a sudden decrease in blood flow to the brain. Even though most causes of syncope are not dangerous, syncope can possibly be a sign of a serious medical problem. Signs that you may be about to faint include feeling dizzy, lightheaded, nausea, visual changes, or cold/clammy skin. Do not drive, operate heavy machinery, or play sports until your health care provider says it is okay.    SEEK IMMEDIATE MEDICAL CARE IF YOU HAVE ANY OF THE FOLLOWING SYMPTOMS: severe headache, pain in your chest/abdomen/back, bleeding from your mouth or rectum, palpitations, shortness of breath, pain with breathing, seizure, confusion, or trouble walking.     IMPORTANT MESSAGE FROM YOUR DOCTOR:  Although you have been discharged from the ER, this does not mean that you have a "clean bill of health".  If your symptoms persist or get worse or if any new symptoms develop, please return to the ER immediately for re-evaluation (especially if your symptoms include chest pain, difficulty breathing, abdominal pain, fever, headache, confusion, trouble seeing, or trouble walking).  It is also very important that you see a primary care doctor within the next few days to follow-up.

## 2019-08-03 ENCOUNTER — EMERGENCY (EMERGENCY)
Facility: HOSPITAL | Age: 49
LOS: 1 days | Discharge: HOME | End: 2019-08-03
Attending: EMERGENCY MEDICINE
Payer: COMMERCIAL

## 2019-08-03 VITALS
WEIGHT: 184.97 LBS | TEMPERATURE: 97 F | SYSTOLIC BLOOD PRESSURE: 112 MMHG | DIASTOLIC BLOOD PRESSURE: 82 MMHG | HEIGHT: 64 IN | OXYGEN SATURATION: 98 % | RESPIRATION RATE: 20 BRPM | HEART RATE: 107 BPM

## 2019-08-03 LAB
ALBUMIN SERPL ELPH-MCNC: 4.8 G/DL — SIGNIFICANT CHANGE UP (ref 3.3–5)
ALP SERPL-CCNC: 85 U/L — SIGNIFICANT CHANGE UP (ref 40–120)
ALT FLD-CCNC: <5 U/L — LOW (ref 10–45)
AMPHET UR-MCNC: NEGATIVE — SIGNIFICANT CHANGE UP
ANION GAP SERPL CALC-SCNC: 14 MMOL/L — SIGNIFICANT CHANGE UP (ref 5–17)
APAP SERPL-MCNC: <15 UG/ML — SIGNIFICANT CHANGE UP (ref 10–30)
APPEARANCE UR: ABNORMAL
AST SERPL-CCNC: 6 U/L — LOW (ref 10–40)
BACTERIA # UR AUTO: ABNORMAL
BARBITURATES UR SCN-MCNC: NEGATIVE — SIGNIFICANT CHANGE UP
BASOPHILS # BLD AUTO: 0.1 K/UL — SIGNIFICANT CHANGE UP (ref 0–0.2)
BASOPHILS NFR BLD AUTO: 0.8 % — SIGNIFICANT CHANGE UP (ref 0–2)
BENZODIAZ UR-MCNC: NEGATIVE — SIGNIFICANT CHANGE UP
BILIRUB SERPL-MCNC: 0.5 MG/DL — SIGNIFICANT CHANGE UP (ref 0.2–1.2)
BILIRUB UR-MCNC: NEGATIVE — SIGNIFICANT CHANGE UP
BUN SERPL-MCNC: 15 MG/DL — SIGNIFICANT CHANGE UP (ref 7–23)
CALCIUM SERPL-MCNC: 10.2 MG/DL — SIGNIFICANT CHANGE UP (ref 8.4–10.5)
CHLORIDE SERPL-SCNC: 102 MMOL/L — SIGNIFICANT CHANGE UP (ref 96–108)
CO2 SERPL-SCNC: 22 MMOL/L — SIGNIFICANT CHANGE UP (ref 22–31)
COCAINE METAB.OTHER UR-MCNC: NEGATIVE — SIGNIFICANT CHANGE UP
COLOR SPEC: YELLOW — SIGNIFICANT CHANGE UP
CREAT SERPL-MCNC: 0.85 MG/DL — SIGNIFICANT CHANGE UP (ref 0.5–1.3)
DIFF PNL FLD: NEGATIVE — SIGNIFICANT CHANGE UP
EOSINOPHIL # BLD AUTO: 0.3 K/UL — SIGNIFICANT CHANGE UP (ref 0–0.5)
EOSINOPHIL NFR BLD AUTO: 3.1 % — SIGNIFICANT CHANGE UP (ref 0–6)
EPI CELLS # UR: 16 /HPF — HIGH
ETHANOL SERPL-MCNC: SIGNIFICANT CHANGE UP MG/DL (ref 0–10)
GLUCOSE SERPL-MCNC: 98 MG/DL — SIGNIFICANT CHANGE UP (ref 70–99)
GLUCOSE UR QL: NEGATIVE — SIGNIFICANT CHANGE UP
HCT VFR BLD CALC: 48.3 % — HIGH (ref 34.5–45)
HGB BLD-MCNC: 16.5 G/DL — HIGH (ref 11.5–15.5)
HYALINE CASTS # UR AUTO: 4 /LPF — HIGH (ref 0–2)
KETONES UR-MCNC: NEGATIVE — SIGNIFICANT CHANGE UP
LEUKOCYTE ESTERASE UR-ACNC: ABNORMAL
LYMPHOCYTES # BLD AUTO: 2.3 K/UL — SIGNIFICANT CHANGE UP (ref 1–3.3)
LYMPHOCYTES # BLD AUTO: 27 % — SIGNIFICANT CHANGE UP (ref 13–44)
MCHC RBC-ENTMCNC: 30.5 PG — SIGNIFICANT CHANGE UP (ref 27–34)
MCHC RBC-ENTMCNC: 34.2 GM/DL — SIGNIFICANT CHANGE UP (ref 32–36)
MCV RBC AUTO: 89.3 FL — SIGNIFICANT CHANGE UP (ref 80–100)
METHADONE UR-MCNC: NEGATIVE — SIGNIFICANT CHANGE UP
MONOCYTES # BLD AUTO: 0.6 K/UL — SIGNIFICANT CHANGE UP (ref 0–0.9)
MONOCYTES NFR BLD AUTO: 7.4 % — SIGNIFICANT CHANGE UP (ref 2–14)
NEUTROPHILS # BLD AUTO: 5.3 K/UL — SIGNIFICANT CHANGE UP (ref 1.8–7.4)
NEUTROPHILS NFR BLD AUTO: 61.7 % — SIGNIFICANT CHANGE UP (ref 43–77)
NITRITE UR-MCNC: NEGATIVE — SIGNIFICANT CHANGE UP
OPIATES UR-MCNC: NEGATIVE — SIGNIFICANT CHANGE UP
OXYCODONE UR-MCNC: NEGATIVE — SIGNIFICANT CHANGE UP
PCP SPEC-MCNC: SIGNIFICANT CHANGE UP
PCP UR-MCNC: NEGATIVE — SIGNIFICANT CHANGE UP
PH UR: 6 — SIGNIFICANT CHANGE UP (ref 5–8)
PLATELET # BLD AUTO: 247 K/UL — SIGNIFICANT CHANGE UP (ref 150–400)
POTASSIUM SERPL-MCNC: 4.2 MMOL/L — SIGNIFICANT CHANGE UP (ref 3.5–5.3)
POTASSIUM SERPL-SCNC: 4.2 MMOL/L — SIGNIFICANT CHANGE UP (ref 3.5–5.3)
PROT SERPL-MCNC: 7.6 G/DL — SIGNIFICANT CHANGE UP (ref 6–8.3)
PROT UR-MCNC: ABNORMAL
RBC # BLD: 5.41 M/UL — HIGH (ref 3.8–5.2)
RBC # FLD: 13.4 % — SIGNIFICANT CHANGE UP (ref 10.3–14.5)
RBC CASTS # UR COMP ASSIST: 2 /HPF — SIGNIFICANT CHANGE UP (ref 0–4)
SALICYLATES SERPL-MCNC: <2 MG/DL — LOW (ref 15–30)
SODIUM SERPL-SCNC: 138 MMOL/L — SIGNIFICANT CHANGE UP (ref 135–145)
SP GR SPEC: 1.02 — SIGNIFICANT CHANGE UP (ref 1.01–1.02)
THC UR QL: NEGATIVE — SIGNIFICANT CHANGE UP
UROBILINOGEN FLD QL: ABNORMAL
WBC # BLD: 8.6 K/UL — SIGNIFICANT CHANGE UP (ref 3.8–10.5)
WBC # FLD AUTO: 8.6 K/UL — SIGNIFICANT CHANGE UP (ref 3.8–10.5)
WBC UR QL: 42 /HPF — HIGH (ref 0–5)

## 2019-08-03 PROCEDURE — 99285 EMERGENCY DEPT VISIT HI MDM: CPT

## 2019-08-03 PROCEDURE — 93010 ELECTROCARDIOGRAM REPORT: CPT

## 2019-08-03 PROCEDURE — 90792 PSYCH DIAG EVAL W/MED SRVCS: CPT | Mod: GT

## 2019-08-03 NOTE — ED BEHAVIORAL HEALTH ASSESSMENT NOTE - HPI (INCLUDE ILLNESS QUALITY, SEVERITY, DURATION, TIMING, CONTEXT, MODIFYING FACTORS, ASSOCIATED SIGNS AND SYMPTOMS)
48 year-old woman with schizoaffective disorder, at least five psychiatric hospitalizations (most recently at [facility] in 2018), no suicide attempts or SIB, noncaregiver, psychiatrically disabled, formerly on clozapine (discontinued due to neutropenia), in outpatient care with Dr. Vegas at [facility], chronic issues with longstanding residual delusions and intermittent medication nonadherence, without history of legal/violence/substance history, who activated 911 today reporting she was fearful at home alone after her   just yesterday. On interview, she reports that the police came to her house yesterday with a piece of paper informing her to call a number. When she called, a  at an [facility] (unsure of which [facility] hospital) informed her that her  had passed away. The patient says today she grew fearful at home, realizing she's alone, and decided to call 911 for help, hopeful that she could be admitted to a psychiatric facility. She says, "I can't live alone. I can't sleep" and repeats these sentences several times. She then says under her breathe, "It doesn't matter" and continues to repeat the phrase several times, whispering it quieter and quieter. Asked why she's whispering that phrase, she gives no reply. Asked about medications, she says she takes whatever is on her medication chart but she is unable to verify names of medications or doses. She denies auditory hallucinations (of note, the patient is responding to internal stimuli during the interview). Asked about the FBI and a , which per the chart have historically been topics inspiring delusions, she reports neither is bother her. Asked whether she's caring for herself at home by eating her meals and tending to her ADLs, she again reports in a whispering voice, "It doesn't matter, it doesn't matter." The patient gives no reply when asked about her emotional response to the news of her 's death just yesterday. She says nothing brings her rio. Denies SI/HI. Says she's hopeless about the future and says there's nothing to look forward to. Endorses worthlessness. She can identify no reasons for living and says her  was the only person with whom she was in contact regularly. She denies anyone is available for collateral. She denies decreased need for sleep or persistently irritable or elevated mood.     An attempt was made to obtain collateral from Dr. Vegas, the patient's outpatient psychiatrist. She was emailed this evening. Per CV, Dr. Vegas say the patient three days ago and started Effexor 37.5 mg daily because of concerns about the patient's depressed mood. She continued the patient's other medications Invega 12 mg daily, Trazodone 200 mg daily, Topamax 25 mg daily.

## 2019-08-03 NOTE — ED BEHAVIORAL HEALTH ASSESSMENT NOTE - DESCRIPTION
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 Pt in ED for ~ 2 hrs prior to Telepsych consult at 21:51. Per Triage RN, provider (verbally conveyed to primary RN Jigar) that Pt arrived at ~ 19:46 dressed appropriately for the weather, with fair hygiene/grooming. Pt was compliant with good cooperation for entire process of wanding/search/ and gowning and was escorted to the  area with no issues. Nothing of note in pt’s belongings. RN reports patient provided both urine specimen and routine bloodwork willingly. Pt requested both food and drink. As per RN, pt. has not been agitated or irritable during her stay. Pt’s eye contact is good, her speech volume is soft, her rate is slow, and articulation is clear. Pt’s affect is depressed and flat and her thought process is disorganized.  Pt. has spent her time sitting and eating and drinking and is now lying in bed being calm and cooperative. Pt reports to RN/provider that her  recently passed away and she doesn’t feel safe at home and she feels depressed.  Pt. denies SI/HI and any delusions. RN states she is A/Ox 2 and does not appear cognitively impaired. Per RN, pt. has not required psychiatric or medical medications or any security interventions. Pt has no visitors at bedside. Pt is placed on a 1:1 observation and in a private room ready for telepsychiatry evaluation.      ICU Vital Signs Last 24 Hrs  T(C): 36.3 (03 Aug 2019 19:52), Max: 36.3 (03 Aug 2019 19:52)  T(F): 97.3 (03 Aug 2019 19:52), Max: 97.3 (03 Aug 2019 19:52)  HR: 107 (03 Aug 2019 19:52) (107 - 107)  BP: 112/82 (03 Aug 2019 19:52) (112/82 - 112/82)  BP(mean): --  ABP: --  ABP(mean): --  RR: 20 (03 Aug 2019 19:52) (20 - 20)  SpO2: 98% (03 Aug 2019 19:52) (98% - 98%)

## 2019-08-03 NOTE — ED ADULT NURSE NOTE - NSSUBSTANCEUSE_GEN_ALL_CORE_SD
Chief Complaint: abdominal pain    HPI:   73y Female s/p right hemicolectomy POD#2.  Pt had a bm today.  Pt is ambulating around unit without difficulties  No nausea or vomiting.  No chest pain or sob.  Pt tolerating clears.        PAIN SCORE:    4/10     SCALE USED: (1-10 VNRS)    Allergies    penicillin (Hives; Short breath)    Intolerances      MEDICATIONS  (STANDING):  sodium chloride 0.9% lock flush 3 milliLiter(s) IV Push every 8 hours  enoxaparin Injectable 40 milliGRAM(s) SubCutaneous every 24 hours  dextrose 5% + sodium chloride 0.45% with potassium chloride 20 mEq/L 1000 milliLiter(s) (100 mL/Hr) IV Continuous <Continuous>  insulin lispro (HumaLOG) corrective regimen sliding scale   SubCutaneous every 6 hours  dextrose 5%. 1000 milliLiter(s) (50 mL/Hr) IV Continuous <Continuous>  dextrose 50% Injectable 12.5 Gram(s) IV Push once  dextrose 50% Injectable 25 Gram(s) IV Push once  dextrose 50% Injectable 25 Gram(s) IV Push once  alvimopan 12 milliGRAM(s) Oral two times a day  simvastatin 40 milliGRAM(s) Oral at bedtime  amLODIPine   Tablet 5 milliGRAM(s) Oral daily  hydrochlorothiazide 12.5 milliGRAM(s) Oral daily  levothyroxine 100 MICROGram(s) Oral daily  docusate sodium 100 milliGRAM(s) Oral two times a day    MEDICATIONS  (PRN):  ondansetron Injectable 4 milliGRAM(s) IV Push every 6 hours PRN Nausea  dextrose Gel 1 Dose(s) Oral once PRN Blood Glucose LESS THAN 70 milliGRAM(s)/deciliter  glucagon  Injectable 1 milliGRAM(s) IntraMuscular once PRN Glucose LESS THAN 70 milligrams/deciliter  naloxone Injectable 0.1 milliGRAM(s) IV Push every 3 minutes PRN For ANY of the following changes in patient status:  A. RR LESS THAN 10 breaths per minute, B. Oxygen saturation LESS THAN 90%, C. Sedation score of 6  ketorolac   Injectable 15 milliGRAM(s) IV Push every 6 hours PRN breakthrough pain  acetaminophen   Tablet. 650 milliGRAM(s) Oral every 6 hours PRN Mild Pain (1 - 3)  oxyCODONE    IR 5 milliGRAM(s) Oral every 4 hours PRN Moderate Pain (4 - 6)  oxyCODONE    IR 10 milliGRAM(s) Oral every 4 hours PRN Severe Pain (7 - 10)      PHYSICAL EXAM:    Vital Signs Last 24 Hrs  T(C): 36.9 (04 Aug 2017 06:06), Max: 37.8 (03 Aug 2017 23:57)  T(F): 98.4 (04 Aug 2017 06:06), Max: 100 (03 Aug 2017 23:57)  HR: 74 (04 Aug 2017 06:06) (60 - 77)  BP: 144/86 (04 Aug 2017 06:06) (132/61 - 144/86)  BP(mean): --  RR: 16 (04 Aug 2017 06:06) (16 - 16)  SpO2: 98% (04 Aug 2017 06:06) (95% - 100%)             LABS:                          13.3   7.5   )-----------( 146      ( 04 Aug 2017 07:54 )             40.1     08-04    141  |  108  |  8   ----------------------------<  131<H>  4.1   |  24  |  0.78    Ca    8.6      04 Aug 2017 07:54            Drug Screen:        RADIOLOGY: never used

## 2019-08-03 NOTE — ED PROVIDER NOTE - ATTENDING CONTRIBUTION TO CARE
Agree with above, Skip Burkett MD, FACEP  Based on patient's history and physical exam, as well as the results of today's workup, I feel that patient warrants admission to the hospital for further workup/evaluation and continued management. I discussed the findings of today's workup with the patient and addressed the patient's questions and concerns. The patient was agreeable with admission. Our team spoke with the inpatient receiving team who accepted the patient for admission and subsequently took over the patient's care at the time of admission.

## 2019-08-03 NOTE — ED PROVIDER NOTE - OBJECTIVE STATEMENT
AV: 48y F PMH hypothyroidism, HLD, DM presents with fear of being alone. Per patient, she was notified that her   last night, since then she has been at home alone, became scared and called police. States she does not know where her psych clinic is located, does not know how to spell the name of her psychiatrist, does not know if she has ever stayed overnight in a hospital or a psych facility. Generally answers " I don't know" to most questions. Denies SI/HI.    Psych Dr. Vegas "clozapine clinic" in [place]

## 2019-08-03 NOTE — ED BEHAVIORAL HEALTH ASSESSMENT NOTE - AXIS III
DM2, HLD, Hypothyroidism, non epileptiform seizures vs seizures DM, HLD, Hypothyroidism, non epileptiform seizures vs seizures

## 2019-08-03 NOTE — ED ADULT NURSE NOTE - OBJECTIVE STATEMENT
47 y/o female, who was bib ems/police after she called 911, feels unsafe being alone @ home, her  recently passed away and wants to be admitted to a mental hospital, takes topamax, invega, w/ hx of hypothyroidism, NIDDM. pt. denies S/HI, no a/v hallucinations and no delusions of any kind. pt. denies any prior psych admissions, but has a psychiatrist who rx'ed, topamax, invega. pt. denies any etoh/drug abuse hx. pt. is cooperative w/ ED protocol and security contraband check.

## 2019-08-03 NOTE — ED ADULT NURSE NOTE - NSIMPLEMENTINTERV_GEN_ALL_ED
Implemented All Universal Safety Interventions:  [place] to call system. Call bell, personal items and telephone within reach. Instruct patient to call for assistance. Room bathroom lighting operational. Non-slip footwear when patient is off stretcher. Physically safe environment: no spills, clutter or unnecessary equipment. Stretcher in lowest position, wheels locked, appropriate side rails in place.

## 2019-08-03 NOTE — ED BEHAVIORAL HEALTH ASSESSMENT NOTE - RISK ASSESSMENT
Acute risk for harm to herself is high given that she appears to be unable to care for herself. Risk for harm will be mitigated by psychiatric admission.

## 2019-08-03 NOTE — ED PROVIDER NOTE - PROGRESS NOTE DETAILS
AV: patient to be voluntary admission to [facility]. Patient is medically cleared.  Urinalysis is questionably positive, but she has no urinary symptoms and there were many squamous epithelia, indicating a dirty sample.  She needs to have repeat urinalysis and urine culture in 1 wk.  Patient can have TSH checked in 1 wk as well.  She has normal VS and no symptoms to suggest acute hyper/hypothyroidism at this time.  Patient needs to have repeat CBC in 1wk.  She is hemoconcentrated from dehydration. She has exhibited no seizure activity in the ED, and may continue her medications as previously prescribed:  Invega 12 mg daily, Trazodone 200 mg qhs, Topamax 25 mg daily, Effexor 37.5 mg daily.    1. TAKE ALL MEDICATIONS AS DIRECTED.  2. FOLLOW UP WITH YOUR PRIMARY DOCTOR WITHIN 7-14 DAYS AS DIRECTED.  3. RETURN TO THE ER FOR ANY WORSENING SYMPTOMS OR CONCERNS.

## 2019-08-03 NOTE — ED BEHAVIORAL HEALTH ASSESSMENT NOTE - SUMMARY
48 year-old woman with schizoaffective disorder, at least five psychiatric hospitalizations (most recently at [facility] in 2018), no suicide attempts or SIB, noncaregiver, psychiatrically disabled, formerly on clozapine (discontinued due to neutropenia), in outpatient care with Dr. Vegas at [facility] AOP[facility], chronic issues with longstanding residual delusions and intermittent medication nonadherence, without history of legal/violence/substance history, who activated 911 today reporting she was fearful at home alone after her   just yesterday. The patient has longstanding psychosis at baseline. She presents as depressed with disordered thought process (poverty of content) and given the reported death of her  yesterday, her only reported source of support, the patient does not appear to be able to care for herself. She was offered voluntary admission to the psychiatric hospital to ensure her safety and she is agreeing to voluntary admission.

## 2019-08-03 NOTE — ED BEHAVIORAL HEALTH NOTE - BEHAVIORAL HEALTH NOTE
Safety assessment    Of note, acute risk of harm to the patient is high given that she appears to be unable to care for herself.     Safety assessment for telepsych   Acute Suicide Risk  (  ) High   (  ) Moderate   (x ) Low   (  ) Unable to determine   Rationale: Patient is denying SI/HI    Elevated Chronic Risk   ( x ) Yes   Details: Risks include prior hospitalizations, longstanding psychosis, history of medication nonadherence   (  ) No   ___________    Details: Patient is unable to safety plan given psychosis  [  ] Safety plan discussed with patient  [  ] Education provided regarding environmental safety / lethal means restriction  [  ] Provision of National Suicide Prevention Lifeline 0-526-989-DQWP (8553)    C-SSRS Screener     1. Have you ever wished to be dead or wished you could go to sleep and not wake up?  [  ]Yes, [ x ]No, [  ]Unable to Assess  Details _____________________________     2. Have you actually had any thoughts of killing yourself?   [  ]Yes, [ x ]No, [  ]Unable to Assess  Details _____________________________     If answer is “No” for 1 and 2, stop here. If answer is “Yes” to 1 or 2, proceed to 3.     3. Have you been thinking about how you might kill yourself?  [  ]Yes, [  ]No, [  ]Unable to Assess  Details _____________________________     4. Have you had these thoughts and had some intention of acting on them?  [  ]Yes, [  ]No, [  ]Unable to Assess  Details _____________________________     5. Have you started to work out or worked out the details of how to kill yourself? Do you intend to carry out this plan?  [  ]Yes, [  ]No, [  ]Unable to Assess  Details _____________________________     6. Have you ever done anything, started to do anything, or prepared to do anything to end your life? If so, was it in the past 3 months?  [  ]Yes, [  ]No, [  ]Unable to Assess  Details _____________________________        Additional Suicide Risk Factors (select all that apply)  [  ]Access to lethal means including firearms  [  ]Family history of suicide  [  ]Impulsivity  [ x ] Current or past mood disorder  [ x ] Current or past psychotic disorder  [  ] Current or past PTSD  [  ] Current or past ADHD  [  ] Current or past TBI  [  ] Current or past cluster B personality disorder or traits  [  ] Current or past conduct problems  [  ] Recent onset of current or past psychiatric disorder  [  ] Family history of psychiatric diagnoses requiring hospitalization     Additional Activating Events (select all that apply)  [  ]Perceived burden on family or others  [  ]Current sexual or physical abuse  [  ]Substance intoxication or withdrawal  [ x ]Inadequate social supports  [  ]Hopeless about or dissatisfied with current provider or treatment     Additional Protective Factors (select all that apply)  [  ] Future plans  [  ] Tenriism beliefs  [  ] Beloved pets

## 2019-08-03 NOTE — ED PROVIDER NOTE - NSFOLLOWUPINSTRUCTIONS_ED_ALL_ED_FT
Patient needs to have repeat urinalysis and urine culture in 1 wk.  Patient can have TSH checked in 1 wk as well.  Patient needs to have repeat CBC in 1wk.  She is hemoconcentrated from dehydration.   She has exhibited no seizure activity in the ED, and may continue her medications as previously prescribed:  Invega 12 mg daily, Trazodone 200 mg qhs, Topamax 25 mg daily, Effexor 37.5 mg daily.  1. TAKE ALL MEDICATIONS AS DIRECTED.    2. FOLLOW UP WITH YOUR PRIMARY DOCTOR WITHIN 7-14 DAYS AS DIRECTED.  3. RETURN TO THE ER FOR ANY WORSENING SYMPTOMS OR CONCERNS.

## 2019-08-03 NOTE — ED BEHAVIORAL HEALTH ASSESSMENT NOTE - OTHER PAST PSYCHIATRIC HISTORY (INCLUDE DETAILS REGARDING ONSET, COURSE OF ILLNESS, INPATIENT/OUTPATIENT TREATMENT)
Schizoaffective disorder, 5 hospitalizations, no SI/SA, last hospitalized in 2018. In outpatient care with Dr. Vegas

## 2019-08-03 NOTE — ED BEHAVIORAL HEALTH ASSESSMENT NOTE - PSYCHIATRIC ISSUES AND PLAN (INCLUDE STANDING AND PRN MEDICATION)
Invega 12 mg daily, Trazodone 200 mg qhs, Topamax 25 mg daily, Effexor 37.5 mg daily Invega 12 mg daily, Trazodone 150 mg qhs, Topamax 25 mg daily, Effexor 37.5 mg daily

## 2019-08-03 NOTE — ED PROVIDER NOTE - CLINICAL SUMMARY MEDICAL DECISION MAKING FREE TEXT BOX
AV: 48y F presents with depression after recent death of . Tachy, afebrile. depressed, flat affect, no SI/HI. Will obtain labs, urine, ekg, psych consult, concern that patient is unable to care for herself at this time.

## 2019-08-03 NOTE — ED BEHAVIORAL HEALTH ASSESSMENT NOTE - CASE SUMMARY
patient is a 49 y/o F with a history of SAD with many prior admissions. she presents with apparent decompensation in the setting of recent death of spouse. patient presents with thought blocking, poverty of speech, blunted affect, requesting hospitalization. qualifies for voluntary admission.

## 2019-08-04 VITALS
HEART RATE: 67 BPM | OXYGEN SATURATION: 97 % | TEMPERATURE: 97 F | RESPIRATION RATE: 18 BRPM | SYSTOLIC BLOOD PRESSURE: 139 MMHG | DIASTOLIC BLOOD PRESSURE: 87 MMHG

## 2019-08-04 LAB
CULTURE RESULTS: SIGNIFICANT CHANGE UP
SPECIMEN SOURCE: SIGNIFICANT CHANGE UP
TSH SERPL-MCNC: 3.86 UIU/ML — SIGNIFICANT CHANGE UP (ref 0.27–4.2)

## 2019-08-04 PROCEDURE — 82962 GLUCOSE BLOOD TEST: CPT

## 2019-08-04 PROCEDURE — 93005 ELECTROCARDIOGRAM TRACING: CPT

## 2019-08-04 PROCEDURE — 80053 COMPREHEN METABOLIC PANEL: CPT

## 2019-08-04 PROCEDURE — 80307 DRUG TEST PRSMV CHEM ANLYZR: CPT

## 2019-08-04 PROCEDURE — 87086 URINE CULTURE/COLONY COUNT: CPT

## 2019-08-04 PROCEDURE — 99285 EMERGENCY DEPT VISIT HI MDM: CPT

## 2019-08-04 PROCEDURE — 85027 COMPLETE CBC AUTOMATED: CPT

## 2019-08-04 PROCEDURE — 81001 URINALYSIS AUTO W/SCOPE: CPT

## 2019-08-04 PROCEDURE — 84443 ASSAY THYROID STIM HORMONE: CPT

## 2019-08-04 NOTE — ED ADULT NURSE REASSESSMENT NOTE - NS ED NURSE REASSESS COMMENT FT1
Report received from LARRY Ann. Pt resting comfortably, in no acute distress. 1:1 maintained fro safety. Pending transfer to [facility].
pt. has been accepted @ [facility] on a voluntary status, report given to LARRY Rendon around 0332 and ems was called @ 0343 for transport. pt. remains on 1:1 CO for s/i.
pt. is in agreement w/ the plan of care, she is to be admitted to a psych facility on a voluntary status which she signed the (9.13 voluntary form). pt. remains on 1:1 CO for s/i.
pt. was interviewed in a private room via Telepsych cart # 2 w/ 1:1 staff in the room.
made a telephone call to EMS for update regarding pt. transfer to [facility], but as per dispatch, it will be another 1 hour for transport. pt. remains on 1:1 CO for s/i. pt. was made aware of the delay of her transfer to [facility].

## 2019-08-06 NOTE — CHART NOTE - NSCHARTNOTEFT_GEN_A_CORE
EMERGENCY ROOM SOCIAL WORK: Patient transferred by Telepsych to [facility] on 8/4/2019. Admitting diagnosis: Schizoaffective disorder. Legal status: Voluntary.   Insurance authorization as follows:  Insurance: [employer]  Policy #: [id number]  Auth: [id number]  Days approved: 8/4-8/6  Next review: 8/6 with IFTIKHAR Henry (ph. [phone])

## 2019-08-08 ENCOUNTER — OTHER (OUTPATIENT)
Age: 49
End: 2019-08-08

## 2019-08-08 LAB — DRUG SCREEN, SERUM: SIGNIFICANT CHANGE UP

## 2019-08-14 NOTE — PATIENT PROFILE ADULT. - ASSIST WITH
Subjective:       Patient ID: Roman Hodges is a 58 y.o. male.    Chief Complaint: Wound Check    This is his first fu with me for wound eval, he is post liver tx of July 3rd, his wound was opened  due to seroma,  He is back home and wife is caregiver, she is not comfortable doing wound care so [facility] is coming MWF and applying alginate ag rope .     Wound Check       Review of Systems   Constitutional: Negative for chills and fever.   Respiratory: Negative for cough and shortness of breath.    Cardiovascular: Negative for chest pain and leg swelling.   Gastrointestinal: Negative for abdominal distention and abdominal pain.   Genitourinary: Negative for hematuria.   Musculoskeletal: Negative for arthralgias and back pain.   Skin: Positive for wound. Negative for color change and rash.   Neurological: Negative for weakness and headaches.       Objective:      Physical Exam   Constitutional: He is oriented to person, place, and time. He appears well-developed and well-nourished.   Pulmonary/Chest: Effort normal. No respiratory distress. He has no wheezes.   Abdominal: Soft. Bowel sounds are normal. He exhibits no distension.   Musculoskeletal: Normal range of motion. He exhibits no edema.   Neurological: He is alert and oriented to person, place, and time.   Skin: Skin is warm and dry. No erythema.   Psychiatric: He has a normal mood and affect.       8/1 first visit      8/14 after removal and soaking of alginate that dried in wound filling in well and granulated to skin level, will change topical as wound is too dry for alginate at this point         Applied TRIAD today 8/14 8/14 change to TRIAD wound dressing apply to cleaned wound bed and then cover with telfa island ( mepor) dressing   HH[facility] to do MWF and wife to redo if needed in between    8/1  Chevron opened 7/31  It is 1-2 cm and  39 cm long, with depth of 1cm, Clean and no signs of infection, will just need good wound care to promote secondary  closure.   I cleansed with VASHE cleanser,  Soak for 5 minutes then packed wound with Aquacel ag rope.  Covered with pad and secure with tape.     Assessment:       1. Non-healing surgical wound, subsequent encounter    2. Type 2 diabetes mellitus without complication, without long-term current use of insulin    3. S/P liver transplant        Plan:       Wound care as above. [facility]  Coordinate with Delmy  VASHE cleanser,  Soak for 5 minutes then apply TRIAD WOUND DRESSING ( paste) .  Covered with MEPOR like dressing  Fu with me in 2 weeks,  Reviewed infection signs and dietary concerns for wound healing  I have reviewed the plan of care with the patient and/ wife and they express understanding. I spent over 50% of this 25 minute visit in face to face counseling.          walking/standing

## 2019-10-26 ENCOUNTER — OUTPATIENT (OUTPATIENT)
Dept: OUTPATIENT SERVICES | Facility: HOSPITAL | Age: 49
LOS: 1 days | End: 2019-10-26
Payer: COMMERCIAL

## 2019-10-26 DIAGNOSIS — F20.9 SCHIZOPHRENIA, UNSPECIFIED: ICD-10-CM

## 2019-10-26 PROCEDURE — 80178 ASSAY OF LITHIUM: CPT

## 2019-10-26 PROCEDURE — 36415 COLL VENOUS BLD VENIPUNCTURE: CPT

## 2019-10-27 ENCOUNTER — OUTPATIENT (OUTPATIENT)
Dept: OUTPATIENT SERVICES | Facility: HOSPITAL | Age: 49
LOS: 1 days | End: 2019-10-27
Payer: COMMERCIAL

## 2019-10-27 ENCOUNTER — INPATIENT (INPATIENT)
Facility: HOSPITAL | Age: 49
LOS: 0 days | Discharge: PSYCHIATRIC OR SUBSTANCE USE FACILITY | DRG: 810 | End: 2019-10-28
Payer: MEDICAID

## 2019-10-27 VITALS
SYSTOLIC BLOOD PRESSURE: 128 MMHG | RESPIRATION RATE: 16 BRPM | HEIGHT: 64 IN | TEMPERATURE: 97 F | OXYGEN SATURATION: 97 % | WEIGHT: 199.96 LBS | HEART RATE: 70 BPM | DIASTOLIC BLOOD PRESSURE: 76 MMHG

## 2019-10-27 DIAGNOSIS — F20.9 SCHIZOPHRENIA, UNSPECIFIED: ICD-10-CM

## 2019-10-27 DIAGNOSIS — Z78.9 OTHER SPECIFIED HEALTH STATUS: Chronic | ICD-10-CM

## 2019-10-27 DIAGNOSIS — F25.9 SCHIZOAFFECTIVE DISORDER, UNSPECIFIED: ICD-10-CM

## 2019-10-27 DIAGNOSIS — E03.9 HYPOTHYROIDISM, UNSPECIFIED: ICD-10-CM

## 2019-10-27 DIAGNOSIS — E78.5 HYPERLIPIDEMIA, UNSPECIFIED: ICD-10-CM

## 2019-10-27 DIAGNOSIS — D70.2 OTHER DRUG-INDUCED AGRANULOCYTOSIS: ICD-10-CM

## 2019-10-27 DIAGNOSIS — E11.9 TYPE 2 DIABETES MELLITUS WITHOUT COMPLICATIONS: ICD-10-CM

## 2019-10-27 DIAGNOSIS — R69 ILLNESS, UNSPECIFIED: ICD-10-CM

## 2019-10-27 LAB
ANION GAP SERPL CALC-SCNC: 12 MMOL/L — SIGNIFICANT CHANGE UP (ref 5–17)
APPEARANCE UR: CLEAR — SIGNIFICANT CHANGE UP
APTT BLD: 41 SEC — HIGH (ref 27.5–36.3)
BASOPHILS # BLD AUTO: 0.05 K/UL — SIGNIFICANT CHANGE UP (ref 0–0.2)
BASOPHILS NFR BLD AUTO: 4.8 % — HIGH (ref 0–2)
BILIRUB UR-MCNC: NEGATIVE — SIGNIFICANT CHANGE UP
BLD GP AB SCN SERPL QL: SIGNIFICANT CHANGE UP
BUN SERPL-MCNC: 14 MG/DL — SIGNIFICANT CHANGE UP (ref 8–20)
CALCIUM SERPL-MCNC: 9.9 MG/DL — SIGNIFICANT CHANGE UP (ref 8.6–10.2)
CHLORIDE SERPL-SCNC: 101 MMOL/L — SIGNIFICANT CHANGE UP (ref 98–107)
CO2 SERPL-SCNC: 24 MMOL/L — SIGNIFICANT CHANGE UP (ref 22–29)
COLOR SPEC: YELLOW — SIGNIFICANT CHANGE UP
CREAT SERPL-MCNC: 0.85 MG/DL — SIGNIFICANT CHANGE UP (ref 0.5–1.3)
DIFF PNL FLD: NEGATIVE — SIGNIFICANT CHANGE UP
EOSINOPHIL # BLD AUTO: 0.07 K/UL — SIGNIFICANT CHANGE UP (ref 0–0.5)
EOSINOPHIL NFR BLD AUTO: 7.6 % — HIGH (ref 0–6)
GIANT PLATELETS BLD QL SMEAR: PRESENT — SIGNIFICANT CHANGE UP
GLUCOSE SERPL-MCNC: 100 MG/DL — SIGNIFICANT CHANGE UP (ref 70–115)
GLUCOSE UR QL: NEGATIVE MG/DL — SIGNIFICANT CHANGE UP
HCT VFR BLD CALC: 40.7 % — SIGNIFICANT CHANGE UP (ref 34.5–45)
HGB BLD-MCNC: 12.8 G/DL — SIGNIFICANT CHANGE UP (ref 11.5–15.5)
INR BLD: 1.18 RATIO — HIGH (ref 0.88–1.16)
KETONES UR-MCNC: NEGATIVE — SIGNIFICANT CHANGE UP
LEUKOCYTE ESTERASE UR-ACNC: NEGATIVE — SIGNIFICANT CHANGE UP
LYMPHOCYTES # BLD AUTO: 0.8 K/UL — LOW (ref 1–3.3)
LYMPHOCYTES # BLD AUTO: 81.9 % — HIGH (ref 13–44)
MANUAL SMEAR VERIFICATION: SIGNIFICANT CHANGE UP
MCHC RBC-ENTMCNC: 28.9 PG — SIGNIFICANT CHANGE UP (ref 27–34)
MCHC RBC-ENTMCNC: 31.4 GM/DL — LOW (ref 32–36)
MCV RBC AUTO: 91.9 FL — SIGNIFICANT CHANGE UP (ref 80–100)
MONOCYTES # BLD AUTO: 0 K/UL — SIGNIFICANT CHANGE UP (ref 0–0.9)
MONOCYTES NFR BLD AUTO: 0 % — LOW (ref 2–14)
NEUTROPHILS # BLD AUTO: 0.05 K/UL — LOW (ref 1.8–7.4)
NEUTROPHILS NFR BLD AUTO: 2.9 % — LOW (ref 43–77)
NEUTS BAND # BLD: 1.9 % — SIGNIFICANT CHANGE UP (ref 0–8)
NITRITE UR-MCNC: NEGATIVE — SIGNIFICANT CHANGE UP
PH UR: 7 — SIGNIFICANT CHANGE UP (ref 5–8)
PLAT MORPH BLD: NORMAL — SIGNIFICANT CHANGE UP
PLATELET # BLD AUTO: 350 K/UL — SIGNIFICANT CHANGE UP (ref 150–400)
POTASSIUM SERPL-MCNC: 4.4 MMOL/L — SIGNIFICANT CHANGE UP (ref 3.5–5.3)
POTASSIUM SERPL-SCNC: 4.4 MMOL/L — SIGNIFICANT CHANGE UP (ref 3.5–5.3)
PROT UR-MCNC: NEGATIVE MG/DL — SIGNIFICANT CHANGE UP
PROTHROM AB SERPL-ACNC: 13.6 SEC — HIGH (ref 10–12.9)
RBC # BLD: 4.43 M/UL — SIGNIFICANT CHANGE UP (ref 3.8–5.2)
RBC # FLD: 12.8 % — SIGNIFICANT CHANGE UP (ref 10.3–14.5)
RBC BLD AUTO: NORMAL — SIGNIFICANT CHANGE UP
SODIUM SERPL-SCNC: 137 MMOL/L — SIGNIFICANT CHANGE UP (ref 135–145)
SP GR SPEC: 1 — LOW (ref 1.01–1.02)
T3 SERPL-MCNC: 82 NG/DL — SIGNIFICANT CHANGE UP (ref 80–200)
T4 AB SER-ACNC: 9.1 UG/DL — SIGNIFICANT CHANGE UP (ref 4.5–12)
TSH SERPL-MCNC: 5.59 UIU/ML — HIGH (ref 0.27–4.2)
UROBILINOGEN FLD QL: NEGATIVE MG/DL — SIGNIFICANT CHANGE UP
VARIANT LYMPHS # BLD: 0.9 % — SIGNIFICANT CHANGE UP (ref 0–6)
WBC # BLD: 0.98 K/UL — CRITICAL LOW (ref 3.8–10.5)
WBC # FLD AUTO: 0.98 K/UL — CRITICAL LOW (ref 3.8–10.5)

## 2019-10-27 PROCEDURE — 71045 X-RAY EXAM CHEST 1 VIEW: CPT | Mod: 26

## 2019-10-27 PROCEDURE — 99223 1ST HOSP IP/OBS HIGH 75: CPT

## 2019-10-27 PROCEDURE — 90792 PSYCH DIAG EVAL W/MED SRVCS: CPT

## 2019-10-27 PROCEDURE — 36415 COLL VENOUS BLD VENIPUNCTURE: CPT

## 2019-10-27 PROCEDURE — 99285 EMERGENCY DEPT VISIT HI MDM: CPT

## 2019-10-27 PROCEDURE — 85027 COMPLETE CBC AUTOMATED: CPT

## 2019-10-27 RX ORDER — FILGRASTIM 480MCG/1.6
480 VIAL (ML) INJECTION DAILY
Refills: 0 | Status: DISCONTINUED | OUTPATIENT
Start: 2019-10-27 | End: 2019-10-27

## 2019-10-27 RX ORDER — DEXTROSE 50 % IN WATER 50 %
15 SYRINGE (ML) INTRAVENOUS ONCE
Refills: 0 | Status: DISCONTINUED | OUTPATIENT
Start: 2019-10-27 | End: 2019-10-28

## 2019-10-27 RX ORDER — GLUCAGON INJECTION, SOLUTION 0.5 MG/.1ML
1 INJECTION, SOLUTION SUBCUTANEOUS ONCE
Refills: 0 | Status: DISCONTINUED | OUTPATIENT
Start: 2019-10-27 | End: 2019-10-28

## 2019-10-27 RX ORDER — DEXTROSE 50 % IN WATER 50 %
12.5 SYRINGE (ML) INTRAVENOUS ONCE
Refills: 0 | Status: DISCONTINUED | OUTPATIENT
Start: 2019-10-27 | End: 2019-10-28

## 2019-10-27 RX ORDER — DEXTROSE 50 % IN WATER 50 %
25 SYRINGE (ML) INTRAVENOUS ONCE
Refills: 0 | Status: DISCONTINUED | OUTPATIENT
Start: 2019-10-27 | End: 2019-10-28

## 2019-10-27 RX ORDER — INSULIN LISPRO 100/ML
VIAL (ML) SUBCUTANEOUS
Refills: 0 | Status: DISCONTINUED | OUTPATIENT
Start: 2019-10-27 | End: 2019-10-28

## 2019-10-27 RX ORDER — FILGRASTIM 480MCG/1.6
480 VIAL (ML) INJECTION DAILY
Refills: 0 | Status: DISCONTINUED | OUTPATIENT
Start: 2019-10-27 | End: 2019-10-28

## 2019-10-27 RX ORDER — ENOXAPARIN SODIUM 100 MG/ML
40 INJECTION SUBCUTANEOUS DAILY
Refills: 0 | Status: DISCONTINUED | OUTPATIENT
Start: 2019-10-27 | End: 2019-10-28

## 2019-10-27 RX ORDER — SODIUM CHLORIDE 9 MG/ML
1000 INJECTION, SOLUTION INTRAVENOUS
Refills: 0 | Status: DISCONTINUED | OUTPATIENT
Start: 2019-10-27 | End: 2019-10-28

## 2019-10-27 RX ADMIN — Medication 480 MICROGRAM(S): at 18:59

## 2019-10-27 NOTE — H&P ADULT - ASSESSMENT
48 yr old female with schizoaffective disorder, hyperlipidemia, hypothyroidism, diabetes mellitus was sent in from [facility] for evaluation of pancytopenia. Per records, patient was recently admitted to [facility] and was on Clozaril 200 mg daily. In ED, no fever, labs reveal WBC 0.98. Clozaril was discontinued at [facility] per records.

## 2019-10-27 NOTE — ED PROVIDER NOTE - CLINICAL SUMMARY MEDICAL DECISION MAKING FREE TEXT BOX
pt sent from [place] for pancytopenia  No symptoms  PE documented. Will check labs and reassess pt sent from [place] for pancytopenia  No symptoms  PE documented. Will check labs and reassess   Lab from this a.m. 19% N, 58%L 3% Band  .6=severe neutropenia. Admit to monitor

## 2019-10-27 NOTE — ED PROVIDER NOTE - OBJECTIVE STATEMENT
47 yo female sent from [place] for pancytopenia secondary to clozaril. Pt is asymptomatic and feels well. She denies fatigue. She denies Bleeding. She denies Bruising. She denies past similar episodes.  med hx 49 yo female sent from [place] for pancytopenia secondary to clozaril. Pt is asymptomatic and feels well. She denies fatigue. She denies Bleeding. She denies Bruising. She denies past similar episodes.  med hx hypothyroid, DM

## 2019-10-27 NOTE — ED BEHAVIORAL HEALTH ASSESSMENT NOTE - OTHER
Dr. Woodruff on call Physician @ [phone] In-Patient hospital @ [facility]--Unit [id number] With other Patients

## 2019-10-27 NOTE — H&P ADULT - NSICDXPASTMEDICALHX_GEN_ALL_CORE_FT
PAST MEDICAL HISTORY:  Diabetes mellitus     Hyperlipidemia     Hypothyroid     Schizoaffective disorder

## 2019-10-27 NOTE — ED BEHAVIORAL HEALTH ASSESSMENT NOTE - HPI (INCLUDE ILLNESS QUALITY, SEVERITY, DURATION, TIMING, CONTEXT, MODIFYING FACTORS, ASSOCIATED SIGNS AND SYMPTOMS)
Patient a 49 y/o   female, unemployed,  passed away in 2019, no children, past Psychiatric diagnosis of Schizophrenia, unknown Past psychiatric hospitalization, currently at In-Patient Unit at [facility], no prior SI/SA, no self mutilation, unknown hx of violence or any legal issues, medically has Hypo-thyroid; DM was BIB/EMS from [facility] due to Neutropenia.      Patient is alert, oriented to time and place, lying comfortable in hospital bed, with 1:1  from [facility], was originally sent from [facility] to [facility] due to ongoing Psychosis. Patient was on Clozapine 200 mg HS at [facility], and was admitted at [facility] on 10/17/2019, received last dosage of Clozapine 200 mg HS on 10/18/2019. She was on other meds to boost increased absolute count, due to Clozapine induced Neutropenia. She receives Levothyroxine 0.05 mcg, Glucophage 500 mg daily and Neupogen with Sliding Scale Glucose coverage and Lithium 300 mg BID. Patient is asymptomatic, denied any cough, fever, or bleeding or ulcer at this time. She is out of her regular meds (Clozapine) due to Neutropenia, since 10/18/2019, and has no complaints, or agitation. She remains grossly disorganized, she makes no sense at times to what she says, she repeats that her   2 months ago, does not remember her meds, or her Psychiatric diagnosis, endorses that her  has not left enough money for her to sustain the rest of her life. She denied drug abuse, but as per record she smokes Cigarettes. Her level of disorganization .." I wasn't diagnosed, I was in the Apartment he passed away, I don't have any medical issues, I was never been in a Hospital ". She has good sleep/appetite. She denied A/V/h and paranoid beliefs, but has not been on Clozapine since 10/18/2019. Her Neutropenia is <1 and her Lithium level is 1.46. She remains quietly disorganized with inappropriate comments.    As per on call doctor Dr. Woodruff @ [phone] she said the same that she was transferred from [facility] 10 days earlier with low count, and was tried at [facility] with Neupogen /Lithium to increase the count for stability. She was sent to [place] for disorganization/meds not working and also her   in .

## 2019-10-27 NOTE — ED ADULT TRIAGE NOTE - CHIEF COMPLAINT QUOTE
Sent from [place] with 1:1 at bedside for pancytopenia. Placed on Clozaril 7 days ago and placed on Neupogen x 3 days. HX DM, hypothyropidism, HLD, and schizoaffective disorder.

## 2019-10-27 NOTE — ED ADULT NURSE NOTE - CHIEF COMPLAINT QUOTE
Sent from [place] with 1:1 at bedside for pancytopenia. Placed on Clozaril 7 days ago and placed on Neupogen x 3 days. HX DM, hypothyropidism, HLD, and schizoaffective disorder.

## 2019-10-27 NOTE — H&P ADULT - HISTORY OF PRESENT ILLNESS
48 yr old female with schizoaffective disorder, hyperlipidemia, hypothyroidism, diabetes mellitus was sent in from [facility] for evaluation of pancytopenia. Per records, patient was recently admitted to [place] and was on Clozaril 200 mg daily. She was noted to have WBC 1.4, noted to have neutropenia. She was given Neupogen as per records with no improvement in her WBC. No documentation of fever or other complaints in transfer summary. Patient is anxious, does not have any complaints. Requesting to go home as she feels fine. She denies fever, rash, urinary or bowel complaints. No headache. No cough. Aide at bedside, states she was recently admitted to [facility]. Patient seems upset and reports loss of spouse recently. She states she will not accept any treatment and would want to return, later accepted to cooperate with treatment plan. Noted to be wandering in ED.

## 2019-10-27 NOTE — ED ADULT NURSE REASSESSMENT NOTE - NS ED NURSE REASSESS COMMENT FT1
Patient received from off-going RN at 19:30, VSS, pt resting in stretcher at this time. [place] aide remains at bedside. No s/s of distress noted. Denies pain/discomfort when asked. Urine sent to lab, pending results. Patient received from off-going RN at 19:30, VSS, pt resting in stretcher at this time. [place] aide remains at bedside, pt safety maintained. No s/s of distress noted. Denies pain/discomfort when asked. Urine sent to lab, pending results.

## 2019-10-27 NOTE — ED BEHAVIORAL HEALTH ASSESSMENT NOTE - DESCRIPTION
Uneventful DM and Hypo-Thyroid   female, unemployed; No children and domiciled at [facility] In-patient unit

## 2019-10-27 NOTE — ED ADULT NURSE REASSESSMENT NOTE - NS ED NURSE REASSESS COMMENT FT1
Patient resting in stretcher, VSS at this time. No s/s of distress noted. [place] aide remains at bedside, patient safety maintained. Will continue to monitor.

## 2019-10-27 NOTE — ED ADULT NURSE REASSESSMENT NOTE - NS ED NURSE REASSESS COMMENT FT1
MD. Norman called to change 1:1 order to when the [place] aide leaves. [place] aide is to stay with pt. until pt. gets admitted.

## 2019-10-27 NOTE — ED BEHAVIORAL HEALTH ASSESSMENT NOTE - DETAILS
[facility]-In-patient Unite -Unit 404; Bldg-82 No Prior SI/SA Neutropenia due to Clozapine DR. Edmonds Aware ED Attending aware DM; Hypo-Thyroid Neutropenia On Call Physician Dr. Woodruff

## 2019-10-27 NOTE — ED BEHAVIORAL HEALTH ASSESSMENT NOTE - SUMMARY
Patient a 49 y/o   female, unemployed,  passed away in August' 2019, no children, past Psychiatric diagnosis of Schizophrenia, unknown Past psychiatric hospitalization, currently at In-Patient Unit at [facility], no prior SI/SA, no self mutilation, unknown hx of violence or any legal issues, medically has Hypo-thyroid; DM was BIB/EMS from [facility] due to Neutropenia.    Patient is alert, oriented to time and place, lying comfortable in hospital bed, with 1:1  from [facility], was originally sent from [facility] to [place] due to ongoing Psychosis. Patient was on Clozapine 200 mg HS at [facility], and was admitted at [facility] on 10/17/2019, received last dosage of Clozapine 200 mg HS on 10/18/2019.    She has not been on Clozapine since 10/18/2019. Her Neutropenia is <1 and her Lithium level is 1.46. She remains quietly disorganized with inappropriate comments. she is asymptomatic, but needes medical admit for stability

## 2019-10-27 NOTE — ED ADULT NURSE NOTE - OBJECTIVE STATEMENT
48 year old female a&ox3 comes to [place] with aide for "low blood count" as per pt. pt. denies chest pain, sob. pt. offers no complaints at this time. breathing even and unlabored. pt. ambulatory without difficulty.

## 2019-10-27 NOTE — ED ADULT NURSE NOTE - NS ED NURSE DISCH DISPOSITION
Patient needs a Welcome Visit scheduled at the  [facility].  No answer on the phone lines.  Please call the patient back to set-up.   Discharged

## 2019-10-28 VITALS
HEART RATE: 63 BPM | DIASTOLIC BLOOD PRESSURE: 74 MMHG | RESPIRATION RATE: 16 BRPM | OXYGEN SATURATION: 100 % | TEMPERATURE: 99 F | SYSTOLIC BLOOD PRESSURE: 114 MMHG

## 2019-10-28 LAB
ANION GAP SERPL CALC-SCNC: 11 MMOL/L — SIGNIFICANT CHANGE UP (ref 5–17)
BASOPHILS # BLD AUTO: 0.06 K/UL — SIGNIFICANT CHANGE UP (ref 0–0.2)
BASOPHILS NFR BLD AUTO: 6.1 % — HIGH (ref 0–2)
BUN SERPL-MCNC: 14 MG/DL — SIGNIFICANT CHANGE UP (ref 8–20)
CALCIUM SERPL-MCNC: 9.6 MG/DL — SIGNIFICANT CHANGE UP (ref 8.6–10.2)
CHLORIDE SERPL-SCNC: 105 MMOL/L — SIGNIFICANT CHANGE UP (ref 98–107)
CHOLEST SERPL-MCNC: 118 MG/DL — SIGNIFICANT CHANGE UP (ref 110–199)
CO2 SERPL-SCNC: 22 MMOL/L — SIGNIFICANT CHANGE UP (ref 22–29)
CREAT SERPL-MCNC: 0.79 MG/DL — SIGNIFICANT CHANGE UP (ref 0.5–1.3)
EOSINOPHIL # BLD AUTO: 0.09 K/UL — SIGNIFICANT CHANGE UP (ref 0–0.5)
EOSINOPHIL NFR BLD AUTO: 9.2 % — HIGH (ref 0–6)
GLUCOSE BLDC GLUCOMTR-MCNC: 154 MG/DL — HIGH (ref 70–99)
GLUCOSE SERPL-MCNC: 95 MG/DL — SIGNIFICANT CHANGE UP (ref 70–115)
HBA1C BLD-MCNC: 5 % — SIGNIFICANT CHANGE UP (ref 4–5.6)
HCT VFR BLD CALC: 37.2 % — SIGNIFICANT CHANGE UP (ref 34.5–45)
HDLC SERPL-MCNC: 59 MG/DL — SIGNIFICANT CHANGE UP
HGB BLD-MCNC: 12.4 G/DL — SIGNIFICANT CHANGE UP (ref 11.5–15.5)
IMM GRANULOCYTES NFR BLD AUTO: 1 % — SIGNIFICANT CHANGE UP (ref 0–1.5)
INR BLD: 1.23 RATIO — HIGH (ref 0.88–1.16)
LIPID PNL WITH DIRECT LDL SERPL: 49 MG/DL — SIGNIFICANT CHANGE UP
LYMPHOCYTES # BLD AUTO: 0.71 K/UL — LOW (ref 1–3.3)
LYMPHOCYTES # BLD AUTO: 72.4 % — HIGH (ref 13–44)
MAGNESIUM SERPL-MCNC: 2.2 MG/DL — SIGNIFICANT CHANGE UP (ref 1.6–2.6)
MCHC RBC-ENTMCNC: 29.9 PG — SIGNIFICANT CHANGE UP (ref 27–34)
MCHC RBC-ENTMCNC: 33.3 GM/DL — SIGNIFICANT CHANGE UP (ref 32–36)
MCV RBC AUTO: 89.6 FL — SIGNIFICANT CHANGE UP (ref 80–100)
MONOCYTES # BLD AUTO: 0.05 K/UL — SIGNIFICANT CHANGE UP (ref 0–0.9)
MONOCYTES NFR BLD AUTO: 5.1 % — SIGNIFICANT CHANGE UP (ref 2–14)
NEUTROPHILS # BLD AUTO: 0.06 K/UL — LOW (ref 1.8–7.4)
NEUTROPHILS NFR BLD AUTO: 6.2 % — LOW (ref 43–77)
PHOSPHATE SERPL-MCNC: 3.4 MG/DL — SIGNIFICANT CHANGE UP (ref 2.4–4.7)
PLATELET # BLD AUTO: 299 K/UL — SIGNIFICANT CHANGE UP (ref 150–400)
POTASSIUM SERPL-MCNC: 3.8 MMOL/L — SIGNIFICANT CHANGE UP (ref 3.5–5.3)
POTASSIUM SERPL-SCNC: 3.8 MMOL/L — SIGNIFICANT CHANGE UP (ref 3.5–5.3)
PROTHROM AB SERPL-ACNC: 14.2 SEC — HIGH (ref 10–12.9)
RBC # BLD: 4.15 M/UL — SIGNIFICANT CHANGE UP (ref 3.8–5.2)
RBC # FLD: 12.6 % — SIGNIFICANT CHANGE UP (ref 10.3–14.5)
SODIUM SERPL-SCNC: 138 MMOL/L — SIGNIFICANT CHANGE UP (ref 135–145)
T3 SERPL-MCNC: 84 NG/DL — SIGNIFICANT CHANGE UP (ref 80–200)
T4 AB SER-ACNC: 8.6 UG/DL — SIGNIFICANT CHANGE UP (ref 4.5–12)
TOTAL CHOLESTEROL/HDL RATIO MEASUREMENT: 2 RATIO — LOW (ref 3.3–7.1)
TRIGL SERPL-MCNC: 49 MG/DL — SIGNIFICANT CHANGE UP (ref 10–200)
TSH SERPL-MCNC: 4.96 UIU/ML — HIGH (ref 0.27–4.2)
WBC # BLD: 0.98 K/UL — CRITICAL LOW (ref 3.8–10.5)
WBC # FLD AUTO: 0.98 K/UL — CRITICAL LOW (ref 3.8–10.5)

## 2019-10-28 PROCEDURE — 99223 1ST HOSP IP/OBS HIGH 75: CPT

## 2019-10-28 PROCEDURE — 99239 HOSP IP/OBS DSCHRG MGMT >30: CPT

## 2019-10-28 RX ORDER — ATORVASTATIN CALCIUM 80 MG/1
1 TABLET, FILM COATED ORAL
Qty: 0 | Refills: 0 | DISCHARGE

## 2019-10-28 RX ORDER — LEVOTHYROXINE SODIUM 125 MCG
1 TABLET ORAL
Qty: 0 | Refills: 0 | DISCHARGE

## 2019-10-28 RX ORDER — TRAZODONE HCL 50 MG
1 TABLET ORAL
Qty: 0 | Refills: 0 | DISCHARGE

## 2019-10-28 RX ORDER — METFORMIN HYDROCHLORIDE 850 MG/1
1 TABLET ORAL
Qty: 0 | Refills: 0 | DISCHARGE

## 2019-10-28 RX ORDER — LITHIUM CARBONATE 300 MG/1
2 TABLET, EXTENDED RELEASE ORAL
Qty: 0 | Refills: 0 | DISCHARGE

## 2019-10-28 RX ADMIN — ENOXAPARIN SODIUM 40 MILLIGRAM(S): 100 INJECTION SUBCUTANEOUS at 12:13

## 2019-10-28 RX ADMIN — Medication 480 MICROGRAM(S): at 12:13

## 2019-10-28 RX ADMIN — Medication 1: at 10:53

## 2019-10-28 NOTE — ED ADULT NURSE REASSESSMENT NOTE - NS ED NURSE REASSESS COMMENT FT1
Pt received A&O x's 3 with aide at the bedside. Pt denies any complaints. Ambulating without difficulty. Calm and pleasant. VSS. IV lock in place without and redness or swelling. Orders reviewed and acknowledged.

## 2019-10-28 NOTE — DISCHARGE NOTE PROVIDER - HOSPITAL COURSE
48 yr old female with schizoaffective disorder, hyperlipidemia, hypothyroidism, diabetes mellitus was sent in from [facility] for evaluation of pancytopenia. Per records, patient was recently admitted to [place] and was on Clozaril 200 mg daily. In ED, no fever, labs reveal WBC 0.98. Clozaril was discontinued at [place] per records. After discussion with hematology, advised to continue Neupogen. Psychiatry evaluation was held. Hematology later advised to discontinue Neupogen and outpatient follow up for monitoring for CBC. Patient had no fever, her UA and CXR were negative. She is stable for discharge.        Spent > 35 mins in discharge plan and documentation

## 2019-10-28 NOTE — CONSULT NOTE ADULT - ASSESSMENT
Drug-induced Neutropenia secondary to clozaril, which has been discontinued.  Patient is afebrile, has no mucositis, and is clinically stable.  Suggest discharge back to [facility].  Please obtain weekly CBC at [facility] and have results sent to Dr. Anand at [facility]. Fax .

## 2019-10-28 NOTE — CONSULT NOTE ADULT - SUBJECTIVE AND OBJECTIVE BOX
REASON FOR CONSULTATION    HPI:  48 yr old female with schizoaffective disorder, hyperlipidemia, hypothyroidism, diabetes mellitus was sent in from [facility] for evaluation of pancytopenia. Per records, patient was recently admitted to [place] and was on Clozaril 200 mg daily. She was noted to have WBC 1.4, noted to have neutropenia. She was given Neupogen as per records with no improvement in her WBC. No documentation of fever or other complaints in transfer summary. Patient is anxious, does not have any complaints. Requesting to go home as she feels fine. She denies fever, rash, urinary or bowel complaints. No headache. No cough. Aide at bedside, states she was recently admitted to [place]. Patient seems upset and reports loss of spouse recently. She states she will not accept any treatment and would want to return, later accepted to cooperate with treatment plan. Noted to be wandering in ED. (27 Oct 2019 17:33)      REVIEW OF SYSTEMS:    CONSTITUTIONAL: No fever, weight loss, or fatigue  EYES: No eye pain, visual disturbances, or discharge  ENMT:  No difficulty hearing, tinnitus, vertigo; No sinus or throat pain  NECK: No pain or stiffness  BREASTS: No pain, masses, or nipple discharge  RESPIRATORY: No cough, wheezing, chills or hemoptysis; No shortness of breath  CARDIOVASCULAR: No chest pain, palpitations, dizziness, or leg swelling  GASTROINTESTINAL: No abdominal or epigastric pain. No nausea, vomiting, or hematemesis; No diarrhea or constipation. No melena or hematochezia.  GENITOURINARY: No dysuria, frequency, hematuria, or incontinence  NEUROLOGICAL: No headaches, memory loss, loss of strength, numbness, or tremors  SKIN: No itching, burning, rashes, or lesions   LYMPH NODES: No enlarged glands  ENDOCRINE: No heat or cold intolerance; No hair loss  MUSCULOSKELETAL: No joint pain or swelling; No muscle, back, or extremity pain  PSYCHIATRIC: schizophrenia  HEME/LYMPH: No easy bruising, or bleeding gums  ALLERY AND IMMUNOLOGIC: No hives or eczema    PAST MEDICAL & SURGICAL HISTORY:  Hyperlipidemia  Diabetes mellitus  Hypothyroid  Schizoaffective disorder  Surgical history unknown      SOCIAL HISTORY:    FAMILY HISTORY:  Family history unknown      SOCIAL HISTORY:    Allergies    Allergy Status Unknown    Intolerances        MEDICATIONS  (STANDING):  dextrose 5%. 1000 milliLiter(s) (50 mL/Hr) IV Continuous <Continuous>  dextrose 50% Injectable 12.5 Gram(s) IV Push once  dextrose 50% Injectable 25 Gram(s) IV Push once  dextrose 50% Injectable 25 Gram(s) IV Push once  enoxaparin Injectable 40 milliGRAM(s) SubCutaneous daily  filgrastim-sndz Injectable 480 MICROGram(s) SubCutaneous daily  insulin lispro (HumaLOG) corrective regimen sliding scale   SubCutaneous three times a day before meals    MEDICATIONS  (PRN):  dextrose 40% Gel 15 Gram(s) Oral once PRN Blood Glucose LESS THAN 70 milliGRAM(s)/deciliter  glucagon  Injectable 1 milliGRAM(s) IntraMuscular once PRN Glucose LESS THAN 70 milligrams/deciliter      Vital Signs Last 24 Hrs  T(C): 37.2 (28 Oct 2019 16:23), Max: 37.4 (28 Oct 2019 08:21)  T(F): 99 (28 Oct 2019 16:23), Max: 99.3 (28 Oct 2019 08:21)  HR: 63 (28 Oct 2019 16:23) (51 - 63)  BP: 114/74 (28 Oct 2019 16:23) (102/69 - 127/80)  BP(mean): --  RR: 16 (28 Oct 2019 16:23) (15 - 18)  SpO2: 100% (28 Oct 2019 16:23) (99% - 100%)    PHYSICAL EXAM:    GENERAL: NAD, well-groomed, well-developed  HEAD:  Atraumatic, Normocephalic  EYES: EOMI, PERRLA, conjunctiva and sclera clear  ENMT: No tonsillar erythema, exudates, or enlargement; Moist mucous membranes, Good dentition, No lesions  NECK: Supple, No JVD, Normal thyroid  NERVOUS SYSTEM:  Alert & Oriented X3, Good concentration; Motor Strength 5/5 B/L upper and lower extremities; DTRs 2+ intact and symmetric  CHEST/LUNG: Clear to percussion bilaterally; No rales, rhonchi, wheezing, or rubs  HEART: Regular rate and rhythm; No murmurs, rubs, or gallops  ABDOMEN: Soft, Nontender, Nondistended; Bowel sounds present  EXTREMITIES:  2+ Peripheral Pulses, No clubbing, cyanosis, or edema  LYMPH: No lymphadenopathy noted  SKIN: No rashes or lesions      LABS:                        12.4   0.98  )-----------( 299      ( 28 Oct 2019 06:32 )             37.2     10-28    138  |  105  |  14.0  ----------------------------<  95  3.8   |  22.0  |  0.79    Ca    9.6      28 Oct 2019 06:32  Phos  3.4     10-28  Mg     2.2     10-28      PT/INR - ( 28 Oct 2019 06:32 )   PT: 14.2 sec;   INR: 1.23 ratio         PTT - ( 27 Oct 2019 15:19 )  PTT:41.0 sec

## 2019-10-28 NOTE — ED ADULT NURSE REASSESSMENT NOTE - NS ED NURSE REASSESS COMMENT FT1
Patient resting in stretcher, VSS at this time. No s/s of distress noted. [place] aide remains at bedside, patient safety maintained. Denies pain/discomfort.

## 2019-10-28 NOTE — ED ADULT NURSE REASSESSMENT NOTE - NS ED NURSE REASSESS COMMENT FT1
oob to bathroom with steady gait, 1:1  from [facility] at bedside. pt awaiting discharge back to [facility].

## 2019-10-28 NOTE — DISCHARGE NOTE PROVIDER - NSDCCPCAREPLAN_GEN_ALL_CORE_FT
PRINCIPAL DISCHARGE DIAGNOSIS  Diagnosis: Neutropenia, drug-induced  Assessment and Plan of Treatment:

## 2019-10-28 NOTE — PROGRESS NOTE ADULT - SUBJECTIVE AND OBJECTIVE BOX
INTERVAL HPI/OVERNIGHT EVENTS:    CC: neutropenia, hyperlipidemia, hypothyroid, diabetes mellitus    No overnight events, denies complaints.    Vital Signs Last 24 Hrs  T(C): 36.7 (28 Oct 2019 12:15), Max: 37.4 (28 Oct 2019 08:21)  T(F): 98 (28 Oct 2019 12:15), Max: 99.3 (28 Oct 2019 08:21)  HR: 58 (28 Oct 2019 12:15) (51 - 70)  BP: 114/77 (28 Oct 2019 12:15) (102/69 - 128/76)  BP(mean): --  RR: 15 (28 Oct 2019 12:15) (15 - 18)  SpO2: 100% (28 Oct 2019 12:15) (97% - 100%)    PHYSICAL EXAM:    GENERAL: Not in distress, alert  CHEST/LUNG: b/l air entry  HEART: Regular  ABDOMEN: Soft, BS+  EXTREMITIES:  no edema, tenderness    MEDICATIONS  (STANDING):  dextrose 5%. 1000 milliLiter(s) (50 mL/Hr) IV Continuous <Continuous>  dextrose 50% Injectable 12.5 Gram(s) IV Push once  dextrose 50% Injectable 25 Gram(s) IV Push once  dextrose 50% Injectable 25 Gram(s) IV Push once  enoxaparin Injectable 40 milliGRAM(s) SubCutaneous daily  filgrastim-sndz Injectable 480 MICROGram(s) SubCutaneous daily  insulin lispro (HumaLOG) corrective regimen sliding scale   SubCutaneous three times a day before meals    MEDICATIONS  (PRN):  dextrose 40% Gel 15 Gram(s) Oral once PRN Blood Glucose LESS THAN 70 milliGRAM(s)/deciliter  glucagon  Injectable 1 milliGRAM(s) IntraMuscular once PRN Glucose LESS THAN 70 milligrams/deciliter      Allergies    Allergy Status Unknown    Intolerances          LABS:                          12.4   0.98  )-----------( 299      ( 28 Oct 2019 06:32 )             37.2     10-    138  |  105  |  14.0  ----------------------------<  95  3.8   |  22.0  |  0.79    Ca    9.6      28 Oct 2019 06:32  Phos  3.4     10-  Mg     2.2     10-      PT/INR - ( 28 Oct 2019 06:32 )   PT: 14.2 sec;   INR: 1.23 ratio         PTT - ( 27 Oct 2019 15:19 )  PTT:41.0 sec  Urinalysis Basic - ( 27 Oct 2019 20:41 )    Color: Yellow / Appearance: Clear / S.005 / pH: x  Gluc: x / Ketone: Negative  / Bili: Negative / Urobili: Negative mg/dL   Blood: x / Protein: Negative mg/dL / Nitrite: Negative   Leuk Esterase: Negative / RBC: x / WBC x   Sq Epi: x / Non Sq Epi: x / Bacteria: x        RADIOLOGY & ADDITIONAL TESTS:

## 2019-10-28 NOTE — PROGRESS NOTE ADULT - ASSESSMENT
48 yr old female with schizoaffective disorder, hyperlipidemia, hypothyroidism, diabetes mellitus was sent in from [facility] for evaluation of pancytopenia. Per records, patient was recently admitted to [place] and was on Clozaril 200 mg daily. In ED, no fever, labs reveal WBC 0.98. Clozaril was discontinued at [place] per records. After discussion with hematology, advised to continue Neupogen. Psychiatry evaluation was held. Hematology later advised to discontinue Neupogen and outpatient follow up for monitoring for CBC. Patient had no fever, her UA and CXR were negative.

## 2019-10-28 NOTE — ED ADULT NURSE REASSESSMENT NOTE - NS ED NURSE REASSESS COMMENT FT1
Patient resting in stretcher, VSS at this time. No s/s of distress noted. [place] aide remains at bedside. Patient resting in stretcher, VSS at this time. No s/s of distress noted. [place] aide remains at bedside, pt safety maintained. Awaiting bed placement at this time.

## 2019-10-28 NOTE — ED ADULT NURSE REASSESSMENT NOTE - NS ED NURSE REASSESS COMMENT FT1
Patient resting in stretcher, VSS at this time. No s/s of distress noted. [place] aide remains at bedside, patient safety maintained. Denies pain/discomfort. Will continue to monitor.

## 2019-10-28 NOTE — ED ADULT NURSE REASSESSMENT NOTE - NS ED NURSE REASSESS COMMENT FT1
Patient resting in stretcher, VSS at this time. No s/s of distress noted. [place] aide remains at bedside, patient safety maintained. Patient ambulating independently to bathroom with steady gait noted. Denies pain/discomfort.

## 2019-10-28 NOTE — DISCHARGE NOTE PROVIDER - CARE PROVIDER_API CALL
Alexis Anand)  Hematology; Internal Medicine; Medical Oncology  [address] [place] [address]  Phone: [phone]  Fax: [phone]  Follow Up Time:

## 2019-10-28 NOTE — ED ADULT NURSE REASSESSMENT NOTE - GENERAL PATIENT STATE
comfortable appearance/[place] aide at bedside
resting/sleeping/comfortable appearance
comfortable appearance/resting/sleeping
resting/sleeping/comfortable appearance
comfortable appearance

## 2019-10-28 NOTE — CHART NOTE - NSCHARTNOTEFT_GEN_A_CORE
KEON Note: KEON spoke to MD who states he completed doc to doc for pt to return to [place]. KEON spoke to Alexis from [place] to inform her that pt is returning. Alexis provided this worker with pt's medicaid # [id number] and as per alexis if insurance doesn't cover the transport they will provide a voucher once ambulance gets there. Pt is from [facility] [id number]. Ambulance arranged. [employer] form left with welch clerk and RN aware. No need fro SW to follow.

## 2019-10-28 NOTE — DISCHARGE NOTE PROVIDER - NSDCFUADDINST_GEN_ALL_CORE_FT
Continue medications as instructed. Hold Clozapine. Monitor CBC. Follow up with hematology in 1 week. Return to ED for fever, any signs of infection.

## 2019-10-29 LAB
CULTURE RESULTS: SIGNIFICANT CHANGE UP
SPECIMEN SOURCE: SIGNIFICANT CHANGE UP

## 2019-10-31 PROCEDURE — 86901 BLOOD TYPING SEROLOGIC RH(D): CPT

## 2019-10-31 PROCEDURE — 83036 HEMOGLOBIN GLYCOSYLATED A1C: CPT

## 2019-10-31 PROCEDURE — 84443 ASSAY THYROID STIM HORMONE: CPT

## 2019-10-31 PROCEDURE — 81003 URINALYSIS AUTO W/O SCOPE: CPT

## 2019-10-31 PROCEDURE — 99285 EMERGENCY DEPT VISIT HI MDM: CPT | Mod: 25

## 2019-10-31 PROCEDURE — 71045 X-RAY EXAM CHEST 1 VIEW: CPT

## 2019-10-31 PROCEDURE — 82962 GLUCOSE BLOOD TEST: CPT

## 2019-10-31 PROCEDURE — 83735 ASSAY OF MAGNESIUM: CPT

## 2019-10-31 PROCEDURE — 80048 BASIC METABOLIC PNL TOTAL CA: CPT

## 2019-10-31 PROCEDURE — 84480 ASSAY TRIIODOTHYRONINE (T3): CPT

## 2019-10-31 PROCEDURE — 87086 URINE CULTURE/COLONY COUNT: CPT

## 2019-10-31 PROCEDURE — 36415 COLL VENOUS BLD VENIPUNCTURE: CPT

## 2019-10-31 PROCEDURE — 84436 ASSAY OF TOTAL THYROXINE: CPT

## 2019-10-31 PROCEDURE — 80061 LIPID PANEL: CPT

## 2019-10-31 PROCEDURE — 85730 THROMBOPLASTIN TIME PARTIAL: CPT

## 2019-10-31 PROCEDURE — 96372 THER/PROPH/DIAG INJ SC/IM: CPT

## 2019-10-31 PROCEDURE — 86850 RBC ANTIBODY SCREEN: CPT

## 2019-10-31 PROCEDURE — 85027 COMPLETE CBC AUTOMATED: CPT

## 2019-10-31 PROCEDURE — 85610 PROTHROMBIN TIME: CPT

## 2019-10-31 PROCEDURE — 84100 ASSAY OF PHOSPHORUS: CPT

## 2019-10-31 PROCEDURE — 86900 BLOOD TYPING SEROLOGIC ABO: CPT

## 2019-10-31 PROCEDURE — 87040 BLOOD CULTURE FOR BACTERIA: CPT

## 2019-11-02 LAB
CULTURE RESULTS: SIGNIFICANT CHANGE UP
CULTURE RESULTS: SIGNIFICANT CHANGE UP
SPECIMEN SOURCE: SIGNIFICANT CHANGE UP
SPECIMEN SOURCE: SIGNIFICANT CHANGE UP

## 2019-12-04 ENCOUNTER — APPOINTMENT (OUTPATIENT)
Dept: CARDIOLOGY | Facility: CLINIC | Age: 49
End: 2019-12-04
Payer: SELF-PAY

## 2019-12-04 ENCOUNTER — NON-APPOINTMENT (OUTPATIENT)
Age: 49
End: 2019-12-04

## 2019-12-04 VITALS
WEIGHT: 181 LBS | BODY MASS INDEX: 32.07 KG/M2 | SYSTOLIC BLOOD PRESSURE: 140 MMHG | RESPIRATION RATE: 18 BRPM | HEART RATE: 86 BPM | DIASTOLIC BLOOD PRESSURE: 66 MMHG | OXYGEN SATURATION: 100 % | HEIGHT: 63 IN

## 2019-12-04 VITALS — SYSTOLIC BLOOD PRESSURE: 136 MMHG | DIASTOLIC BLOOD PRESSURE: 86 MMHG

## 2019-12-04 DIAGNOSIS — R94.31 ABNORMAL ELECTROCARDIOGRAM [ECG] [EKG]: ICD-10-CM

## 2019-12-04 DIAGNOSIS — I10 ESSENTIAL (PRIMARY) HYPERTENSION: ICD-10-CM

## 2019-12-04 DIAGNOSIS — G47.00 INSOMNIA, UNSPECIFIED: ICD-10-CM

## 2019-12-04 PROCEDURE — 99204 OFFICE O/P NEW MOD 45 MIN: CPT

## 2019-12-04 PROCEDURE — 93000 ELECTROCARDIOGRAM COMPLETE: CPT

## 2019-12-04 RX ORDER — INSULIN LISPRO 100 [IU]/ML
100 INJECTION, SOLUTION INTRAVENOUS; SUBCUTANEOUS
Refills: 0 | Status: ACTIVE | COMMUNITY
Start: 2019-12-04

## 2019-12-04 RX ORDER — LEVOTHYROXINE SODIUM 0.07 MG/1
75 TABLET ORAL DAILY
Refills: 0 | Status: ACTIVE | COMMUNITY
Start: 2019-12-04

## 2019-12-04 RX ORDER — TRAZODONE HYDROCHLORIDE 100 MG/1
100 TABLET ORAL
Refills: 0 | Status: ACTIVE | COMMUNITY

## 2019-12-04 RX ORDER — PALIPERIDONE 6 MG/1
6 TABLET, FILM COATED, EXTENDED RELEASE ORAL TWICE DAILY
Refills: 0 | Status: DISCONTINUED | COMMUNITY
End: 2019-12-04

## 2019-12-04 NOTE — ASSESSMENT
[FreeTextEntry1] : 50 y/o female with schizoaffective disorder, referred because of sinus bradycardia and LAFB \par EKG in the office showed normal sinus rhythm with no evidence of LAFB\par Prior echo in 8/2018 showed normal LV function with mild LVH and no significant valvular disease \par There is no contraindications to start lithium if necessary ( severe bradycardia can be a side effect) but i think bradycardia could be related to hypothyroid state recently treated by increased dose of levothyroxine.\par \par Borderline hypertension continue lifestyle modifications

## 2019-12-04 NOTE — HISTORY OF PRESENT ILLNESS
[FreeTextEntry1] : 50 y/o with PMH of borderline hypertension , schizoaffective disorder currently in [place], who is referred to me to evaluate sinus bradycardia and LAFB. \par She has  no exertional chest pain or  SOB, palpitations, dizziness or syncope\par EKG in 11/25/2019 showed sinus bradycardia and LAFB \par no prior valvular, ischemic , congenital heart disease or cardiomyopathy\par she has history of hypothyroidism on levothyroxine  (last TSH 5.91 , for which levothyroxine was increase to 75 mcg daily

## 2019-12-04 NOTE — PHYSICAL EXAM
[General Appearance - Well Developed] : well developed [General Appearance - Well Nourished] : well nourished [Normal Appearance] : normal appearance [Eyelids - No Xanthelasma] : the eyelids demonstrated no xanthelasmas [No Oral Cyanosis] : no oral cyanosis [No Oral Pallor] : no oral pallor [Normal Jugular Venous A Waves Present] : normal jugular venous A waves present [Normal Jugular Venous V Waves Present] : normal jugular venous V waves present [Edema] : no peripheral edema present [Murmurs] : no murmurs present [Heart Sounds] : normal S1 and S2 [] : no respiratory distress [Auscultation Breath Sounds / Voice Sounds] : lungs were clear to auscultation bilaterally [Abdomen Soft] : soft [Abdomen Tenderness] : non-tender [Abnormal Walk] : normal gait [Nail Clubbing] : no clubbing of the fingernails [Skin Color & Pigmentation] : normal skin color and pigmentation [Cyanosis, Localized] : no localized cyanosis [Skin Turgor] : normal skin turgor [Oriented To Time, Place, And Person] : oriented to person, place, and time

## 2020-04-19 NOTE — ED ADULT NURSE NOTE - MUSCULOSKELETAL ASSESSMENT
Anesthesia Evaluation     Patient summary reviewed and Nursing notes reviewed   no history of anesthetic complications:  NPO Solid Status: > 8 hours  NPO Liquid Status: > 2 hours           Airway   Mallampati: II  TM distance: >3 FB  Neck ROM: full  No difficulty expected  Dental    (+) poor dentition    Pulmonary - normal exam    breath sounds clear to auscultation  (+) a smoker Current,   Cardiovascular - negative cardio ROS and normal exam    Rhythm: regular  Rate: normal        Neuro/Psych  GI/Hepatic/Renal/Endo    (+)  GERD,      Musculoskeletal     Abdominal    Substance History   (+) drug use     OB/GYN          Other                        Anesthesia Plan    ASA 3 - emergent     general   Rapid sequence  intravenous induction     Anesthetic plan, all risks, benefits, and alternatives have been provided, discussed and informed consent has been obtained with: patient.       WDL

## 2020-06-10 ENCOUNTER — APPOINTMENT (OUTPATIENT)
Dept: CARDIOLOGY | Facility: CLINIC | Age: 50
End: 2020-06-10

## 2020-08-07 NOTE — ED BEHAVIORAL HEALTH ASSESSMENT NOTE - SUBSTANCE USE
Pt's wife reports that pt has stopped his finasteride, Myrbetriq, and Toviaz. Now pt is reporting frequency and urgency to urinate. Pt is S/p Botox 12/20/2019. Will review with MD for recommendations.    None known

## 2020-08-11 NOTE — H&P ADULT. - NEUROLOGICAL
NATHALY ARRIVES AMBULATORY FOR MD CONSULTATION  DR AUGUSTIN IN TO SEE PATIENT   ORDERS RECEIVED  ULTRASOUND LIVER & SPLEEN & LABS SOON  RV VIRTUAL  LABS HEPATIC FUNCTION PANEL HEPATITIS PANEL HIV SCREEN CDP FERRITIN CMV BY PCR QUANTITATIVE 8/18/20  U/S LIVER & SPLEEN 8/18/20 @ 7:30AM ARRIVE @ 7:15 AM ST [facility]AE VANG VISIT 8/20/20 1PM VIRTUAL APPOINTMENT  AVS PRINTED AND GIVEN TO PATIENT W/ INSTRUCTIONS  PATIENT DISCHARGED AMBULATORY details… detailed exam

## 2020-09-10 NOTE — DISCHARGE NOTE ADULT - MODE OF TRANSPORTATION
Respiratory Care Protocol Assessment    Respiratory assessment completed, pathway(s) are indicated per the following triggers:  Aerosolized Medication: Yes  Home Resp Meds: Yes  Volume Expansion: Yes           Oxygen Therapy: Yes  Home O2: Yes      Recommendations: Will order Incentive Spirometry per  Acuity Score: 7   they will be scheduled   Acuity Frequency: None.    Additional comments:      Goal: Return to current home regimen       Ambulatory

## 2020-09-29 NOTE — ED PROVIDER NOTE - NS ED MD DISPO DISCHARGE CCDA
ID applied and verified. Plan of care initiated with Liz Coto. Understanding verbalized.    
Patient/Caregiver provided printed discharge information.

## 2021-02-05 NOTE — PROGRESS NOTE BEHAVIORAL HEALTH - ABNORMAL MOVEMENTS
CONSTITUTIONAL: Well-developed; well-nourished; in no acute distress.   SKIN: warm, dry  HEAD: Normocephalic; atraumatic.  CARD: S1, S2 normal; no murmurs, gallops, or rubs. Regular rate and rhythm.   RESP: No wheezes, rales or rhonchi.  ABD: soft ntnd. BS+ in all 4 quadrants.  EXT: able to flex PIP and DIP joint of left 2nd digit. no change in sensation. TTP of pad of left 2nd digit. no ttp of other digits/hand/wrist.  NEURO: Alert, oriented, grossly unremarkable.  PSYCH: Cooperative, appropriate. No abnormal movements

## 2021-02-17 NOTE — ED ADULT NURSE NOTE - NS ED NURSE DC INFO COMPLEXITY
ED Time Seen By Provider Entered On:  3/2/2017 15:04     Performed On:  3/2/2017 15:04  by Martin Oates MD      Time Seen By Provider   Time Seen by Provider :   3/2/2017 15:04    Martin Oates MD - 3/2/2017 15:04            Simple: Patient demonstrates quick and easy understanding

## 2021-05-06 NOTE — ED PROVIDER NOTE - CHIEF COMPLAINT
The patient is a 46y Female complaining of dizziness. Bexarotene Counseling:  I discussed with the patient the risks of bexarotene including but not limited to hair loss, dry lips/skin/eyes, liver abnormalities, hyperlipidemia, pancreatitis, depression/suicidal ideation, photosensitivity, drug rash/allergic reactions, hypothyroidism, anemia, leukopenia, infection, cataracts, and teratogenicity.  Patient understands that they will need regular blood tests to check lipid profile, liver function tests, white blood cell count, thyroid function tests and pregnancy test if applicable.

## 2021-08-12 NOTE — H&P ADULT. - PROBLEM SELECTOR PROBLEM 7
Consent 1/Introductory Paragraph: The rationale for Mohs was explained to the patient and consent was obtained. The risks, benefits and alternatives to therapy were discussed in detail. Specifically, the risks of infection, scarring, bleeding, prolonged wound healing, incomplete removal, allergy to anesthesia, nerve injury and recurrence were addressed. Prior to the procedure, the treatment site was clearly identified and confirmed by the patient. All components of Universal Protocol/PAUSE Rule completed. Need for prophylactic measure

## 2021-11-15 NOTE — ED ADULT NURSE NOTE - CHIEF COMPLAINT
Doxycycline Pregnancy And Lactation Text: This medication is Pregnancy Category D and not consider safe during pregnancy. It is also excreted in breast milk but is considered safe for shorter treatment courses. Topical Retinoid Pregnancy And Lactation Text: This medication is Pregnancy Category C. It is unknown if this medication is excreted in breast milk. Azithromycin Counseling:  I discussed with the patient the risks of azithromycin including but not limited to GI upset, allergic reaction, drug rash, diarrhea, and yeast infections. The patient is a 47y Female complaining of Topical Sulfur Applications Counseling: Topical Sulfur Counseling: Patient counseled that this medication may cause skin irritation or allergic reactions.  In the event of skin irritation, the patient was advised to reduce the amount of the drug applied or use it less frequently.   The patient verbalized understanding of the proper use and possible adverse effects of topical sulfur application.  All of the patient's questions and concerns were addressed. Tetracycline Pregnancy And Lactation Text: This medication is Pregnancy Category D and not consider safe during pregnancy. It is also excreted in breast milk. High Dose Vitamin A Counseling: Side effects reviewed, pt to contact office should one occur. Birth Control Pills Pregnancy And Lactation Text: This medication should be avoided if pregnant and for the first 30 days post-partum. Erythromycin Counseling:  I discussed with the patient the risks of erythromycin including but not limited to GI upset, allergic reaction, drug rash, diarrhea, increase in liver enzymes, and yeast infections. Tazorac Counseling:  Patient advised that medication is irritating and drying.  Patient may need to apply sparingly and wash off after an hour before eventually leaving it on overnight.  The patient verbalized understanding of the proper use and possible adverse effects of tazorac.  All of the patient's questions and concerns were addressed. Azithromycin Pregnancy And Lactation Text: This medication is considered safe during pregnancy and is also secreted in breast milk. Topical Sulfur Applications Pregnancy And Lactation Text: This medication is Pregnancy Category C and has an unknown safety profile during pregnancy. It is unknown if this topical medication is excreted in breast milk. Benzoyl Peroxide Counseling: Patient counseled that medicine may cause skin irritation and bleach clothing.  In the event of skin irritation, the patient was advised to reduce the amount of the drug applied or use it less frequently.   The patient verbalized understanding of the proper use and possible adverse effects of benzoyl peroxide.  All of the patient's questions and concerns were addressed. High Dose Vitamin A Pregnancy And Lactation Text: High dose vitamin A therapy is contraindicated during pregnancy and breast feeding. Dapsone Counseling: I discussed with the patient the risks of dapsone including but not limited to hemolytic anemia, agranulocytosis, rashes, methemoglobinemia, kidney failure, peripheral neuropathy, headaches, GI upset, and liver toxicity.  Patients who start dapsone require monitoring including baseline LFTs and weekly CBCs for the first month, then every month thereafter.  The patient verbalized understanding of the proper use and possible adverse effects of dapsone.  All of the patient's questions and concerns were addressed. Tazorac Pregnancy And Lactation Text: This medication is not safe during pregnancy. It is unknown if this medication is excreted in breast milk. Bactrim Counseling:  I discussed with the patient the risks of sulfa antibiotics including but not limited to GI upset, allergic reaction, drug rash, diarrhea, dizziness, photosensitivity, and yeast infections.  Rarely, more serious reactions can occur including but not limited to aplastic anemia, agranulocytosis, methemoglobinemia, blood dyscrasias, liver or kidney failure, lung infiltrates or desquamative/blistering drug rashes. Spironolactone Counseling: Patient advised regarding risks of diarrhea, abdominal pain, hyperkalemia, birth defects (for female patients), liver toxicity and renal toxicity. The patient may need blood work to monitor liver and kidney function and potassium levels while on therapy. The patient verbalized understanding of the proper use and possible adverse effects of spironolactone.  All of the patient's questions and concerns were addressed. Erythromycin Pregnancy And Lactation Text: This medication is Pregnancy Category B and is considered safe during pregnancy. It is also excreted in breast milk. Benzoyl Peroxide Pregnancy And Lactation Text: This medication is Pregnancy Category C. It is unknown if benzoyl peroxide is excreted in breast milk. Minocycline Counseling: Patient advised regarding possible photosensitivity and discoloration of the teeth, skin, lips, tongue and gums.  Patient instructed to avoid sunlight, if possible.  When exposed to sunlight, patients should wear protective clothing, sunglasses, and sunscreen.  The patient was instructed to call the office immediately if the following severe adverse effects occur:  hearing changes, easy bruising/bleeding, severe headache, or vision changes.  The patient verbalized understanding of the proper use and possible adverse effects of minocycline.  All of the patient's questions and concerns were addressed. Dapsone Pregnancy And Lactation Text: This medication is Pregnancy Category C and is not considered safe during pregnancy or breast feeding. Topical Clindamycin Counseling: Patient counseled that this medication may cause skin irritation or allergic reactions.  In the event of skin irritation, the patient was advised to reduce the amount of the drug applied or use it less frequently.   The patient verbalized understanding of the proper use and possible adverse effects of clindamycin.  All of the patient's questions and concerns were addressed. Bactrim Pregnancy And Lactation Text: This medication is Pregnancy Category D and is known to cause fetal risk.  It is also excreted in breast milk. Spironolactone Pregnancy And Lactation Text: This medication can cause feminization of the male fetus and should be avoided during pregnancy. The active metabolite is also found in breast milk. Isotretinoin Counseling: Patient should get monthly blood tests, not donate blood, not drive at night if vision affected, not share medication, and not undergo elective surgery for 6 months after tx completed. Side effects reviewed, pt to contact office should one occur. Detail Level: Detailed Doxycycline Counseling:  Patient counseled regarding possible photosensitivity and increased risk for sunburn.  Patient instructed to avoid sunlight, if possible.  When exposed to sunlight, patients should wear protective clothing, sunglasses, and sunscreen.  The patient was instructed to call the office immediately if the following severe adverse effects occur:  hearing changes, easy bruising/bleeding, severe headache, or vision changes.  The patient verbalized understanding of the proper use and possible adverse effects of doxycycline.  All of the patient's questions and concerns were addressed. Use Enhanced Medication Counseling?: No Topical Retinoid counseling:  Patient advised to apply a pea-sized amount only at bedtime and wait 30 minutes after washing their face before applying.  If too drying, patient may add a non-comedogenic moisturizer. The patient verbalized understanding of the proper use and possible adverse effects of retinoids.  All of the patient's questions and concerns were addressed. Birth Control Pills Counseling: Birth Control Pill Counseling: I discussed with the patient the potential side effects of OCPs including but not limited to increased risk of stroke, heart attack, thrombophlebitis, deep venous thrombosis, hepatic adenomas, breast changes, GI upset, headaches, and depression.  The patient verbalized understanding of the proper use and possible adverse effects of OCPs. All of the patient's questions and concerns were addressed. Topical Clindamycin Pregnancy And Lactation Text: This medication is Pregnancy Category B and is considered safe during pregnancy. It is unknown if it is excreted in breast milk. Tetracycline Counseling: Patient counseled regarding possible photosensitivity and increased risk for sunburn.  Patient instructed to avoid sunlight, if possible.  When exposed to sunlight, patients should wear protective clothing, sunglasses, and sunscreen.  The patient was instructed to call the office immediately if the following severe adverse effects occur:  hearing changes, easy bruising/bleeding, severe headache, or vision changes.  The patient verbalized understanding of the proper use and possible adverse effects of tetracycline.  All of the patient's questions and concerns were addressed. Patient understands to avoid pregnancy while on therapy due to potential birth defects. Isotretinoin Pregnancy And Lactation Text: This medication is Pregnancy Category X and is considered extremely dangerous during pregnancy. It is unknown if it is excreted in breast milk. Sarecycline Counseling: Patient advised regarding possible photosensitivity and discoloration of the teeth, skin, lips, tongue and gums.  Patient instructed to avoid sunlight, if possible.  When exposed to sunlight, patients should wear protective clothing, sunglasses, and sunscreen.  The patient was instructed to call the office immediately if the following severe adverse effects occur:  hearing changes, easy bruising/bleeding, severe headache, or vision changes.  The patient verbalized understanding of the proper use and possible adverse effects of sarecycline.  All of the patient's questions and concerns were addressed.

## 2022-02-08 NOTE — ED ADULT NURSE NOTE - NS_NURSE_DISC_TEACHING_YN_ED_ALL_ED
Patient: Isak Zamora Date: 2022   : 1938 Attending: Joseph Goodrich MD   83 year old male        Chief complaint: Hypernatremia, hypokalemia, CKD 3/EJ    Subjective:  Resting in bed. Sleepy, arousable. Not responding verbally. Appears comfortable.    Reviewed: Vital signs, labs, imaging studies, medications, notes    Vital Last Value 24 Hour Range   Temperature 98.3 °F (36.8 °C) (22) Temp  Min: 97.5 °F (36.4 °C)  Max: 98.3 °F (36.8 °C)   Pulse 89 (22) Pulse  Min: 73  Max: 89   Respiratory 18 (22) Resp  Min: 16  Max: 18   Non-Invasive  Blood Pressure 132/75 (22) BP  Min: 127/82  Max: 158/95   Arterial   Blood Pressure   No data recorded   PulseOximetry 97 % (22) SpO2  Min: 97 %  Max: 100 %     Vital Today Admitted   Weight 97.2 kg (214 lb 4.6 oz) (22) Weight: 87.6 kg (193 lb 2 oz) (22 133)   Height N/A Height: 5' 9\" (175.3 cm) (22)   BMI N/A BMI (Calculated): 27.88 (22)     Weight over the past 48 Hours:  Patient Vitals for the past 48 hrs:   Weight   22 0500 97.3 kg (214 lb 6.4 oz)   22 97.2 kg (214 lb 4.6 oz)       Intake/Output:    Last Stool Occurrence:  (0) (22)    No intake/output data recorded.    I/O last 3 completed shifts:  In: 600 [P.O.:600]  Out: -       Intake/Output Summary (Last 24 hours) at 2022 1027  Last data filed at 2022 2100  Gross per 24 hour   Intake 600 ml   Output --   Net 600 ml       Medications/Infusions:  Scheduled:   • carbidopa-levodopa  1 tablet Oral TID   • sodium chloride (PF)  10 mL Injection 2 times per day   • mirtazapine  15 mg Oral Nightly   • potassium CHLORIDE  20 mEq Oral BID WC   • sodium chloride (PF)  2 mL Intracatheter 2 times per day   • tamsulosin  0.4 mg Oral Daily after dinner   • pantoprazole  40 mg Oral BID   • docusate sodium-sennosides  1 tablet Oral Daily   • finasteride  5 mg Oral Daily   • apixaBAN  5 mg Oral 2  times per day   • cholecalciferol  50 mcg Oral Daily   • [Held by provider] furosemide  40 mg Oral Daily   • gabapentin  300 mg Oral TID   • mirabegron ER  25 mg Oral Daily   • verapamil  180 mg Oral Daily   • Potassium Standard Replacement Protocol   Does not apply See Admin Instructions   • Magnesium Standard Replacement Protocol   Does not apply See Admin Instructions       Continuous Infusions:      Physical Exam:  Pt alert, NAD  HEENT: Oral mucosa is moist. Sclerae anicteric  Neck: Supple. No JVD  Cardiac: S1, S2, RRR  Lungs: CTA bilaterally. No IWOB. On RA. SpO2 97%  Abdomen: Soft, NT, +BS  Extremities: No edema  Neuro: Alert, minimally interactive      Laboratory Results:    Recent Labs   Lab 02/08/22  0525 02/07/22  0611 02/05/22  0440   SODIUM 139 139 138   POTASSIUM 4.0 4.4 3.9   CHLORIDE 106 108* 109*   CO2 26 25 24   ANIONGAP 11 10 9*   BUN 6 5* 4*   CREATININE 0.83 0.78 0.78   CALCIUM 9.0 8.7 8.4   GLUCOSE 134* 99 109*   WBC  --  6.9  --    HCT  --  32.1*  --      Recent Labs   Lab 02/07/22  0611   HGB 10.0*      MG 1.8   PHOS 2.9   ALKPT 59   BILIRUBIN 0.4   AST 10   GPT 7   ALBUMIN 2.3*       Urine Panel  Lab Results   Component Value Date    UKET 20  (A) 01/27/2022    USPG 1.016 01/27/2022    UPROT 30  (A) 01/27/2022    UWBC Moderate (A) 01/27/2022    URBC Small (A) 01/27/2022    UBILI Negative 01/27/2022    UPH 5.0 01/27/2022    UROB 1.0 01/27/2022    UBACTR NEGATIVE 02/24/2003       Diagnosis/Plan:  1. Hypertension.   Well controlled on verapamil    2. CKD II   Renal function is stable   OK to supplement lytes PRN    3. Acute kidney injury : likely prerenal. Resolved     4. Hypokalemia, corrected.     5.  Hypernatremia : Resolved    Pt to be discharged to SNF today.  He currently appears euvolemic off diuretics   Has refused KCl supplement for the past several days. Did take it this AM (or part of it per RN report). His potassium level is normal  He probably does not need KCl supplement if  he is off diuretics.     Carolyn Mcqueen NP         Yes

## 2022-06-14 ENCOUNTER — EMERGENCY (EMERGENCY)
Facility: HOSPITAL | Age: 52
LOS: 1 days | Discharge: HOME | End: 2022-06-14
Attending: EMERGENCY MEDICINE
Payer: MEDICAID

## 2022-06-14 ENCOUNTER — NON-APPOINTMENT (OUTPATIENT)
Age: 52
End: 2022-06-14

## 2022-06-14 VITALS
RESPIRATION RATE: 16 BRPM | SYSTOLIC BLOOD PRESSURE: 95 MMHG | HEART RATE: 80 BPM | DIASTOLIC BLOOD PRESSURE: 67 MMHG | HEIGHT: 64 IN | TEMPERATURE: 98 F | WEIGHT: 156.09 LBS | OXYGEN SATURATION: 99 %

## 2022-06-14 DIAGNOSIS — Z78.9 OTHER SPECIFIED HEALTH STATUS: Chronic | ICD-10-CM

## 2022-06-14 PROBLEM — E03.9 HYPOTHYROIDISM, UNSPECIFIED: Chronic | Status: ACTIVE | Noted: 2019-10-27

## 2022-06-14 PROBLEM — F25.9 SCHIZOAFFECTIVE DISORDER, UNSPECIFIED: Chronic | Status: ACTIVE | Noted: 2019-10-27

## 2022-06-14 PROBLEM — E78.5 HYPERLIPIDEMIA, UNSPECIFIED: Chronic | Status: ACTIVE | Noted: 2019-10-27

## 2022-06-14 PROBLEM — E11.9 TYPE 2 DIABETES MELLITUS WITHOUT COMPLICATIONS: Chronic | Status: ACTIVE | Noted: 2019-10-27

## 2022-06-14 PROCEDURE — 99282 EMERGENCY DEPT VISIT SF MDM: CPT

## 2022-06-14 PROCEDURE — 99283 EMERGENCY DEPT VISIT LOW MDM: CPT

## 2022-06-14 RX ORDER — MEN-PHOR .5; .5 G/G; G/G
1 LOTION TOPICAL
Qty: 1 | Refills: 0
Start: 2022-06-14 | End: 2022-06-23

## 2022-06-14 NOTE — ED PROVIDER NOTE - NS ED ATTENDING STATEMENT MOD
I have seen and examined this patient and fully participated in the care of this patient as the teaching attending.  The service was shared with the ARANZA.  I reviewed and verified the documentation and independently performed the documented:

## 2022-06-14 NOTE — ED PROVIDER NOTE - PROGRESS NOTE DETAILS
BELLA CORDOVA: evaluated at bedside. one day rash to face no associated fever chills or pain, no new creams lotions chnages to medications. offers no other complaints. from [place]. no si/hi.   blanching erythematous plaque to face no ulcerations no petechiae. no palpable area of induration. front forehead.   dc and fu with derm

## 2022-06-14 NOTE — ED PROVIDER NOTE - CARE PROVIDER_API CALL
Jodie Acuna)  Dermatology  [address] [place] [address]  Phone: [phone]  Fax: [phone]  Follow Up Time: 4-6 Days

## 2022-06-14 NOTE — ED PROVIDER NOTE - ATTENDING CONTRIBUTION TO CARE
rash to face since yesterday. no itch, no fever.  Denies application of topicals to face.  Denies prior skin problems.  PE:  erythematous patch to forehead and left malar area without secondary scaling or crusting.  A/P  rash; etiology unclear.  advised treatment with sarna cream and follow-up with dermatology

## 2022-06-14 NOTE — ED ADULT NURSE NOTE - OBJECTIVE STATEMENT
51y female PMHX of Schizoaffective Disorder, HLD, hypothyroid, and pancytopenia. pt is residing at [facility] c/o 1 day hx of diffuse red non-itching rash localized to the patient's forehead and left cheek. She states the rash was first noticed this morning by staff and she was advised to have an evaluation in the ED. She denies any use of new lotions, creams, fabric softeners, detergents. No new changes in medications. Patient denies any new systemic symptoms. No fevers, chills, Nasal congestion, sore throat, recent sick contact.

## 2022-06-14 NOTE — ED PROVIDER NOTE - OBJECTIVE STATEMENT
Patient is a 51yoF w/ pmh Schizoaffective Disorder, HLD, hypothyroid hx pancytopenia residing at [facility] c/o 1 day hx of diffuse red non-itching rash localized to the patient's forehead and left cheek. She states the rash was first noticed this morning by staff and she was advised to have an evaluation in the ED. She denies any use of new lotions, creams, fabric softeners, detergents. No new changes in medications. Patient denies any new systemic symptoms. No fevers, chills, Nasal congestion, sore throat, recent sick contact.

## 2022-06-14 NOTE — ED PROVIDER NOTE - NS ED ROS FT
Review of Systems  •	CONSTITUTIONAL - no  fever, no diaphoresis, no weight change  •	SKIN - +RASH (forehead and left cheek), non-itching  •	HEMATOLOGIC - no bleeding, no bruising  •	EYES - no eye pain, no blurred vision  •	ENT - no change in hearing, no pain  •	RESPIRATORY - no shortness of breath, no cough  •	CARDIAC - no chest pain, no palpitations  •	GI - no abd pain, no nausea, no vomiting, no diarrhea, no constipation, no bleeding  •	GENITO-URINARY - no discharge, no dysuria; no hematuria,   •	ENDO - no polydipsia, no polyuria, no heat/no cold intolerance  •	MUSCULOSKELETAL - no joint pain, no swelling, no redness  •	NEUROLOGIC - no weakness, no headache, no anesthesia, no paresthesias

## 2022-06-14 NOTE — ED PROVIDER NOTE - NSICDXPASTMEDICALHX_GEN_ALL_CORE_FT
10/16/21 3066 -Cardiology consult called in and RNs number provided for callback.    -Lady Lui, PCA PAST MEDICAL HISTORY:  Diabetes mellitus     Hyperlipidemia     Hypothyroid     Schizoaffective disorder

## 2022-06-14 NOTE — ED PROVIDER NOTE - PATIENT PORTAL LINK FT
You can access the FollowMyHealth Patient Portal offered by [facility] by registering at the following website: [email or web address]. By joining [employer]’s FollowMyHealth portal, you will also be able to view your health information using other applications (apps) compatible with our system.

## 2022-06-21 NOTE — ED PROVIDER NOTE - MUSCULOSKELETAL NEGATIVE STATEMENT, MLM
Take prednisone as prescribed for the next 4 days    Take Benadryl as needed for any recurrence of the rash, or for any itchiness  Do not drive or drink any alcohol after taking this medication      Return to the emergency department for for any trouble breathing, lip or tongue swelling, trouble swallowing, wheezing, or any other new or concerning symptoms    Follow-up with primary care and allergy to determine cause of rash no back pain, no gout, no musculoskeletal pain, no neck pain, and no weakness.

## 2022-08-02 NOTE — ED PROVIDER NOTE - PROGRESS NOTE
Problem: Adult Inpatient Plan of Care  Goal: Plan of Care Review  Outcome: Ongoing, Progressing  Goal: Absence of Hospital-Acquired Illness or Injury  Outcome: Ongoing, Progressing  Goal: Optimal Comfort and Wellbeing  Outcome: Ongoing, Progressing  Goal: Readiness for Transition of Care  Outcome: Ongoing, Progressing      Stable.

## 2022-10-04 NOTE — ED PROVIDER NOTE - TOBACCO USE
----- Message from YISEL Abel sent at 10/3/2022  4:04 PM CDT -----  Lab/diagnostic results reviewed in detail.  Please call patient with the following:    NT pro-BNP is almost in normal range, lowest recorded in 2 years. Likely no fluid overload.  Kidney function is stable.  Electrolytes are stable.    No change in plan of care.     Former smoker

## 2022-10-12 NOTE — ED ADULT TRIAGE NOTE - PRO INTERPRETER NEED 2
HEART CARE NOTE          Assessment/Recommendations     1. HFrEF c/b shock  Assessment / Plan    New diagnosis + mildly positive troponin - ? Stress induced in the setting of hypoxic respiratory failure - repeat echo once underlying stressors have been resolved; will likely need ischemic evaluation for further assessment if persistent    Somewhat volume overloaded on physical exam - will give furosemide bolus x1 while monitoring hemodynamics and response    GDMT on hold given the above     2. Shock  Assessment / Plan    Likely mixed picture of cardiogenic and distributive      Continue pressor support (wean as tolerated) and supportive care     3. PE  Assessment / Plan    ?signs of RV strain -  heparin gtt and management per primary team        4. COVID-19 positive  Assessment / Plan    Supportive care per primary team     Patient remains critically ill in the ICU requiring mechanical ventilation support as well as multiple vasopressor requirements for hemodynamic support. 50 minutes spent on critical care.    History of Present Illness/Subjective      Ms. Lavonne Talbot is a 66 year old female with a PMHx significant for (per Tu Price's note) active smoker, with history of lymphedema on furosemide since 2021, metabolic alkalosis, polycythemia, and recent diagnosis of COVID-19 viral infection 1 week ago who presented to [facility] ED on 10/8/2022 due to respiratory distress now admitted to the ICU with acute respiratory failure requiring mechanical ventilation in the setting of possible community-acquired pneumonia or aspiration pneumonia with mucous plugging, small subsegmental pulmonary embolism, circulatory shock on vasopressors, and possible myocardial injury.     Today, Patient is intubated and sedated; Management plan as detailed above     ECG: Personally reviewed. normal sinus rhythm, nonspecific ST and T waves changes.     ECHO (personnaly Reviewed):  1. Left ventricular size and wall thickness  "are normal. Systolic function is  moderately reduced with global hypokinesis. The estimated left ventricular  ejection fraction is 30-35%.  2. Right ventricle is not well visualized. Limited views suggest possible  enlargement and reduced systolic function.  3. No hemodynamically significant valvular abnormalities.  4. No prior study available for comparison.          Physical Examination Review of Systems   /75   Pulse 53   Temp 98.1  F (36.7  C)   Resp 20   Ht 1.702 m (5' 7\")   Wt 46.7 kg (102 lb 14.4 oz)   SpO2 (!) 88%   BMI 16.12 kg/m    Body mass index is 16.12 kg/m .  Wt Readings from Last 3 Encounters:   10/12/22 46.7 kg (102 lb 14.4 oz)     General Appearance:   Intubated/sedated   ENT/Mouth: membranes moist, no oral lesions or bleeding gums.      EYES:  no scleral icterus, normal conjunctivae   Neck: no carotid bruits or thyromegaly   Chest/Lungs:   lungs are clear to auscultation, no rales or wheezing, equal chest wall expansion    Cardiovascular:   Regular. Normal first and second heart sounds with no murmurs, rubs, or gallops; the carotid, radial and posterior tibial pulses are intact, + LE edema   Abdomen:  no organomegaly, masses, bruits, or tenderness; bowel sounds are present   Extremities: no cyanosis or clubbing   Skin: no xanthelasma, warm.    Neurologic: Intubated/sedated     Psychiatric: Intubated/sedated    A complete 10 systems ROS was reviewed  And is negative except what is listed in the HPI.          Medical History  Surgical History Family History Social History   Past Medical History:   Diagnosis Date     Tobacco abuse     Past Surgical History:   Procedure Laterality Date     NO HISTORY OF SURGERY      no family history of premature coronary artery disease Social History     Socioeconomic History     Marital status:      Spouse name: Not on file     Number of children: Not on file     Years of education: Not on file     Highest education level: Not on file "   Occupational History     Not on file   Tobacco Use     Smoking status: Every Day     Types: Cigarettes     Smokeless tobacco: Not on file   Substance and Sexual Activity     Alcohol use: Not on file     Drug use: Not on file     Sexual activity: Not on file   Other Topics Concern     Not on file   Social History Narrative     Not on file     Social Determinants of Health     Financial Resource Strain: Not on file   Food Insecurity: Not on file   Transportation Needs: Not on file   Physical Activity: Not on file   Stress: Not on file   Social Connections: Not on file   Intimate Partner Violence: Not on file   Housing Stability: Not on file           Lab Results    Chemistry/lipid CBC Cardiac Enzymes/BNP/TSH/INR   Lab Results   Component Value Date    BUN 30.5 (H) 10/12/2022     (H) 10/12/2022    CO2 31 (H) 10/12/2022    Lab Results   Component Value Date    WBC 12.2 (H) 10/12/2022    HGB 14.8 10/12/2022    HCT 45.6 10/12/2022    MCV 97 10/12/2022     10/12/2022    Lab Results   Component Value Date    INR 1.41 (H) 10/08/2022     No results found for: CKTOTAL, CKMB, TROPONINI       Weight:    Wt Readings from Last 3 Encounters:   10/12/22 46.7 kg (102 lb 14.4 oz)       Allergies  No Known Allergies      Surgical History  Past Surgical History:   Procedure Laterality Date     NO HISTORY OF SURGERY         Social History  Tobacco:   History   Smoking Status     Every Day     Types: Cigarettes   Smokeless Tobacco     Not on file    Alcohol:   Social History    Substance and Sexual Activity      Alcohol use: Not on file   Illicit Drugs:   History   Drug Use Not on file       Family History  Family History   Problem Relation Age of Onset     Diabetes Mother           Nisreen Amin MD on 10/12/2022      cc: Cinthya Cabral     English

## 2022-11-03 NOTE — H&P ADULT. - I WAS PHYSICALLY PRESENT FOR THE KEY PORTIONS OF THE EVALUATION AND MANAGEMENT (E/M) SERVICE PROVIDED.  I AGREE WITH THE ABOVE HISTORY, PHYSICAL, AND PLAN WHICH I HAVE REVIEWED AND EDITED WHERE APPROPRIATE
improvement in psychosis and CGI improvement score of 2  improvement in psychosis and CGI improvement score of 2  improvement in psychosis and CGI improvement score of 2  Statement Selected

## 2022-12-13 NOTE — ED PROVIDER NOTE - CHIEF COMPLAINT
Pt admitted to L&D and oriented to room, call light, and plan of care (expectant management).  Dr. Bell aware of admission and will see pt.   The patient is a 46y Female complaining of seizures.

## 2023-04-27 NOTE — PATIENT PROFILE ADULT. - FALLEN IN THE PAST
Date of Service:  4/27/2023    Chief Complaint:  Bladder cancer  Benign prostatic hypertrophy with obstruction.  Slow stream, incomplete bladder emptying, nocturia, incontinence  Elevated PSA    History of Present Illness:  The patient presents today 2 weeks since I last saw him, his medical records were reviewed and in summary, Alexy Bourne is an 81-year old male who has a past urologic history significant for BPH who presented with gross hematuria and a CT scan revealed multiple sessile masses within the bladder.  He underwent a transurethral resection of bladder tumor on 1-26-23 which revealed high-grade invasive disease, but no evidence of any invasion into the muscle.  Because of the extent of his disease last time, it was an incomplete resection, and there was marked bleeding that made it very difficult to assess invasion.  At that time, he was not given gemcitabine, because of the bleeding at the termination of the procedure. Therefore I took him back to the operating room for a transurethral resection of bladder tumor with Gemcitabine on 3-23-23.  Final pathology reveals invasive papillary urothelial carcinoma, high-grade.  Muscularis propria is present and negative for tumor.  He is having persistent burning with urination with low urine output and constipation issues, this has been since his procedure.          Independent Historian:  No    PSA Test Results:  Lab Results   Component Value Date    PSA 2.62 12/28/2022    PSA 3.07 12/22/2021    PSA 1.95 06/25/2021    PSA 2.12 08/24/2020    PSA 2.32 08/15/2019    PSA 5.43 (H) 04/25/2017    PSA 5.34 (H) 04/21/2016    PSA 5.55 (H) 04/07/2015    PSA 4.65 (H) 03/17/2014    PSA 4.66 (H) 05/28/2013       Past Medical History:   Past Medical History:   Diagnosis Date   • Bronchitis     x2   • CAD (coronary artery disease)    • Congestive cardiac failure (CMD)    • Essential (primary) hypertension    • Gastroesophageal reflux disease    • Iliac artery aneurysm  (CMD)    • Malignant neoplasm (CMD)    • Malignant neoplasm of overlapping sites of bladder (CMD) 3/26/2023   • MI (myocardial infarction) (CMD)    • Osteoporosis, unspecified 2006   • Pneumonia     x3   • Urinary tract infection        Surgical History:  Past Surgical History:   Procedure Laterality Date   • Colonoscopy  2019   • Colonoscopy diagnostic  10/01/2005    Colonoscopy, Dx   • Coronary angioplasty with stent placement  2009    Gobles to Mid RCA   • Coronary angioplasty with stent placement  2009    Gobles to mid LCx and Prox OM   • Cystoscopy w/ transurethral resection of posterior uretheral valves  2023   • Dexa bone density axial skeleton  2006   • Echocardiogram  2009    LVEF 59%   • Nm jelani per rst/strs pharmacolo  2014    Negative, 10.4 METs. EF >60%   • Stress echo  2011    Negative   • Vasectomy     :    Family History:  Family history of G.U. (Genitourinary) Malignancy - none  Family History   Problem Relation Age of Onset   • Osteoarthritis Mother    • Osteoarthritis Father    • Heart disease Father    • Stroke Father    • High blood pressure Father    • Birth defects Maternal Grandfather        Social History:  Social History     Socioeconomic History   • Marital status:      Spouse name: Not on file   • Number of children: Not on file   • Years of education: Not on file   • Highest education level: Not on file   Occupational History   • Not on file   Tobacco Use   • Smoking status: Former     Current packs/day: 0.00     Average packs/day: 1.5 packs/day for 31.0 years (46.5 pk-yrs)     Types: Cigarettes     Start date: 1953     Quit date: 1984     Years since quittin.3   • Smokeless tobacco: Never   Vaping Use   • Vaping status: never used   Substance and Sexual Activity   • Alcohol use: Yes     Alcohol/week: 1.0 standard drink of alcohol     Types: 1 Glasses of wine per week     Comment: on Holidays   • Drug use: No   •  Sexual activity: Not on file   Other Topics Concern   • Not on file   Social History Narrative   • Not on file     Social Determinants of Health     Financial Resource Strain: Low Risk  (1/26/2023)    Financial Resource Strain    • Social Determinants: Financial Resource Strain: None   Food Insecurity: No Food Insecurity (1/26/2023)    Food Insecurity    • Social Determinants: Food Insecurity: Never   Transportation Needs: No Transportation Needs (1/26/2023)    PRAPARE - Transportation    • Lack of Transportation (Medical): No    • Lack of Transportation (Non-Medical): No   Physical Activity: Not on file   Stress: Not on file   Social Connections: Socially Integrated (1/26/2023)    Social Connections    • Social Determinants: Social Connections: 5 or more times a week   Intimate Partner Violence: Not At Risk (3/20/2023)    Intimate Partner Violence    • Social Determinants: Intimate Partner Violence Past Fear: No    • Social Determinants: Intimate Partner Violence Current Fear: No       Allergies:  ALLERGIES:   Allergen Reactions   • Calendula Officinalis (Marigold) Other (See Comments)     Shortness of breath     • Grass Other (See Comments)     bronchitis       Medications:  Current Outpatient Medications   Medication Sig Dispense Refill   • Magnesium Hydroxide (DULCOLAX PO)      • finasteride (PROSCAR) 5 MG tablet Take 1 tablet by mouth daily. 90 tablet 0   • clopidogrel (PLAVIX) 75 MG tablet Take 1 tablet by mouth daily. 90 tablet 0   • aspirin 81 MG chewable tablet Chew 1 tablet by mouth daily. Do not start before February 1, 2023. 90 tablet 3   • metoPROLOL tartrate (LOPRESSOR) 25 MG tablet TAKE ONE-HALF (1/2) TABLET TWICE A DAY 90 tablet 1   • nitroGLYcerin (NITROSTAT) 0.4 MG sublingual tablet Place 1 tablet under the tongue every 5 minutes as needed for Chest pain. 30 tablet 2   • tamsulosin (FLOMAX) 0.4 MG Cap Take 2 capsules by mouth daily after a meal. ONE HALF HOUR AFTER EVENING MEAL 180 capsule 3   •  ZINC SULFATE PO Take 12.5 mg by mouth daily.     • cholecalciferol (VITAMIN D3) 1000 UNITS tablet Take 1,000 Units by mouth daily.     • Ascorbic Acid (VITAMIN C) 500 MG tablet Take 500 mg by mouth daily.       No current facility-administered medications for this visit.       Allergies, Medications, Past Medical History, Past Surgical History, Family History, and Social History were reviewed today.    Physical Exam:    Constitutional:  Visit Vitals  BP (!) 177/66   Pulse (!) 55   Resp 16   Ht 5' 7\" (1.702 m)   Wt 91.2 kg (201 lb)   BMI 31.48 kg/m²     Well developed, well nourished, and afebrile.    Genitourinary: Meatus with inflammation and a little bit of induration   Scrotum normal  Testicles descended  Right testicle normal size, shape and consistency  Right epididymis normal  Left testicle normal size, shape and consistency  Left epididymis normal      Tests Reviewed: Surgical pathology  Tests Ordered: CT urogram    Assessment and Plan:  Therefore, we discussed where to go from here.    For his bladder cancer-  Checking his upper tracts, I am recommending a CT urogram.        For his burning with urination-likely UTI-   I am going to empirically treat him for an infection with Doxycycline x 10 days.          For his benign prostatic hypertrophy with obstruction with obstructive voiding symptoms -  We are going to continue him on the maximum medical management to include Flomax 0.8 mg and Finasteride 5 mg.         For his weak stream, incomplete bladder emptying and nocturia-   We will continue to monitor him with urinary flow studies in the office.      For his elevated prostate specific antigen-    We will continue to monitor his prostate specific antigen values.       Follow up in 3 weeks for a symptom check.  He agrees with the plan and is pleased.          On 4/27/2023, Stephany PATEL scribed the services personally performed by Ba Franks MD  The documentation recorded by the scribe accurately  and completely reflects the service(s) I personally performed and the decisions made by me.        yes

## 2023-06-07 NOTE — H&P ADULT. - GUM GEN PE MLT EXAM PC
Bharti [phone] calling to inform that patient was released from rehab at [facility] on 6/6/2023, and was ordered for home health, asking if doctor will sign Normal genitalia; no lesions; no discharge

## 2023-07-30 NOTE — ED BEHAVIORAL HEALTH ASSESSMENT NOTE - ADULT OR CHILD PROTECTIVE SERVICES INVOLVEMENT
Rest, ice, and elevate L knee.  You may take tylenol 650mg every 6 hours as needed for pain. Do not exceed 3500mg in 24 hours.  You may take 400-600mg ibuprofen every 6 hours as needed.    If needed, you can alternate these medications so that you take one medication every 3 hours.   For instance, at noon take ibuprofen, then at 3pm take tylenol, then at 6pm take ibuprofen again.    No

## 2023-11-11 NOTE — ED BEHAVIORAL HEALTH ASSESSMENT NOTE - SUICIDE ATTEMPT:
Anesthesia Post Evaluation    Patient: Sonali Feliz    Procedure(s) Performed: Procedure(s) (LRB):  ORIF, FRACTURE, FEMUR, INTERTROCHANTERIC (Left)    Final Anesthesia Type: general      Patient location during evaluation: PACU  Patient participation: Yes- Able to Participate  Level of consciousness: awake and alert  Post-procedure vital signs: reviewed and stable  Pain management: adequate  Airway patency: patent    PONV status at discharge: No PONV  Anesthetic complications: no      Cardiovascular status: blood pressure returned to baseline and hemodynamically stable  Respiratory status: unassisted  Hydration status: euvolemic  Follow-up not needed.          Vitals Value Taken Time   /60 11/11/23 1137   Temp 37 °C (98.6 °F) 11/11/23 1137   Pulse 73 11/11/23 1208   Resp 18 11/11/23 1137   SpO2 98 % 11/11/23 1137         Event Time   Out of Recovery 11/08/2023 18:33:00         Pain/Jadyn Score: Pain Rating Prior to Med Admin: 0 (11/11/2023 11:37 AM)           None known

## 2023-12-07 NOTE — REASON FOR VISIT
"  Subjective   Reason for Visit: Loki Donis is an 69 y.o. y.o. male here for a Medicare Wellness visit.     Would like to know about the combination RX for losartan and hydrochlorothiazide in stead of having them sperate     HPI    Patient Care Team:  Jono Moody MD as PCP - General  Jono Moody MD as PCP - [facility] ACO PCP     Review of Systems    Objective   Vitals:  /62 (BP Location: Right arm, BP Cuff Size: Large adult)   Pulse 79   Temp 36.3 °C (97.3 °F) (Temporal)   Ht 1.676 m (5' 6\")   Wt 113 kg (249 lb)   SpO2 97%   BMI 40.19 kg/m²       Physical Exam    Assessment/Plan   Problem List Items Addressed This Visit    None  Patient education provided.  Stay current with age appropriate health maintenance as instructed.  Appointment here or ER with new or worsening symptoms'  Keep appropriate follow-up visit.  Stay current with proper immunizations   " [Follow-Up - Clinic] : a clinic follow-up of [Hyperlipidemia] : hyperlipidemia [FreeTextEntry1] : \par \par continues smoking ---? ppd; denies cough, wheezing, SOB\par \par meds---taking as listed\par \par see psychiatrist ---q. 2 weeks\par \par

## 2023-12-09 NOTE — DISCHARGE NOTE ADULT - PATIENT PORTAL LINK FT
Goal Outcome Evaluation:              Outcome Evaluation: patient discharging home with instructions to follow up with neurology. patient received dc instructions, verbalized understanding and denied having questions.          “You can access the FollowHealth Patient Portal, offered by [facility], by registering with the following website: [email or web address]”

## 2024-01-19 NOTE — ED BEHAVIORAL HEALTH ASSESSMENT NOTE - ORIENTED TO PLACE
1. Recommend to change diet to minced and moist consistency and mildly thick liquids as per previous SLP recs to maximize caloric and protein intake and encourage high-kcal, protein-rich foods, maximize food preferences   2. Add mildly thick ensure plus high protein BID to optimize PO intake (provides 350 kcal, 20g protein/ shake)   3. Recommend to add Vit C 500 mg BID and Zinc Sulfate 220 mg x 10 days to promote wound healing.   4. Consider obtaining vitamin D 25OH level to assess nutriture   5. Monitor bowel movements, if no BM for >3 days, consider implementing bowel regimen.   6. Consider adding thiamine 100 mg daily 2/2 poor PO intake/ malnutrition and MVI w/ minerals daily to ensure 100% RDA met   7. Confirm goals of care regarding nutrition support - nutrition support is not recommended due to overall declining medical status which evidenced based studies indicate EN is not effective in prolonging survival and improving quality of life. It can also increase risk of aspiration pneumonia as well as other related issues (infection, GI complications, and worsening/ non-healing PI's). However, will provide nutrition/ hydration within GOC.   RD will continue to monitor PO intake, labs, hydration, and wt prn. 
No

## 2024-03-25 NOTE — H&P ADULT. - PROBLEM/PLAN-3
[facility]  [address] [address]  [phone]  March 25, 2024    Mariana Blake  [address] [place]  [place] [facility] [address]    Dear Mariana,    I care about your health and have reviewed your health plan. I have reviewed your medical conditions, medication list, and lab results and am making recommendations based on this review, to better manage your health.    You are in particular need of attention regarding:  -Depression  -Wellness (Physical) Visit   -Chlamydia Screening    I am recommending that you:  {recommendations:-schedule a WELLNESS (Physical) APPOINTMENT with me.   I will check fasting labs the same day - nothing to eat except water and meds for 8-10 hours prior. (If you go elsewhere for Wellness visits then please disregard this reminder.)    Here is a list of Health Maintenance topics that are due now or due soon:  Health Maintenance Due   Topic Date Due    YEARLY PREVENTIVE VISIT  Never done    CHLAMYDIA SCREENING  Never done    ADVANCE CARE PLANNING  Never done    DEPRESSION ACTION PLAN  Never done    HIV SCREENING  Never done    HPV IMMUNIZATION (1 - 3-dose series) Never done    HEPATITIS C SCREENING  Never done    INFLUENZA VACCINE (1) 09/01/2023    COVID-19 Vaccine (1 - 2023-24 season) Never done    PHQ-9  11/15/2023    DEPRESSION 12 MO INDEX REPEAT PHQ-9  02/06/2024    ANNUAL REVIEW OF HM ORDERS  04/06/2024       Please call us at [phone] (or use [employer]) to address the above recommendations.     Thank you for trusting [facility] and we appreciate the opportunity to serve you.  We look forward to supporting your healthcare needs in the future.    Healthy Regards,    Elizabeth Dean PA-C           DISPLAY PLAN FREE TEXT

## 2024-04-18 NOTE — ED ADULT NURSE NOTE - CAS DISCH CONDITION
[FreeTextEntry1] : It's a pleasure to see Ms. NELSON EUBANKS In the office today. She is a 72 year -  old woman  who presents to the office today for the evaluation of unsteadiness progressed over the last few month as well as neck pain as a result of syncope.  She reports that she has had episodes of syncope since she was a child most time from standing up too fast from sitting position.  She has never seen a cardiologist for this.  4-month ago she had another episode of syncope and when she woke up, she found her neck to be very stiff and somewhat painful but without any radiation down her extremities and denies any weakness, numbness or urinary/bowel incontinence.  She had x-ray of the cervical spine which just showed scoliosis, but no fracture and no misalignment.  She thinks her unsteadiness without vertigo started after the fall and has been getting worse gradually.  She went to the hospital and had a CAT scan of the head that was normal.  She does complain feeling numbness in her feet when she walks and does not feel the ground well sometimes when she walks.  She does not use any assisted device to walk  She suffers from anxiety/depression and is on multiple psych medications for years.  No new medication was introduced in the last few month.
Stable

## 2024-04-20 NOTE — ED BEHAVIORAL HEALTH ASSESSMENT NOTE - NS ED BHA MSE SPEECH RATE
[facility] EMERGENCY DEPARTMENT  EMERGENCY DEPARTMENT ENCOUNTER        Pt Name: Ammon Owusu  MRN: [id number]  Birthdate 1981  Date of evaluation: 4/20/2024  Provider: SHEILA Lundy - VALERIO  PCP: Shruthi Meier MD  Note Started: 8:12 AM EDT 4/20/24       I have seen and evaluated this patient with my supervising physician Kalpesh Hunt MD.      CHIEF COMPLAINT       Chief Complaint   Patient presents with    Hyperglycemia     Pt arrived via [facility] for hyperglycemia and HTN. EMS states  and /100 enroute. Pt states he has T1DM. Pt states he took 20 units of insulin this morning. Pt states he has been feeling sick for about one week. Pt states he has had N&V and last vomitted last night.  @0817.       HISTORY OF PRESENT ILLNESS: 1 or more Elements     History From: pt, family at bedside        Social Determinants Significantly Affecting Health : Patient has significant healthcare illiteracy    Chief Complaint:above    Ammon Owusu is a 43 y.o. male who  has a past medical history of Arthritis, Asthma, COPD (chronic obstructive pulmonary disease) (HCC), Diabetes mellitus (HCC), Hyperlipidemia, and Hypertension. presents to ED for evaluation of diffuse abdominal pain, hyperglycemia and elevated blood pressure.  Tells me takes all of his medications as prescribed including lisinopril, NovoLog and Lantus.  His blood sugars at home have been greater than 500 for the past week.  Tells me multiple family members sick with diarrhea.  He reports multiple episodes of vomiting.  Daily marijuana use.  Nothing else makes symptoms better or worse.  Denies any chest pain or shortness of breath.    The patient  reports that he has quit smoking. He has never used smokeless tobacco. He reports current drug use. Frequency: 2.00 times per week. Drug: Marijuana (Weed). He reports that he does not drink alcohol.       Nursing Notes were all reviewed and agreed with or any  Normal

## 2024-04-24 NOTE — ED PROVIDER NOTE - CROS ED PSYCH ALL NEG
APPTS ARE READY TO BE MADE: [X] YES    Best Family or Patient Contact (if needed):    Additional Information about above appointments (if needed):    1:   2:   3:     Other comments or requests:    negative...

## 2024-07-08 NOTE — ED BEHAVIORAL HEALTH ASSESSMENT NOTE - DIFFERENTIAL
From home via EMS for c/o sickle cell crisis pain mainly is chest. Patient states this is one of his worst episodes ever and takes his breath away.   SAD, schizophrenia

## 2024-08-05 NOTE — ED ADULT TRIAGE NOTE - HEART RATE (BEATS/MIN)
Detail Level: Generalized Sunscreen Recommendations: ISDIN eryfotona actinica Topical Retinoids Recommendations: OTC Differin gel Detail Level: Zone 65

## 2024-10-30 ENCOUNTER — NON-APPOINTMENT (OUTPATIENT)
Age: 54
End: 2024-10-30

## 2024-10-30 NOTE — ED ADULT NURSE NOTE - ED STAT RN HANDOFF TIME
Writer reviewed the referral. Referral and the documentation is looking for psychiatry not neuropsychological testing.    07:10 07:11

## 2024-12-03 NOTE — ED PROVIDER NOTE - MEDICAL DECISION MAKING DETAILS
EKG changes likely related to lead reversal.  EKG in [facility] ED identical to pt's most recent ekg in system.  Patient asymptomatic. [Negative] : Heme/Lymph [Blurry Vision] : blurred vision EKG changes likely related to lead reversal.  EKG in [facility] ED identical to pt's most recent ekg in system.  Patient asymptomatic.    Rosi Glez MD - Attending Physician: Pt sent by pcp for ?EKG on routine screening. Pt asymptomatic, normal exam. EKG here unchanged from priors (at least 4 over the last year). No ACS or ischemic signs. F/u with pcp as needed. No intervention needed

## 2025-03-11 NOTE — ED PROVIDER NOTE - CROS ED SKIN ALL NEG
-Xray obtained. Will call pt with radiologist result. Discussed preliminary findings.  -Pt provided with ACE wrap as the xray does not appear to have an obvious fracture.  -Rest injured area / avoid strenuous or painful activities that cause pain or strain.  -Ice injured area 20 minutes at a time with ice pack 10 times per day.  -Elevate affected area above the level of your heart as tolerated.  -Tylenol and Motrin as directed.     negative...

## 2025-07-11 NOTE — ED ADULT NURSE NOTE - CAS EDP DISCH DISPOSITION ADMI
Advised patient's daughter to call back once pt's physical therapy is completed.      Copied from CRM #[id number]. Topic: General Inquiry - Return Call  >> Jul 11, 2025  4:00 PM Linda wrote:  Pt is returning a missed call from someone in the office and is asking for a return call back soon. Thanks.     Reason for call:miss call      Patient's DX:     Patient requesting call back or [facility] msg: [phone]  
[email or web address]
